# Patient Record
Sex: FEMALE | Race: OTHER | HISPANIC OR LATINO | URBAN - METROPOLITAN AREA
[De-identification: names, ages, dates, MRNs, and addresses within clinical notes are randomized per-mention and may not be internally consistent; named-entity substitution may affect disease eponyms.]

---

## 2017-01-01 ENCOUNTER — OUTPATIENT (OUTPATIENT)
Dept: OUTPATIENT SERVICES | Facility: HOSPITAL | Age: 57
LOS: 1 days | End: 2017-01-01
Payer: MEDICAID

## 2017-01-01 DIAGNOSIS — Z90.49 ACQUIRED ABSENCE OF OTHER SPECIFIED PARTS OF DIGESTIVE TRACT: Chronic | ICD-10-CM

## 2017-01-01 DIAGNOSIS — Z98.89 OTHER SPECIFIED POSTPROCEDURAL STATES: Chronic | ICD-10-CM

## 2017-03-01 PROCEDURE — G9001: CPT

## 2017-03-08 DIAGNOSIS — R69 ILLNESS, UNSPECIFIED: ICD-10-CM

## 2017-04-05 ENCOUNTER — OUTPATIENT (OUTPATIENT)
Dept: OUTPATIENT SERVICES | Facility: HOSPITAL | Age: 57
LOS: 1 days | End: 2017-04-05
Payer: MEDICAID

## 2017-04-05 ENCOUNTER — APPOINTMENT (OUTPATIENT)
Dept: CARDIOLOGY | Facility: HOSPITAL | Age: 57
End: 2017-04-05

## 2017-04-05 DIAGNOSIS — Z98.89 OTHER SPECIFIED POSTPROCEDURAL STATES: Chronic | ICD-10-CM

## 2017-04-05 DIAGNOSIS — R07.89 OTHER CHEST PAIN: ICD-10-CM

## 2017-04-05 DIAGNOSIS — R07.9 CHEST PAIN, UNSPECIFIED: ICD-10-CM

## 2017-04-05 DIAGNOSIS — Z90.49 ACQUIRED ABSENCE OF OTHER SPECIFIED PARTS OF DIGESTIVE TRACT: Chronic | ICD-10-CM

## 2017-04-05 PROCEDURE — 75574 CT ANGIO HRT W/3D IMAGE: CPT | Mod: 26

## 2017-04-05 PROCEDURE — 75574 CT ANGIO HRT W/3D IMAGE: CPT

## 2017-06-01 ENCOUNTER — OUTPATIENT (OUTPATIENT)
Dept: OUTPATIENT SERVICES | Facility: HOSPITAL | Age: 57
LOS: 1 days | End: 2017-06-01
Payer: MEDICAID

## 2017-06-01 DIAGNOSIS — Z98.89 OTHER SPECIFIED POSTPROCEDURAL STATES: Chronic | ICD-10-CM

## 2017-06-01 DIAGNOSIS — Z90.49 ACQUIRED ABSENCE OF OTHER SPECIFIED PARTS OF DIGESTIVE TRACT: Chronic | ICD-10-CM

## 2017-06-09 DIAGNOSIS — R69 ILLNESS, UNSPECIFIED: ICD-10-CM

## 2017-09-01 PROCEDURE — G9001: CPT

## 2018-04-10 ENCOUNTER — OUTPATIENT (OUTPATIENT)
Dept: OUTPATIENT SERVICES | Facility: HOSPITAL | Age: 58
LOS: 1 days | End: 2018-04-10
Payer: MEDICAID

## 2018-04-10 VITALS
SYSTOLIC BLOOD PRESSURE: 108 MMHG | TEMPERATURE: 99 F | RESPIRATION RATE: 18 BRPM | HEART RATE: 85 BPM | DIASTOLIC BLOOD PRESSURE: 63 MMHG | WEIGHT: 210.1 LBS | HEIGHT: 64 IN | OXYGEN SATURATION: 98 %

## 2018-04-10 DIAGNOSIS — Z98.89 OTHER SPECIFIED POSTPROCEDURAL STATES: Chronic | ICD-10-CM

## 2018-04-10 DIAGNOSIS — R06.02 SHORTNESS OF BREATH: ICD-10-CM

## 2018-04-10 DIAGNOSIS — R07.89 OTHER CHEST PAIN: ICD-10-CM

## 2018-04-10 DIAGNOSIS — Z90.49 ACQUIRED ABSENCE OF OTHER SPECIFIED PARTS OF DIGESTIVE TRACT: Chronic | ICD-10-CM

## 2018-04-10 LAB
ALBUMIN SERPL ELPH-MCNC: 4.1 G/DL — SIGNIFICANT CHANGE UP (ref 3.3–5)
ALP SERPL-CCNC: 110 U/L — SIGNIFICANT CHANGE UP (ref 40–120)
ALT FLD-CCNC: 15 U/L RC — SIGNIFICANT CHANGE UP (ref 10–45)
ANION GAP SERPL CALC-SCNC: 14 MMOL/L — SIGNIFICANT CHANGE UP (ref 5–17)
AST SERPL-CCNC: 13 U/L — SIGNIFICANT CHANGE UP (ref 10–40)
BILIRUB SERPL-MCNC: 0.4 MG/DL — SIGNIFICANT CHANGE UP (ref 0.2–1.2)
BUN SERPL-MCNC: 11 MG/DL — SIGNIFICANT CHANGE UP (ref 7–23)
CALCIUM SERPL-MCNC: 9.5 MG/DL — SIGNIFICANT CHANGE UP (ref 8.4–10.5)
CHLORIDE SERPL-SCNC: 103 MMOL/L — SIGNIFICANT CHANGE UP (ref 96–108)
CO2 SERPL-SCNC: 22 MMOL/L — SIGNIFICANT CHANGE UP (ref 22–31)
CREAT SERPL-MCNC: 0.52 MG/DL — SIGNIFICANT CHANGE UP (ref 0.5–1.3)
GLUCOSE BLDC GLUCOMTR-MCNC: 105 MG/DL — HIGH (ref 70–99)
GLUCOSE SERPL-MCNC: 117 MG/DL — HIGH (ref 70–99)
HCT VFR BLD CALC: 34.2 % — LOW (ref 34.5–45)
HGB BLD-MCNC: 11.4 G/DL — LOW (ref 11.5–15.5)
MCHC RBC-ENTMCNC: 27.6 PG — SIGNIFICANT CHANGE UP (ref 27–34)
MCHC RBC-ENTMCNC: 33.2 GM/DL — SIGNIFICANT CHANGE UP (ref 32–36)
MCV RBC AUTO: 83 FL — SIGNIFICANT CHANGE UP (ref 80–100)
PLATELET # BLD AUTO: 314 K/UL — SIGNIFICANT CHANGE UP (ref 150–400)
POTASSIUM SERPL-MCNC: 3.8 MMOL/L — SIGNIFICANT CHANGE UP (ref 3.5–5.3)
POTASSIUM SERPL-SCNC: 3.8 MMOL/L — SIGNIFICANT CHANGE UP (ref 3.5–5.3)
PROT SERPL-MCNC: 7.6 G/DL — SIGNIFICANT CHANGE UP (ref 6–8.3)
RBC # BLD: 4.12 M/UL — SIGNIFICANT CHANGE UP (ref 3.8–5.2)
RBC # FLD: 13.1 % — SIGNIFICANT CHANGE UP (ref 10.3–14.5)
SODIUM SERPL-SCNC: 139 MMOL/L — SIGNIFICANT CHANGE UP (ref 135–145)
WBC # BLD: 10.1 K/UL — SIGNIFICANT CHANGE UP (ref 3.8–10.5)
WBC # FLD AUTO: 10.1 K/UL — SIGNIFICANT CHANGE UP (ref 3.8–10.5)

## 2018-04-10 PROCEDURE — 99152 MOD SED SAME PHYS/QHP 5/>YRS: CPT

## 2018-04-10 PROCEDURE — 93458 L HRT ARTERY/VENTRICLE ANGIO: CPT | Mod: 26

## 2018-04-10 PROCEDURE — 82962 GLUCOSE BLOOD TEST: CPT

## 2018-04-10 PROCEDURE — 85027 COMPLETE CBC AUTOMATED: CPT

## 2018-04-10 PROCEDURE — C1887: CPT

## 2018-04-10 PROCEDURE — C1894: CPT

## 2018-04-10 PROCEDURE — C1769: CPT

## 2018-04-10 PROCEDURE — 93010 ELECTROCARDIOGRAM REPORT: CPT

## 2018-04-10 PROCEDURE — 93005 ELECTROCARDIOGRAM TRACING: CPT

## 2018-04-10 PROCEDURE — 93458 L HRT ARTERY/VENTRICLE ANGIO: CPT

## 2018-04-10 PROCEDURE — 80053 COMPREHEN METABOLIC PANEL: CPT

## 2018-04-10 RX ORDER — AZELASTINE HCL 0.05 %
1 DROPS OPHTHALMIC (EYE)
Qty: 0 | Refills: 0 | COMMUNITY

## 2018-04-10 RX ORDER — DULOXETINE HYDROCHLORIDE 30 MG/1
1 CAPSULE, DELAYED RELEASE ORAL
Qty: 0 | Refills: 0 | COMMUNITY

## 2018-04-10 RX ORDER — TIOTROPIUM BROMIDE 18 UG/1
1 CAPSULE ORAL; RESPIRATORY (INHALATION)
Qty: 0 | Refills: 0 | COMMUNITY

## 2018-04-10 RX ORDER — NITROGLYCERIN 6.5 MG
1 CAPSULE, EXTENDED RELEASE ORAL
Qty: 0 | Refills: 0 | COMMUNITY

## 2018-04-10 RX ORDER — ALBUTEROL 90 UG/1
2 AEROSOL, METERED ORAL
Qty: 0 | Refills: 0 | COMMUNITY

## 2018-04-10 RX ORDER — MONTELUKAST 4 MG/1
1 TABLET, CHEWABLE ORAL
Qty: 0 | Refills: 0 | COMMUNITY

## 2018-04-10 RX ORDER — FERROUS SULFATE 325(65) MG
1 TABLET ORAL
Qty: 0 | Refills: 0 | COMMUNITY

## 2018-04-10 RX ORDER — TIZANIDINE 4 MG/1
2 TABLET ORAL
Qty: 0 | Refills: 0 | COMMUNITY

## 2018-04-10 RX ORDER — COLCHICINE 0.6 MG
1 TABLET ORAL
Qty: 0 | Refills: 0 | COMMUNITY

## 2018-04-10 RX ORDER — BUDESONIDE AND FORMOTEROL FUMARATE DIHYDRATE 160; 4.5 UG/1; UG/1
2 AEROSOL RESPIRATORY (INHALATION)
Qty: 0 | Refills: 0 | COMMUNITY

## 2018-04-10 NOTE — H&P CARDIOLOGY - FAMILY HISTORY
Father  Still living? Unknown  Family history of heart disease, Age at diagnosis: 81-90     Mother  Still living? Yes, Estimated age: Age Unknown  Family history of hypertension, Age at diagnosis: 51-60     Sibling  Still living? Yes, Estimated age: 51-60  Family history of heart disease, Age at diagnosis: 41-50  Family history of diabetes mellitus type II, Age at diagnosis: 41-50

## 2018-04-10 NOTE — H&P CARDIOLOGY - PSH
H/O knee surgery  bilateral patella realignment  S/P bilateral breast reduction  age 22  S/P cholecystectomy  1993  S/P colon resection  surgery March 2013 with temporary colostomy til 9/13

## 2018-04-10 NOTE — H&P CARDIOLOGY - PMH
Asthma    Coronary artery disease    Diabetes mellitus type 2 in obese    Diverticular disease    Fibromyalgia    Hyperlipidemia    Hypertension    Obstructive sleep apnea of adult    Peripheral neuropathy

## 2018-04-10 NOTE — H&P CARDIOLOGY - HISTORY OF PRESENT ILLNESS
57 yr old with PMHx of DM2(Hgba1c-unknown,managed by PMD), HTN, CAD (mild to small vessel disease), Cardiac Catheterization in 2014 documenting mild CAD and occasional ventricular ectopy. Pt reports her palpitations have significantly increased despite maximum medical therapy. Holter monitor showed frequent Ventricular ectopy with pt having over 3000 PVC's over a 24 hour period on Metoprolol 100 mg BID. Pt also experiences chest pain. Pt was referred for Cardiac Cath by Dr Mccloud. 57 yr old with PMHx of DM2(Hgba1c-unknown,managed by PMD), HTN, CAD (mild to small vessel disease), MYLES-uses CPAP, Cardiac Catheterization in 2014 documenting mild CAD and occasional ventricular ectopy. Pt reports her palpitations have significantly increased despite maximum medical therapy. Holter monitor showed frequent Ventricular ectopy with pt having over 3000 PVC's over a 24 hour period on Metoprolol 100 mg BID. Pt also experiences chest pain. Pt was referred for Cardiac Cath by Dr Mccloud. 57 yr old with PMHx of DM2(Rjmn2m-1.7,managed by Endo @ [facility]), HTN, CAD (mild to small vessel disease), MYLES-uses CPAP, Cardiac Catheterization in 2014 documenting mild CAD and occasional ventricular ectopy. Pt reports her palpitations have significantly increased despite maximum medical therapy. Holter monitor showed frequent Ventricular ectopy with pt having over 3000 PVC's over a 24 hour period on Metoprolol 100 mg BID. Pt also experiences chest pain. Pt was referred for Cardiac Cath by Dr Mccloud.

## 2018-04-24 ENCOUNTER — TRANSCRIPTION ENCOUNTER (OUTPATIENT)
Age: 58
End: 2018-04-24

## 2018-04-27 ENCOUNTER — APPOINTMENT (OUTPATIENT)
Dept: ELECTROPHYSIOLOGY | Facility: CLINIC | Age: 58
End: 2018-04-27
Payer: MEDICAID

## 2018-04-27 ENCOUNTER — NON-APPOINTMENT (OUTPATIENT)
Age: 58
End: 2018-04-27

## 2018-04-27 VITALS
HEIGHT: 64 IN | BODY MASS INDEX: 35.85 KG/M2 | HEART RATE: 87 BPM | DIASTOLIC BLOOD PRESSURE: 76 MMHG | SYSTOLIC BLOOD PRESSURE: 111 MMHG | WEIGHT: 210 LBS | OXYGEN SATURATION: 98 %

## 2018-04-27 DIAGNOSIS — R26.89 OTHER ABNORMALITIES OF GAIT AND MOBILITY: ICD-10-CM

## 2018-04-27 DIAGNOSIS — I49.9 CARDIAC ARRHYTHMIA, UNSPECIFIED: ICD-10-CM

## 2018-04-27 DIAGNOSIS — Z87.19 PERSONAL HISTORY OF OTHER DISEASES OF THE DIGESTIVE SYSTEM: ICD-10-CM

## 2018-04-27 DIAGNOSIS — Z86.19 PERSONAL HISTORY OF OTHER INFECTIOUS AND PARASITIC DISEASES: ICD-10-CM

## 2018-04-27 DIAGNOSIS — D16.00 BENIGN NEOPLASM OF SCAPULA AND LONG BONES OF UNSPECIFIED UPPER LIMB: ICD-10-CM

## 2018-04-27 DIAGNOSIS — Z98.890 OTHER SPECIFIED POSTPROCEDURAL STATES: ICD-10-CM

## 2018-04-27 PROCEDURE — 99205 OFFICE O/P NEW HI 60 MIN: CPT

## 2018-04-27 PROCEDURE — 93000 ELECTROCARDIOGRAM COMPLETE: CPT

## 2018-04-27 RX ORDER — POLYVINYL ALCOHOL 14 MG/ML
1.4 SOLUTION/ DROPS OPHTHALMIC
Refills: 0 | Status: ACTIVE | COMMUNITY

## 2018-11-05 NOTE — H&P CARDIOLOGY - BSA (M2)
Render Post-Care Instructions In Note?: no Medical Necessity Information: It is in your best interest to select a reason for this procedure from the list below. All of these items fulfill various CMS LCD requirements except the new and changing color options. Include Z78.9 (Other Specified Conditions Influencing Health Status) As An Associated Diagnosis?: Yes Post-Care Instructions: I reviewed with the patient in detail post-care instructions. Patient is to wear sunprotection, and avoid picking at any of the treated lesions. Pt may apply Vaseline to crusted or scabbing areas. Pared With?: 15 blade Number Of Freeze-Thaw Cycles: 2 freeze-thaw cycles Detail Level: Detailed Consent: The patient's consent was obtained including but not limited to risks of crusting, scabbing, blistering, scarring, darker or lighter pigmentary change, recurrence, incomplete removal and infection. Medical Necessity Clause: This procedure was medically necessary because the lesions that were treated were: painful, pruritic and spreading 1.99

## 2019-01-31 ENCOUNTER — APPOINTMENT (OUTPATIENT)
Dept: ELECTROPHYSIOLOGY | Facility: CLINIC | Age: 59
End: 2019-01-31
Payer: MEDICAID

## 2019-01-31 ENCOUNTER — NON-APPOINTMENT (OUTPATIENT)
Age: 59
End: 2019-01-31

## 2019-01-31 VITALS
BODY MASS INDEX: 33.63 KG/M2 | WEIGHT: 197 LBS | OXYGEN SATURATION: 98 % | SYSTOLIC BLOOD PRESSURE: 129 MMHG | HEIGHT: 64 IN | HEART RATE: 83 BPM | DIASTOLIC BLOOD PRESSURE: 74 MMHG

## 2019-01-31 DIAGNOSIS — I49.3 VENTRICULAR PREMATURE DEPOLARIZATION: ICD-10-CM

## 2019-01-31 PROCEDURE — 93000 ELECTROCARDIOGRAM COMPLETE: CPT

## 2019-01-31 PROCEDURE — 99215 OFFICE O/P EST HI 40 MIN: CPT

## 2019-01-31 RX ORDER — IBUPROFEN 600 MG/1
600 TABLET, FILM COATED ORAL
Refills: 0 | Status: DISCONTINUED | COMMUNITY
End: 2019-01-31

## 2019-01-31 RX ORDER — MIRTAZAPINE 15 MG/1
15 TABLET, FILM COATED ORAL
Refills: 0 | Status: DISCONTINUED | COMMUNITY
End: 2019-01-31

## 2019-01-31 RX ORDER — CHLORHEXIDINE GLUCONATE 4 %
325 (65 FE) LIQUID (ML) TOPICAL
Refills: 0 | Status: DISCONTINUED | COMMUNITY
End: 2019-01-31

## 2019-01-31 RX ORDER — EVOLOCUMAB 140 MG/ML
140 INJECTION, SOLUTION SUBCUTANEOUS
Refills: 0 | Status: DISCONTINUED | COMMUNITY
End: 2019-01-31

## 2019-01-31 RX ORDER — DICLOFENAC SODIUM 10 MG/G
1 GEL TOPICAL
Refills: 0 | Status: DISCONTINUED | COMMUNITY
End: 2019-01-31

## 2019-01-31 RX ORDER — CELECOXIB 200 MG/1
200 CAPSULE ORAL
Refills: 0 | Status: DISCONTINUED | COMMUNITY
End: 2019-01-31

## 2019-01-31 RX ORDER — AZELASTINE HYDROCHLORIDE 0.5 MG/ML
0.05 SOLUTION/ DROPS OPHTHALMIC
Refills: 0 | Status: DISCONTINUED | COMMUNITY
End: 2019-01-31

## 2019-01-31 RX ORDER — ATORVASTATIN CALCIUM 40 MG/1
40 TABLET, FILM COATED ORAL
Refills: 0 | Status: DISCONTINUED | COMMUNITY
End: 2019-01-31

## 2019-01-31 RX ORDER — CHOLESTYRAMINE 4 G/9G
4 POWDER, FOR SUSPENSION ORAL
Refills: 0 | Status: DISCONTINUED | COMMUNITY
End: 2019-01-31

## 2019-01-31 RX ORDER — TIZANIDINE 4 MG/1
4 TABLET ORAL
Refills: 0 | Status: DISCONTINUED | COMMUNITY
End: 2019-01-31

## 2019-02-01 PROBLEM — I49.3 PVC (PREMATURE VENTRICULAR CONTRACTION): Status: ACTIVE | Noted: 2019-02-01

## 2019-02-01 NOTE — PHYSICAL EXAM
[General Appearance - Well Developed] : well developed [Normal Appearance] : normal appearance [Well Groomed] : well groomed [General Appearance - Well Nourished] : well nourished [No Deformities] : no deformities [General Appearance - In No Acute Distress] : no acute distress [Normal Conjunctiva] : the conjunctiva exhibited no abnormalities [Eyelids - No Xanthelasma] : the eyelids demonstrated no xanthelasmas [Normal Oral Mucosa] : normal oral mucosa [No Oral Pallor] : no oral pallor [No Oral Cyanosis] : no oral cyanosis [Normal Jugular Venous A Waves Present] : normal jugular venous A waves present [Normal Jugular Venous V Waves Present] : normal jugular venous V waves present [No Jugular Venous Crow A Waves] : no jugular venous crow A waves [Respiration, Rhythm And Depth] : normal respiratory rhythm and effort [Exaggerated Use Of Accessory Muscles For Inspiration] : no accessory muscle use [Auscultation Breath Sounds / Voice Sounds] : lungs were clear to auscultation bilaterally [Heart Rate And Rhythm] : heart rate and rhythm were normal [Heart Sounds] : normal S1 and S2 [Murmurs] : no murmurs present [Abdomen Soft] : soft [Abdomen Tenderness] : non-tender [Abdomen Mass (___ Cm)] : no abdominal mass palpated [Abnormal Walk] : normal gait [Gait - Sufficient For Exercise Testing] : the gait was sufficient for exercise testing [Nail Clubbing] : no clubbing of the fingernails [Cyanosis, Localized] : no localized cyanosis [Petechial Hemorrhages (___cm)] : no petechial hemorrhages [Skin Color & Pigmentation] : normal skin color and pigmentation [] : no rash [No Venous Stasis] : no venous stasis [Skin Lesions] : no skin lesions [No Skin Ulcers] : no skin ulcer [No Xanthoma] : no  xanthoma was observed [Oriented To Time, Place, And Person] : oriented to person, place, and time [Affect] : the affect was normal [Mood] : the mood was normal [No Anxiety] : not feeling anxious

## 2019-02-01 NOTE — HISTORY OF PRESENT ILLNESS
[FreeTextEntry1] : Palpitations/fluttering worse when laying down. If it occurs while standing she may get lightheaded or dizzy.  No syncope or collapse.  May also get chest pain.  She has tried metoprolol with mild relief.  For progressive symptoms she presents today to revisit the idea of ablation. She exercises/swims a few times a week, but has not been consistent.  She does get symptoms with exercise.

## 2019-02-01 NOTE — DISCUSSION/SUMMARY
[FreeTextEntry1] : Progressive symptoms on beta blocker therapy.  We discussed options for therapy.  As noted at her prior visit with Dr. Grayson, she was reminded success for ablation would be dependent on her ability to demonstrate PVCs in the lab.  All of her questions were answered and an attempt at ablation will be arranged.

## 2019-02-25 ENCOUNTER — TRANSCRIPTION ENCOUNTER (OUTPATIENT)
Age: 59
End: 2019-02-25

## 2019-02-25 ENCOUNTER — INPATIENT (INPATIENT)
Facility: HOSPITAL | Age: 59
LOS: 0 days | Discharge: HOME | DRG: 247 | End: 2019-02-26
Attending: INTERNAL MEDICINE | Admitting: INTERNAL MEDICINE
Payer: MEDICAID

## 2019-02-25 VITALS
OXYGEN SATURATION: 98 % | TEMPERATURE: 99 F | HEART RATE: 70 BPM | DIASTOLIC BLOOD PRESSURE: 76 MMHG | RESPIRATION RATE: 18 BRPM | HEIGHT: 65 IN | WEIGHT: 186.95 LBS | SYSTOLIC BLOOD PRESSURE: 143 MMHG

## 2019-02-25 DIAGNOSIS — Z90.49 ACQUIRED ABSENCE OF OTHER SPECIFIED PARTS OF DIGESTIVE TRACT: Chronic | ICD-10-CM

## 2019-02-25 DIAGNOSIS — Z98.89 OTHER SPECIFIED POSTPROCEDURAL STATES: Chronic | ICD-10-CM

## 2019-02-25 DIAGNOSIS — I20.0 UNSTABLE ANGINA: ICD-10-CM

## 2019-02-25 DIAGNOSIS — R06.89 OTHER ABNORMALITIES OF BREATHING: ICD-10-CM

## 2019-02-25 LAB
GLUCOSE BLDC GLUCOMTR-MCNC: 113 MG/DL — HIGH (ref 70–99)
GLUCOSE BLDC GLUCOMTR-MCNC: 127 MG/DL — HIGH (ref 70–99)

## 2019-02-25 PROCEDURE — 93010 ELECTROCARDIOGRAM REPORT: CPT

## 2019-02-25 PROCEDURE — 93010 ELECTROCARDIOGRAM REPORT: CPT | Mod: 77

## 2019-02-25 PROCEDURE — 99152 MOD SED SAME PHYS/QHP 5/>YRS: CPT | Mod: GC

## 2019-02-25 PROCEDURE — 92928 PRQ TCAT PLMT NTRAC ST 1 LES: CPT | Mod: LD,GC

## 2019-02-25 PROCEDURE — 93454 CORONARY ARTERY ANGIO S&I: CPT | Mod: 26,59,GC

## 2019-02-25 RX ORDER — DEXTROSE 50 % IN WATER 50 %
25 SYRINGE (ML) INTRAVENOUS ONCE
Qty: 0 | Refills: 0 | Status: DISCONTINUED | OUTPATIENT
Start: 2019-02-25 | End: 2019-02-26

## 2019-02-25 RX ORDER — CETIRIZINE HYDROCHLORIDE 10 MG/1
1 TABLET ORAL
Qty: 0 | Refills: 0 | COMMUNITY

## 2019-02-25 RX ORDER — INSULIN GLARGINE 100 [IU]/ML
18 INJECTION, SOLUTION SUBCUTANEOUS AT BEDTIME
Qty: 0 | Refills: 0 | Status: DISCONTINUED | OUTPATIENT
Start: 2019-02-25 | End: 2019-02-26

## 2019-02-25 RX ORDER — OMEGA-3 ACID ETHYL ESTERS 1 G
1 CAPSULE ORAL
Qty: 0 | Refills: 0 | COMMUNITY

## 2019-02-25 RX ORDER — METOPROLOL TARTRATE 50 MG
1 TABLET ORAL
Qty: 0 | Refills: 0 | COMMUNITY

## 2019-02-25 RX ORDER — DICLOFENAC SODIUM 30 MG/G
0 GEL TOPICAL
Qty: 0 | Refills: 0 | COMMUNITY

## 2019-02-25 RX ORDER — METOPROLOL TARTRATE 50 MG
25 TABLET ORAL
Qty: 0 | Refills: 0 | Status: DISCONTINUED | OUTPATIENT
Start: 2019-02-25 | End: 2019-02-26

## 2019-02-25 RX ORDER — SITAGLIPTIN 50 MG/1
1 TABLET, FILM COATED ORAL
Qty: 0 | Refills: 0 | COMMUNITY

## 2019-02-25 RX ORDER — INSULIN LISPRO 100/ML
VIAL (ML) SUBCUTANEOUS AT BEDTIME
Qty: 0 | Refills: 0 | Status: DISCONTINUED | OUTPATIENT
Start: 2019-02-25 | End: 2019-02-26

## 2019-02-25 RX ORDER — OMEPRAZOLE 10 MG/1
1 CAPSULE, DELAYED RELEASE ORAL
Qty: 0 | Refills: 0 | COMMUNITY

## 2019-02-25 RX ORDER — ASPIRIN/CALCIUM CARB/MAGNESIUM 324 MG
81 TABLET ORAL DAILY
Qty: 0 | Refills: 0 | Status: DISCONTINUED | OUTPATIENT
Start: 2019-02-25 | End: 2019-02-26

## 2019-02-25 RX ORDER — INSULIN LISPRO 100/ML
VIAL (ML) SUBCUTANEOUS
Qty: 0 | Refills: 0 | Status: DISCONTINUED | OUTPATIENT
Start: 2019-02-25 | End: 2019-02-26

## 2019-02-25 RX ORDER — FAMOTIDINE 10 MG/ML
20 INJECTION INTRAVENOUS DAILY
Qty: 0 | Refills: 0 | Status: DISCONTINUED | OUTPATIENT
Start: 2019-02-25 | End: 2019-02-26

## 2019-02-25 RX ORDER — ZOLPIDEM TARTRATE 10 MG/1
5 TABLET ORAL AT BEDTIME
Qty: 0 | Refills: 0 | Status: DISCONTINUED | OUTPATIENT
Start: 2019-02-25 | End: 2019-02-26

## 2019-02-25 RX ORDER — MIRTAZAPINE 45 MG/1
1 TABLET, ORALLY DISINTEGRATING ORAL
Qty: 0 | Refills: 0 | COMMUNITY

## 2019-02-25 RX ORDER — VALSARTAN 80 MG/1
1 TABLET ORAL
Qty: 0 | Refills: 0 | COMMUNITY

## 2019-02-25 RX ORDER — DEXTROSE 50 % IN WATER 50 %
15 SYRINGE (ML) INTRAVENOUS ONCE
Qty: 0 | Refills: 0 | Status: DISCONTINUED | OUTPATIENT
Start: 2019-02-25 | End: 2019-02-26

## 2019-02-25 RX ORDER — MAGNESIUM OXIDE 400 MG ORAL TABLET 241.3 MG
1 TABLET ORAL
Qty: 0 | Refills: 0 | COMMUNITY

## 2019-02-25 RX ORDER — MONTELUKAST 4 MG/1
1 TABLET, CHEWABLE ORAL
Qty: 0 | Refills: 0 | COMMUNITY

## 2019-02-25 RX ORDER — DEXTROSE 50 % IN WATER 50 %
12.5 SYRINGE (ML) INTRAVENOUS ONCE
Qty: 0 | Refills: 0 | Status: DISCONTINUED | OUTPATIENT
Start: 2019-02-25 | End: 2019-02-26

## 2019-02-25 RX ORDER — ATORVASTATIN CALCIUM 80 MG/1
80 TABLET, FILM COATED ORAL AT BEDTIME
Qty: 0 | Refills: 0 | Status: DISCONTINUED | OUTPATIENT
Start: 2019-02-25 | End: 2019-02-25

## 2019-02-25 RX ORDER — TRAZODONE HCL 50 MG
50 TABLET ORAL AT BEDTIME
Qty: 0 | Refills: 0 | Status: DISCONTINUED | OUTPATIENT
Start: 2019-02-25 | End: 2019-02-26

## 2019-02-25 RX ORDER — SODIUM CHLORIDE 9 MG/ML
1000 INJECTION, SOLUTION INTRAVENOUS
Qty: 0 | Refills: 0 | Status: DISCONTINUED | OUTPATIENT
Start: 2019-02-25 | End: 2019-02-26

## 2019-02-25 RX ORDER — ALLOPURINOL 300 MG
100 TABLET ORAL
Qty: 0 | Refills: 0 | Status: DISCONTINUED | OUTPATIENT
Start: 2019-02-25 | End: 2019-02-26

## 2019-02-25 RX ORDER — CLOPIDOGREL BISULFATE 75 MG/1
75 TABLET, FILM COATED ORAL DAILY
Qty: 0 | Refills: 0 | Status: DISCONTINUED | OUTPATIENT
Start: 2019-02-25 | End: 2019-02-26

## 2019-02-25 RX ORDER — GLUCAGON INJECTION, SOLUTION 0.5 MG/.1ML
1 INJECTION, SOLUTION SUBCUTANEOUS ONCE
Qty: 0 | Refills: 0 | Status: DISCONTINUED | OUTPATIENT
Start: 2019-02-25 | End: 2019-02-26

## 2019-02-25 RX ORDER — FAMOTIDINE 10 MG/ML
1 INJECTION INTRAVENOUS
Qty: 0 | Refills: 0 | COMMUNITY

## 2019-02-25 RX ORDER — INSULIN GLARGINE 100 [IU]/ML
16 INJECTION, SOLUTION SUBCUTANEOUS
Qty: 0 | Refills: 0 | COMMUNITY

## 2019-02-25 RX ADMIN — INSULIN GLARGINE 18 UNIT(S): 100 INJECTION, SOLUTION SUBCUTANEOUS at 22:19

## 2019-02-25 RX ADMIN — Medication 25 MILLIGRAM(S): at 18:17

## 2019-02-25 RX ADMIN — Medication 100 MILLIGRAM(S): at 18:17

## 2019-02-25 NOTE — PROVIDER CONTACT NOTE (OTHER) - ACTION/TREATMENT ORDERED:
pt not on isolation precautions, if pt presents with loose stool/diarrhea, notify Infection Prevention immediately.

## 2019-02-25 NOTE — CHART NOTE - NSCHARTNOTEFT_GEN_A_CORE
Patient underwent a PCI procedure and is being admitted as they are at increased risk for major adverse cardiac and vascular events if discharged due to the following high risk characteristics:      Pre- Procedural Clinical Criteria  Unstable angina     Admit- patient underwent a PCI procedure and is being admitted due to high risk characteristicts and is considered to be at an increased risk of major adverse cardiovascular events if discharged at this time   -prox LAD lesion

## 2019-02-25 NOTE — CHART NOTE - NSCHARTNOTEFT_GEN_A_CORE
Patient is on Repatha injectible at home for hyperlipidemia, Lipitor 80 mg PO d/c'd in [place].                  Romeo Spencer, [facility]    [phone]

## 2019-02-25 NOTE — PROGRESS NOTE ADULT - SUBJECTIVE AND OBJECTIVE BOX
1- Hour Post Removal of Right Radial Band Assessment of Access Site    Vital signs are stable, neuro-vascular status of the upper extremities is intact, stable and there is no evidence of hematoma on the right upper extremity.     Complications: NONE     Comments:

## 2019-02-25 NOTE — H&P CARDIOLOGY - HISTORY OF PRESENT ILLNESS
58 yr old with PMHx of DM2,  HTN, CAD (mild to small vessel disease), HLD, MYLES-uses CPAP, presents to [facility] on 2/23/19 with complaints of chest pain. Pt reports chest pain for one day and pain in the back and radiated to the chest and down the left arm, associated with SOB.   Cardiac Cath-  Pt was admitted on telemetry for ACS work up. Trop x 3 WNL.   Lipase on admission was 345, normalized to 295 next day, and then 152 two days later. GI Consulted and CT Abdomen done showed small bowel containing left lower quadrant abdominal hernia without evidence of   Pt endorsed diarrhea on admission which resolved the next day. Cdiff was positive, however pt always has positive result. Pt spoke to Dr Gregg (her GI doctor) and was recommended for no treatment as Cdiff will always be positive. Currently pt does not have any symptoms of diarrhea, fever, chills, abdominal pain. Pt is follow up with GI speacialist at [facility] for possible fecal transplant. 58 yr old with PMHx of DM2,  HTN, CAD (mild to small vessel disease), HLD, MYLES-uses CPAP, presents to [facility] on 2/23/19 with complaints of chest pain. Pt reports chest pain for one day and pain in the back and radiated to the chest and down the left arm, associated with SOB.   Cardiac Cath-4/2018 40% LAD.   Pt was admitted on telemetry for ACS work up. Trop x 3 WNL.     Lipase on admission was 345, normalized to 295 next day, and then 152 two days later. GI Consulted and CT Abdomen done showed small bowel containing left lower quadrant abdominal hernia without evidence of bowel obstruction. Fat containing supraumbilical and left periumbilical hernias noted.     Pt endorsed diarrhea on admission which resolved the next day. Cdiff was positive, however pt always has positive result. Pt spoke to Dr Gregg (her GI doctor) and was recommended for no treatment as Cdiff will always be positive. Currently pt does not have any symptoms of diarrhea, fever, chills, abdominal pain. Pt is follow up with GI speacialist at [facility] for possible fecal transplant. Pt was in isolation room in [facility].     Echo 2/24/19 showed LVEF 60-65%, No regional wall motion abnormalities, Trace Mitral valve regurgitation, mildly thickened mitral valve    Pt was transferred to [facility] for Cardiac Cath. 58 yr old with PMHx of DM2,  HTN, CAD (mild to small vessel disease), HLD, MYLES-does not use CPAP, presents to [facility] on 2/23/19 with complaints of chest pain. Pt reports chest pain for one day and pain in the back and radiated to the chest and down the left arm, associated with SOB.   Cardiac Cath-4/2018 40% LAD.   Pt was admitted on telemetry for ACS work up. Trop x 3 WNL.     Lipase on admission was 345, normalized to 295 next day, and then 152 two days later. GI Consulted and CT Abdomen done showed small bowel containing left lower quadrant abdominal hernia without evidence of bowel obstruction. Fat containing supraumbilical and left periumbilical hernias noted.     Pt endorsed diarrhea on admission which resolved the next day. Cdiff was positive, however pt always has positive result. Pt spoke to Dr Gregg (her GI doctor) and was recommended for no treatment as Cdiff will always be positive. Currently pt does not have any symptoms of diarrhea, fever, chills, abdominal pain. Pt is follow up with GI speacialist at [facility] for possible fecal transplant. Pt was in isolation room in [facility].     Echo 2/24/19 showed LVEF 60-65%, No regional wall motion abnormalities, Trace Mitral valve regurgitation, mildly thickened mitral valve    Pt was transferred to [facility] for Cardiac Cath. 58 yr old with PMHx of DM2 (Dr Griffith-Dawson, controlled),  HTN, CAD (mild to small vessel disease), HLD, MYLES-does not use CPAP, presents to [facility] on 2/23/19 with complaints of chest pain. Pt reports chest pain for one day and pain in the back and radiated to the chest and down the left arm, associated with SOB.   Cardiac Cath-4/2018 40% LAD.   Pt was admitted on telemetry for ACS work up. Trop x 3 WNL.     Lipase on admission was 345, normalized to 295 next day, and then 152 two days later. GI Consulted and CT Abdomen done showed small bowel containing left lower quadrant abdominal hernia without evidence of bowel obstruction. Fat containing supraumbilical and left periumbilical hernias noted.     Pt endorsed diarrhea on admission which resolved the next day. Cdiff was positive, however pt always has positive result. Pt spoke to Dr Gregg (her GI doctor) and was recommended for no treatment as Cdiff will always be positive. Currently pt does not have any symptoms of diarrhea, fever, chills, abdominal pain. Pt is follow up with GI speacialist at [facility] for possible fecal transplant. Pt was in isolation room in [facility].     Echo 2/24/19 showed LVEF 60-65%, No regional wall motion abnormalities, Trace Mitral valve regurgitation, mildly thickened mitral valve    Pt was transferred to [facility] for Cardiac Cath. 58 yr old with PMHx of DM2 (Dr Griffith-Dawson, controlled, [id number]-3.7 2/21/19),  HTN, CAD (mild to small vessel disease), HLD, MYLES-does not use CPAP, presents to [facility] on 2/23/19 with complaints of chest pain. Pt reports chest pain for one day and pain in the back and radiated to the chest and down the left arm, associated with SOB.   Cardiac Cath-4/2018 40% LAD.   Pt was admitted on telemetry for ACS work up. Trop x 3 WNL.     Lipase on admission was 345, normalized to 295 next day, and then 152 two days later. GI Consulted and CT Abdomen done showed small bowel containing left lower quadrant abdominal hernia without evidence of bowel obstruction. Fat containing supraumbilical and left periumbilical hernias noted.     Pt endorsed diarrhea on admission which resolved the next day. Cdiff was positive, however pt always has positive result. Pt spoke to Dr Gregg (her GI doctor) and was recommended for no treatment as Cdiff will always be positive. Currently pt does not have any symptoms of diarrhea, fever, chills, abdominal pain. Pt is follow up with GI speacialist at [facility] for possible fecal transplant. Pt was in isolation room in [facility].     Echo 2/24/19 showed LVEF 60-65%, No regional wall motion abnormalities, Trace Mitral valve regurgitation, mildly thickened mitral valve    Pt was transferred to [facility] for Cardiac Cath.

## 2019-02-25 NOTE — PROVIDER CONTACT NOTE (OTHER) - REASON
pt is transferred from [facility], positive for c-diff, but doesn't need to be on isolation as per Infection Prevention

## 2019-02-25 NOTE — PROVIDER CONTACT NOTE (OTHER) - SITUATION
Pt is positive for c-diff, according to the transfer documentation from [facility], pt always has a positive c-diff result regardless of her not having any symptoms.

## 2019-02-25 NOTE — PROVIDER CONTACT NOTE (OTHER) - RECOMMENDATIONS
Spoke to Nisreen from Infection Prevention and Nisreen suggested that the is probably colonized and does not need to be on isolation precautions.

## 2019-02-25 NOTE — PATIENT PROFILE ADULT - NSPROMUTANXFEARFT_GEN_A_NUR
Pt's wife calling for UA results from 3-16-17.    Was done to see if abx had cleared the UTI.    Pt denies having any UTI s/s.    Please advise, thanks.   none

## 2019-02-26 VITALS
SYSTOLIC BLOOD PRESSURE: 161 MMHG | OXYGEN SATURATION: 99 % | TEMPERATURE: 98 F | RESPIRATION RATE: 17 BRPM | DIASTOLIC BLOOD PRESSURE: 89 MMHG | HEART RATE: 70 BPM

## 2019-02-26 DIAGNOSIS — E11.69 TYPE 2 DIABETES MELLITUS WITH OTHER SPECIFIED COMPLICATION: ICD-10-CM

## 2019-02-26 DIAGNOSIS — I10 ESSENTIAL (PRIMARY) HYPERTENSION: ICD-10-CM

## 2019-02-26 DIAGNOSIS — I25.118 ATHEROSCLEROTIC HEART DISEASE OF NATIVE CORONARY ARTERY WITH OTHER FORMS OF ANGINA PECTORIS: ICD-10-CM

## 2019-02-26 DIAGNOSIS — E78.5 HYPERLIPIDEMIA, UNSPECIFIED: ICD-10-CM

## 2019-02-26 LAB
ANION GAP SERPL CALC-SCNC: 15 MMOL/L — SIGNIFICANT CHANGE UP (ref 5–17)
BASOPHILS # BLD AUTO: 0.1 K/UL — SIGNIFICANT CHANGE UP (ref 0–0.2)
BASOPHILS NFR BLD AUTO: 0.7 % — SIGNIFICANT CHANGE UP (ref 0–2)
BUN SERPL-MCNC: 13 MG/DL — SIGNIFICANT CHANGE UP (ref 7–23)
CALCIUM SERPL-MCNC: 9.8 MG/DL — SIGNIFICANT CHANGE UP (ref 8.4–10.5)
CHLORIDE SERPL-SCNC: 99 MMOL/L — SIGNIFICANT CHANGE UP (ref 96–108)
CO2 SERPL-SCNC: 23 MMOL/L — SIGNIFICANT CHANGE UP (ref 22–31)
CREAT SERPL-MCNC: 0.56 MG/DL — SIGNIFICANT CHANGE UP (ref 0.5–1.3)
EOSINOPHIL # BLD AUTO: 0.2 K/UL — SIGNIFICANT CHANGE UP (ref 0–0.5)
EOSINOPHIL NFR BLD AUTO: 2.7 % — SIGNIFICANT CHANGE UP (ref 0–6)
GLUCOSE BLDC GLUCOMTR-MCNC: 138 MG/DL — HIGH (ref 70–99)
GLUCOSE SERPL-MCNC: 154 MG/DL — HIGH (ref 70–99)
HBA1C BLD-MCNC: 6.8 % — HIGH (ref 4–5.6)
HCT VFR BLD CALC: 34.2 % — LOW (ref 34.5–45)
HGB BLD-MCNC: 11.9 G/DL — SIGNIFICANT CHANGE UP (ref 11.5–15.5)
LYMPHOCYTES # BLD AUTO: 3.2 K/UL — SIGNIFICANT CHANGE UP (ref 1–3.3)
LYMPHOCYTES # BLD AUTO: 35.9 % — SIGNIFICANT CHANGE UP (ref 13–44)
MCHC RBC-ENTMCNC: 28.2 PG — SIGNIFICANT CHANGE UP (ref 27–34)
MCHC RBC-ENTMCNC: 35 GM/DL — SIGNIFICANT CHANGE UP (ref 32–36)
MCV RBC AUTO: 80.5 FL — SIGNIFICANT CHANGE UP (ref 80–100)
MONOCYTES # BLD AUTO: 0.8 K/UL — SIGNIFICANT CHANGE UP (ref 0–0.9)
MONOCYTES NFR BLD AUTO: 9.4 % — SIGNIFICANT CHANGE UP (ref 2–14)
NEUTROPHILS # BLD AUTO: 4.6 K/UL — SIGNIFICANT CHANGE UP (ref 1.8–7.4)
NEUTROPHILS NFR BLD AUTO: 51.3 % — SIGNIFICANT CHANGE UP (ref 43–77)
PLATELET # BLD AUTO: 267 K/UL — SIGNIFICANT CHANGE UP (ref 150–400)
POTASSIUM SERPL-MCNC: 3.9 MMOL/L — SIGNIFICANT CHANGE UP (ref 3.5–5.3)
POTASSIUM SERPL-SCNC: 3.9 MMOL/L — SIGNIFICANT CHANGE UP (ref 3.5–5.3)
RBC # BLD: 4.24 M/UL — SIGNIFICANT CHANGE UP (ref 3.8–5.2)
RBC # FLD: 13.6 % — SIGNIFICANT CHANGE UP (ref 10.3–14.5)
SODIUM SERPL-SCNC: 137 MMOL/L — SIGNIFICANT CHANGE UP (ref 135–145)
WBC # BLD: 9 K/UL — SIGNIFICANT CHANGE UP (ref 3.8–10.5)
WBC # FLD AUTO: 9 K/UL — SIGNIFICANT CHANGE UP (ref 3.8–10.5)

## 2019-02-26 PROCEDURE — 99231 SBSQ HOSP IP/OBS SF/LOW 25: CPT

## 2019-02-26 RX ORDER — CLOPIDOGREL BISULFATE 75 MG/1
1 TABLET, FILM COATED ORAL
Qty: 90 | Refills: 3
Start: 2019-02-26 | End: 2020-02-20

## 2019-02-26 RX ORDER — CLOPIDOGREL BISULFATE 75 MG/1
1 TABLET, FILM COATED ORAL
Qty: 0 | Refills: 0 | COMMUNITY

## 2019-02-26 RX ORDER — ROSUVASTATIN CALCIUM 5 MG/1
1 TABLET ORAL
Qty: 0 | Refills: 0 | COMMUNITY

## 2019-02-26 RX ORDER — HEPARIN SODIUM 5000 [USP'U]/ML
0 INJECTION INTRAVENOUS; SUBCUTANEOUS
Qty: 0 | Refills: 0 | COMMUNITY

## 2019-02-26 RX ORDER — INSULIN GLARGINE 100 [IU]/ML
18 INJECTION, SOLUTION SUBCUTANEOUS
Qty: 0 | Refills: 0 | COMMUNITY

## 2019-02-26 RX ORDER — METFORMIN HYDROCHLORIDE 850 MG/1
1 TABLET ORAL
Qty: 0 | Refills: 0 | COMMUNITY

## 2019-02-26 RX ADMIN — Medication 100 MILLIGRAM(S): at 05:43

## 2019-02-26 RX ADMIN — Medication 81 MILLIGRAM(S): at 05:43

## 2019-02-26 RX ADMIN — Medication 25 MILLIGRAM(S): at 05:43

## 2019-02-26 RX ADMIN — CLOPIDOGREL BISULFATE 75 MILLIGRAM(S): 75 TABLET, FILM COATED ORAL at 05:43

## 2019-02-26 NOTE — DISCHARGE NOTE ADULT - PLAN OF CARE
Patient remains chest pain free and understands post cath discharge instructions. Do not stop your aspirin or Plavix unless instructed to do so by your cardiologist, they help keep your stented arteries open.   No heavy lifting or pushing/pulling with procedure arm for 2 weeks. No driving for 2 days. You may shower 24 hours following the procedure but avoid baths/swimming for 1 week. Check your wrist site for bleeding and/or swelling daily following procedure and call your doctor immediately if it occurs or if you experience increased pain at the site. Follow up with your cardiologist in 1-2 weeks. You may call [facility] if you have any questions/concerns regarding your procedure [phone]. Your blood pressure will be controlled. Continue with your blood pressure medications; eat a heart healthy diet with low salt diet; exercise regularly (consult with your physician or cardiologist first); maintain a heart healthy weight; if you smoke - quit (A resource to help you stop smoking is the [facility] for Tobacco Control – phone number [phone].); include healthy ways to manage stress. Continue to follow with your primary care physician or cardiologist. Your LDL cholesterol will be less than 70mg/dL Continue with your cholesterol medications. Eat a heart healthy diet that is low in saturated fats and salt, and includes whole grains, fruits, vegetables and lean protein; exercise regularly (consult with your physician or cardiologist first); maintain a heart healthy weight; if you smoke - quit (A resource to help you stop smoking is the [facility] for Tobacco Control – phone number [phone].). Continue to follow with your primary physician or cardiologist. Your hemoglobin A1C will be between 7-8 Your Hemoglobin A1c level is   Continue to follow with your primary care MD or your endocrinologist.  Follow a heart healthy diabetic diet. If you check your fingerstick glucose at home, call your MD if it is greater than 250mg/dL on 2 occasions or less than 100mg/dL on 2 occasions. Know signs of low blood sugar, such as: dizziness, shakiness, sweating, confusion, hunger, nervousness-drink 4 ounces apple juice if occurs and call your doctor. Know early signs of high blood sugar, such as: frequent urination, increased thirst, blurry vision, fatigue, headache - call your doctor if this occurs. Follow with other practitioners to care for your diabetes, such as ophthalmologist and podiatrist.

## 2019-02-26 NOTE — DISCHARGE NOTE ADULT - CARE PLAN
Principal Discharge DX:	Coronary artery disease of native artery of native heart with stable angina pectoris  Secondary Diagnosis:	Hypertension, unspecified type  Secondary Diagnosis:	Hyperlipidemia, unspecified hyperlipidemia type  Secondary Diagnosis:	Diabetes mellitus type 2 in obese Principal Discharge DX:	Coronary artery disease of native artery of native heart with stable angina pectoris  Goal:	Patient remains chest pain free and understands post cath discharge instructions.  Assessment and plan of treatment:	Do not stop your aspirin or Plavix unless instructed to do so by your cardiologist, they help keep your stented arteries open.   No heavy lifting or pushing/pulling with procedure arm for 2 weeks. No driving for 2 days. You may shower 24 hours following the procedure but avoid baths/swimming for 1 week. Check your wrist site for bleeding and/or swelling daily following procedure and call your doctor immediately if it occurs or if you experience increased pain at the site. Follow up with your cardiologist in 1-2 weeks. You may call [facility] if you have any questions/concerns regarding your procedure [phone].  Secondary Diagnosis:	Hypertension, unspecified type  Goal:	Your blood pressure will be controlled.  Assessment and plan of treatment:	Continue with your blood pressure medications; eat a heart healthy diet with low salt diet; exercise regularly (consult with your physician or cardiologist first); maintain a heart healthy weight; if you smoke - quit (A resource to help you stop smoking is the [facility] Tobacco [facility] – phone number [phone].); include healthy ways to manage stress. Continue to follow with your primary care physician or cardiologist.  Secondary Diagnosis:	Hyperlipidemia, unspecified hyperlipidemia type  Goal:	Your LDL cholesterol will be less than 70mg/dL  Assessment and plan of treatment:	Continue with your cholesterol medications. Eat a heart healthy diet that is low in saturated fats and salt, and includes whole grains, fruits, vegetables and lean protein; exercise regularly (consult with your physician or cardiologist first); maintain a heart healthy weight; if you smoke - quit (A resource to help you stop smoking is the [facility] for Tobacco Control – phone number [phone].). Continue to follow with your primary physician or cardiologist.  Secondary Diagnosis:	Diabetes mellitus type 2 in obese  Goal:	Your hemoglobin A1C will be between 7-8  Assessment and plan of treatment:	Your Hemoglobin A1c level is   Continue to follow with your primary care MD or your endocrinologist.  Follow a heart healthy diabetic diet. If you check your fingerstick glucose at home, call your MD if it is greater than 250mg/dL on 2 occasions or less than 100mg/dL on 2 occasions. Know signs of low blood sugar, such as: dizziness, shakiness, sweating, confusion, hunger, nervousness-drink 4 ounces apple juice if occurs and call your doctor. Know early signs of high blood sugar, such as: frequent urination, increased thirst, blurry vision, fatigue, headache - call your doctor if this occurs. Follow with other practitioners to care for your diabetes, such as ophthalmologist and podiatrist.

## 2019-02-26 NOTE — DISCHARGE NOTE ADULT - PATIENT PORTAL LINK FT
You can access the [facility] Patient Portal, offered by [facility], by registering with the following website: [email or web address]

## 2019-02-26 NOTE — DISCHARGE NOTE ADULT - MEDICATION SUMMARY - MEDICATIONS TO TAKE
I will START or STAY ON the medications listed below when I get home from the hospital:    aspirin 81 mg oral delayed release tablet  -- 1 tab(s) by mouth once a day  home/hospital  -- Indication: For Helping keep stented arteries open    Diovan 40 mg oral tablet  -- 1 tab(s) by mouth once a day  home  -- Indication: For Hypertension    traZODone 50 mg oral tablet  -- 1 tab(s) by mouth once a day (at bedtime)  home /hospital  -- Indication: For Depression    metFORMIN 1000 mg oral tablet  -- 1 tab(s) by mouth 2 times a day. DO NOT TAKE on 2/26 or 2/27, restart on 2/28.  -- Indication: For Diabetes type 2    Basaglar KwikPen 100 units/mL subcutaneous solution  -- 18  subcutaneous once a day (at bedtime)    home  -- Indication: For Diabetes type 2     Januvia 100 mg oral tablet  -- 1 tab(s) by mouth once a day  home  -- Indication: For Diabetes type 2    allopurinol 100 mg oral tablet  -- 1 tab(s) by mouth 2 times a day  home/hospital  -- Indication: For gout    colchicine 0.6 mg oral tablet  -- 1 tab(s) by mouth once a day, As Needed  -- Indication: For gout    ezetimibe 10 mg oral tablet  -- 1 tab(s) by mouth once a day  home/hospital  -- Indication: For Hyperlipidemia    evolocumab 140 mg/mL subcutaneous solution  -- subcutaneous every 2 weeks  home  -- Indication: For Hyperlipidemia    Plavix 75 mg oral tablet  -- 1 tab(s) by mouth once a day  hospital  -- Indication: For Helping keep stented arteries open    hydrOXYzine hydrochloride 10 mg oral tablet  -- 2 tab(s) by mouth 4 times a day, As Needed  home  -- Indication: For anxiety    Ambien 5 mg oral tablet  -- 1 tab(s) by mouth once a day (at bedtime)  home/hospital  -- Indication: For insomnia    metoprolol tartrate 25 mg oral tablet  -- 1 tab(s) by mouth 2 times a day  home/hospital  -- Indication: For Hypertension    albuterol 2.5 mg/3 mL (0.083%) inhalation solution  -- 3 milliliter(s) inhaled every 6 hours, As Needed  home/hospital  -- Indication: For CoPD    budesonide-formoterol 160 mcg-4.5 mcg/inh inhalation aerosol  -- 2 puff(s) inhaled 2 times a day, As Needed  -- Indication: For CoPD    dicyclomine 10 mg oral capsule  -- 1 cap(s) by mouth 3 times a day  home/hospital  -- Indication: For bowel    Pepcid 20 mg oral tablet  -- 1 tab(s) by mouth once a day  home/hospital  -- Indication: For reflux I will START or STAY ON the medications listed below when I get home from the hospital:    aspirin 81 mg oral delayed release tablet  -- 1 tab(s) by mouth once a day  home/hospital  -- Indication: For Helping keep stented arteries open    Diovan 40 mg oral tablet  -- 1 tab(s) by mouth once a day  home  -- Indication: For Hypertension    traZODone 50 mg oral tablet  -- 1 tab(s) by mouth once a day (at bedtime)  home /hospital  -- Indication: For Depression    metFORMIN 1000 mg oral tablet  -- 1 tab(s) by mouth 2 times a day. DO NOT TAKE on 2/26 or 2/27, restart on 2/28.  -- Indication: For Diabetes type 2    Basaglar KwikPen 100 units/mL subcutaneous solution  -- 18  subcutaneous once a day (at bedtime)    home  -- Indication: For Diabetes type 2     Januvia 100 mg oral tablet  -- 1 tab(s) by mouth once a day  home  -- Indication: For Diabetes type 2    allopurinol 100 mg oral tablet  -- 1 tab(s) by mouth 2 times a day  home/hospital  -- Indication: For gout    colchicine 0.6 mg oral tablet  -- 1 tab(s) by mouth once a day, As Needed  -- Indication: For gout    ezetimibe 10 mg oral tablet  -- 1 tab(s) by mouth once a day  home/hospital  -- Indication: For Hyperlipidemia    evolocumab 140 mg/mL subcutaneous solution  -- subcutaneous every 2 weeks  home  -- Indication: For Hyperlipidemia    Plavix 75 mg oral tablet  -- 1 tab(s) by mouth once a day  [facility] MDD:1  -- Indication: For Stent     Ambien 5 mg oral tablet  -- 1 tab(s) by mouth once a day (at bedtime)  home/hospital  -- Indication: For insomnia    hydrOXYzine hydrochloride 10 mg oral tablet  -- 2 tab(s) by mouth 4 times a day, As Needed  home  -- Indication: For anxiety    metoprolol tartrate 25 mg oral tablet  -- 1 tab(s) by mouth 2 times a day  home/hospital  -- Indication: For Hypertension    albuterol 2.5 mg/3 mL (0.083%) inhalation solution  -- 3 milliliter(s) inhaled every 6 hours, As Needed  home/hospital  -- Indication: For CoPD    budesonide-formoterol 160 mcg-4.5 mcg/inh inhalation aerosol  -- 2 puff(s) inhaled 2 times a day, As Needed  -- Indication: For CoPD    dicyclomine 10 mg oral capsule  -- 1 cap(s) by mouth 3 times a day  home/hospital  -- Indication: For bowel    Pepcid 20 mg oral tablet  -- 1 tab(s) by mouth once a day  home/hospital  -- Indication: For reflux

## 2019-02-26 NOTE — PROGRESS NOTE ADULT - ATTENDING COMMENTS
Romeo Spencer, [facility]    [phone]
Romeo Spencer, [facility]    [phone]

## 2019-02-26 NOTE — DISCHARGE NOTE ADULT - HOSPITAL COURSE
HPI:  58 yr old with PMHx of DM2 (Dr Griffith-Endo, controlled, [id number]-5.7 2/21/19),  HTN, CAD (mild to small vessel disease), HLD, MYLES-does not use CPAP, presents to [facility] on 2/23/19 with complaints of chest pain. Pt reports chest pain for one day and pain in the back and radiated to the chest and down the left arm, associated with SOB.   Cardiac Cath-4/2018 40% LAD.   Pt was admitted on telemetry for ACS work up. Trop x 3 WNL.     Lipase on admission was 345, normalized to 295 next day, and then 152 two days later. GI Consulted and CT Abdomen done showed small bowel containing left lower quadrant abdominal hernia without evidence of bowel obstruction. Fat containing supraumbilical and left periumbilical hernias noted.     Pt endorsed diarrhea on admission which resolved the next day. Cdiff was positive, however pt always has positive result. Pt spoke to Dr Gregg (her GI doctor) and was recommended for no treatment as Cdiff will always be positive. Currently pt does not have any symptoms of diarrhea, fever, chills, abdominal pain. Pt is follow up with GI speacialist at [facility] for possible fecal transplant. Pt was in isolation room in [facility].     Echo 2/24/19 showed LVEF 60-65%, No regional wall motion abnormalities, Trace Mitral valve regurgitation, mildly thickened mitral valve    Pt was transferred to [facility] for Cardiac Cath. (25 Feb 2019 15:02)    2/25 cardiac cath with 2 stents to the prox LAD. Right radial cath without swelling, bleeding. 58 yr old with PMHx of DM2 (Dr Griffith-Dawson, controlled, Uzqu9o-0.7 2/21/19),  HTN, CAD (mild to small vessel disease), HLD, MYLES-does not use CPAP, presents to [facility] on 2/23/19 with complaints of chest pain. Pt reports chest pain for one day and pain in the back and radiated to the chest and down the left arm, associated with SOB.   Cardiac Cath-4/2018 40% LAD.   Pt was admitted on telemetry for ACS work up. Trop x 3 WNL.     Lipase on admission was 345, normalized to 295 next day, and then 152 two days later. GI Consulted and CT Abdomen done showed small bowel containing left lower quadrant abdominal hernia without evidence of bowel obstruction. Fat containing supraumbilical and left periumbilical hernias noted.     Pt endorsed diarrhea on admission which resolved the next day. Cdiff was positive, however pt always has positive result. Pt spoke to Dr Gregg (her GI doctor) and was recommended for no treatment as Cdiff will always be positive. Currently pt does not have any symptoms of diarrhea, fever, chills, abdominal pain. Pt is follow up with GI speacialist at [facility] for possible fecal transplant. Pt was in isolation room in [facility].     Echo 2/24/19 showed LVEF 60-65%, No regional wall motion abnormalities, Trace Mitral valve regurgitation, mildly thickened mitral valve    Pt was transferred to [facility] for Cardiac Cath. (25 Feb 2019 15:02)    2/25 cardiac cath with 2 stents to the prox LAD. Right radial cath without swelling, bleeding.

## 2019-02-26 NOTE — DISCHARGE NOTE ADULT - MEDICATION SUMMARY - MEDICATIONS TO STOP TAKING
I will STOP taking the medications listed below when I get home from the hospital:    heparin 1 units/mL injectable solution (obsolete)  -- injectable every 8 hours  hospital    insulin lispro 100 units/mL injectable solution (obsolete)  -- [facility] I will STOP taking the medications listed below when I get home from the hospital:    heparin 1 units/mL injectable solution (obsolete)  -- injectable every 8 hours  hospital    Lantus 100 units/mL subcutaneous solution  -- 18 unit(s) subcutaneous once a day (at bedtime)  [facility]    insulin lispro 100 units/mL injectable solution (obsolete)  -- [facility]    Crestor 20 mg oral tablet  -- 1 tab(s) by mouth once a day (at bedtime)  [facility]

## 2019-02-26 NOTE — DISCHARGE NOTE ADULT - SECONDARY DIAGNOSIS.
Hypertension, unspecified type Hyperlipidemia, unspecified hyperlipidemia type Diabetes mellitus type 2 in obese

## 2019-02-26 NOTE — PROGRESS NOTE ADULT - ASSESSMENT
HPI:  58 yr old with PMHx of DM2 (Dr Griffith-Endo, controlled, Fqiy5o-5.7 2/21/19),  HTN, CAD (mild to small vessel disease), HLD, MYLES-does not use CPAP, presents to [facility] on 2/23/19 with complaints of chest pain. Pt reports chest pain for one day and pain in the back and radiated to the chest and down the left arm, associated with SOB.   Cardiac Cath-4/2018 40% LAD.   Pt was admitted on telemetry for ACS work up. Trop x 3 WNL.     Lipase on admission was 345, normalized to 295 next day, and then 152 two days later. GI Consulted and CT Abdomen done showed small bowel containing left lower quadrant abdominal hernia without evidence of bowel obstruction. Fat containing supraumbilical and left periumbilical hernias noted.     Pt endorsed diarrhea on admission which resolved the next day. Cdiff was positive, however pt always has positive result. Pt spoke to Dr Gregg (her GI doctor) and was recommended for no treatment as Cdiff will always be positive. Currently pt does not have any symptoms of diarrhea, fever, chills, abdominal pain. Pt is follow up with GI speacialist at [facility] for possible fecal transplant. Pt was in isolation room in [facility].     Echo 2/24/19 showed LVEF 60-65%, No regional wall motion abnormalities, Trace Mitral valve regurgitation, mildly thickened mitral valve    Pt was transferred to [facility] for Cardiac Cath. (25 Feb 2019 15:02)

## 2019-02-26 NOTE — PROGRESS NOTE ADULT - SUBJECTIVE AND OBJECTIVE BOX
Patient is a 58y old  Female who presents with a chief complaint of chest pain        Allergies    Cipro (Diarrhea; Nausea; Vomiting)  codeine (Diarrhea; Nausea; Vomiting)  morphine (Diarrhea; Nausea; Vomiting)    Intolerances        Medications:  allopurinol 100 milliGRAM(s) Oral two times a day  aspirin enteric coated 81 milliGRAM(s) Oral daily  clopidogrel Tablet 75 milliGRAM(s) Oral daily  dextrose 40% Gel 15 Gram(s) Oral once PRN  dextrose 5%. 1000 milliLiter(s) IV Continuous <Continuous>  dextrose 50% Injectable 12.5 Gram(s) IV Push once  dextrose 50% Injectable 25 Gram(s) IV Push once  dextrose 50% Injectable 25 Gram(s) IV Push once  dicyclomine 10 milliGRAM(s) Oral three times a day before meals  famotidine    Tablet 20 milliGRAM(s) Oral daily  glucagon  Injectable 1 milliGRAM(s) IntraMuscular once PRN  insulin glargine Injectable (LANTUS) 18 Unit(s) SubCutaneous at bedtime  insulin lispro (HumaLOG) corrective regimen sliding scale   SubCutaneous three times a day before meals  insulin lispro (HumaLOG) corrective regimen sliding scale   SubCutaneous at bedtime  metoprolol tartrate 25 milliGRAM(s) Oral two times a day  traZODone 50 milliGRAM(s) Oral at bedtime  zolpidem 5 milliGRAM(s) Oral at bedtime PRN      Vitals:  T(C): 36.8 (19 @ 20:15), Max: 37.3 (19 @ 15:02)  HR: 67 (19 @ 22:15) (67 - 80)  BP: 151/70 (19 @ 22:15) (143/76 - 181/93)  BP(mean): 98 (19 @ 15:02) (98 - 98)  RR: 18 (19 @ 22:15) (17 - 18)  SpO2: 98% (19 @ 22:15) (97% - 99%)  Wt(kg): --  Daily Height in cm: 165.1 (2019 15:16)    Daily Weight in k.8 (2019 15:02)  I&O's Summary    2019 07:01  -  2019 01:20  --------------------------------------------------------  IN: 180 mL / OUT: 0 mL / NET: 180 mL          Physical Exam:  Appearance: Normal  Eyes: PERRL, EOMI  HENT: Normal oral muscosa, NC/AT  Cardiovascular: S1S2, RRR, No M/R/G, no JVD, No Lower extremity edema  Procedural Access Site: No hematoma, Non-tender to palpation, 2+ pulse, No bruit, No Ecchymosis  Respiratory: Clear to auscultation bilaterally  Gastrointestinal: Soft, Non tender, Normal Bowel Sounds  Musculoskeletal: No clubbing, No joint deformity   Neurologic: Non-focal  Lymphatic: No lymphadenopathy  Psychiatry: AAOx3, Mood & affect appropriate  Skin: No rashes, No ecchymoses, No cyanosis        137  |  99  |  13  ----------------------------<  154<H>  3.9   |  23  |  0.56    Ca    9.8      2019 00:04              Lipid panel   Hgb A1c                         11.9   9.0   )-----------( 267      ( 2019 00:04 )             34.2         ECG:    Cath: prox LAD x2 stents    Imaging:    Interpretation of Telemetry:

## 2019-02-26 NOTE — DISCHARGE NOTE ADULT - CARE PROVIDER_API CALL
Renetta Saldana)  Cardiology  [address], [address]  [place] [address]  Phone: [phone]  Fax: [phone]  Follow Up Time:

## 2019-02-26 NOTE — DISCHARGE NOTE ADULT - VISION (WITH CORRECTIVE LENSES IF THE PATIENT USUALLY WEARS THEM):
Partially impaired: cannot see medication labels or newsprint, but can see obstacles in path, and the surrounding layout; can count fingers at arm's length
Home

## 2019-04-16 ENCOUNTER — APPOINTMENT (OUTPATIENT)
Dept: ELECTROPHYSIOLOGY | Facility: HOSPITAL | Age: 59
End: 2019-04-16

## 2019-05-23 ENCOUNTER — APPOINTMENT (OUTPATIENT)
Dept: PULMONOLOGY | Facility: CLINIC | Age: 59
End: 2019-05-23
Payer: MEDICAID

## 2019-05-23 VITALS
OXYGEN SATURATION: 98 % | DIASTOLIC BLOOD PRESSURE: 76 MMHG | RESPIRATION RATE: 15 BRPM | WEIGHT: 202 LBS | SYSTOLIC BLOOD PRESSURE: 126 MMHG | HEIGHT: 64 IN | HEART RATE: 89 BPM | TEMPERATURE: 97 F | BODY MASS INDEX: 34.49 KG/M2

## 2019-05-23 DIAGNOSIS — R06.09 OTHER FORMS OF DYSPNEA: ICD-10-CM

## 2019-05-23 PROCEDURE — 99204 OFFICE O/P NEW MOD 45 MIN: CPT | Mod: 25

## 2019-05-23 PROCEDURE — 94729 DIFFUSING CAPACITY: CPT

## 2019-05-23 PROCEDURE — ZZZZZ: CPT

## 2019-05-23 PROCEDURE — 94060 EVALUATION OF WHEEZING: CPT

## 2019-05-23 PROCEDURE — 94726 PLETHYSMOGRAPHY LUNG VOLUMES: CPT

## 2019-05-23 NOTE — END OF VISIT
[] : Fellow [FreeTextEntry3] : CT personally reviewed by me and fellow and reveals faint ggo which are consistent with either atelectasis or mosaic attenuation.  will add inhaled corticosteroids for optimal management of asthma.   [Time Spent: ___ minutes] : I have spent [unfilled] minutes of face to face time with the patient

## 2019-05-23 NOTE — HISTORY OF PRESENT ILLNESS
[FreeTextEntry1] : 58F hx DM2, HTN, CAD, MYLES, c. diff, asthma, PVC, fibromyalgia, who comes for initial evaluation. Recently admitted to [facility] for anginal pain and subsequently transferred to [facility] on 2/25/2019 for cardiac cath - 2 stents to prox LAD.  She then developed some SOB and went to [facility] [place] to be worked up. While there, she had a CT Chest which showed "minimal groundglass infiltrate at the lung bases which may be due to active disease" but no evidence of nodule or alveolar infiltrate. Since discharge she has been participating in cardiac rehab. Pt comes here for evaluation of abnormal CT Chest and history of asthma. \par \maria teresa Has had asthma since she was a child. Initially very severe, became a little better during puberty but then worsened again when she was pregnant at 25. Overall the past 5-10 years pt states that her dyspnea has been relatively well controlled though recently notes that she has difficult carrying on long conversations because she develops SOB. Only has albuterol at home - prescribed budesonide bid but not taking it. Rarely uses albuterol. SOB worsens with strong smells, exertion, smoke. Participates in swimming, cardiac rehab without significant SOB. Has not been hospitalized for asthma in the past. No steroids either. Never smoker. \maria teresa \maria teresa Has a diagnosis of sleep apnea but was not able to tolerate the machine. Significant other has told her that she snores but unsure if she has any apneic episodes. Feels fatigued during the day and would take naps if able to. \par \par TTE 2/24/19: EF 60-65%, No regional wall motion abnormalities, Trace Mitral valve regurgitation, mildly thickened mitral valve \par CT Chest 3/4/2019: patchy GG bases vs. ?mosaic attenuation

## 2019-05-23 NOTE — REASON FOR VISIT
[Initial Evaluation] : an initial evaluation [Abnormal CT Scan] : abnormal CT Scan [Asthma] : asthma

## 2019-05-23 NOTE — REVIEW OF SYSTEMS
[Cough] : cough [Dyspnea] : dyspnea [Hypertension] : ~T hypertension [Chest Discomfort] : chest discomfort [Snoring] : snoring [Awakes With Headache] : awakes with a headache [Awakes With Dry Mouth] : awakes with dry mouth [Negative] : Endocrine [Wheezing] : no wheezing

## 2019-05-23 NOTE — PHYSICAL EXAM
[General Appearance - Well Developed] : well developed [Normal Appearance] : normal appearance [General Appearance - Well Nourished] : well nourished [Normal Conjunctiva] : the conjunctiva exhibited no abnormalities [Neck Appearance] : the appearance of the neck was normal [Jugular Venous Distention Increased] : there was no jugular-venous distention [Heart Rate And Rhythm] : heart rate and rhythm were normal [Heart Sounds] : normal S1 and S2 [Murmurs] : no murmurs present [] : no respiratory distress [Auscultation Breath Sounds / Voice Sounds] : lungs were clear to auscultation bilaterally [Bowel Sounds] : normal bowel sounds [Abdomen Soft] : soft [Abdomen Tenderness] : non-tender [Abnormal Walk] : normal gait [Nail Clubbing] : no clubbing of the fingernails [Cyanosis, Localized] : no localized cyanosis [Skin Color & Pigmentation] : normal skin color and pigmentation [Skin Turgor] : normal skin turgor [Motor Exam] : the motor exam was normal [No Focal Deficits] : no focal deficits [Oriented To Time, Place, And Person] : oriented to person, place, and time [Mood] : the mood was normal

## 2019-05-23 NOTE — ASSESSMENT
[FreeTextEntry1] : 58F hx DM2, HTN, CAD, MYLES, c. diff, asthma, PVC, fibromyalgia, who comes for initial evaluation. Recently admitted for anginal episode, transferred to [facility], had cardiac cath requiring 2 stent to prox LAD. Had subsequent SOB and CT Chest which shows GG infiltrate vs. mosaic pattern - may be atelectasis vs. small airways disease. Pt does not appear to be infected, floridly fluid overloaded or in respiratory distress. Has a hx of asthma since childhood, no recent exacerbations, though reports that in the last year she has been having increasing dyspnea - has difficulty carrying on long conversations without becoming SOB. PFTs today show normal FEV1/FVC though has a partial bronchodilatory response. Also has sleep apnea - actively snoring at night, wakes up with headache and dry mouth, feels tired throughout the day. Given cardiac history, she may benefit from treatment of underlying MYLES. \par - trial of pulmicort\par - cont albuterol\par - diagnostic PSG\par - CPET to w/u dyspnea if not improved with addition of inhaled steroid\par \par d/w Dr. Valdovinos

## 2019-06-26 ENCOUNTER — FORM ENCOUNTER (OUTPATIENT)
Age: 59
End: 2019-06-26

## 2019-07-07 PROBLEM — Z86.19 HISTORY OF CLOSTRIDIUM DIFFICILE COLITIS: Status: RESOLVED | Noted: 2018-04-27 | Resolved: 2019-07-07

## 2019-07-10 PROCEDURE — 83036 HEMOGLOBIN GLYCOSYLATED A1C: CPT

## 2019-07-10 PROCEDURE — C1887: CPT

## 2019-07-10 PROCEDURE — C1874: CPT

## 2019-07-10 PROCEDURE — 99152 MOD SED SAME PHYS/QHP 5/>YRS: CPT

## 2019-07-10 PROCEDURE — 82962 GLUCOSE BLOOD TEST: CPT

## 2019-07-10 PROCEDURE — 93454 CORONARY ARTERY ANGIO S&I: CPT | Mod: 59

## 2019-07-10 PROCEDURE — C1894: CPT

## 2019-07-10 PROCEDURE — 85027 COMPLETE CBC AUTOMATED: CPT

## 2019-07-10 PROCEDURE — 80048 BASIC METABOLIC PNL TOTAL CA: CPT

## 2019-07-10 PROCEDURE — C1769: CPT

## 2019-07-10 PROCEDURE — 93005 ELECTROCARDIOGRAM TRACING: CPT

## 2019-07-10 PROCEDURE — C1725: CPT

## 2019-07-10 PROCEDURE — C9600: CPT | Mod: LD

## 2019-08-01 ENCOUNTER — APPOINTMENT (OUTPATIENT)
Dept: PULMONOLOGY | Facility: CLINIC | Age: 59
End: 2019-08-01
Payer: MEDICAID

## 2019-08-01 VITALS
SYSTOLIC BLOOD PRESSURE: 137 MMHG | WEIGHT: 205 LBS | RESPIRATION RATE: 16 BRPM | DIASTOLIC BLOOD PRESSURE: 82 MMHG | TEMPERATURE: 97.3 F | BODY MASS INDEX: 35 KG/M2 | HEART RATE: 88 BPM | HEIGHT: 64 IN

## 2019-08-01 DIAGNOSIS — J45.909 UNSPECIFIED ASTHMA, UNCOMPLICATED: ICD-10-CM

## 2019-08-01 DIAGNOSIS — R93.89 ABNORMAL FINDINGS ON DIAGNOSTIC IMAGING OF OTHER SPECIFIED BODY STRUCTURES: ICD-10-CM

## 2019-08-01 DIAGNOSIS — R06.83 SNORING: ICD-10-CM

## 2019-08-01 PROCEDURE — 99214 OFFICE O/P EST MOD 30 MIN: CPT

## 2019-08-01 NOTE — HISTORY OF PRESENT ILLNESS
[FreeTextEntry1] : 58F hx DM2, HTN, CAD, MYLES, c. diff, asthma, PVC, fibromyalgia, seen initially on 5/23/19 for evaluation of abnormal Chest CT.    \par \maria teresa On 2/25/2019 had cardiac cath - 2 stents to prox LAD.  She then developed some SOB and went to [place] to be worked up. While there, she had a CT Chest which showed "minimal groundglass infiltrate at the lung bases which may be due to active disease" but no evidence of nodule or alveolar infiltrate. \par \par Reports history asthma since she was a child. Initially very severe, became a little better during puberty but then worsened again when she was pregnant at 25. Overall the past 5-10 years pt states that her dyspnea has been relatively well controlled though recently notes that she has difficult carrying on long conversations because she develops SOB. Only has albuterol at home - prescribed budesonide bid but not taking it. Rarely uses albuterol. SOB worsens with strong smells, exertion, smoke. Participates in swimming, cardiac rehab without significant SOB. Has not been hospitalized for asthma in the past. No steroids either. Never smoker. \par \maria teresa Has a diagnosis of sleep apnea but was not able to tolerate the machine in the past.  She had a PSG on 6/27/19 in our lab and it did not show sleep apnea.  AHI was 0.5/\par PFTs in 5/2019 showed restrictive physilogy secondary to obesity.  DLCO was WNL.\par \par TTE 2/24/19: EF 60-65%, No regional wall motion abnormalities, Trace Mitral valve regurgitation, mildly thickened mitral valve \par \par Returns for follow up today:\par \maria teresa Used PUlmicort for one month then stopped.  She is feeling well and has not had to use a rescue inhaler at all.  She is having some issues with her heart.  Reports an atypical left chest discomfort that is being worked up by her cardiologist.  Also recently wore a holter monitor.  Will be seeing her doctor soon.  Remains active, swimming each day, experiencing joint pains.  \par \par

## 2019-08-01 NOTE — ASSESSMENT
[FreeTextEntry1] : 58F hx DM2, HTN, CAD, MYLES, c. diff, asthma, PVC, fibromyalgia,evaluated initially after a hospitalization for chest discomfort.  She had two stents placed at that time and CT Chest  showed minimal GG infiltrate vs. mosaic pattern which was thought to be atelectasis vs. small airways disease.  PFTs at that time showed normal FEV1/FVC with a partial bronchodilatory response. She reported history of sleep apnea but subsequent PSG in our lab did not show evidence of MYLES.  She had lost significant weight since her earlier diagnosis.  She was given a trial of PUlmicort which she used for a month and then stopped.  She has not had any difficulty since stopping and has not reauired albuterol at all.  \par \par She is clinically stable from the pulmonary standpoint at this time.  \par \par Plan:\par - Pro air as needed\par - Advised that if she is needing to use PRoAir more than 2x per week she should resume PUlmicort.  IT was renewed for her today.\par - She does not require any further CT follow up.  \par F/U in 6 months.

## 2019-08-01 NOTE — REVIEW OF SYSTEMS
[Hypertension] : ~T hypertension [Chest Discomfort] : chest discomfort [Snoring] : snoring [Awakes With Dry Mouth] : awakes with dry mouth [Awakes With Headache] : awakes with a headache [As Noted in HPI] : as noted in HPI [Arthralgias] : arthralgias [Negative] : Respiratory [Cough] : no cough [Dyspnea] : no dyspnea [Wheezing] : no wheezing [Myalgias] : no myalgias

## 2019-08-01 NOTE — CONSULT LETTER
[Dear  ___] : Dear  [unfilled], [Consult Letter:] : I had the pleasure of evaluating your patient, [unfilled]. [Please see my note below.] : Please see my note below. [Sincerely,] : Sincerely, [Consult Closing:] : Thank you very much for allowing me to participate in the care of this patient.  If you have any questions, please do not hesitate to contact me. [FreeTextEntry2] : Shelia Tirado MD [DrEber  ___] : Dr. CONTRERAS

## 2019-08-01 NOTE — PHYSICAL EXAM
[General Appearance - Well Developed] : well developed [Normal Appearance] : normal appearance [General Appearance - Well Nourished] : well nourished [Normal Conjunctiva] : the conjunctiva exhibited no abnormalities [Neck Appearance] : the appearance of the neck was normal [Jugular Venous Distention Increased] : there was no jugular-venous distention [Heart Rate And Rhythm] : heart rate and rhythm were normal [Heart Sounds] : normal S1 and S2 [Murmurs] : no murmurs present [Auscultation Breath Sounds / Voice Sounds] : lungs were clear to auscultation bilaterally [Abnormal Walk] : normal gait [Nail Clubbing] : no clubbing of the fingernails [Motor Exam] : the motor exam was normal [Cyanosis, Localized] : no localized cyanosis [No Focal Deficits] : no focal deficits [Oriented To Time, Place, And Person] : oriented to person, place, and time [Mood] : the mood was normal [Skin Turgor] : normal skin turgor [II] : II [] : no rash [Skin Color & Pigmentation] : normal skin color and pigmentation [Skin Lesions] : no skin lesions [No Venous Stasis] : no venous stasis [No Xanthoma] : no  xanthoma was observed [No Skin Ulcers] : no skin ulcer

## 2019-08-06 ENCOUNTER — APPOINTMENT (OUTPATIENT)
Dept: CV DIAGNOSTICS | Facility: HOSPITAL | Age: 59
End: 2019-08-06

## 2019-08-06 ENCOUNTER — OUTPATIENT (OUTPATIENT)
Dept: OUTPATIENT SERVICES | Facility: HOSPITAL | Age: 59
LOS: 1 days | End: 2019-08-06
Payer: MEDICAID

## 2019-08-06 DIAGNOSIS — Z98.89 OTHER SPECIFIED POSTPROCEDURAL STATES: Chronic | ICD-10-CM

## 2019-08-06 DIAGNOSIS — Z90.49 ACQUIRED ABSENCE OF OTHER SPECIFIED PARTS OF DIGESTIVE TRACT: Chronic | ICD-10-CM

## 2019-08-06 DIAGNOSIS — I25.10 ATHEROSCLEROTIC HEART DISEASE OF NATIVE CORONARY ARTERY WITHOUT ANGINA PECTORIS: ICD-10-CM

## 2019-08-06 PROCEDURE — A9500: CPT

## 2019-08-06 PROCEDURE — 78452 HT MUSCLE IMAGE SPECT MULT: CPT

## 2019-08-06 PROCEDURE — 93017 CV STRESS TEST TRACING ONLY: CPT

## 2019-08-06 PROCEDURE — 78452 HT MUSCLE IMAGE SPECT MULT: CPT | Mod: 26

## 2019-08-06 PROCEDURE — 93016 CV STRESS TEST SUPVJ ONLY: CPT

## 2019-08-06 PROCEDURE — 93018 CV STRESS TEST I&R ONLY: CPT

## 2019-08-13 RX ORDER — BUDESONIDE 90 UG/1
90 AEROSOL, POWDER RESPIRATORY (INHALATION)
Qty: 1 | Refills: 0 | Status: DISCONTINUED | COMMUNITY
Start: 2019-05-23 | End: 2019-08-13

## 2019-08-13 RX ORDER — BUDESONIDE 90 UG/1
90 AEROSOL, POWDER RESPIRATORY (INHALATION)
Qty: 1 | Refills: 5 | Status: DISCONTINUED | COMMUNITY
Start: 2019-08-01 | End: 2019-08-13

## 2019-08-22 ENCOUNTER — OUTPATIENT (OUTPATIENT)
Dept: OUTPATIENT SERVICES | Facility: HOSPITAL | Age: 59
LOS: 1 days | End: 2019-08-22
Payer: MEDICAID

## 2019-08-22 VITALS
HEART RATE: 77 BPM | RESPIRATION RATE: 16 BRPM | HEIGHT: 64 IN | WEIGHT: 201.06 LBS | TEMPERATURE: 99 F | DIASTOLIC BLOOD PRESSURE: 76 MMHG | OXYGEN SATURATION: 99 % | SYSTOLIC BLOOD PRESSURE: 126 MMHG

## 2019-08-22 DIAGNOSIS — R07.89 OTHER CHEST PAIN: ICD-10-CM

## 2019-08-22 DIAGNOSIS — Z90.49 ACQUIRED ABSENCE OF OTHER SPECIFIED PARTS OF DIGESTIVE TRACT: Chronic | ICD-10-CM

## 2019-08-22 DIAGNOSIS — Z98.89 OTHER SPECIFIED POSTPROCEDURAL STATES: Chronic | ICD-10-CM

## 2019-08-22 LAB
ALBUMIN SERPL ELPH-MCNC: 4.6 G/DL — SIGNIFICANT CHANGE UP (ref 3.3–5)
ALP SERPL-CCNC: 91 U/L — SIGNIFICANT CHANGE UP (ref 40–120)
ALT FLD-CCNC: 22 U/L — SIGNIFICANT CHANGE UP (ref 10–45)
ANION GAP SERPL CALC-SCNC: 13 MMOL/L — SIGNIFICANT CHANGE UP (ref 5–17)
AST SERPL-CCNC: 16 U/L — SIGNIFICANT CHANGE UP (ref 10–40)
BILIRUB SERPL-MCNC: 0.3 MG/DL — SIGNIFICANT CHANGE UP (ref 0.2–1.2)
BUN SERPL-MCNC: 12 MG/DL — SIGNIFICANT CHANGE UP (ref 7–23)
CALCIUM SERPL-MCNC: 10 MG/DL — SIGNIFICANT CHANGE UP (ref 8.4–10.5)
CHLORIDE SERPL-SCNC: 103 MMOL/L — SIGNIFICANT CHANGE UP (ref 96–108)
CO2 SERPL-SCNC: 22 MMOL/L — SIGNIFICANT CHANGE UP (ref 22–31)
CREAT SERPL-MCNC: 0.51 MG/DL — SIGNIFICANT CHANGE UP (ref 0.5–1.3)
GLUCOSE BLDC GLUCOMTR-MCNC: 109 MG/DL — HIGH (ref 70–99)
GLUCOSE SERPL-MCNC: 124 MG/DL — HIGH (ref 70–99)
HCT VFR BLD CALC: 37.8 % — SIGNIFICANT CHANGE UP (ref 34.5–45)
HGB BLD-MCNC: 12.5 G/DL — SIGNIFICANT CHANGE UP (ref 11.5–15.5)
MCHC RBC-ENTMCNC: 27.6 PG — SIGNIFICANT CHANGE UP (ref 27–34)
MCHC RBC-ENTMCNC: 33.1 GM/DL — SIGNIFICANT CHANGE UP (ref 32–36)
MCV RBC AUTO: 83.5 FL — SIGNIFICANT CHANGE UP (ref 80–100)
PLATELET # BLD AUTO: 269 K/UL — SIGNIFICANT CHANGE UP (ref 150–400)
POTASSIUM SERPL-MCNC: 4.1 MMOL/L — SIGNIFICANT CHANGE UP (ref 3.5–5.3)
POTASSIUM SERPL-SCNC: 4.1 MMOL/L — SIGNIFICANT CHANGE UP (ref 3.5–5.3)
PROT SERPL-MCNC: 7.3 G/DL — SIGNIFICANT CHANGE UP (ref 6–8.3)
RBC # BLD: 4.53 M/UL — SIGNIFICANT CHANGE UP (ref 3.8–5.2)
RBC # FLD: 13.4 % — SIGNIFICANT CHANGE UP (ref 10.3–14.5)
SODIUM SERPL-SCNC: 138 MMOL/L — SIGNIFICANT CHANGE UP (ref 135–145)
WBC # BLD: 7.4 K/UL — SIGNIFICANT CHANGE UP (ref 3.8–10.5)
WBC # FLD AUTO: 7.4 K/UL — SIGNIFICANT CHANGE UP (ref 3.8–10.5)

## 2019-08-22 PROCEDURE — 99152 MOD SED SAME PHYS/QHP 5/>YRS: CPT | Mod: GC

## 2019-08-22 PROCEDURE — 93458 L HRT ARTERY/VENTRICLE ANGIO: CPT | Mod: 26,GC

## 2019-08-22 PROCEDURE — 80053 COMPREHEN METABOLIC PANEL: CPT

## 2019-08-22 PROCEDURE — C1769: CPT

## 2019-08-22 PROCEDURE — 85027 COMPLETE CBC AUTOMATED: CPT

## 2019-08-22 PROCEDURE — C1894: CPT

## 2019-08-22 PROCEDURE — 93458 L HRT ARTERY/VENTRICLE ANGIO: CPT

## 2019-08-22 PROCEDURE — 93005 ELECTROCARDIOGRAM TRACING: CPT

## 2019-08-22 PROCEDURE — 93010 ELECTROCARDIOGRAM REPORT: CPT | Mod: 59

## 2019-08-22 PROCEDURE — 82962 GLUCOSE BLOOD TEST: CPT

## 2019-08-22 PROCEDURE — 99152 MOD SED SAME PHYS/QHP 5/>YRS: CPT

## 2019-08-22 PROCEDURE — C1887: CPT

## 2019-08-22 RX ORDER — VALSARTAN 80 MG/1
1 TABLET ORAL
Qty: 0 | Refills: 0 | DISCHARGE

## 2019-08-22 RX ORDER — TRAZODONE HCL 50 MG
1 TABLET ORAL
Qty: 0 | Refills: 0 | DISCHARGE

## 2019-08-22 RX ORDER — SODIUM CHLORIDE 9 MG/ML
3 INJECTION INTRAMUSCULAR; INTRAVENOUS; SUBCUTANEOUS EVERY 8 HOURS
Refills: 0 | Status: DISCONTINUED | OUTPATIENT
Start: 2019-08-22 | End: 2019-09-11

## 2019-08-22 RX ORDER — INSULIN GLARGINE 100 [IU]/ML
18 INJECTION, SOLUTION SUBCUTANEOUS
Qty: 0 | Refills: 0 | DISCHARGE

## 2019-08-22 RX ORDER — FAMOTIDINE 10 MG/ML
1 INJECTION INTRAVENOUS
Qty: 0 | Refills: 0 | DISCHARGE

## 2019-08-22 NOTE — H&P CARDIOLOGY - PMH
Asthma    CAD (coronary artery disease)    Coronary artery disease    Diabetes mellitus type 2 in obese    Diverticular disease    Fibromyalgia    Hyperlipidemia    Hypertension    Obstructive sleep apnea of adult    Peripheral neuropathy    Stented coronary artery
amio load.  on full dose lovenox.  follow up cardio re: possible long term AC
per GI/medicine

## 2019-08-22 NOTE — H&P CARDIOLOGY - HISTORY OF PRESENT ILLNESS
58 yr old no implanted device with PMHx of DM2 (Dr Griffith-Endo, controlled, Hgba1c- 6.9 on 8/15/19), HTN, CAD (2 stents in LAD 2/25/2019), HLD, MYLES-does not use CPAP presents for cardiac cath. pt reports she is having chest discomfort worse on exertion and sleeping sometimes for past 3 weeks, evaluated by Dr. BOSTON Mccloud and had abnormal stress test.     < from: Nuclear Stress Test-Pharmacologic (08.06.19 @ 11:04) >  * Baseline ECG: Nonspecific ST-T wave abnormality.  * ECG Changes: No significant ischemic ST segment changes beyond baseline abnormalities.  * Arrhythmia: None.  * Review of raw data shows: Breast attenuation artifact, diaphragmatic artifact. * The left ventricle was normal in size. There are medium-sized, mild defects in the anteroseptal wall that are mostly fixed suggestive of infarct with mild kushal-infarct ischemia.  * There are small, mild to moderate defects in the basal inferior and basal inferoseptal walls that are mostly fixed suggestive of infarct with mild kushal-infarct ischemia.  * Post-stress gated wall motion analysis was performed (LVEF = 59 %;LVEDV = 71 ml.) revealing hypokinesis of the basal inferior and basal inferoseptal walls and reduced systolic thickening of the anteroseptal wall. *** No previous Nuclear/Stress exam.    < from: Cardiac Cath Lab - Adult (02.25.19 @ 16:17) >  LM:   --  LM: Normal.  LAD:   --  Proximal LAD: There was a 70 % stenosis.  CX:   --  Circumflex: Normal.  RCA:   --  RCA: Normal. < end of copied text >

## 2019-08-28 ENCOUNTER — TRANSCRIPTION ENCOUNTER (OUTPATIENT)
Age: 59
End: 2019-08-28

## 2019-11-18 ENCOUNTER — INPATIENT (INPATIENT)
Facility: HOSPITAL | Age: 59
LOS: 4 days | Discharge: HOME | End: 2019-11-23
Attending: INTERNAL MEDICINE | Admitting: INTERNAL MEDICINE
Payer: MEDICAID

## 2019-11-18 VITALS
TEMPERATURE: 99 F | SYSTOLIC BLOOD PRESSURE: 131 MMHG | DIASTOLIC BLOOD PRESSURE: 74 MMHG | OXYGEN SATURATION: 95 % | RESPIRATION RATE: 18 BRPM | HEART RATE: 92 BPM

## 2019-11-18 DIAGNOSIS — Z90.49 ACQUIRED ABSENCE OF OTHER SPECIFIED PARTS OF DIGESTIVE TRACT: Chronic | ICD-10-CM

## 2019-11-18 DIAGNOSIS — K63.1 PERFORATION OF INTESTINE (NONTRAUMATIC): ICD-10-CM

## 2019-11-18 DIAGNOSIS — Z98.89 OTHER SPECIFIED POSTPROCEDURAL STATES: Chronic | ICD-10-CM

## 2019-11-18 LAB
ALBUMIN SERPL ELPH-MCNC: 4.2 G/DL — SIGNIFICANT CHANGE UP (ref 3.3–5)
ALP SERPL-CCNC: 107 U/L — SIGNIFICANT CHANGE UP (ref 40–120)
ALT FLD-CCNC: 13 U/L — SIGNIFICANT CHANGE UP (ref 4–33)
ANION GAP SERPL CALC-SCNC: 15 MMO/L — HIGH (ref 7–14)
AST SERPL-CCNC: 23 U/L — SIGNIFICANT CHANGE UP (ref 4–32)
BILIRUB SERPL-MCNC: 0.7 MG/DL — SIGNIFICANT CHANGE UP (ref 0.2–1.2)
BUN SERPL-MCNC: 7 MG/DL — SIGNIFICANT CHANGE UP (ref 7–23)
CALCIUM SERPL-MCNC: 9.6 MG/DL — SIGNIFICANT CHANGE UP (ref 8.4–10.5)
CHLORIDE SERPL-SCNC: 101 MMOL/L — SIGNIFICANT CHANGE UP (ref 98–107)
CO2 SERPL-SCNC: 23 MMOL/L — SIGNIFICANT CHANGE UP (ref 22–31)
CREAT SERPL-MCNC: 0.57 MG/DL — SIGNIFICANT CHANGE UP (ref 0.5–1.3)
GLUCOSE BLDC GLUCOMTR-MCNC: 111 MG/DL — HIGH (ref 70–99)
GLUCOSE BLDC GLUCOMTR-MCNC: 123 MG/DL — HIGH (ref 70–99)
GLUCOSE SERPL-MCNC: 121 MG/DL — HIGH (ref 70–99)
HCT VFR BLD CALC: 41.2 % — SIGNIFICANT CHANGE UP (ref 34.5–45)
HGB BLD-MCNC: 12.7 G/DL — SIGNIFICANT CHANGE UP (ref 11.5–15.5)
LACTATE SERPL-SCNC: 1.3 MMOL/L — SIGNIFICANT CHANGE UP (ref 0.5–2)
MAGNESIUM SERPL-MCNC: 1.8 MG/DL — SIGNIFICANT CHANGE UP (ref 1.6–2.6)
MCHC RBC-ENTMCNC: 26.5 PG — LOW (ref 27–34)
MCHC RBC-ENTMCNC: 30.8 % — LOW (ref 32–36)
MCV RBC AUTO: 85.8 FL — SIGNIFICANT CHANGE UP (ref 80–100)
NRBC # FLD: 0 K/UL — SIGNIFICANT CHANGE UP (ref 0–0)
PHOSPHATE SERPL-MCNC: 3.8 MG/DL — SIGNIFICANT CHANGE UP (ref 2.5–4.5)
PLATELET # BLD AUTO: 268 K/UL — SIGNIFICANT CHANGE UP (ref 150–400)
PMV BLD: 10.8 FL — SIGNIFICANT CHANGE UP (ref 7–13)
POTASSIUM SERPL-MCNC: 4.2 MMOL/L — SIGNIFICANT CHANGE UP (ref 3.5–5.3)
POTASSIUM SERPL-SCNC: 4.2 MMOL/L — SIGNIFICANT CHANGE UP (ref 3.5–5.3)
PROT SERPL-MCNC: 8 G/DL — SIGNIFICANT CHANGE UP (ref 6–8.3)
RBC # BLD: 4.8 M/UL — SIGNIFICANT CHANGE UP (ref 3.8–5.2)
RBC # FLD: 14.5 % — SIGNIFICANT CHANGE UP (ref 10.3–14.5)
SODIUM SERPL-SCNC: 139 MMOL/L — SIGNIFICANT CHANGE UP (ref 135–145)
WBC # BLD: 11.79 K/UL — HIGH (ref 3.8–10.5)
WBC # FLD AUTO: 11.79 K/UL — HIGH (ref 3.8–10.5)

## 2019-11-18 PROCEDURE — 99222 1ST HOSP IP/OBS MODERATE 55: CPT | Mod: GC

## 2019-11-18 RX ORDER — PIPERACILLIN AND TAZOBACTAM 4; .5 G/20ML; G/20ML
3.38 INJECTION, POWDER, LYOPHILIZED, FOR SOLUTION INTRAVENOUS ONCE
Refills: 0 | Status: COMPLETED | OUTPATIENT
Start: 2019-11-18 | End: 2019-11-18

## 2019-11-18 RX ORDER — DEXTROSE 50 % IN WATER 50 %
25 SYRINGE (ML) INTRAVENOUS ONCE
Refills: 0 | Status: DISCONTINUED | OUTPATIENT
Start: 2019-11-18 | End: 2019-11-23

## 2019-11-18 RX ORDER — INFLUENZA VIRUS VACCINE 15; 15; 15; 15 UG/.5ML; UG/.5ML; UG/.5ML; UG/.5ML
0.5 SUSPENSION INTRAMUSCULAR ONCE
Refills: 0 | Status: DISCONTINUED | OUTPATIENT
Start: 2019-11-18 | End: 2019-11-23

## 2019-11-18 RX ORDER — PIPERACILLIN AND TAZOBACTAM 4; .5 G/20ML; G/20ML
3.38 INJECTION, POWDER, LYOPHILIZED, FOR SOLUTION INTRAVENOUS EVERY 8 HOURS
Refills: 0 | Status: DISCONTINUED | OUTPATIENT
Start: 2019-11-18 | End: 2019-11-22

## 2019-11-18 RX ORDER — INSULIN LISPRO 100/ML
VIAL (ML) SUBCUTANEOUS EVERY 6 HOURS
Refills: 0 | Status: DISCONTINUED | OUTPATIENT
Start: 2019-11-18 | End: 2019-11-20

## 2019-11-18 RX ORDER — COLCHICINE 0.6 MG
1 TABLET ORAL
Qty: 0 | Refills: 0 | DISCHARGE

## 2019-11-18 RX ORDER — DEXTROSE 50 % IN WATER 50 %
15 SYRINGE (ML) INTRAVENOUS ONCE
Refills: 0 | Status: DISCONTINUED | OUTPATIENT
Start: 2019-11-18 | End: 2019-11-23

## 2019-11-18 RX ORDER — ASPIRIN/CALCIUM CARB/MAGNESIUM 324 MG
81 TABLET ORAL DAILY
Refills: 0 | Status: DISCONTINUED | OUTPATIENT
Start: 2019-11-18 | End: 2019-11-18

## 2019-11-18 RX ORDER — HYDROXYZINE HCL 10 MG
2 TABLET ORAL
Qty: 0 | Refills: 0 | DISCHARGE

## 2019-11-18 RX ORDER — METOPROLOL TARTRATE 50 MG
5 TABLET ORAL EVERY 6 HOURS
Refills: 0 | Status: DISCONTINUED | OUTPATIENT
Start: 2019-11-18 | End: 2019-11-20

## 2019-11-18 RX ORDER — EZETIMIBE 10 MG/1
1 TABLET ORAL
Qty: 0 | Refills: 0 | DISCHARGE

## 2019-11-18 RX ORDER — INSULIN LISPRO 100/ML
VIAL (ML) SUBCUTANEOUS
Refills: 0 | Status: DISCONTINUED | OUTPATIENT
Start: 2019-11-18 | End: 2019-11-18

## 2019-11-18 RX ORDER — METOPROLOL TARTRATE 50 MG
1 TABLET ORAL
Qty: 0 | Refills: 0 | DISCHARGE

## 2019-11-18 RX ORDER — METOPROLOL TARTRATE 50 MG
5 TABLET ORAL EVERY 6 HOURS
Refills: 0 | Status: DISCONTINUED | OUTPATIENT
Start: 2019-11-18 | End: 2019-11-18

## 2019-11-18 RX ORDER — GLUCAGON INJECTION, SOLUTION 0.5 MG/.1ML
1 INJECTION, SOLUTION SUBCUTANEOUS ONCE
Refills: 0 | Status: DISCONTINUED | OUTPATIENT
Start: 2019-11-18 | End: 2019-11-23

## 2019-11-18 RX ORDER — SODIUM CHLORIDE 9 MG/ML
1000 INJECTION, SOLUTION INTRAVENOUS
Refills: 0 | Status: DISCONTINUED | OUTPATIENT
Start: 2019-11-18 | End: 2019-11-20

## 2019-11-18 RX ORDER — SODIUM CHLORIDE 9 MG/ML
1000 INJECTION INTRAMUSCULAR; INTRAVENOUS; SUBCUTANEOUS
Refills: 0 | Status: DISCONTINUED | OUTPATIENT
Start: 2019-11-18 | End: 2019-11-19

## 2019-11-18 RX ORDER — DEXTROSE 50 % IN WATER 50 %
12.5 SYRINGE (ML) INTRAVENOUS ONCE
Refills: 0 | Status: DISCONTINUED | OUTPATIENT
Start: 2019-11-18 | End: 2019-11-23

## 2019-11-18 RX ORDER — ENOXAPARIN SODIUM 100 MG/ML
40 INJECTION SUBCUTANEOUS DAILY
Refills: 0 | Status: DISCONTINUED | OUTPATIENT
Start: 2019-11-18 | End: 2019-11-23

## 2019-11-18 RX ORDER — ASPIRIN/CALCIUM CARB/MAGNESIUM 324 MG
81 TABLET ORAL DAILY
Refills: 0 | Status: DISCONTINUED | OUTPATIENT
Start: 2019-11-18 | End: 2019-11-20

## 2019-11-18 RX ORDER — CLOPIDOGREL BISULFATE 75 MG/1
75 TABLET, FILM COATED ORAL DAILY
Refills: 0 | Status: DISCONTINUED | OUTPATIENT
Start: 2019-11-18 | End: 2019-11-20

## 2019-11-18 RX ADMIN — PIPERACILLIN AND TAZOBACTAM 3.38 GRAM(S): 4; .5 INJECTION, POWDER, LYOPHILIZED, FOR SOLUTION INTRAVENOUS at 21:15

## 2019-11-18 RX ADMIN — SODIUM CHLORIDE 100 MILLILITER(S): 9 INJECTION INTRAMUSCULAR; INTRAVENOUS; SUBCUTANEOUS at 21:36

## 2019-11-18 RX ADMIN — PIPERACILLIN AND TAZOBACTAM 200 GRAM(S): 4; .5 INJECTION, POWDER, LYOPHILIZED, FOR SOLUTION INTRAVENOUS at 20:13

## 2019-11-18 NOTE — H&P ADULT - NSHPPHYSICALEXAM_GEN_ALL_CORE
General: WN/WD NAD  Neurology: A&Ox3, nonfocal, SWIFT x 4  Head:  Normocephalic, atraumatic  ENT:  Mucosa moist, no ulcerations  Neck:  Supple, no sinuses or palpable masses  Lymphatic:  No palpable cervical, supraclavicular, axillary or inguinal adenopathy  Respiratory: CTA B/L  CV: RRR, S1S2, no murmur  Abdominal: Soft, tenderness in bilateral lower quadrants, left worse than right, no rebound or guarding, well healed midline laparotomy and ostomy scars   MSK: No edema, + peripheral pulses, FROM all 4 extremity

## 2019-11-18 NOTE — ED PROVIDER NOTE - OBJECTIVE STATEMENT
59F transferred from [facility] for perforated diverticulitis to see Dr Arriola of surgery. She is 59 yr old female with h/o HTN DM CAD 2 stents (2/25/19 [facility]), h/o C diff infections, h/o prior perforated diverticulitis requiring surgery including colostomy 2013 with subsequent reversal, c/o lower abdo pain since yesterday morning assoc with some loss of appetite mild nausea, had normal BM today. Seen at [facility] and had CT showing: " moderate free air in abdomen and pelvis, concerning for bowel perforation. Colonic diverticulosis with sigmoid inflammation, suggesting acute perforated diverticulitis" Patient had received Zosyn at [facility]. Arrives with stable VS c/o mild lower abdo pains.

## 2019-11-18 NOTE — H&P ADULT - ASSESSMENT
59F with diverticulitis s/p Hartmans and reversal in 2013 at [facility], also with CAD s/p two stents in February presents with diverticulitis    admit to B team surgery, Dr. Arriola  -will obtain labs  -upload imaging  -IV antibiotics  -NPO/IVF  -strict intake and output  seen and examined with Dr. Arriola  page [phone] with surgical questions    Alison Lincoln, PGY-4

## 2019-11-18 NOTE — ED PROVIDER NOTE - CLINICAL SUMMARY MEDICAL DECISION MAKING FREE TEXT BOX
59 yr old female with h/o HTN DM CAD 2 stents (2/25/19 [facility]), h/o C diff infections, h/o prior perforated diverticulitis requiring surgery including colostomy 2013 with subsequent reversal, now with abdo pains since yesterday and perforated sigmoid diverticulitis on CT scan. Received Zosyn, NPO, pre-op labs IVF EKG, surgery notified

## 2019-11-18 NOTE — ED PROVIDER NOTE - PMH
Asthma    CAD (coronary artery disease)    Coronary artery disease    Diabetes mellitus type 2 in obese    Diverticular disease    Fibromyalgia    Hyperlipidemia    Hypertension    Obstructive sleep apnea of adult    Peripheral neuropathy    Stented coronary artery

## 2019-11-18 NOTE — H&P ADULT - NSICDXPASTMEDICALHX_GEN_ALL_CORE_FT
PAST MEDICAL HISTORY:  Asthma     CAD (coronary artery disease)     Coronary artery disease     Diabetes mellitus type 2 in obese     Diverticular disease     Fibromyalgia     Hyperlipidemia     Hypertension     Obstructive sleep apnea of adult     Peripheral neuropathy     Stented coronary artery

## 2019-11-18 NOTE — H&P ADULT - NSICDXFAMILYHX_GEN_ALL_CORE_FT
FAMILY HISTORY:  Father  Still living? Unknown  Family history of heart disease, Age at diagnosis: 81-90    Mother  Still living? Yes, Estimated age: Age Unknown  Family history of hypertension, Age at diagnosis: 51-60    Sibling  Still living? Yes, Estimated age: 51-60  Family history of diabetes mellitus type II, Age at diagnosis: 41-50  Family history of heart disease, Age at diagnosis: 41-50

## 2019-11-18 NOTE — H&P ADULT - ATTENDING COMMENTS
I saw and examined the patient. I was physically present for the key portions of the evaluation and management (E/M) service provided.  I agree with the above history, physical, and plan which I have reviewed and edited where appropriate.    After speaking with our Radiologists (spoke w/ Dr. Waldrop) we feel that this is likely free air secondary to a colonic perforation from acute diverticulitis. On exam, Mrs. Samaniego is soft and moderately tender in the lower quadrants but not peritoneal. She is normothermic and in NSR. He WBCs are elevated as expected.   Given Mrs. Samaniego's complicated past surgical history and multiple ventral hernias as well as on DAPT for her stents as well as the likely Hinchey 2 diverticulitis, we will defer operating on her at this time. She warrants close observation with serial abdominal exams and follow up labs. We will have to revisit the need for DAPT i/s/o more than 6 months out from drug eluting stents.     I spent 65min reviewing history, data, images and in discussion/coordination of care. Greater than 50% of my time was spent in face-to-face discussion with the patient regarding diagnosis, preoperative concerns and antibiotic therapy.    Cortez Arriola MD  Acute and Critical Care Surgery    The Acute Care Surgery (B Team) Attending Group Practice:  Dr. Harriett Grayson, Dr. Marlo Cazares, Dr. Cortez Arriola, and Dr. Jeremiah Rosales    Urgent issues - spectra [phone] or [phone]  Nonurgent issues - [phone]  Patient appointments or after hours - [phone] I saw and examined the patient. I was physically present for the key portions of the evaluation and management (E/M) service provided.  I agree with the above history, physical, and plan which I have reviewed and edited where appropriate.    After speaking with our Radiologists (spoke w/ Dr. Waldrop) we feel that this is likely free air secondary to a colonic perforation from acute diverticulitis. On exam, Mrs. Samaniego is soft and moderately tender in the lower quadrants but not peritoneal. She is normothermic and in NSR. He WBCs are elevated as expected.   Given Mrs. Samaniego's complicated past surgical history and multiple ventral hernias as well as on DAPT for her stents as well as the likely Hinchey 2 diverticulitis, we will defer operating on her at this time. She warrants close observation with serial abdominal exams and follow up labs. We will have to revisit the need for DAPT i/s/o more than 6 months out from drug eluting stents.      K57.20 Diverticulitis of large intestine with perforation without bleeding   E11.59 Type 2 diabetes mellitus with other circulatory complication, without long-term current use of insulin   -May need basal/bolus regiment while NPO  -FS q6  I10 Essential hypertension   -IV meds ordered, maintain SBP<180  E66.01 Morbid obesity      I spent 65min reviewing history, data, images and in discussion/coordination of care. Greater than 50% of my time was spent in face-to-face discussion with the patient regarding diagnosis, preoperative concerns and antibiotic therapy.    Cortez Arriola MD  Acute and Critical Care Surgery    The Acute Care Surgery (B Team) Attending Group Practice:  Dr. Harriett Grayson, Dr. Marlo Cazares, Dr. Cortez Arriola, and Dr. Jeremiah Rosales    Urgent issues - spectra [phone] or [phone]  Nonurgent issues - [phone]  Patient appointments or after hours - [phone]

## 2019-11-18 NOTE — ED ADULT NURSE NOTE - OBJECTIVE STATEMENT
60 yo female, ambulatory, transferred from [facility] ED today for rule out perforation to colon and to have surgery performed at [facility]. pt reports pain to bilateral lower pelvic region that began yesterday morning. pt reports nausea, denies emesis. pt reports subjective fever/chills at home. pt denies headache, chest pain, sob, dysuria. pt arrives w 20g iv insert to right hand, flushing well. abx hung, pt to go straight to floor.

## 2019-11-18 NOTE — ED ADULT NURSE NOTE - CHIEF COMPLAINT QUOTE
pt transffered fro [facility] for r/o diverticulum.,  pt has confirmed pneumoperituneum.  pt c/o abd pain.  pt received tylenol 650mg prior to arrival.

## 2019-11-18 NOTE — ED ADULT NURSE NOTE - ED STAT RN HANDOFF DETAILS
Patient is A&Ox4, aware of plan of care, and has room available.  Report given to nurse on floor via phone.  Patient awaiting transportation.  Will continue to monitor patient closely. UBALDO Kennedy R.N.

## 2019-11-18 NOTE — ED ADULT NURSE NOTE - NSIMPLEMENTINTERV_GEN_ALL_ED
Implemented All Fall with Harm Risk Interventions:  [place] to call system. Call bell, personal items and telephone within reach. Instruct patient to call for assistance. Room bathroom lighting operational. Non-slip footwear when patient is off stretcher. Physically safe environment: no spills, clutter or unnecessary equipment. Stretcher in lowest position, wheels locked, appropriate side rails in place. Provide visual cue, wrist band, yellow gown, etc. Monitor gait and stability. Monitor for mental status changes and reorient to person, place, and time. Review medications for side effects contributing to fall risk. Reinforce activity limits and safety measures with patient and family. Provide visual clues: red socks.

## 2019-11-18 NOTE — H&P ADULT - HISTORY OF PRESENT ILLNESS
59F with diverticulitis s/p Hartmans and reversal in 2013 at [facility], also with CAD s/p two stents in February presents with abdominal pain for two days. As per patient, pain started yesterday am in the left lower quadrant and then worsened. Pain identical to the type she had last time she presented with diverticulitis. Pain associated with mild nausea and decreased appetite. Patient denies vomiting. She does endorse a couple of episodes of diarrhea yesterday, improved today.  In the ED, patient with normal vital signs.... labs/ CT.

## 2019-11-18 NOTE — ED ADULT TRIAGE NOTE - CHIEF COMPLAINT QUOTE
pt transffered fro [facility] for r/o diverticulum.,  pt has confirmed pneumoperituneum.  pt c/o abd pain.  pt received tylenol 650mg prior to arrival.

## 2019-11-18 NOTE — CONSULT NOTE ADULT - ASSESSMENT
59 year female with h/o DM HTN hyperlipidemia,  diverticular disease, prior colon resection, prior hartmans, h/o CDIF  now with acute abdomen, perforation, abdominal pain, distention, nausea, diarrhea, in need of surgical intervention. Patient is cardiac optimized to proceed should she require surgery as she has a recent cardiac catheterization  demonstrating that her stent is patent and no other vessels diseased, and her most recent echo is normal . She has no chest pain or shortness of breath. She is on DAPT , and risk of bleeding is increased.     advise    1. continue IV fluids, maintaining K and mag within therapeutic limits  2. continue metoprolol 5 IV q 6 as tolerated  3. stop ASA and plavix for possible OR

## 2019-11-19 LAB
ANION GAP SERPL CALC-SCNC: 14 MMO/L — SIGNIFICANT CHANGE UP (ref 7–14)
BUN SERPL-MCNC: 7 MG/DL — SIGNIFICANT CHANGE UP (ref 7–23)
CALCIUM SERPL-MCNC: 9.2 MG/DL — SIGNIFICANT CHANGE UP (ref 8.4–10.5)
CHLORIDE SERPL-SCNC: 102 MMOL/L — SIGNIFICANT CHANGE UP (ref 98–107)
CO2 SERPL-SCNC: 23 MMOL/L — SIGNIFICANT CHANGE UP (ref 22–31)
CREAT SERPL-MCNC: 0.54 MG/DL — SIGNIFICANT CHANGE UP (ref 0.5–1.3)
GLUCOSE BLDC GLUCOMTR-MCNC: 109 MG/DL — HIGH (ref 70–99)
GLUCOSE BLDC GLUCOMTR-MCNC: 112 MG/DL — HIGH (ref 70–99)
GLUCOSE BLDC GLUCOMTR-MCNC: 138 MG/DL — HIGH (ref 70–99)
GLUCOSE BLDC GLUCOMTR-MCNC: 143 MG/DL — HIGH (ref 70–99)
GLUCOSE SERPL-MCNC: 149 MG/DL — HIGH (ref 70–99)
HBA1C BLD-MCNC: 6.8 % — HIGH (ref 4–5.6)
HCT VFR BLD CALC: 35.8 % — SIGNIFICANT CHANGE UP (ref 34.5–45)
HGB BLD-MCNC: 11.2 G/DL — LOW (ref 11.5–15.5)
MAGNESIUM SERPL-MCNC: 1.8 MG/DL — SIGNIFICANT CHANGE UP (ref 1.6–2.6)
MCHC RBC-ENTMCNC: 26.2 PG — LOW (ref 27–34)
MCHC RBC-ENTMCNC: 31.3 % — LOW (ref 32–36)
MCV RBC AUTO: 83.6 FL — SIGNIFICANT CHANGE UP (ref 80–100)
NRBC # FLD: 0 K/UL — SIGNIFICANT CHANGE UP (ref 0–0)
PHOSPHATE SERPL-MCNC: 2.9 MG/DL — SIGNIFICANT CHANGE UP (ref 2.5–4.5)
PLATELET # BLD AUTO: 243 K/UL — SIGNIFICANT CHANGE UP (ref 150–400)
PMV BLD: 10.7 FL — SIGNIFICANT CHANGE UP (ref 7–13)
POTASSIUM SERPL-MCNC: 3.7 MMOL/L — SIGNIFICANT CHANGE UP (ref 3.5–5.3)
POTASSIUM SERPL-SCNC: 3.7 MMOL/L — SIGNIFICANT CHANGE UP (ref 3.5–5.3)
RBC # BLD: 4.28 M/UL — SIGNIFICANT CHANGE UP (ref 3.8–5.2)
RBC # FLD: 14.5 % — SIGNIFICANT CHANGE UP (ref 10.3–14.5)
SODIUM SERPL-SCNC: 139 MMOL/L — SIGNIFICANT CHANGE UP (ref 135–145)
WBC # BLD: 9.9 K/UL — SIGNIFICANT CHANGE UP (ref 3.8–10.5)
WBC # FLD AUTO: 9.9 K/UL — SIGNIFICANT CHANGE UP (ref 3.8–10.5)

## 2019-11-19 PROCEDURE — 99232 SBSQ HOSP IP/OBS MODERATE 35: CPT

## 2019-11-19 RX ORDER — POTASSIUM CHLORIDE 20 MEQ
10 PACKET (EA) ORAL ONCE
Refills: 0 | Status: COMPLETED | OUTPATIENT
Start: 2019-11-19 | End: 2019-11-19

## 2019-11-19 RX ORDER — POTASSIUM PHOSPHATE, MONOBASIC POTASSIUM PHOSPHATE, DIBASIC 236; 224 MG/ML; MG/ML
15 INJECTION, SOLUTION INTRAVENOUS ONCE
Refills: 0 | Status: COMPLETED | OUTPATIENT
Start: 2019-11-19 | End: 2019-11-19

## 2019-11-19 RX ORDER — DEXTROSE MONOHYDRATE, SODIUM CHLORIDE, AND POTASSIUM CHLORIDE 50; .745; 4.5 G/1000ML; G/1000ML; G/1000ML
1000 INJECTION, SOLUTION INTRAVENOUS
Refills: 0 | Status: DISCONTINUED | OUTPATIENT
Start: 2019-11-19 | End: 2019-11-21

## 2019-11-19 RX ORDER — MAGNESIUM SULFATE 500 MG/ML
2 VIAL (ML) INJECTION ONCE
Refills: 0 | Status: COMPLETED | OUTPATIENT
Start: 2019-11-19 | End: 2019-11-19

## 2019-11-19 RX ADMIN — CLOPIDOGREL BISULFATE 75 MILLIGRAM(S): 75 TABLET, FILM COATED ORAL at 11:34

## 2019-11-19 RX ADMIN — Medication 5 MILLIGRAM(S): at 00:03

## 2019-11-19 RX ADMIN — SODIUM CHLORIDE 100 MILLILITER(S): 9 INJECTION INTRAMUSCULAR; INTRAVENOUS; SUBCUTANEOUS at 11:34

## 2019-11-19 RX ADMIN — PIPERACILLIN AND TAZOBACTAM 25 GRAM(S): 4; .5 INJECTION, POWDER, LYOPHILIZED, FOR SOLUTION INTRAVENOUS at 18:52

## 2019-11-19 RX ADMIN — PIPERACILLIN AND TAZOBACTAM 25 GRAM(S): 4; .5 INJECTION, POWDER, LYOPHILIZED, FOR SOLUTION INTRAVENOUS at 11:30

## 2019-11-19 RX ADMIN — Medication 100 MILLIEQUIVALENT(S): at 12:23

## 2019-11-19 RX ADMIN — Medication 81 MILLIGRAM(S): at 11:34

## 2019-11-19 RX ADMIN — PIPERACILLIN AND TAZOBACTAM 25 GRAM(S): 4; .5 INJECTION, POWDER, LYOPHILIZED, FOR SOLUTION INTRAVENOUS at 05:10

## 2019-11-19 RX ADMIN — ENOXAPARIN SODIUM 40 MILLIGRAM(S): 100 INJECTION SUBCUTANEOUS at 11:30

## 2019-11-19 RX ADMIN — Medication 5 MILLIGRAM(S): at 17:28

## 2019-11-19 RX ADMIN — POTASSIUM PHOSPHATE, MONOBASIC POTASSIUM PHOSPHATE, DIBASIC 62.5 MILLIMOLE(S): 236; 224 INJECTION, SOLUTION INTRAVENOUS at 17:20

## 2019-11-19 RX ADMIN — Medication 5 MILLIGRAM(S): at 05:10

## 2019-11-19 RX ADMIN — Medication 50 GRAM(S): at 15:55

## 2019-11-19 NOTE — PROGRESS NOTE ADULT - ATTENDING COMMENTS
Chart and note reviewed, case discussed with B team    Pneumoperitoneum  a.  No obvious source on non-contrast CT  b.  Mildly tender, Afebrile, VSS  c.  Keep NPO  d.  Continue IV zosyn  e.  Repeat CT with contrast for non-resolution  f.  Possible operative intervention for non-improvment

## 2019-11-19 NOTE — PROGRESS NOTE ADULT - ASSESSMENT
59F with diverticulitis s/p Hartmans and reversal in 2013 at [facility], also with CAD s/p two stents in February presents with diverticulitis    Plan:  - NPO, IVF  - Continue abx  - strict intake and output    Surgery B team  [id number]

## 2019-11-19 NOTE — PROGRESS NOTE ADULT - SUBJECTIVE AND OBJECTIVE BOX
SUBJECTIVE: Pt seen and examined at bedside. No acute events overnight. Pain well controlled. Denies any pain this morning. Denies N/V/F/C    MEDICATIONS  (STANDING):  aspirin enteric coated 81 milliGRAM(s) Oral daily  clopidogrel Tablet 75 milliGRAM(s) Oral daily  dextrose 5%. 1000 milliLiter(s) (50 mL/Hr) IV Continuous <Continuous>  dextrose 50% Injectable 12.5 Gram(s) IV Push once  dextrose 50% Injectable 25 Gram(s) IV Push once  dextrose 50% Injectable 25 Gram(s) IV Push once  enoxaparin Injectable 40 milliGRAM(s) SubCutaneous daily  influenza   Vaccine 0.5 milliLiter(s) IntraMuscular once  insulin lispro (HumaLOG) corrective regimen sliding scale   SubCutaneous every 6 hours  magnesium sulfate  IVPB 2 Gram(s) IV Intermittent once  metoprolol tartrate Injectable 5 milliGRAM(s) IV Push every 6 hours  piperacillin/tazobactam IVPB.. 3.375 Gram(s) IV Intermittent every 8 hours  potassium chloride  10 mEq/100 mL IVPB 10 milliEquivalent(s) IV Intermittent once  potassium phosphate IVPB 15 milliMole(s) IV Intermittent once  sodium chloride 0.9%. 1000 milliLiter(s) (100 mL/Hr) IV Continuous <Continuous>    MEDICATIONS  (PRN):  dextrose 40% Gel 15 Gram(s) Oral once PRN Blood Glucose LESS THAN 70 milliGRAM(s)/deciliter  glucagon  Injectable 1 milliGRAM(s) IntraMuscular once PRN Glucose LESS THAN 70 milligrams/deciliter      OBJECTIVE:    Vital Signs Last 24 Hrs  T(C): 37.4 (19 Nov 2019 10:02), Max: 37.6 (19 Nov 2019 05:09)  T(F): 99.3 (19 Nov 2019 10:02), Max: 99.6 (19 Nov 2019 05:09)  HR: 93 (19 Nov 2019 10:02) (85 - 93)  BP: 110/65 (19 Nov 2019 10:02) (110/65 - 142/76)  BP(mean): --  RR: 16 (19 Nov 2019 10:02) (16 - 19)  SpO2: 98% (19 Nov 2019 10:02) (94% - 98%)    General Appearance: NAD  Neck: Supple  Chest: non-labored breathing, no respiratory distress  Abdomen: Soft, non-tender, non-distended  Extremities: warm and well perfused    I&O's Summary    18 Nov 2019 07:01 - 19 Nov 2019 07:00  --------------------------------------------------------  IN: 1200 mL / OUT: 850 mL / NET: 350 mL    19 Nov 2019 07:01  -  19 Nov 2019 10:24  --------------------------------------------------------  IN: 0 mL / OUT: 0 mL / NET: 0 mL      I&O's Detail    18 Nov 2019 07:01 - 19 Nov 2019 07:00  --------------------------------------------------------  IN:    sodium chloride 0.9%.: 1200 mL  Total IN: 1200 mL    OUT:    Voided: 850 mL  Total OUT: 850 mL    Total NET: 350 mL      19 Nov 2019 07:01  -  19 Nov 2019 10:24  --------------------------------------------------------  IN:  Total IN: 0 mL    OUT:  Total OUT: 0 mL    Total NET: 0 mL            LABS:                        11.2   9.90  )-----------( 243      ( 19 Nov 2019 06:35 )             35.8     11-19    139  |  102  |  7   ----------------------------<  149<H>  3.7   |  23  |  0.54    Ca    9.2      19 Nov 2019 06:35  Phos  2.9     11-19  Mg     1.8     11-19    TPro  8.0  /  Alb  4.2  /  TBili  0.7  /  DBili  x   /  AST  23  /  ALT  13  /  AlkPhos  107  11-18

## 2019-11-20 LAB
ANION GAP SERPL CALC-SCNC: 15 MMO/L — HIGH (ref 7–14)
BUN SERPL-MCNC: 4 MG/DL — LOW (ref 7–23)
CALCIUM SERPL-MCNC: 9.2 MG/DL — SIGNIFICANT CHANGE UP (ref 8.4–10.5)
CHLORIDE SERPL-SCNC: 103 MMOL/L — SIGNIFICANT CHANGE UP (ref 98–107)
CO2 SERPL-SCNC: 22 MMOL/L — SIGNIFICANT CHANGE UP (ref 22–31)
CREAT SERPL-MCNC: 0.53 MG/DL — SIGNIFICANT CHANGE UP (ref 0.5–1.3)
GLUCOSE BLDC GLUCOMTR-MCNC: 120 MG/DL — HIGH (ref 70–99)
GLUCOSE BLDC GLUCOMTR-MCNC: 135 MG/DL — HIGH (ref 70–99)
GLUCOSE BLDC GLUCOMTR-MCNC: 152 MG/DL — HIGH (ref 70–99)
GLUCOSE BLDC GLUCOMTR-MCNC: 171 MG/DL — HIGH (ref 70–99)
GLUCOSE SERPL-MCNC: 172 MG/DL — HIGH (ref 70–99)
HCT VFR BLD CALC: 36.2 % — SIGNIFICANT CHANGE UP (ref 34.5–45)
HGB BLD-MCNC: 11.3 G/DL — LOW (ref 11.5–15.5)
MAGNESIUM SERPL-MCNC: 2 MG/DL — SIGNIFICANT CHANGE UP (ref 1.6–2.6)
MCHC RBC-ENTMCNC: 26.5 PG — LOW (ref 27–34)
MCHC RBC-ENTMCNC: 31.2 % — LOW (ref 32–36)
MCV RBC AUTO: 84.8 FL — SIGNIFICANT CHANGE UP (ref 80–100)
NRBC # FLD: 0 K/UL — SIGNIFICANT CHANGE UP (ref 0–0)
PHOSPHATE SERPL-MCNC: 3 MG/DL — SIGNIFICANT CHANGE UP (ref 2.5–4.5)
PLATELET # BLD AUTO: 255 K/UL — SIGNIFICANT CHANGE UP (ref 150–400)
PMV BLD: 10.9 FL — SIGNIFICANT CHANGE UP (ref 7–13)
POTASSIUM SERPL-MCNC: 4 MMOL/L — SIGNIFICANT CHANGE UP (ref 3.5–5.3)
POTASSIUM SERPL-SCNC: 4 MMOL/L — SIGNIFICANT CHANGE UP (ref 3.5–5.3)
RBC # BLD: 4.27 M/UL — SIGNIFICANT CHANGE UP (ref 3.8–5.2)
RBC # FLD: 13.9 % — SIGNIFICANT CHANGE UP (ref 10.3–14.5)
SODIUM SERPL-SCNC: 140 MMOL/L — SIGNIFICANT CHANGE UP (ref 135–145)
WBC # BLD: 9.85 K/UL — SIGNIFICANT CHANGE UP (ref 3.8–10.5)
WBC # FLD AUTO: 9.85 K/UL — SIGNIFICANT CHANGE UP (ref 3.8–10.5)

## 2019-11-20 RX ORDER — METOPROLOL TARTRATE 50 MG
50 TABLET ORAL
Refills: 0 | Status: DISCONTINUED | OUTPATIENT
Start: 2019-11-20 | End: 2019-11-23

## 2019-11-20 RX ORDER — ACETAMINOPHEN 500 MG
1000 TABLET ORAL ONCE
Refills: 0 | Status: COMPLETED | OUTPATIENT
Start: 2019-11-20 | End: 2019-11-20

## 2019-11-20 RX ORDER — ALBUTEROL 90 UG/1
2 AEROSOL, METERED ORAL EVERY 6 HOURS
Refills: 0 | Status: DISCONTINUED | OUTPATIENT
Start: 2019-11-20 | End: 2019-11-23

## 2019-11-20 RX ORDER — INSULIN LISPRO 100/ML
VIAL (ML) SUBCUTANEOUS
Refills: 0 | Status: DISCONTINUED | OUTPATIENT
Start: 2019-11-20 | End: 2019-11-23

## 2019-11-20 RX ADMIN — Medication 1000 MILLIGRAM(S): at 03:04

## 2019-11-20 RX ADMIN — Medication 1: at 22:43

## 2019-11-20 RX ADMIN — Medication 50 MILLIGRAM(S): at 18:35

## 2019-11-20 RX ADMIN — Medication 81 MILLIGRAM(S): at 12:07

## 2019-11-20 RX ADMIN — Medication 5 MILLIGRAM(S): at 01:50

## 2019-11-20 RX ADMIN — PIPERACILLIN AND TAZOBACTAM 25 GRAM(S): 4; .5 INJECTION, POWDER, LYOPHILIZED, FOR SOLUTION INTRAVENOUS at 01:51

## 2019-11-20 RX ADMIN — Medication 1000 MILLIGRAM(S): at 17:11

## 2019-11-20 RX ADMIN — Medication 1: at 06:30

## 2019-11-20 RX ADMIN — Medication 400 MILLIGRAM(S): at 02:34

## 2019-11-20 RX ADMIN — CLOPIDOGREL BISULFATE 75 MILLIGRAM(S): 75 TABLET, FILM COATED ORAL at 12:07

## 2019-11-20 RX ADMIN — PIPERACILLIN AND TAZOBACTAM 25 GRAM(S): 4; .5 INJECTION, POWDER, LYOPHILIZED, FOR SOLUTION INTRAVENOUS at 18:35

## 2019-11-20 RX ADMIN — Medication 400 MILLIGRAM(S): at 16:41

## 2019-11-20 RX ADMIN — PIPERACILLIN AND TAZOBACTAM 25 GRAM(S): 4; .5 INJECTION, POWDER, LYOPHILIZED, FOR SOLUTION INTRAVENOUS at 09:47

## 2019-11-20 RX ADMIN — ENOXAPARIN SODIUM 40 MILLIGRAM(S): 100 INJECTION SUBCUTANEOUS at 12:07

## 2019-11-20 NOTE — PROGRESS NOTE ADULT - SUBJECTIVE AND OBJECTIVE BOX
B Team Surgery Progress Note    SUBJECTIVE: Pt seen and examined at bedside. No acute events overnight. Pain well controlled. Tolerated sips, pain improving, having flatus    OBJECTIVE:    General Appearance: NAD  Chest: non-labored breathing, no respiratory distress  Abdomen: Soft, minimally tender in LLQ, non-distended  Extremities: warm and well perfused    Vital Signs Last 24 Hrs  T(C): 36.7 (20 Nov 2019 05:49), Max: 37.8 (19 Nov 2019 21:26)  T(F): 98 (20 Nov 2019 05:49), Max: 100 (19 Nov 2019 21:26)  HR: 67 (20 Nov 2019 05:49) (67 - 97)  BP: 117/56 (20 Nov 2019 05:49) (110/65 - 139/63)  BP(mean): --  RR: 16 (20 Nov 2019 05:49) (14 - 16)  SpO2: 98% (20 Nov 2019 05:49) (96% - 98%)    I&O's Detail    19 Nov 2019 07:01  -  20 Nov 2019 07:00  --------------------------------------------------------  IN:    dextrose 5% + sodium chloride 0.45% with potassium chloride 20 mEq/L: 1000 mL    IV PiggyBack: 700 mL    sodium chloride 0.9%: 1200 mL  Total IN: 2900 mL    OUT:    Voided: 1950 mL  Total OUT: 1950 mL    Total NET: 950 mL      MEDICATIONS  (STANDING):  aspirin enteric coated 81 milliGRAM(s) Oral daily  clopidogrel Tablet 75 milliGRAM(s) Oral daily  dextrose 5% + sodium chloride 0.45% with potassium chloride 20 mEq/L 1000 milliLiter(s) (50 mL/Hr) IV Continuous <Continuous>  dextrose 50% Injectable 12.5 Gram(s) IV Push once  dextrose 50% Injectable 25 Gram(s) IV Push once  dextrose 50% Injectable 25 Gram(s) IV Push once  enoxaparin Injectable 40 milliGRAM(s) SubCutaneous daily  influenza   Vaccine 0.5 milliLiter(s) IntraMuscular once  insulin lispro (HumaLOG) corrective regimen sliding scale   SubCutaneous every 6 hours  metoprolol tartrate Injectable 5 milliGRAM(s) IV Push every 6 hours  piperacillin/tazobactam IVPB.. 3.375 Gram(s) IV Intermittent every 8 hours    MEDICATIONS  (PRN):  dextrose 40% Gel 15 Gram(s) Oral once PRN Blood Glucose LESS THAN 70 milliGRAM(s)/deciliter  glucagon  Injectable 1 milliGRAM(s) IntraMuscular once PRN Glucose LESS THAN 70 milligrams/deciliter      LABS:                        11.3   9.85  )-----------( 255      ( 20 Nov 2019 06:20 )             36.2     11-20    140  |  103  |  4<L>  ----------------------------<  172<H>  4.0   |  22  |  0.53    Ca    9.2      20 Nov 2019 06:20  Phos  3.0     11-20  Mg     2.0     11-20    TPro  8.0  /  Alb  4.2  /  TBili  0.7  /  DBili  x   /  AST  23  /  ALT  13  /  AlkPhos  107  11-18      LIVER FUNCTIONS - ( 18 Nov 2019 21:00 )  Alb: 4.2 g/dL / Pro: 8.0 g/dL / ALK PHOS: 107 u/L / ALT: 13 u/L / AST: 23 u/L / GGT: x

## 2019-11-20 NOTE — PROGRESS NOTE ADULT - ASSESSMENT
59F with diverticulitis s/p Robert's and reversal in 2013 at [facility], also with CAD s/p two stents in February, presents with diverticulitis    Plan:  - transitioning to clears, monitor for nausea or change in exam  - continue abx  - ASA/Plavix for stents; DVT ppx  - strict intake and output    Surgery B team  [id number]

## 2019-11-20 NOTE — PROGRESS NOTE ADULT - SUBJECTIVE AND OBJECTIVE BOX
INTERVAL HPI/OVERNIGHT EVENTS: Patient noted with improvement in abdominal pain.  No chest pain, no shortness of breath    MEDICATIONS  (STANDING):  dextrose 5% + sodium chloride 0.45% with potassium chloride 20 mEq/L 1000 milliLiter(s) (50 mL/Hr) IV Continuous <Continuous>  dextrose 50% Injectable 12.5 Gram(s) IV Push once  dextrose 50% Injectable 25 Gram(s) IV Push once  dextrose 50% Injectable 25 Gram(s) IV Push once  enoxaparin Injectable 40 milliGRAM(s) SubCutaneous daily  influenza   Vaccine 0.5 milliLiter(s) IntraMuscular once  insulin lispro (HumaLOG) corrective regimen sliding scale   SubCutaneous Before meals and at bedtime  metoprolol tartrate 50 milliGRAM(s) Oral two times a day  piperacillin/tazobactam IVPB.. 3.375 Gram(s) IV Intermittent every 8 hours    MEDICATIONS  (PRN):  ALBUTerol    90 MICROgram(s) HFA Inhaler 2 Puff(s) Inhalation every 6 hours PRN Wheezing  dextrose 40% Gel 15 Gram(s) Oral once PRN Blood Glucose LESS THAN 70 milliGRAM(s)/deciliter  glucagon  Injectable 1 milliGRAM(s) IntraMuscular once PRN Glucose LESS THAN 70 milligrams/deciliter      Allergies    Cipro (Diarrhea; Nausea; Vomiting)  codeine (Diarrhea; Nausea; Vomiting)  morphine (Diarrhea; Nausea; Vomiting)    Intolerances      ROS:  General: Pt denies recent weight loss/fever/chills    Neurological: denies numbness or  sensation loss    Cardiovascular: denies chest pain/palpitations/leg edema    Respiratory and Thorax: denies SOB/cough/wheezing    Gastrointestinal: denies abdominal pain/diarrhea/constipation/bloody stool    Genitourinary: denies urinary frequency/urgency/ dysuria    Musculoskeletal: denies joint pain or swelling, denies restricted motion    Hematologic: denies abnormal bleeding  	    	  	    		        	    	            Vital Signs Last 24 Hrs  T(C): 36.9 (20 Nov 2019 18:06), Max: 37.8 (19 Nov 2019 21:26)  T(F): 98.4 (20 Nov 2019 18:06), Max: 100 (19 Nov 2019 21:26)  HR: 89 (20 Nov 2019 18:06) (67 - 96)  BP: 132/77 (20 Nov 2019 18:06) (115/70 - 139/63)  BP(mean): --  RR: 17 (20 Nov 2019 18:06) (15 - 18)  SpO2: 99% (20 Nov 2019 18:06) (96% - 99%)  Daily     Daily     11-19 @ 07:01  -  11-20 @ 07:00  --------------------------------------------------------  IN: 2900 mL / OUT: 1950 mL / NET: 950 mL    11-20 @ 07:01  -  11-20 @ 20:31  --------------------------------------------------------  IN: 400 mL / OUT: 0 mL / NET: 400 mL      Physical Exam:    wdwn female  no JVD  cor RRR  lung clear  abd soft  ext no edema      LABS:                        11.3   9.85  )-----------( 255      ( 20 Nov 2019 06:20 )             36.2     11-20    140  |  103  |  4<L>  ----------------------------<  172<H>  4.0   |  22  |  0.53    Ca    9.2      20 Nov 2019 06:20  Phos  3.0     11-20  Mg     2.0     11-20    TPro  8.0  /  Alb  4.2  /  TBili  0.7  /  DBili  x   /  AST  23  /  ALT  13  /  AlkPhos  107  11-18          RADIOLOGY & ADDITIONAL TESTS:    TELE:    EKG:

## 2019-11-20 NOTE — PROGRESS NOTE ADULT - ASSESSMENT
59 year female with h/o DM HTN hyperlipidemia,  diverticular disease, prior colon resection, prior hartmans, h/o CDIF  now with acute abdomen, perforation, abdominal pain, distention, nausea, diarrhea felt to be resolving spontaneously, being observed by surgical. She has no chest pain or shortness of breath. She was  on DAPT ,      This patient had a LAD stent placed in 2/2019 and subsequently had repeat cath 8/2019 with patent stent, no other significant disease. As it is now greater than 6 months since stent, low risk of reocclusion.  The risk of  GI bleeding  or repeat perforation is increased.     advise    1. continue IV fluids, maintaining K and mag within therapeutic limits  2. continue metoprolol 5 IV q 6 as tolerated  3.  hold ASA and plavix x 2 -3 days until  acute abdomen is resolved, then restart ASA only

## 2019-11-21 ENCOUNTER — TRANSCRIPTION ENCOUNTER (OUTPATIENT)
Age: 59
End: 2019-11-21

## 2019-11-21 LAB
ANION GAP SERPL CALC-SCNC: 11 MMO/L — SIGNIFICANT CHANGE UP (ref 7–14)
BUN SERPL-MCNC: 4 MG/DL — LOW (ref 7–23)
CALCIUM SERPL-MCNC: 9.8 MG/DL — SIGNIFICANT CHANGE UP (ref 8.4–10.5)
CHLORIDE SERPL-SCNC: 104 MMOL/L — SIGNIFICANT CHANGE UP (ref 98–107)
CO2 SERPL-SCNC: 23 MMOL/L — SIGNIFICANT CHANGE UP (ref 22–31)
CREAT SERPL-MCNC: 0.54 MG/DL — SIGNIFICANT CHANGE UP (ref 0.5–1.3)
GLUCOSE BLDC GLUCOMTR-MCNC: 127 MG/DL — HIGH (ref 70–99)
GLUCOSE BLDC GLUCOMTR-MCNC: 146 MG/DL — HIGH (ref 70–99)
GLUCOSE BLDC GLUCOMTR-MCNC: 147 MG/DL — HIGH (ref 70–99)
GLUCOSE BLDC GLUCOMTR-MCNC: 158 MG/DL — HIGH (ref 70–99)
GLUCOSE SERPL-MCNC: 154 MG/DL — HIGH (ref 70–99)
HCT VFR BLD CALC: 39.6 % — SIGNIFICANT CHANGE UP (ref 34.5–45)
HGB BLD-MCNC: 12.3 G/DL — SIGNIFICANT CHANGE UP (ref 11.5–15.5)
MAGNESIUM SERPL-MCNC: 1.9 MG/DL — SIGNIFICANT CHANGE UP (ref 1.6–2.6)
MCHC RBC-ENTMCNC: 25.9 PG — LOW (ref 27–34)
MCHC RBC-ENTMCNC: 31.1 % — LOW (ref 32–36)
MCV RBC AUTO: 83.4 FL — SIGNIFICANT CHANGE UP (ref 80–100)
NRBC # FLD: 0 K/UL — SIGNIFICANT CHANGE UP (ref 0–0)
PHOSPHATE SERPL-MCNC: 3.4 MG/DL — SIGNIFICANT CHANGE UP (ref 2.5–4.5)
PLATELET # BLD AUTO: 297 K/UL — SIGNIFICANT CHANGE UP (ref 150–400)
PMV BLD: 10.7 FL — SIGNIFICANT CHANGE UP (ref 7–13)
POTASSIUM SERPL-MCNC: 3.9 MMOL/L — SIGNIFICANT CHANGE UP (ref 3.5–5.3)
POTASSIUM SERPL-SCNC: 3.9 MMOL/L — SIGNIFICANT CHANGE UP (ref 3.5–5.3)
RBC # BLD: 4.75 M/UL — SIGNIFICANT CHANGE UP (ref 3.8–5.2)
RBC # FLD: 14 % — SIGNIFICANT CHANGE UP (ref 10.3–14.5)
SODIUM SERPL-SCNC: 138 MMOL/L — SIGNIFICANT CHANGE UP (ref 135–145)
WBC # BLD: 7.52 K/UL — SIGNIFICANT CHANGE UP (ref 3.8–10.5)
WBC # FLD AUTO: 7.52 K/UL — SIGNIFICANT CHANGE UP (ref 3.8–10.5)

## 2019-11-21 PROCEDURE — 99231 SBSQ HOSP IP/OBS SF/LOW 25: CPT

## 2019-11-21 PROCEDURE — 99232 SBSQ HOSP IP/OBS MODERATE 35: CPT

## 2019-11-21 RX ORDER — CLOPIDOGREL BISULFATE 75 MG/1
75 TABLET, FILM COATED ORAL DAILY
Refills: 0 | Status: DISCONTINUED | OUTPATIENT
Start: 2019-11-21 | End: 2019-11-21

## 2019-11-21 RX ORDER — ACETAMINOPHEN 500 MG
650 TABLET ORAL EVERY 6 HOURS
Refills: 0 | Status: DISCONTINUED | OUTPATIENT
Start: 2019-11-21 | End: 2019-11-23

## 2019-11-21 RX ORDER — ASPIRIN/CALCIUM CARB/MAGNESIUM 324 MG
81 TABLET ORAL DAILY
Refills: 0 | Status: DISCONTINUED | OUTPATIENT
Start: 2019-11-21 | End: 2019-11-23

## 2019-11-21 RX ADMIN — ENOXAPARIN SODIUM 40 MILLIGRAM(S): 100 INJECTION SUBCUTANEOUS at 12:01

## 2019-11-21 RX ADMIN — PIPERACILLIN AND TAZOBACTAM 25 GRAM(S): 4; .5 INJECTION, POWDER, LYOPHILIZED, FOR SOLUTION INTRAVENOUS at 09:27

## 2019-11-21 RX ADMIN — PIPERACILLIN AND TAZOBACTAM 25 GRAM(S): 4; .5 INJECTION, POWDER, LYOPHILIZED, FOR SOLUTION INTRAVENOUS at 18:12

## 2019-11-21 RX ADMIN — Medication 50 MILLIGRAM(S): at 18:13

## 2019-11-21 RX ADMIN — Medication 650 MILLIGRAM(S): at 12:02

## 2019-11-21 RX ADMIN — Medication 1: at 18:13

## 2019-11-21 RX ADMIN — PIPERACILLIN AND TAZOBACTAM 25 GRAM(S): 4; .5 INJECTION, POWDER, LYOPHILIZED, FOR SOLUTION INTRAVENOUS at 01:21

## 2019-11-21 RX ADMIN — Medication 650 MILLIGRAM(S): at 12:32

## 2019-11-21 RX ADMIN — Medication 50 MILLIGRAM(S): at 05:48

## 2019-11-21 NOTE — DISCHARGE NOTE PROVIDER - CARE PROVIDERS DIRECT ADDRESSES
[email or web address]

## 2019-11-21 NOTE — DISCHARGE NOTE PROVIDER - HOSPITAL COURSE
59F with diverticulitis s/p Hartmans and reversal in 2013 at [facility], also with CAD s/p two stents in February presents with abdominal pain for two days to [facility] on 11/18. As per patient, pain started 1 day prior in the left lower quadrant and then worsened. Pain identical to the type she had last time she presented with diverticulitis. Pain associated with mild nausea and decreased appetite. Patient denies vomiting. She does endorse a couple of episodes of diarrhea. Patient diagnosed with acute diverticulitis. She was admitted to Dr Arriola. She was made NPO and started on zosyn. Patient pain slowly resolved and her diet was slowly advanced as tolerated.  At this time, pt is tolerating a regular diet, ambulating and voiding.  Pt has been deemed stable for discharge at this time. 59F with diverticulitis s/p Hartmans and reversal in 2013 at [facility], also with CAD s/p two stents in February presents with abdominal pain for two days to [facility] on 11/18. As per patient, pain started 1 day prior in the left lower quadrant and then worsened. Pain identical to the type she had last time she presented with diverticulitis. Pain associated with mild nausea and decreased appetite. Patient denies vomiting. She does endorse a couple of episodes of diarrhea. Patient diagnosed with acute diverticulitis. She was admitted to Dr Arriola. She was made NPO and started on zosyn. Patient pain slowly resolved and her diet was slowly advanced as tolerated.  At this time, pt is tolerating a regular diet, ambulating and voiding.  Pt has been deemed stable for discharge at this time with augmentin to f/u with Dr Roberto 59F with diverticulitis s/p Hartmans and reversal in 2013 at [facility], also with CAD s/p two stents in February presents with abdominal pain for two days to [facility] on 11/18. As per patient, pain started 1 day prior in the left lower quadrant and then worsened. Pain identical to the type she had last time she presented with diverticulitis. Pain associated with mild nausea and decreased appetite. Patient denies vomiting. She does endorse a couple of episodes of diarrhea. Patient diagnosed with acute diverticulitis. She was admitted to Dr Arriola. She was made NPO and started on zosyn. Patient pain slowly resolved and her diet was slowly advanced as tolerated.  At this time, pt is tolerating a regular diet, ambulating and voiding.  Pt has been deemed stable for discharge at this time with augmentin to f/u with Dr Roberto.

## 2019-11-21 NOTE — PROGRESS NOTE ADULT - ASSESSMENT
59F with diverticulitis s/p Robert's and reversal in 2013 at [facility], also with CAD s/p two stents in February, presents with diverticulitis with pain improved today    Plan:  - transitioning to regular diet  - continue abx  - ASA for stents, as per EP holding plavix  - strict intake and output    Surgery B team  [id number]

## 2019-11-21 NOTE — PROGRESS NOTE ADULT - SUBJECTIVE AND OBJECTIVE BOX
Morning Surgical Progress Note  Patient is a 59y old  Female who presents with a chief complaint of diverticulitis (20 Nov 2019 20:31)      SUBJECTIVE: Patient seen and examined at bedside with surgical team, patient without complaints.     PAST MEDICAL & SURGICAL HISTORY:  Stented coronary artery  CAD (coronary artery disease)  Fibromyalgia  Asthma  Coronary artery disease  Diverticular disease  Obstructive sleep apnea of adult  Peripheral neuropathy  Hyperlipidemia  Hypertension  Diabetes mellitus type 2 in obese  S/P bilateral breast reduction: age 22  H/O knee surgery: bilateral patella realignment  S/P cholecystectomy: 1993  S/P colon resection: surgery March 2013 with temporary colostomy til 9/13    FAMILY HISTORY:  Family history of diabetes mellitus type II (Sibling)  Family history of hypertension  Family history of heart disease (Sibling)    REVIEW OF SYSTEMS:    CONSTITUTIONAL: No weakness, fevers or chills  EYES/ENT: No visual changes;  No vertigo or throat pain   NECK: No pain or stiffness  RESPIRATORY: No cough, wheezing, hemoptysis; No shortness of breath  CARDIOVASCULAR: No chest pain or palpitations  GASTROINTESTINAL: No abdominal or epigastric pain. No nausea, vomiting, or hematemesis; No diarrhea or constipation. No melena or hematochezia.  GENITOURINARY: No dysuria, frequency or hematuria  NEUROLOGICAL: No numbness or weakness  SKIN: No itching, rashes    Vital Signs Last 24 Hrs  T(C): 36.9 (21 Nov 2019 05:47), Max: 37.1 (21 Nov 2019 02:16)  T(F): 98.4 (21 Nov 2019 05:47), Max: 98.7 (21 Nov 2019 02:16)  HR: 68 (21 Nov 2019 05:47) (68 - 96)  BP: 119/64 (21 Nov 2019 05:47) (114/68 - 135/80)  BP(mean): --  RR: 17 (21 Nov 2019 05:47) (16 - 18)  SpO2: 96% (21 Nov 2019 05:47) (96% - 100%)I&O's Detail    20 Nov 2019 07:01  -  21 Nov 2019 07:00  --------------------------------------------------------  IN:    dextrose 5% + sodium chloride 0.45% with potassium chloride 20 mEq/L: 1000 mL    IV PiggyBack: 100 mL    Oral Fluid: 150 mL  Total IN: 1250 mL    OUT:  Total OUT: 0 mL    Total NET: 1250 mL        Medications  MEDICATIONS  (STANDING):  dextrose 50% Injectable 12.5 Gram(s) IV Push once  dextrose 50% Injectable 25 Gram(s) IV Push once  dextrose 50% Injectable 25 Gram(s) IV Push once  enoxaparin Injectable 40 milliGRAM(s) SubCutaneous daily  influenza   Vaccine 0.5 milliLiter(s) IntraMuscular once  insulin lispro (HumaLOG) corrective regimen sliding scale   SubCutaneous Before meals and at bedtime  metoprolol tartrate 50 milliGRAM(s) Oral two times a day  piperacillin/tazobactam IVPB.. 3.375 Gram(s) IV Intermittent every 8 hours    MEDICATIONS  (PRN):  ALBUTerol    90 MICROgram(s) HFA Inhaler 2 Puff(s) Inhalation every 6 hours PRN Wheezing  dextrose 40% Gel 15 Gram(s) Oral once PRN Blood Glucose LESS THAN 70 milliGRAM(s)/deciliter  glucagon  Injectable 1 milliGRAM(s) IntraMuscular once PRN Glucose LESS THAN 70 milligrams/deciliter      Physical Exam  Constitutional: A&Ox3, NAD  Eyes: Scleras clear, PERRLA/ EOMI, Gross vision intact  Gastrointestinal: Soft nontender, nondistended  Skin: No Rashes, Hematoma, Ecchymosis    LABS:                        12.3   7.52  )-----------( 297      ( 21 Nov 2019 06:45 )             39.6     11-20    140  |  103  |  4<L>  ----------------------------<  172<H>  4.0   |  22  |  0.53    Ca    9.2      20 Nov 2019 06:20  Phos  3.0     11-20  Mg     2.0     11-20

## 2019-11-21 NOTE — DISCHARGE NOTE PROVIDER - CARE PROVIDER_API CALL
Cortez Arriola)  Surgery; Surgical Critical Care  [address], [address]  [place] [address]  Phone: [phone]  Fax: [phone]  Follow Up Time: 1 week Derek Roberto)  ColonRectal Surgery; Surgery  [facility], [address] [place] [address]  Phone: [phone]  Fax: [phone]  Follow Up Time: 1 month

## 2019-11-21 NOTE — DISCHARGE NOTE PROVIDER - NSDCMRMEDTOKEN_GEN_ALL_CORE_FT
albuterol 90 mcg/inh inhalation aerosol: 2 puff(s) inhaled 4 times a day, As Needed  Ambien 5 mg oral tablet: 1 tab(s) orally once a day (at bedtime), As Needed  aspirin 81 mg oral delayed release tablet: 1 tab(s) orally once a day  home/hospital  Basaglar KwikPen 100 units/mL subcutaneous solution: 24  subcutaneous once a day (at bedtime)  colchicine 0.6 mg oral tablet: 1 tab(s) orally once a day, As Needed  dicyclomine 10 mg oral capsule: 1 cap(s) orally 3 times a day  evolocumab 140 mg/mL subcutaneous solution: subcutaneous every 2 weeks  home  Januvia 100 mg oral tablet: 1 tab(s) orally once a day  home  metFORMIN 1000 mg oral tablet: 1 tab(s) orally 2 times a day. DO NOT TAKE on 2/26 or 2/27, restart on 2/28.  metoprolol tartrate 100 mg oral tablet: 1 tab(s) orally once a day (at bedtime)  metoprolol tartrate 75 mg oral tablet: 1 tab(s) orally once a day  Plavix 75 mg oral tablet: 1 tab(s) orally once a day  hospital MDD:1 acetaminophen 325 mg oral tablet: 2 tab(s) orally every 6 hours, As needed, Mild Pain (1 - 3)  albuterol 90 mcg/inh inhalation aerosol: 2 puff(s) inhaled 4 times a day, As Needed  Ambien 5 mg oral tablet: 1 tab(s) orally once a day (at bedtime), As Needed  amoxicillin-clavulanate 875 mg-125 mg oral tablet: 1 tab(s) orally 2 times a day MDD:2  aspirin 81 mg oral delayed release tablet: 1 tab(s) orally once a day  home/hospital  Basaglar KwikPen 100 units/mL subcutaneous solution: 24  subcutaneous once a day (at bedtime)  colchicine 0.6 mg oral tablet: 1 tab(s) orally once a day, As Needed  dicyclomine 10 mg oral capsule: 1 cap(s) orally 3 times a day  evolocumab 140 mg/mL subcutaneous solution: subcutaneous every 2 weeks  home  Januvia 100 mg oral tablet: 1 tab(s) orally once a day  home  metFORMIN 1000 mg oral tablet: 1 tab(s) orally 2 times a day. DO NOT TAKE on 2/26 or 2/27, restart on 2/28.  metoprolol tartrate 100 mg oral tablet: 1 tab(s) orally once a day (at bedtime)  metoprolol tartrate 75 mg oral tablet: 1 tab(s) orally once a day

## 2019-11-21 NOTE — DISCHARGE NOTE PROVIDER - NSDCCPCAREPLAN_GEN_ALL_CORE_FT
PRINCIPAL DISCHARGE DIAGNOSIS  Diagnosis: Perforated bowel  Assessment and Plan of Treatment: s/p Zosyn (antibiotics) and bowel rest  You will need close follow up with Dr Cortez Arriola  Please follow up with your cardiologist for your cardiac stent      SECONDARY DISCHARGE DIAGNOSES  Diagnosis: Diverticulitis large intestine  Assessment and Plan of Treatment: PRINCIPAL DISCHARGE DIAGNOSIS  Diagnosis: Perforated bowel  Assessment and Plan of Treatment: s/p Zosyn (antibiotics) and bowel rest  You will need close follow up with Dr Derek Roberto, please see him in 4-6 weeks for colonscopy  Please follow up with your cardiologist for your cardiac stent, you will continue aspirin and stop plavix      SECONDARY DISCHARGE DIAGNOSES  Diagnosis: Diverticulitis large intestine  Assessment and Plan of Treatment:

## 2019-11-21 NOTE — PROGRESS NOTE ADULT - ATTENDING COMMENTS
Perforated diverticulitis  a.  Tolerating diet  b.  Denies pain, with LLQ tenderness  c.  Transition to po abx  d.  Plan of care discussed with pt, follow up with colorectal surgeon for colonoscopy and further intervention  e.  Discharge planning

## 2019-11-21 NOTE — DISCHARGE NOTE PROVIDER - PROVIDER TOKENS
PROVIDER:[TOKEN:[[id number]],FOLLOWUP:[1 week]] PROVIDER:[TOKEN:[[id number]],FOLLOWUP:[1 month]]

## 2019-11-22 LAB
GLUCOSE BLDC GLUCOMTR-MCNC: 139 MG/DL — HIGH (ref 70–99)
GLUCOSE BLDC GLUCOMTR-MCNC: 147 MG/DL — HIGH (ref 70–99)
GLUCOSE BLDC GLUCOMTR-MCNC: 150 MG/DL — HIGH (ref 70–99)
GLUCOSE BLDC GLUCOMTR-MCNC: 203 MG/DL — HIGH (ref 70–99)

## 2019-11-22 PROCEDURE — 99232 SBSQ HOSP IP/OBS MODERATE 35: CPT

## 2019-11-22 PROCEDURE — 99231 SBSQ HOSP IP/OBS SF/LOW 25: CPT

## 2019-11-22 RX ORDER — ACETAMINOPHEN 500 MG
2 TABLET ORAL
Qty: 0 | Refills: 0 | DISCHARGE
Start: 2019-11-22

## 2019-11-22 RX ADMIN — Medication 650 MILLIGRAM(S): at 11:47

## 2019-11-22 RX ADMIN — Medication 650 MILLIGRAM(S): at 21:07

## 2019-11-22 RX ADMIN — Medication 2: at 21:57

## 2019-11-22 RX ADMIN — Medication 81 MILLIGRAM(S): at 10:02

## 2019-11-22 RX ADMIN — PIPERACILLIN AND TAZOBACTAM 25 GRAM(S): 4; .5 INJECTION, POWDER, LYOPHILIZED, FOR SOLUTION INTRAVENOUS at 01:50

## 2019-11-22 RX ADMIN — Medication 650 MILLIGRAM(S): at 21:37

## 2019-11-22 RX ADMIN — Medication 650 MILLIGRAM(S): at 12:40

## 2019-11-22 RX ADMIN — Medication 1 TABLET(S): at 11:47

## 2019-11-22 RX ADMIN — Medication 50 MILLIGRAM(S): at 10:02

## 2019-11-22 RX ADMIN — Medication 1 TABLET(S): at 18:05

## 2019-11-22 RX ADMIN — Medication 50 MILLIGRAM(S): at 18:05

## 2019-11-22 NOTE — PROGRESS NOTE ADULT - SUBJECTIVE AND OBJECTIVE BOX
Morning Surgical Progress Note  Patient is a 59y old  Female who presents with a chief complaint of diverticulitis (21 Nov 2019 14:10)      SUBJECTIVE: Patient seen and examined at bedside with surgical team, patient without complaints this am, she is tolerating a regular diet     PAST MEDICAL & SURGICAL HISTORY:  Stented coronary artery  CAD (coronary artery disease)  Fibromyalgia  Asthma  Coronary artery disease  Diverticular disease  Obstructive sleep apnea of adult  Peripheral neuropathy  Hyperlipidemia  Hypertension  Diabetes mellitus type 2 in obese  S/P bilateral breast reduction: age 22  H/O knee surgery: bilateral patella realignment  S/P cholecystectomy: 1993  S/P colon resection: surgery March 2013 with temporary colostomy til 9/13    FAMILY HISTORY:  Family history of diabetes mellitus type II (Sibling)  Family history of hypertension  Family history of heart disease (Sibling)    REVIEW OF SYSTEMS:    CONSTITUTIONAL: No weakness, fevers or chills  EYES/ENT: No visual changes;  No vertigo or throat pain   NECK: No pain or stiffness  RESPIRATORY: No cough, wheezing, hemoptysis; No shortness of breath  CARDIOVASCULAR: No chest pain or palpitations  GASTROINTESTINAL: No abdominal or epigastric pain. No nausea, vomiting, or hematemesis; No diarrhea or constipation. No melena or hematochezia.  GENITOURINARY: No dysuria, frequency or hematuria  NEUROLOGICAL: No numbness or weakness  SKIN: No itching, rashes    Vital Signs Last 24 Hrs  T(C): 37 (22 Nov 2019 05:22), Max: 37.1 (21 Nov 2019 14:00)  T(F): 98.6 (22 Nov 2019 05:22), Max: 98.8 (21 Nov 2019 14:00)  HR: 70 (22 Nov 2019 05:22) (68 - 81)  BP: 99/53 (22 Nov 2019 05:22) (99/53 - 133/85)  BP(mean): --  RR: 18 (22 Nov 2019 05:22) (17 - 18)  SpO2: 97% (22 Nov 2019 05:22) (97% - 100%)I&O's Detail    21 Nov 2019 07:01  -  22 Nov 2019 07:00  --------------------------------------------------------  IN:    IV PiggyBack: 300 mL    Oral Fluid: 790 mL  Total IN: 1090 mL    OUT:  Total OUT: 0 mL    Total NET: 1090 mL        Medications  MEDICATIONS  (STANDING):  amoxicillin  875 milliGRAM(s)/clavulanate 1 Tablet(s) Oral two times a day  aspirin enteric coated 81 milliGRAM(s) Oral daily  dextrose 50% Injectable 12.5 Gram(s) IV Push once  dextrose 50% Injectable 25 Gram(s) IV Push once  dextrose 50% Injectable 25 Gram(s) IV Push once  enoxaparin Injectable 40 milliGRAM(s) SubCutaneous daily  influenza   Vaccine 0.5 milliLiter(s) IntraMuscular once  insulin lispro (HumaLOG) corrective regimen sliding scale   SubCutaneous Before meals and at bedtime  metoprolol tartrate 50 milliGRAM(s) Oral two times a day    MEDICATIONS  (PRN):  acetaminophen   Tablet .. 650 milliGRAM(s) Oral every 6 hours PRN Mild Pain (1 - 3)  ALBUTerol    90 MICROgram(s) HFA Inhaler 2 Puff(s) Inhalation every 6 hours PRN Wheezing  dextrose 40% Gel 15 Gram(s) Oral once PRN Blood Glucose LESS THAN 70 milliGRAM(s)/deciliter  glucagon  Injectable 1 milliGRAM(s) IntraMuscular once PRN Glucose LESS THAN 70 milligrams/deciliter      Physical Exam  Constitutional: A&Ox3, NAD  Gastrointestinal: Soft minimally tender LLQ no rebound or guarding  Extremities: Moving all extremities, no edema  Skin: No Rashes, Hematoma, Ecchymosis    LABS:                        12.3   7.52  )-----------( 297      ( 21 Nov 2019 06:45 )             39.6     11-21    138  |  104  |  4<L>  ----------------------------<  154<H>  3.9   |  23  |  0.54    Ca    9.8      21 Nov 2019 06:45  Phos  3.4     11-21  Mg     1.9     11-21

## 2019-11-22 NOTE — PROGRESS NOTE ADULT - ASSESSMENT
59F with diverticulitis s/p Robert's and reversal in 2013 at [facility], also with CAD s/p two stents in February, presents with diverticulitis with pain improved today    Plan:  - transitioning to regular diet  - continue abx, changing to augmentin for a total of 10 more additional days (4 days of zosyn already given)  - ASA for stents, no need for plavix as per EP  - DC later today, f/u with Dr Roberto  - Seen and discussed with B team    Surgery B team  [id number]

## 2019-11-23 ENCOUNTER — TRANSCRIPTION ENCOUNTER (OUTPATIENT)
Age: 59
End: 2019-11-23

## 2019-11-23 VITALS
DIASTOLIC BLOOD PRESSURE: 57 MMHG | SYSTOLIC BLOOD PRESSURE: 125 MMHG | RESPIRATION RATE: 16 BRPM | OXYGEN SATURATION: 97 % | TEMPERATURE: 98 F | HEART RATE: 79 BPM

## 2019-11-23 LAB — GLUCOSE BLDC GLUCOMTR-MCNC: 162 MG/DL — HIGH (ref 70–99)

## 2019-11-23 PROCEDURE — 99238 HOSP IP/OBS DSCHRG MGMT 30/<: CPT

## 2019-11-23 RX ADMIN — Medication 1 TABLET(S): at 05:43

## 2019-11-23 NOTE — PROGRESS NOTE ADULT - SUBJECTIVE AND OBJECTIVE BOX
Surgery Progress Note  Patient is a 59y old  Female who presents with a chief complaint of diverticulitis (22 Nov 2019 08:10)      SUBJECTIVE: Patient seen and examined at bedside with surgical team, patient without complaints.   Patient tolerating regular diet. Abdominal pain is improving and not worsened with meal.    Abdominal pain increases with BMs. BMs have become more formed and less watery.   Patient is voiding, having BMs, and passing flatus.    Vital Signs Last 24 Hrs  T(C): 36.5 (23 Nov 2019 05:40), Max: 37.3 (22 Nov 2019 10:10)  T(F): 97.7 (23 Nov 2019 05:40), Max: 99.1 (22 Nov 2019 10:10)  HR: 70 (23 Nov 2019 05:40) (68 - 97)  BP: 104/60 (23 Nov 2019 05:40) (98/59 - 137/94)  BP(mean): --  RR: 16 (23 Nov 2019 05:40) (16 - 18)  SpO2: 97% (23 Nov 2019 05:40) (97% - 100%)    Physical Exam  Constitutional: NAD  Respiratory: breathing comfortably on RA  Abd: soft, mild LLQ tenderness, non-distended, no guarding   Ext: moving all 4 ext spontaneously     I&O's Detail    21 Nov 2019 07:01  -  22 Nov 2019 07:00  --------------------------------------------------------  IN:    IV PiggyBack: 300 mL    Oral Fluid: 790 mL  Total IN: 1090 mL    OUT:  Total OUT: 0 mL    Total NET: 1090 mL      22 Nov 2019 07:01  -  23 Nov 2019 05:58  --------------------------------------------------------  IN:    Oral Fluid: 1120 mL  Total IN: 1120 mL    OUT:    Voided: 300 mL  Total OUT: 300 mL    Total NET: 820 mL      MEDICATIONS  (STANDING):  amoxicillin  875 milliGRAM(s)/clavulanate 1 Tablet(s) Oral two times a day  aspirin enteric coated 81 milliGRAM(s) Oral daily  dextrose 50% Injectable 12.5 Gram(s) IV Push once  dextrose 50% Injectable 25 Gram(s) IV Push once  dextrose 50% Injectable 25 Gram(s) IV Push once  enoxaparin Injectable 40 milliGRAM(s) SubCutaneous daily  influenza   Vaccine 0.5 milliLiter(s) IntraMuscular once  insulin lispro (HumaLOG) corrective regimen sliding scale   SubCutaneous Before meals and at bedtime  metoprolol tartrate 50 milliGRAM(s) Oral two times a day    MEDICATIONS  (PRN):  acetaminophen   Tablet .. 650 milliGRAM(s) Oral every 6 hours PRN Mild Pain (1 - 3)  ALBUTerol    90 MICROgram(s) HFA Inhaler 2 Puff(s) Inhalation every 6 hours PRN Wheezing  dextrose 40% Gel 15 Gram(s) Oral once PRN Blood Glucose LESS THAN 70 milliGRAM(s)/deciliter  glucagon  Injectable 1 milliGRAM(s) IntraMuscular once PRN Glucose LESS THAN 70 milligrams/deciliter      LABS:                        12.3   7.52  )-----------( 297      ( 21 Nov 2019 06:45 )             39.6     11-21    138  |  104  |  4<L>  ----------------------------<  154<H>  3.9   |  23  |  0.54    Ca    9.8      21 Nov 2019 06:45  Phos  3.4     11-21  Mg     1.9     11-21

## 2019-11-23 NOTE — DISCHARGE NOTE NURSING/CASE MANAGEMENT/SOCIAL WORK - PATIENT PORTAL LINK FT
You can access the FollowMyHealth Patient Portal offered by [facility] by registering at the following website: [email or web address]. By joining [employer]’s FollowMyHealth portal, you will also be able to view your health information using other applications (apps) compatible with our system.

## 2019-11-23 NOTE — PROGRESS NOTE ADULT - ASSESSMENT
Assessment and Plan:     Assessment	  59F with diverticulitis s/p Robert's and reversal in 2013 at [facility], also with CAD s/p two stents in February, presents with diverticulitis with pain improved today    Plan:  - c/w regular diet   - continue abx, changing to augmentin for a total of 10 more additional days (4 days of zosyn already given)  - ASA for stents, no need for plavix as per EP  - DC today f/u with Dr Roberto    Surgery B team  [id number]

## 2019-11-23 NOTE — PROGRESS NOTE ADULT - ATTENDING COMMENTS
Seen and examined, chart and note reviewed, case discussed with B team    Diverticulitis  a.  Feel better today  b.  Tolerating diet  c.  Soft nontender, nondistended  d.  AVSS  e.  Continue augmentin x 10 days  f.  Follow with [facility] for colonoscopy

## 2019-12-03 ENCOUNTER — APPOINTMENT (OUTPATIENT)
Dept: COLORECTAL SURGERY | Facility: CLINIC | Age: 59
End: 2019-12-03
Payer: MEDICAID

## 2019-12-03 VITALS
HEART RATE: 85 BPM | HEIGHT: 64 IN | DIASTOLIC BLOOD PRESSURE: 80 MMHG | WEIGHT: 202 LBS | SYSTOLIC BLOOD PRESSURE: 122 MMHG | RESPIRATION RATE: 16 BRPM | BODY MASS INDEX: 34.49 KG/M2 | OXYGEN SATURATION: 98 %

## 2019-12-03 DIAGNOSIS — K52.9 NONINFECTIVE GASTROENTERITIS AND COLITIS, UNSPECIFIED: ICD-10-CM

## 2019-12-03 DIAGNOSIS — K43.2 INCISIONAL HERNIA W/OUT OBSTRUCTION OR GANGRENE: ICD-10-CM

## 2019-12-03 PROCEDURE — 99204 OFFICE O/P NEW MOD 45 MIN: CPT

## 2019-12-03 RX ORDER — POLYETHYLENE GLYCOL 3350 AND ELECTROLYTES WITH LEMON FLAVOR 236; 22.74; 6.74; 5.86; 2.97 G/4L; G/4L; G/4L; G/4L; G/4L
236 POWDER, FOR SOLUTION ORAL
Refills: 0 | Status: DISCONTINUED | COMMUNITY
End: 2019-12-03

## 2019-12-03 RX ORDER — OMEPRAZOLE 20 MG/1
20 CAPSULE, DELAYED RELEASE ORAL
Refills: 0 | Status: DISCONTINUED | COMMUNITY
End: 2019-12-03

## 2019-12-03 RX ORDER — FLUTICASONE FUROATE 100 UG/1
100 POWDER RESPIRATORY (INHALATION) DAILY
Qty: 3 | Refills: 0 | Status: DISCONTINUED | COMMUNITY
Start: 2019-08-13 | End: 2019-12-03

## 2019-12-03 RX ORDER — PSYLLIUM HUSK 0.4 G
CAPSULE ORAL
Refills: 0 | Status: DISCONTINUED | COMMUNITY
End: 2019-12-03

## 2019-12-03 RX ORDER — SITAGLIPTIN 100 MG/1
100 TABLET, FILM COATED ORAL
Refills: 0 | Status: DISCONTINUED | COMMUNITY
End: 2019-12-03

## 2019-12-03 RX ORDER — LACTOBACIL 2/BIFIDO 1/S.THERMO 900B CELL
PACKET (EA) ORAL
Refills: 0 | Status: DISCONTINUED | COMMUNITY
End: 2019-12-03

## 2019-12-03 RX ORDER — VALSARTAN 40 MG/1
40 TABLET ORAL DAILY
Refills: 0 | Status: DISCONTINUED | COMMUNITY
End: 2019-12-03

## 2019-12-03 RX ORDER — CETIRIZINE HCL 10 MG
10 TABLET ORAL
Refills: 0 | Status: DISCONTINUED | COMMUNITY
End: 2019-12-03

## 2019-12-03 NOTE — CONSULT LETTER
[Dear  ___] : Dear  [unfilled], [Consult Letter:] : I had the pleasure of evaluating your patient, [unfilled]. [Please see my note below.] : Please see my note below. [Sincerely,] : Sincerely, [FreeTextEntry2] : Marlo Ashley [FreeTextEntry3] : Derek Roberto MD FACS\par Chief Colon and Rectal Surgery\par [facility]

## 2019-12-03 NOTE — REVIEW OF SYSTEMS
[Heartburn] : heartburn [Abdominal Pain] : abdominal pain [Joint Pain] : joint pain [Anxiety] : anxiety [Negative] : Integumentary [FreeTextEntry3] : wears glasses [FreeTextEntry5] : 2 stents [FreeTextEntry6] : Asthma [FreeTextEntry7] : Diverticulitis [FreeTextEntry9] : Back pain [de-identified] : Diabetes

## 2019-12-03 NOTE — HISTORY OF PRESENT ILLNESS
[FreeTextEntry1] : Patient is a 58 yo female here to discuss her recent hospitalization.  Patient With history of perforated diverticulitis with subsequent Robert's procedure and reversal. This was in 2013. Patient was recently admitted to outside hospital in which it appeared perforated diverticulitis was once again present. Given patient's multiple comorbidities, she was transferred to [facility] for further evaluation. There she was treated with antibiotics with significant resolution of symptoms. Currently she reports active bowel with occasional cramping. No fevers or chills. Normal bowel movements tolerating diet. Denies pain.  Patient is still on antibiotics Augmentin. She is scheduled for colonoscopy on December 19. She has not had additional endoscopy since 2015.

## 2019-12-03 NOTE — PHYSICAL EXAM
[Normal Rate and Rhythm] : normal rate and rhythm [Normal Heart Sounds] : normal heart sounds [Normal Breath Sounds] : Normal breath sounds [No Rash or Lesion] : No rash or lesion [Alert] : alert [Oriented to Person] : oriented to person [Oriented to Place] : oriented to place [Oriented to Time] : oriented to time [Calm] : calm [de-identified] : obese abdomen, +BS [de-identified] : normal female [de-identified] : NC/AT [de-identified] : SANCHEZ/+ROM [de-identified] : Intact

## 2019-12-03 NOTE — ASSESSMENT
[FreeTextEntry1] : Recurrent diverticulitis with large incisional hernias\par -I recommended patient continue antibiotics at this time\par -Will repeat imaging to evaluate for active inflammation\par -We'll determine duration of antibiotics after imaging\par -We'll discuss case with gastroenterology\par -Any surgical intervention on this patient is not without significant risk given multiple comorbidities and large incisional hernias\par -We'll discuss further after imaging

## 2019-12-10 ENCOUNTER — APPOINTMENT (OUTPATIENT)
Dept: CT IMAGING | Facility: IMAGING CENTER | Age: 59
End: 2019-12-10
Payer: MEDICAID

## 2019-12-10 ENCOUNTER — OUTPATIENT (OUTPATIENT)
Dept: OUTPATIENT SERVICES | Facility: HOSPITAL | Age: 59
LOS: 1 days | End: 2019-12-10
Payer: MEDICAID

## 2019-12-10 DIAGNOSIS — Z98.89 OTHER SPECIFIED POSTPROCEDURAL STATES: Chronic | ICD-10-CM

## 2019-12-10 DIAGNOSIS — Z90.49 ACQUIRED ABSENCE OF OTHER SPECIFIED PARTS OF DIGESTIVE TRACT: Chronic | ICD-10-CM

## 2019-12-10 DIAGNOSIS — Z00.8 ENCOUNTER FOR OTHER GENERAL EXAMINATION: ICD-10-CM

## 2019-12-10 PROCEDURE — 82565 ASSAY OF CREATININE: CPT

## 2019-12-10 PROCEDURE — 74177 CT ABD & PELVIS W/CONTRAST: CPT

## 2019-12-10 PROCEDURE — 74177 CT ABD & PELVIS W/CONTRAST: CPT | Mod: 26

## 2019-12-12 ENCOUNTER — RESULT CHARGE (OUTPATIENT)
Age: 59
End: 2019-12-12

## 2019-12-13 ENCOUNTER — APPOINTMENT (OUTPATIENT)
Dept: ULTRASOUND IMAGING | Facility: CLINIC | Age: 59
End: 2019-12-13

## 2020-01-01 NOTE — H&P CARDIOLOGY - FAMILY HISTORY
infant/actual Father  Still living? Unknown  Family history of heart disease, Age at diagnosis: 81-90     Mother  Still living? Yes, Estimated age: Age Unknown  Family history of hypertension, Age at diagnosis: 51-60     Sibling  Still living? Yes, Estimated age: 51-60  Family history of heart disease, Age at diagnosis: 41-50  Family history of diabetes mellitus type II, Age at diagnosis: 41-50

## 2020-02-13 ENCOUNTER — APPOINTMENT (OUTPATIENT)
Dept: COLORECTAL SURGERY | Facility: CLINIC | Age: 60
End: 2020-02-13

## 2020-08-05 ENCOUNTER — OUTPATIENT (OUTPATIENT)
Dept: OUTPATIENT SERVICES | Facility: HOSPITAL | Age: 60
LOS: 1 days | End: 2020-08-05
Payer: MEDICAID

## 2020-08-05 ENCOUNTER — APPOINTMENT (OUTPATIENT)
Dept: CV DIAGNOSTICS | Facility: HOSPITAL | Age: 60
End: 2020-08-05

## 2020-08-05 DIAGNOSIS — Z90.49 ACQUIRED ABSENCE OF OTHER SPECIFIED PARTS OF DIGESTIVE TRACT: Chronic | ICD-10-CM

## 2020-08-05 DIAGNOSIS — Z98.89 OTHER SPECIFIED POSTPROCEDURAL STATES: Chronic | ICD-10-CM

## 2020-08-05 DIAGNOSIS — I25.10 ATHEROSCLEROTIC HEART DISEASE OF NATIVE CORONARY ARTERY WITHOUT ANGINA PECTORIS: ICD-10-CM

## 2020-08-05 PROCEDURE — A9500: CPT

## 2020-08-05 PROCEDURE — 78452 HT MUSCLE IMAGE SPECT MULT: CPT | Mod: 26

## 2020-08-05 PROCEDURE — 78452 HT MUSCLE IMAGE SPECT MULT: CPT

## 2020-08-05 PROCEDURE — 93017 CV STRESS TEST TRACING ONLY: CPT

## 2020-08-05 PROCEDURE — 93016 CV STRESS TEST SUPVJ ONLY: CPT

## 2020-08-05 PROCEDURE — 93018 CV STRESS TEST I&R ONLY: CPT

## 2020-08-31 ENCOUNTER — APPOINTMENT (OUTPATIENT)
Dept: ENDOCRINOLOGY | Facility: CLINIC | Age: 60
End: 2020-08-31
Payer: MEDICAID

## 2020-08-31 VITALS
WEIGHT: 204 LBS | HEIGHT: 62.6 IN | DIASTOLIC BLOOD PRESSURE: 81 MMHG | BODY MASS INDEX: 36.6 KG/M2 | SYSTOLIC BLOOD PRESSURE: 139 MMHG | HEART RATE: 69 BPM | OXYGEN SATURATION: 96 %

## 2020-08-31 DIAGNOSIS — Z82.49 FAMILY HISTORY OF ISCHEMIC HEART DISEASE AND OTHER DISEASES OF THE CIRCULATORY SYSTEM: ICD-10-CM

## 2020-08-31 DIAGNOSIS — Z78.9 OTHER SPECIFIED HEALTH STATUS: ICD-10-CM

## 2020-08-31 LAB — GLUCOSE BLDC GLUCOMTR-MCNC: 126

## 2020-08-31 PROCEDURE — 99214 OFFICE O/P EST MOD 30 MIN: CPT | Mod: 25

## 2020-08-31 PROCEDURE — 36415 COLL VENOUS BLD VENIPUNCTURE: CPT

## 2020-08-31 PROCEDURE — G0447 BEHAVIOR COUNSEL OBESITY 15M: CPT

## 2020-08-31 PROCEDURE — 82962 GLUCOSE BLOOD TEST: CPT

## 2020-08-31 PROCEDURE — 99204 OFFICE O/P NEW MOD 45 MIN: CPT | Mod: 25

## 2020-08-31 RX ORDER — FLUTICASONE PROPIONATE 50 UG/1
50 SPRAY, METERED NASAL
Refills: 0 | Status: DISCONTINUED | COMMUNITY
End: 2020-08-31

## 2020-08-31 RX ORDER — METFORMIN HYDROCHLORIDE 1000 MG/1
1000 TABLET, COATED ORAL
Qty: 60 | Refills: 5 | Status: DISCONTINUED | COMMUNITY
End: 2020-08-31

## 2020-08-31 RX ORDER — NITROGLYCERIN 20 MG/1
0.1 PATCH TRANSDERMAL
Refills: 0 | Status: ACTIVE | COMMUNITY

## 2020-08-31 RX ORDER — FLUTICASONE PROPIONATE 0.5 MG/G
0.05 CREAM TOPICAL
Refills: 0 | Status: DISCONTINUED | COMMUNITY
End: 2020-08-31

## 2020-08-31 RX ORDER — SALINE NASAL SPRAY 1.5 OZ
0.65 SOLUTION NASAL
Refills: 0 | Status: DISCONTINUED | COMMUNITY
End: 2020-08-31

## 2020-08-31 RX ORDER — DULOXETINE HYDROCHLORIDE 20 MG/1
20 CAPSULE, DELAYED RELEASE PELLETS ORAL
Refills: 0 | Status: DISCONTINUED | COMMUNITY
End: 2020-08-31

## 2020-08-31 RX ORDER — AMOXICILLIN AND CLAVULANATE POTASSIUM 875; 125 MG/1; MG/1
875-125 TABLET, COATED ORAL
Qty: 20 | Refills: 0 | Status: DISCONTINUED | COMMUNITY
Start: 2019-12-03 | End: 2020-08-31

## 2020-08-31 RX ORDER — EPINEPHRINE 0.3 MG/.3ML
0.3 INJECTION INTRAMUSCULAR
Refills: 0 | Status: DISCONTINUED | COMMUNITY
End: 2020-08-31

## 2020-08-31 NOTE — PAST MEDICAL HISTORY
[Surgical Menopause] : The patient is in surgical menopause [Menarche Age ____] : age at menarche was [unfilled] [Menopause Age____] : age at menopause was [unfilled] [Irregular Cycle Intervals] : are  irregular [Total Preg ___] : G[unfilled] [Live Births ___] : P[unfilled]  [Abortions ___] : Abortions:[unfilled] [AB Spont ___] : miscarriages: [unfilled]

## 2020-08-31 NOTE — ASSESSMENT
[Diabetes Foot Care] : diabetes foot care [Long Term Vascular Complications] : long term vascular complications of diabetes [Carbohydrate Consistent Diet] : carbohydrate consistent diet [Importance of Diet and Exercise] : importance of diet and exercise to improve glycemic control, achieve weight loss and improve cardiovascular health [Exercise/Effect on Glucose] : exercise/effect on glucose [Hypoglycemia Management] : hypoglycemia management [Action and use of Insulin] : action and use of short and long-acting insulin [Insulin Self-Administration] : insulin self-administration [Self Monitoring of Blood Glucose] : self monitoring of blood glucose [Injection Technique, Storage, Sharps Disposal] : injection technique, storage, and sharps disposal [Weight Loss] : weight loss [Retinopathy Screening] : Patient was referred to ophthalmology for retinopathy screening [Diabetic Medications] : Risks and benefits of diabetic medications were discussed [FreeTextEntry1] : 1. Type 2 daibetes/obesity - Labs were don’t to check insulin production. Will consider adjusting medications for patient. Did discuss that Metformin can cause GI side effects, will switch to Metformin ER. Also discussed Acarbose, can cause bloating and gi side effects. Will make adjustments once labs are reviewed. We discussed the importance of weight loss in the management of her comorbidities. We discussed the risks of continued excess weight on long term health. \par 2. Thyroid enlarged - She states her daughters both have hypothyroidism and Hashimoto's. Recent TFTs were normal. Will check labs for TFTs and Thyroid Ab. Will go for thyroid sono.

## 2020-08-31 NOTE — CONSULT LETTER
[Dear  ___] : Dear  [unfilled], [Consult Letter:] : I had the pleasure of evaluating your patient, [unfilled]. [Please see my note below.] : Please see my note below. [Consult Closing:] : Thank you very much for allowing me to participate in the care of this patient.  If you have any questions, please do not hesitate to contact me. [Sincerely,] : Sincerely, [FreeTextEntry3] : Keesha NAIRC

## 2020-08-31 NOTE — REVIEW OF SYSTEMS
[Fatigue] : fatigue [Recent Weight Loss (___ Lbs)] : recent weight loss: [unfilled] lbs [Blurred Vision] : blurred vision [Palpitations] : palpitations [Shortness Of Breath] : shortness of breath [Dysphagia] : dysphagia [Diarrhea] : diarrhea [Joint Stiffness] : joint stiffness [Joint Pain] : joint pain [Headaches] : headaches [Tremors] : tremors [Pain/Numbness of Digits] : pain/numbness of digits [Depression] : depression [Anxiety] : anxiety [All other systems negative] : All other systems negative [Negative] : Heme/Lymph [FreeTextEntry7] : Diverticulitis [FreeTextEntry5] : PVCs [FreeTextEntry9] : gout, [de-identified] : possible mini stroke

## 2020-08-31 NOTE — HISTORY OF PRESENT ILLNESS
[FreeTextEntry1] : 60 year old female presents for evaluation of diabetes. She has been living with diabetes for over 5 years. She was started on insulin 2 years ago. Presently she is on Metformin 1000 mg BID, Acarbose 25 mg TIDAC and Basaglar 35 u qhs. Her recent A1C is 7.3. She reports that she tries her best to follow a carb balanced diet. She does struggle as she has IBS and Gout and is limited in her food choices. She does not drink any juice or soda. She walks daily. She enjoys swimming and goes as often as possible to the beach. She denies any diabetic neuropathy, retinopathy, or nephropathy.\par Last Optho was over 1 year ago. \par She does have IBS, diverticulitis and h/o colon resection with colectomy which was reversed.

## 2020-08-31 NOTE — PHYSICAL EXAM
[Alert] : alert [Well Nourished] : well nourished [Well Developed] : well developed [No Acute Distress] : no acute distress [EOMI] : extra ocular movement intact [Normal Sclera/Conjunctiva] : normal sclera/conjunctiva [Normal Oropharynx] : the oropharynx was normal [No Proptosis] : no proptosis [No Thyroid Nodules] : no palpable thyroid nodules [No Respiratory Distress] : no respiratory distress [No Accessory Muscle Use] : no accessory muscle use [Clear to Auscultation] : lungs were clear to auscultation bilaterally [Normal S1, S2] : normal S1 and S2 [Normal Rate] : heart rate was normal [Regular Rhythm] : with a regular rhythm [No Edema] : no peripheral edema [Pedal Pulses Normal] : the pedal pulses are present [Normal Bowel Sounds] : normal bowel sounds [Not Distended] : not distended [Soft] : abdomen soft [Normal Anterior Cervical Nodes] : no anterior cervical lymphadenopathy [Normal Posterior Cervical Nodes] : no posterior cervical lymphadenopathy [No Spinal Tenderness] : no spinal tenderness [Spine Straight] : spine straight [Normal Gait] : normal gait [Normal Strength/Tone] : muscle strength and tone were normal [No Rash] : no rash [Normal] : normal [Full ROM] : with full range of motion [Normal Reflexes] : deep tendon reflexes were 2+ and symmetric [Oriented x3] : oriented to person, place, and time [No Tremors] : no tremors [Acanthosis Nigricans] : no acanthosis nigricans [Diminished Throughout Both Feet] : normal tactile sensation with monofilament testing throughout both feet [de-identified] : thyroid enlarged approx 60 g; irregular texture [de-identified] : mildly tender LLQ; large well healed scar

## 2020-09-10 LAB
ALBUMIN SERPL ELPH-MCNC: 4.7 G/DL
ALP BLD-CCNC: 114 U/L
ALT SERPL-CCNC: 19 U/L
ANION GAP SERPL CALC-SCNC: 16 MMOL/L
APPEARANCE: CLEAR
AST SERPL-CCNC: 14 U/L
BASOPHILS # BLD AUTO: 0.04 K/UL
BASOPHILS NFR BLD AUTO: 0.4 %
BILIRUB SERPL-MCNC: 0.2 MG/DL
BILIRUBIN URINE: NEGATIVE
BLOOD URINE: NEGATIVE
BUN SERPL-MCNC: 13 MG/DL
CALCIUM SERPL-MCNC: 9.9 MG/DL
CHLORIDE SERPL-SCNC: 103 MMOL/L
CO2 SERPL-SCNC: 21 MMOL/L
COLOR: COLORLESS
CREAT SERPL-MCNC: 0.5 MG/DL
CREAT SPEC-SCNC: 37 MG/DL
EOSINOPHIL # BLD AUTO: 0.28 K/UL
EOSINOPHIL NFR BLD AUTO: 2.9 %
FOLATE SERPL-MCNC: 10.7 NG/ML
GLUCOSE QUALITATIVE U: NEGATIVE
GLUCOSE SERPL-MCNC: 117 MG/DL
HCT VFR BLD CALC: 39.2 %
HGB BLD-MCNC: 12.4 G/DL
IMM GRANULOCYTES NFR BLD AUTO: 0.3 %
KETONES URINE: NEGATIVE
LEUKOCYTE ESTERASE URINE: NEGATIVE
LYMPHOCYTES # BLD AUTO: 2.53 K/UL
LYMPHOCYTES NFR BLD AUTO: 26.3 %
MAN DIFF?: NORMAL
MCHC RBC-ENTMCNC: 26.8 PG
MCHC RBC-ENTMCNC: 31.6 GM/DL
MCV RBC AUTO: 84.7 FL
MICROALBUMIN 24H UR DL<=1MG/L-MCNC: <1.2 MG/DL
MICROALBUMIN/CREAT 24H UR-RTO: NORMAL MG/G
MONOCYTES # BLD AUTO: 0.67 K/UL
MONOCYTES NFR BLD AUTO: 7 %
NEUTROPHILS # BLD AUTO: 6.08 K/UL
NEUTROPHILS NFR BLD AUTO: 63.1 %
NITRITE URINE: NEGATIVE
PH URINE: 6
PLATELET # BLD AUTO: 298 K/UL
POTASSIUM SERPL-SCNC: 4.1 MMOL/L
PROT SERPL-MCNC: 6.9 G/DL
PROTEIN URINE: NEGATIVE
RBC # BLD: 4.63 M/UL
RBC # FLD: 14.3 %
SAVE SPECIMEN: NORMAL
SODIUM SERPL-SCNC: 140 MMOL/L
SPECIFIC GRAVITY URINE: 1.01
T4 FREE SERPL-MCNC: 1.1 NG/DL
T4 SERPL-MCNC: 7.9 UG/DL
THYROGLOB AB SERPL-ACNC: <20 IU/ML
THYROPEROXIDASE AB SERPL IA-ACNC: <10 IU/ML
TSH SERPL-ACNC: 1.29 UIU/ML
UROBILINOGEN URINE: NORMAL
VIT B12 SERPL-MCNC: 798 PG/ML
WBC # FLD AUTO: 9.63 K/UL

## 2020-09-16 NOTE — H&P CARDIOLOGY - PULMONARY EMBOLUS
----- Message from Inga Ridley sent at 9/16/2020 12:52 PM CDT -----  Regarding: cancel appt  Contact: pt [phone]  Pt is calling to cancel her appt today due to transportation issues. If any questions please contact pt      
no

## 2020-10-29 ENCOUNTER — APPOINTMENT (OUTPATIENT)
Dept: ENDOCRINOLOGY | Facility: CLINIC | Age: 60
End: 2020-10-29
Payer: MEDICAID

## 2020-10-29 VITALS
SYSTOLIC BLOOD PRESSURE: 133 MMHG | WEIGHT: 200.38 LBS | DIASTOLIC BLOOD PRESSURE: 80 MMHG | OXYGEN SATURATION: 95 % | BODY MASS INDEX: 35.95 KG/M2 | HEIGHT: 62.6 IN | TEMPERATURE: 94.4 F | HEART RATE: 79 BPM

## 2020-10-29 LAB
GLUCOSE BLDC GLUCOMTR-MCNC: 183
HBA1C MFR BLD HPLC: 7.2

## 2020-10-29 PROCEDURE — 99072 ADDL SUPL MATRL&STAF TM PHE: CPT

## 2020-10-29 PROCEDURE — 36415 COLL VENOUS BLD VENIPUNCTURE: CPT

## 2020-10-29 PROCEDURE — 83036 HEMOGLOBIN GLYCOSYLATED A1C: CPT | Mod: QW

## 2020-10-29 PROCEDURE — 82962 GLUCOSE BLOOD TEST: CPT

## 2020-10-29 PROCEDURE — 99214 OFFICE O/P EST MOD 30 MIN: CPT | Mod: 25

## 2020-10-29 RX ORDER — ACARBOSE 25 MG/1
25 TABLET ORAL 3 TIMES DAILY
Refills: 3 | Status: DISCONTINUED | COMMUNITY
End: 2020-10-29

## 2020-10-29 RX ORDER — METFORMIN ER 500 MG 500 MG/1
500 TABLET ORAL
Qty: 360 | Refills: 1 | Status: DISCONTINUED | COMMUNITY
Start: 2020-08-31 | End: 2020-10-29

## 2020-10-29 NOTE — HISTORY OF PRESENT ILLNESS
[FreeTextEntry1] : 60 year old female presents for follow up of diabetes. She has been living with diabetes for over 5 years. She was started on insulin 2 years ago. Presently she is on Metformin 1000 mg BID, Acarbose 25 mg TIDAC and Basaglar 35 u qhs. She reports that she has seen Metformin in her feces and the "capsule" is full of the medication. Her recent A1C is 7.2. She reports that she tries her best to follow a carb balanced diet. She does struggle as she has IBS and Gout and is limited in her food choices. She does not drink any juice or soda. She walks daily. She enjoys swimming but has not been able to go due to the weather. She denies any diabetic neuropathy, retinopathy, or nephropathy.\par Last Optho was over 1 year ago. \par She does have IBS, diverticulitis and h/o colon resection with colectomy which was reversed. \par Recently hospitalized for hernia blockage. Also has aneurysm in the brain and is having workup for that.

## 2020-10-29 NOTE — REASON FOR VISIT
[DM Type 2] : DM Type 2 [Weight Management/Obesity] : weight management/obesity [Follow - Up] : a follow-up visit

## 2020-10-29 NOTE — ASSESSMENT
[FreeTextEntry1] : 1. Type 2 daibetes/obesity - Due to her multiple GI issues, I am recommending she stop the Acarbose and Metformin. She will continue Basaglar 35 u qhs. Will start prandial insulin. Sliding scale was given to the patient. We discussed how to use the sliding scale. Will adjust at next appt. \par She was also given a sample Mega sensor. She was trained on using the Mega. Script sent to pharmacy. Pt is willing to pay out of pocket for it.  We discussed at length the importance of following a carbohydrate balanced diet and the importance of incorporating protein in meals. We also discussed appropriate alternative food choices. I have recommended she f/u with the RD for additional education. \par 2. Thyroid enlarged - Recent TFTs were normal. Thyroid sono showed b/l subcm nodules. f/u sono in March 2021 \par Fasting labs done today\par Will f/u in 2 weeks [Diabetes Foot Care] : diabetes foot care [Long Term Vascular Complications] : long term vascular complications of diabetes [Carbohydrate Consistent Diet] : carbohydrate consistent diet [Importance of Diet and Exercise] : importance of diet and exercise to improve glycemic control, achieve weight loss and improve cardiovascular health [Exercise/Effect on Glucose] : exercise/effect on glucose [Hypoglycemia Management] : hypoglycemia management [Action and use of Insulin] : action and use of short and long-acting insulin [Self Monitoring of Blood Glucose] : self monitoring of blood glucose [Insulin Self-Administration] : insulin self-administration [Injection Technique, Storage, Sharps Disposal] : injection technique, storage, and sharps disposal [Retinopathy Screening] : Patient was referred to ophthalmology for retinopathy screening [Weight Loss] : weight loss [Diabetic Medications] : Risks and benefits of diabetic medications were discussed

## 2020-10-29 NOTE — PHYSICAL EXAM
[Alert] : alert [Well Nourished] : well nourished [No Acute Distress] : no acute distress [Well Developed] : well developed [Normal Sclera/Conjunctiva] : normal sclera/conjunctiva [EOMI] : extra ocular movement intact [No Proptosis] : no proptosis [Normal Oropharynx] : the oropharynx was normal [No Thyroid Nodules] : no palpable thyroid nodules [No Respiratory Distress] : no respiratory distress [No Accessory Muscle Use] : no accessory muscle use [Clear to Auscultation] : lungs were clear to auscultation bilaterally [Normal S1, S2] : normal S1 and S2 [Normal Rate] : heart rate was normal [Regular Rhythm] : with a regular rhythm [No Edema] : no peripheral edema [Pedal Pulses Normal] : the pedal pulses are present [Normal Bowel Sounds] : normal bowel sounds [Not Distended] : not distended [Soft] : abdomen soft [Normal Anterior Cervical Nodes] : no anterior cervical lymphadenopathy [No Spinal Tenderness] : no spinal tenderness [Spine Straight] : spine straight [Normal Gait] : normal gait [Normal Strength/Tone] : muscle strength and tone were normal [No Rash] : no rash [Normal] : normal [Full ROM] : with full range of motion [Normal Reflexes] : deep tendon reflexes were 2+ and symmetric [No Tremors] : no tremors [Oriented x3] : oriented to person, place, and time [Acanthosis Nigricans] : no acanthosis nigricans [Diminished Throughout Both Feet] : normal tactile sensation with monofilament testing throughout both feet [de-identified] : thyroid enlarged approx 60 g; irregular texture [de-identified] : mildly tender LLQ; large well healed scar

## 2020-10-30 ENCOUNTER — APPOINTMENT (OUTPATIENT)
Dept: NEUROSURGERY | Facility: CLINIC | Age: 60
End: 2020-10-30
Payer: MEDICAID

## 2020-10-30 VITALS
HEIGHT: 62.6 IN | SYSTOLIC BLOOD PRESSURE: 136 MMHG | BODY MASS INDEX: 35.88 KG/M2 | WEIGHT: 200 LBS | TEMPERATURE: 98.3 F | HEART RATE: 73 BPM | RESPIRATION RATE: 17 BRPM | DIASTOLIC BLOOD PRESSURE: 80 MMHG | OXYGEN SATURATION: 94 %

## 2020-10-30 DIAGNOSIS — I67.1 CEREBRAL ANEURYSM, NONRUPTURED: ICD-10-CM

## 2020-10-30 PROCEDURE — 99205 OFFICE O/P NEW HI 60 MIN: CPT

## 2020-10-30 PROCEDURE — 99072 ADDL SUPL MATRL&STAF TM PHE: CPT

## 2020-10-30 RX ORDER — MAGNESIUM OXIDE 241.3 MG/1000MG
400 TABLET ORAL
Qty: 30 | Refills: 0 | Status: ACTIVE | COMMUNITY
Start: 2020-05-19

## 2020-10-30 RX ORDER — ACETAMINOPHEN EXTRA STRENGTH 500 MG/1
500 TABLET ORAL
Qty: 90 | Refills: 0 | Status: ACTIVE | COMMUNITY
Start: 2020-07-17

## 2020-10-30 RX ORDER — GABAPENTIN 300 MG/1
300 CAPSULE ORAL
Qty: 90 | Refills: 0 | Status: DISCONTINUED | COMMUNITY
Start: 2020-03-25 | End: 2020-10-30

## 2020-10-30 RX ORDER — BLOOD SUGAR DIAGNOSTIC
STRIP MISCELLANEOUS
Qty: 100 | Refills: 0 | Status: ACTIVE | COMMUNITY
Start: 2020-09-08

## 2020-10-30 RX ORDER — POLYVINYL ALCOHOL, POVIDONE 14; 6 MG/ML; MG/ML
1.4-0.6 SOLUTION/ DROPS OPHTHALMIC
Qty: 15 | Refills: 0 | Status: ACTIVE | COMMUNITY
Start: 2020-06-11

## 2020-10-30 RX ORDER — CYANOCOBALAMIN 1000 UG/ML
1000 INJECTION INTRAMUSCULAR; SUBCUTANEOUS
Qty: 1 | Refills: 0 | Status: ACTIVE | COMMUNITY
Start: 2020-09-02

## 2020-11-02 PROBLEM — I67.1 CEREBRAL ANEURYSM: Status: ACTIVE | Noted: 2020-11-02

## 2020-11-02 NOTE — PHYSICAL EXAM
[General Appearance - Alert] : alert [General Appearance - In No Acute Distress] : in no acute distress [General Appearance - Well Nourished] : well nourished [General Appearance - Well Developed] : well developed [General Appearance - Well-Appearing] : healthy appearing [Oriented To Time, Place, And Person] : oriented to person, place, and time [Impaired Insight] : insight and judgment were intact [Affect] : the affect was normal [Memory Recent] : recent memory was not impaired [Person] : oriented to person [Place] : oriented to place [Time] : oriented to time [Cranial Nerves Optic (II)] : visual acuity intact bilaterally,  pupils equal round and reactive to light [Cranial Nerves Oculomotor (III)] : extraocular motion intact [Cranial Nerves Trigeminal (V)] : facial sensation intact symmetrically [Cranial Nerves Facial (VII)] : face symmetrical [Cranial Nerves Vestibulocochlear (VIII)] : hearing was intact bilaterally [Cranial Nerves Glossopharyngeal (IX)] : tongue and palate midline [Cranial Nerves Accessory (XI - Cranial And Spinal)] : head turning and shoulder shrug symmetric [Cranial Nerves Hypoglossal (XII)] : there was no tongue deviation with protrusion [Motor Strength] : muscle strength was normal in all four extremities [Sclera] : the sclera and conjunctiva were normal [PERRL With Normal Accommodation] : pupils were equal in size, round, reactive to light, with normal accommodation [Extraocular Movements] : extraocular movements were intact [Outer Ear] : the ears and nose were normal in appearance [Hearing Threshold Finger Rub Not [place]] : hearing was normal [Oropharynx] : the oropharynx was normal [Neck Appearance] : the appearance of the neck was normal [Respiration, Rhythm And Depth] : normal respiratory rhythm and effort [Exaggerated Use Of Accessory Muscles For Inspiration] : no accessory muscle use [Heart Rate And Rhythm] : heart rate was normal and rhythm regular [Abnormal Walk] : normal gait [Involuntary Movements] : no involuntary movements were seen [Motor Tone] : muscle strength and tone were normal [Skin Color & Pigmentation] : normal skin color and pigmentation [] : no rash

## 2020-11-06 NOTE — ASSESSMENT
[FreeTextEntry1] : IMPRESSION:\par 1. Left facial pain.\par 2. 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 Brain MRI w/wo - [facility] and navigation protocol.\par 2. F/U after imaging.\par \par \par

## 2020-11-06 NOTE — HISTORY OF PRESENT ILLNESS
[> 3 months] : more  than 3 months [FreeTextEntry1] : Left facial pain and aneurysm [de-identified] : Kinjal Samaniego is a pleasant 60 year old lady who presents for consultation regarding left facial pain.  Pain is 10/10 with increased stress.  She describes left temporal pain and left cheek pain.  Denies pain in left jaw, and is not worsened with cold, eating and is not described as a stabbing pain.\par \par She describes left facial pain as achy, burning and does not occur on a daily basis.  \par \par her Neurologist is Dr. Leroy Mota.  She takes Gabapentin for neuropathic pain.

## 2020-11-06 NOTE — REVIEW OF SYSTEMS
[Arm Weakness] : arm weakness [Hand Weakness] :  hand weakness [Leg Weakness] : leg weakness [Tension Headache] : tension-type headaches [Difficulty Walking] : difficulty walking [Anxiety] : anxiety [Eye Pain] : eye pain [Eyesight Problems] : eyesight problems [Abdominal Pain] : abdominal pain [Negative] : Heme/Lymph [FreeTextEntry7] : diverticulitis, colitis, hernia [FreeTextEntry9] : joint stiffness, arthritis

## 2020-11-06 NOTE — DATA REVIEWED
[de-identified] : BRAIN DONE ON 9/22/20 AT  [de-identified] : MRI BRAIN W/WO DONE ON 8/25/20 AT

## 2020-11-13 ENCOUNTER — RESULT REVIEW (OUTPATIENT)
Age: 60
End: 2020-11-13

## 2020-11-13 ENCOUNTER — OUTPATIENT (OUTPATIENT)
Dept: OUTPATIENT SERVICES | Facility: HOSPITAL | Age: 60
LOS: 1 days | End: 2020-11-13
Payer: MEDICAID

## 2020-11-13 ENCOUNTER — APPOINTMENT (OUTPATIENT)
Dept: ENDOCRINOLOGY | Facility: CLINIC | Age: 60
End: 2020-11-13
Payer: MEDICAID

## 2020-11-13 ENCOUNTER — APPOINTMENT (OUTPATIENT)
Dept: RHEUMATOLOGY | Facility: HOSPITAL | Age: 60
End: 2020-11-13
Payer: MEDICAID

## 2020-11-13 VITALS
SYSTOLIC BLOOD PRESSURE: 145 MMHG | HEIGHT: 63 IN | DIASTOLIC BLOOD PRESSURE: 83 MMHG | TEMPERATURE: 96.7 F | WEIGHT: 197 LBS | BODY MASS INDEX: 34.91 KG/M2 | HEART RATE: 77 BPM

## 2020-11-13 VITALS
BODY MASS INDEX: 35.08 KG/M2 | HEART RATE: 75 BPM | WEIGHT: 198 LBS | DIASTOLIC BLOOD PRESSURE: 84 MMHG | SYSTOLIC BLOOD PRESSURE: 138 MMHG | TEMPERATURE: 93.7 F | HEIGHT: 63 IN | OXYGEN SATURATION: 97 %

## 2020-11-13 DIAGNOSIS — Z98.89 OTHER SPECIFIED POSTPROCEDURAL STATES: Chronic | ICD-10-CM

## 2020-11-13 DIAGNOSIS — Z90.49 ACQUIRED ABSENCE OF OTHER SPECIFIED PARTS OF DIGESTIVE TRACT: Chronic | ICD-10-CM

## 2020-11-13 DIAGNOSIS — M06.9 RHEUMATOID ARTHRITIS, UNSPECIFIED: ICD-10-CM

## 2020-11-13 LAB
C3 SERPL-MCNC: 178 MG/DL — HIGH (ref 81–157)
C4 SERPL-MCNC: 38 MG/DL — SIGNIFICANT CHANGE UP (ref 13–39)
CK SERPL-CCNC: 116 U/L — SIGNIFICANT CHANGE UP (ref 25–170)
CRP SERPL-MCNC: 1.17 MG/DL — HIGH (ref 0–0.4)
ERYTHROCYTE [SEDIMENTATION RATE] IN BLOOD: 47 MM/HR — HIGH (ref 0–20)
GLUCOSE BLDC GLUCOMTR-MCNC: 252
RHEUMATOID FACT SERPL-ACNC: <10 IU/ML — SIGNIFICANT CHANGE UP (ref 0–13)

## 2020-11-13 PROCEDURE — 99214 OFFICE O/P EST MOD 30 MIN: CPT | Mod: 25

## 2020-11-13 PROCEDURE — 85652 RBC SED RATE AUTOMATED: CPT

## 2020-11-13 PROCEDURE — 86200 CCP ANTIBODY: CPT

## 2020-11-13 PROCEDURE — 86140 C-REACTIVE PROTEIN: CPT

## 2020-11-13 PROCEDURE — 86431 RHEUMATOID FACTOR QUANT: CPT

## 2020-11-13 PROCEDURE — 99205 OFFICE O/P NEW HI 60 MIN: CPT | Mod: GC

## 2020-11-13 PROCEDURE — 73660 X-RAY EXAM OF TOE(S): CPT

## 2020-11-13 PROCEDURE — 73130 X-RAY EXAM OF HAND: CPT

## 2020-11-13 PROCEDURE — 95249 CONT GLUC MNTR PT PROV EQP: CPT

## 2020-11-13 PROCEDURE — 95251 CONT GLUC MNTR ANALYSIS I&R: CPT

## 2020-11-13 PROCEDURE — 82962 GLUCOSE BLOOD TEST: CPT

## 2020-11-13 PROCEDURE — 73660 X-RAY EXAM OF TOE(S): CPT | Mod: 26,LT,RT

## 2020-11-13 PROCEDURE — 73130 X-RAY EXAM OF HAND: CPT | Mod: 26,LT,RT

## 2020-11-13 PROCEDURE — 73564 X-RAY EXAM KNEE 4 OR MORE: CPT

## 2020-11-13 PROCEDURE — 86225 DNA ANTIBODY NATIVE: CPT

## 2020-11-13 PROCEDURE — 82550 ASSAY OF CK (CPK): CPT

## 2020-11-13 PROCEDURE — 86160 COMPLEMENT ANTIGEN: CPT

## 2020-11-13 PROCEDURE — 73564 X-RAY EXAM KNEE 4 OR MORE: CPT | Mod: 26,LT,RT

## 2020-11-13 PROCEDURE — G0463: CPT

## 2020-11-13 PROCEDURE — 99072 ADDL SUPL MATRL&STAF TM PHE: CPT

## 2020-11-13 PROCEDURE — 86038 ANTINUCLEAR ANTIBODIES: CPT

## 2020-11-13 RX ORDER — ASPIRIN 81 MG/1
81 TABLET, COATED ORAL
Qty: 30 | Refills: 0 | Status: DISCONTINUED | COMMUNITY
Start: 2020-03-25 | End: 2020-11-13

## 2020-11-13 RX ORDER — METOPROLOL SUCCINATE 50 MG/1
50 TABLET, EXTENDED RELEASE ORAL
Qty: 60 | Refills: 0 | Status: DISCONTINUED | COMMUNITY
Start: 2020-07-09 | End: 2020-11-13

## 2020-11-13 RX ORDER — INSULIN ASPART INJECTION 100 [IU]/ML
100 INJECTION, SOLUTION SUBCUTANEOUS
Qty: 1 | Refills: 2 | Status: DISCONTINUED | COMMUNITY
Start: 2020-11-13 | End: 2020-11-13

## 2020-11-13 RX ORDER — PREDNISONE 20 MG/1
20 TABLET ORAL
Qty: 10 | Refills: 0 | Status: DISCONTINUED | COMMUNITY
Start: 2020-04-28 | End: 2020-11-13

## 2020-11-13 RX ORDER — ALLOPURINOL 300 MG/1
300 TABLET ORAL
Qty: 30 | Refills: 0 | Status: DISCONTINUED | COMMUNITY
Start: 2020-08-13 | End: 2020-11-13

## 2020-11-13 RX ORDER — ACETAMINOPHEN 650 MG/1
650 TABLET, FILM COATED, EXTENDED RELEASE ORAL
Qty: 90 | Refills: 0 | Status: DISCONTINUED | COMMUNITY
Start: 2020-03-25 | End: 2020-11-13

## 2020-11-13 RX ORDER — ACARBOSE 50 MG/1
50 TABLET ORAL
Qty: 90 | Refills: 0 | Status: DISCONTINUED | COMMUNITY
Start: 2020-04-28 | End: 2020-11-13

## 2020-11-13 RX ORDER — INSULIN ASPART INJECTION 100 [IU]/ML
100 INJECTION, SOLUTION SUBCUTANEOUS
Refills: 0 | Status: DISCONTINUED | COMMUNITY
End: 2020-11-13

## 2020-11-13 RX ORDER — ASPIRIN 81 MG
81 TABLET, DELAYED RELEASE (ENTERIC COATED) ORAL
Refills: 0 | Status: DISCONTINUED | COMMUNITY
End: 2020-11-13

## 2020-11-13 RX ORDER — ISOSORBIDE MONONITRATE 30 MG/1
30 TABLET, EXTENDED RELEASE ORAL
Qty: 30 | Refills: 0 | Status: DISCONTINUED | COMMUNITY
Start: 2020-03-25 | End: 2020-11-13

## 2020-11-13 NOTE — PHYSICAL EXAM
[Alert] : alert [Well Nourished] : well nourished [No Acute Distress] : no acute distress [Well Developed] : well developed [Normal Sclera/Conjunctiva] : normal sclera/conjunctiva [EOMI] : extra ocular movement intact [No Proptosis] : no proptosis [Normal Oropharynx] : the oropharynx was normal [No Thyroid Nodules] : no palpable thyroid nodules [No Respiratory Distress] : no respiratory distress [No Accessory Muscle Use] : no accessory muscle use [Clear to Auscultation] : lungs were clear to auscultation bilaterally [Normal S1, S2] : normal S1 and S2 [Normal Rate] : heart rate was normal [Regular Rhythm] : with a regular rhythm [No Edema] : no peripheral edema [Pedal Pulses Normal] : the pedal pulses are present [Normal Bowel Sounds] : normal bowel sounds [Not Distended] : not distended [Soft] : abdomen soft [Normal Anterior Cervical Nodes] : no anterior cervical lymphadenopathy [No Spinal Tenderness] : no spinal tenderness [Spine Straight] : spine straight [Normal Gait] : normal gait [Normal Strength/Tone] : muscle strength and tone were normal [No Rash] : no rash [Normal] : normal [Full ROM] : with full range of motion [Normal Reflexes] : deep tendon reflexes were 2+ and symmetric [No Tremors] : no tremors [Oriented x3] : oriented to person, place, and time [Acanthosis Nigricans] : no acanthosis nigricans [Diminished Throughout Both Feet] : normal tactile sensation with monofilament testing throughout both feet [de-identified] : thyroid enlarged approx 60 g; irregular texture [de-identified] : mildly tender LLQ; large well healed scar

## 2020-11-13 NOTE — ASSESSMENT
[Diabetes Foot Care] : diabetes foot care [Long Term Vascular Complications] : long term vascular complications of diabetes [Carbohydrate Consistent Diet] : carbohydrate consistent diet [Importance of Diet and Exercise] : importance of diet and exercise to improve glycemic control, achieve weight loss and improve cardiovascular health [Exercise/Effect on Glucose] : exercise/effect on glucose [Hypoglycemia Management] : hypoglycemia management [Action and use of Insulin] : action and use of short and long-acting insulin [Self Monitoring of Blood Glucose] : self monitoring of blood glucose [Insulin Self-Administration] : insulin self-administration [Injection Technique, Storage, Sharps Disposal] : injection technique, storage, and sharps disposal [Retinopathy Screening] : Patient was referred to ophthalmology for retinopathy screening [Weight Loss] : weight loss [Diabetic Medications] : Risks and benefits of diabetic medications were discussed [FreeTextEntry1] : 1. Type 2 diabetes/obesity - TIR 81% >180, 18% > 250, 1%. CV is 18.3% Her BS are stable. She does have PP spikes. We discussed diet. We also discussed looking back at those meals and reviewing insulin use. Looking at trend arrows was also discussed. \par Will continue the same medications. Has f/u appt with RD next week. \par Switch to Admelog from [facility] due to coverage\par 2. Thyroid enlarged - Recent TFTs were normal. Thyroid sono showed b/l subcm nodules. f/u sono in March 2021 \par \par Will f/u in 1 months

## 2020-11-13 NOTE — HISTORY OF PRESENT ILLNESS
[FreeTextEntry1] : 60 year old female presents for follow up of diabetes. She has been living with diabetes for over 5 years. She was started on insulin 2 years ago. Presently she is on Basaglar 35 u qhs AND Fiasp SS TIDAC started at her last visit. She is off of Metformin and Acarbose. She feels her GI sx have improved. She is now using the Mega to monitor her BS. Her recent A1C is 7.2. She reports that she tries her best to follow a carb balanced diet. She does struggle as she has IBS and Gout and is limited in her food choices. She does not drink any juice or soda. She has made changes since she started using the Mega to her diet. She walks daily. She enjoys swimming but has not been able to go due to the weather. She denies any diabetic neuropathy, retinopathy, or nephropathy.\par Last Optho was over 1 year ago. \par She does have IBS, diverticulitis and h/o colon resection with colectomy which was reversed. \par Recently hospitalized for hernia blockage. Also has aneurysm in the brain and is having workup for that.

## 2020-11-14 LAB — ANA TITR SER: NEGATIVE — SIGNIFICANT CHANGE UP

## 2020-11-16 DIAGNOSIS — M79.7 FIBROMYALGIA: ICD-10-CM

## 2020-11-16 DIAGNOSIS — R76.8 OTHER SPECIFIED ABNORMAL IMMUNOLOGICAL FINDINGS IN SERUM: ICD-10-CM

## 2020-11-16 DIAGNOSIS — M25.50 PAIN IN UNSPECIFIED JOINT: ICD-10-CM

## 2020-11-16 DIAGNOSIS — M79.10 MYALGIA, UNSPECIFIED SITE: ICD-10-CM

## 2020-11-16 LAB
CCP IGG SERPL-ACNC: <8 UNITS — SIGNIFICANT CHANGE UP
DSDNA AB SER-ACNC: <12 IU/ML — SIGNIFICANT CHANGE UP
RF+CCP IGG SER-IMP: NEGATIVE — SIGNIFICANT CHANGE UP

## 2020-11-16 NOTE — ASSESSMENT
[FreeTextEntry1] : 60 Y F with a PMHx of DM, HTN, Asthma, CAD, diverticulitis and self reported Hx of Gout presents for evaluation for elevated Complement, dsDNA levels, body and  joint pains. \par \par Problem List: \par # Concern for SLE shown by Cardiologist due to elevated ds DNA.\par # Episodic/flares Oligoarticular pains in large joints; self reported Hx of Gout\par # Persistent daily symmetrical small joint pain of hands\par # Body wide pain with tender spots. \par \par Impression:\par \par # Concern for SLE shown by Cardiologist due to elevated ds DNA.\par On Hx and exam, there are no concerns for SLE.\par dsDNA was slightly elevated, DEMETRICE was negative. 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 \par Definitive dx needs aspiration of joint during flare, she should get this procedure when/if she flare again. \par \par # Persistent daily symmetrical small joint pain of hands.\par Hx equivocal for inflammatory origin of these pains, will need further evaluation. \par Will get Xrays of hands and feet, ESR, CRP, RF, CCP. \par \par # Body wide pain with tender spots. \par Hx and exam suggestive of FM.\par Sleep hygiene, PT, weight loss advised. \par \par RTC in 3 months. \par \par DW Dr. Mercado

## 2020-11-16 NOTE — HISTORY OF PRESENT ILLNESS
[FreeTextEntry1] : 60 Y F with a PMHx of DM, HTN, Asthma, CAD, diverticulitis and self reported Hx of Gout presents for evaluation for elevated Complement, dsDNA levels, body and  joint pains. \par The patient has had three distinctive MSK symptoms since a few years.\par 1. Diffuse body pains, in arms, legs, shoulders, fatigue, lack of adequate sleep; symptoms improve with exercise.\par 2. Acute episodes of pain, swelling, limitation or ROM in knee and ankle joints; these have been attributed to Gout episodes although she has never had an arthrocentesis procedure done, but has been told she has had high UA levels; when these episodes come she takes Colchicine with resolution. \par 3. She has had pains in b/l PIP, MCP and wrist joints with no morning stiffness; could not specify if these pains are worse during the day or the night. \par Besides these symptoms she denies alopecia, oral ulcers, malar rash, Raynaud's , Sicca symptoms. \par Otherwise feels well, denies, SOB, n/v/d, abdominal pain, fever or chills.

## 2020-11-16 NOTE — PHYSICAL EXAM
[General Appearance - Alert] : alert [General Appearance - In No Acute Distress] : in no acute distress [Sclera] : the sclera and conjunctiva were normal [PERRL With Normal Accommodation] : pupils were equal in size, round, and reactive to light [Extraocular Movements] : extraocular movements were intact [Outer Ear] : the ears and nose were normal in appearance [Oropharynx] : the oropharynx was normal [Auscultation Breath Sounds / Voice Sounds] : lungs were clear to auscultation bilaterally [Heart Rate And Rhythm] : heart rate was normal and rhythm regular [Heart Sounds] : normal S1 and S2 [Heart Sounds Gallop] : no gallops [Murmurs] : no murmurs [Heart Sounds Pericardial Friction Rub] : no pericardial rub [Full Pulse] : the pedal pulses are present [Edema] : there was no peripheral edema [Bowel Sounds] : normal bowel sounds [Abdomen Soft] : soft [Abdomen Tenderness] : non-tender [Abdomen Mass (___ Cm)] : no abdominal mass palpated [Skin Color & Pigmentation] : normal skin color and pigmentation [Skin Turgor] : normal skin turgor [] : no rash [FreeTextEntry1] : Tender spots appreciated on arms, shoulders, back and lateral hips. No signs of synovitis, erythema, limitation of ROM, deformity noted in any joint but she has tenderness in b/l PIP, MCP, wrist, elbow, knee and akle joints.

## 2020-11-17 ENCOUNTER — APPOINTMENT (OUTPATIENT)
Dept: ENDOCRINOLOGY | Facility: CLINIC | Age: 60
End: 2020-11-17
Payer: MEDICAID

## 2020-11-17 PROCEDURE — G0108 DIAB MANAGE TRN  PER INDIV: CPT | Mod: 95

## 2020-12-05 ENCOUNTER — NON-APPOINTMENT (OUTPATIENT)
Age: 60
End: 2020-12-05

## 2021-01-08 ENCOUNTER — APPOINTMENT (OUTPATIENT)
Dept: RHEUMATOLOGY | Facility: HOSPITAL | Age: 61
End: 2021-01-08

## 2021-01-20 ENCOUNTER — LABORATORY RESULT (OUTPATIENT)
Age: 61
End: 2021-01-20

## 2021-01-20 ENCOUNTER — APPOINTMENT (OUTPATIENT)
Dept: ENDOCRINOLOGY | Facility: CLINIC | Age: 61
End: 2021-01-20
Payer: MEDICAID

## 2021-01-20 PROCEDURE — 99213 OFFICE O/P EST LOW 20 MIN: CPT | Mod: 95

## 2021-01-20 NOTE — ASSESSMENT
[Diabetes Foot Care] : diabetes foot care [Long Term Vascular Complications] : long term vascular complications of diabetes [Carbohydrate Consistent Diet] : carbohydrate consistent diet [Importance of Diet and Exercise] : importance of diet and exercise to improve glycemic control, achieve weight loss and improve cardiovascular health [Exercise/Effect on Glucose] : exercise/effect on glucose [Hypoglycemia Management] : hypoglycemia management [Action and use of Insulin] : action and use of short and long-acting insulin [Self Monitoring of Blood Glucose] : self monitoring of blood glucose [Insulin Self-Administration] : insulin self-administration [Injection Technique, Storage, Sharps Disposal] : injection technique, storage, and sharps disposal [Retinopathy Screening] : Patient was referred to ophthalmology for retinopathy screening [Weight Loss] : weight loss [Diabetic Medications] : Risks and benefits of diabetic medications were discussed [FreeTextEntry1] : 1. Type 2 diabetes/obesity - TIR 83% >180, 16% > 250, 1%. CV is 25.1% Her BS are stable. She does have some PP spikes but they are improving. Discussed using trend arrows to help guide insulin bolus dose. Will continue the same medications. Will continue to f/u with the RD. \par 2. Thyroid enlarged - Recent TFTs were normal. Thyroid sono showed b/l subcm nodules. f/u sono in March 2021 \par \par Will f/u in 3 months\par Will complete fasting labs and I will call withr jonathan.

## 2021-01-20 NOTE — HISTORY OF PRESENT ILLNESS
[FreeTextEntry1] : 60 year old female presents for follow up of diabetes. She has been living with diabetes for over 5 years. She was started on insulin 2 years ago. Presently she is on Basaglar 35 u qhs AND Admelog SS TIDAC. She is off of Metformin and Acarbose. She feels her GI sx have improved. She is now using the Mega to monitor her BS. Her recent A1C is 7.2. She reports that she tries her best to follow a carb balanced diet. She does struggle as she has IBS and Gout and is limited in her food choices. She has been very diligent about her diet. She does not drink any juice or soda. She has made changes since she started using the Mega to her diet. She walks daily. She enjoys swimming but has not been able to go due to the weather. She denies any diabetic neuropathy, retinopathy, or nephropathy.\par Last Optho was over 1 year ago. \par She does have IBS, diverticulitis and h/o colon resection with colectomy which was reversed. \par Recently hospitalized for hernia blockage. Also has aneurysm in the brain and is having workup for that. \par Spouse recently tested positive for COVID, she has had multiple tests and has been negative.

## 2021-01-20 NOTE — REASON FOR VISIT
[Follow - Up] : a follow-up visit [DM Type 2] : DM Type 2 [Home] : at home, [unfilled] , at the time of the visit. [Medical Office: ([facility] [place]___] : at the medical office located in  [Verbal consent obtained from patient] : the patient, [unfilled]

## 2021-02-05 ENCOUNTER — APPOINTMENT (OUTPATIENT)
Dept: ENDOCRINOLOGY | Facility: CLINIC | Age: 61
End: 2021-02-05
Payer: MEDICAID

## 2021-02-05 PROCEDURE — G0108 DIAB MANAGE TRN  PER INDIV: CPT

## 2021-02-05 PROCEDURE — 99072 ADDL SUPL MATRL&STAF TM PHE: CPT

## 2021-02-19 ENCOUNTER — APPOINTMENT (OUTPATIENT)
Dept: RHEUMATOLOGY | Facility: HOSPITAL | Age: 61
End: 2021-02-19
Payer: MEDICAID

## 2021-02-19 ENCOUNTER — OUTPATIENT (OUTPATIENT)
Dept: OUTPATIENT SERVICES | Facility: HOSPITAL | Age: 61
LOS: 1 days | End: 2021-02-19
Payer: MEDICAID

## 2021-02-19 DIAGNOSIS — Z98.89 OTHER SPECIFIED POSTPROCEDURAL STATES: Chronic | ICD-10-CM

## 2021-02-19 DIAGNOSIS — M06.9 RHEUMATOID ARTHRITIS, UNSPECIFIED: ICD-10-CM

## 2021-02-19 DIAGNOSIS — Z90.49 ACQUIRED ABSENCE OF OTHER SPECIFIED PARTS OF DIGESTIVE TRACT: Chronic | ICD-10-CM

## 2021-02-19 DIAGNOSIS — M79.7 FIBROMYALGIA: ICD-10-CM

## 2021-02-19 PROCEDURE — ZZZZZ: CPT

## 2021-02-19 PROCEDURE — G0463: CPT

## 2021-02-19 NOTE — REASON FOR VISIT
[Home] : at home, [unfilled] , at the time of the visit. [Medical Office: ([facility])___] : at the medical office located in  [Verbal consent obtained from patient] : the patient, [unfilled] [Follow-Up: _____] : a [unfilled] follow-up visit

## 2021-02-22 DIAGNOSIS — Z01.818 ENCOUNTER FOR OTHER PREPROCEDURAL EXAMINATION: ICD-10-CM

## 2021-02-23 ENCOUNTER — APPOINTMENT (OUTPATIENT)
Dept: DISASTER EMERGENCY | Facility: CLINIC | Age: 61
End: 2021-02-23

## 2021-02-23 LAB — SARS-COV-2 N GENE NPH QL NAA+PROBE: NOT DETECTED

## 2021-02-26 ENCOUNTER — APPOINTMENT (OUTPATIENT)
Dept: CV DIAGNOSTICS | Facility: HOSPITAL | Age: 61
End: 2021-02-26

## 2021-02-26 ENCOUNTER — OUTPATIENT (OUTPATIENT)
Dept: OUTPATIENT SERVICES | Facility: HOSPITAL | Age: 61
LOS: 1 days | End: 2021-02-26
Payer: MEDICAID

## 2021-02-26 DIAGNOSIS — Z98.89 OTHER SPECIFIED POSTPROCEDURAL STATES: Chronic | ICD-10-CM

## 2021-02-26 DIAGNOSIS — Z90.49 ACQUIRED ABSENCE OF OTHER SPECIFIED PARTS OF DIGESTIVE TRACT: Chronic | ICD-10-CM

## 2021-02-26 DIAGNOSIS — I25.10 ATHEROSCLEROTIC HEART DISEASE OF NATIVE CORONARY ARTERY WITHOUT ANGINA PECTORIS: ICD-10-CM

## 2021-02-26 PROCEDURE — A9500: CPT

## 2021-02-26 PROCEDURE — 78452 HT MUSCLE IMAGE SPECT MULT: CPT

## 2021-02-26 PROCEDURE — 78452 HT MUSCLE IMAGE SPECT MULT: CPT | Mod: 26

## 2021-02-26 PROCEDURE — 93016 CV STRESS TEST SUPVJ ONLY: CPT

## 2021-02-26 PROCEDURE — 93017 CV STRESS TEST TRACING ONLY: CPT

## 2021-02-26 PROCEDURE — 93018 CV STRESS TEST I&R ONLY: CPT

## 2021-02-26 NOTE — HISTORY OF PRESENT ILLNESS
[FreeTextEntry1] : 2/19/2020:\par Today we did a Telephonic visit. \par The patient says she feels alright.\par Joint pain her hands is about the same, worsens with activity. \par Shoulder and hip aching pains, muscle and body wide pains continue. \par No episodes of swollen/red/hot knee or ankle joints since our last visit. \par No fever, chills, n/v/d, abdominal pain, CP, cough. \par Has had headaches since 1.5 months; left sided, temple and face, aching, no scalp tenderness or jaw claudication, does have left eye blurry vision but no visual filed losses. \par

## 2021-02-26 NOTE — ASSESSMENT
[FreeTextEntry1] : 60 Y F with a PMHx of DM, HTN, Asthma, CAD, diverticulitis and self reported Hx of Gout was initially evaluated for elevated Complement, dsDNA levels, body and  joint pains in 11/2020.\par \par Impression/Plan: \par \par # Concern for GCA: \par Per the pt she had left sided headache in 7/2020 initially. \par Left sided, temple and face, aching, no scalp tenderness or jaw claudication, did have left eye blurry vision but no visual filed losses.\par Saw Dr. Leroy Mota (Neurology); had CT head (shoed possible internal carotid artery aneurysms) and then MRI head that did not show any abnormality.\par Headaches spontaneously resolved; she did not have any headache in 11/2020 when she saw us in clinic. \par In 12/2020 headaches returned, saw neurologist again, was given a tentative dx of GCA and started on prednisone 60 mg daily (tapered rapidly to 10 mg daily within a 1-2 weeks). Currently she is on 10 mg prednisone daily; steroids were given by neurology. \par No temporal artery bx was done.   \par At this time the dx of GCA is not clear, we requested her to come for an in person visit as soon as possible to evaluate for GCA. \par For now she will continue 10 mg daily as per her Neurologist.\par We called Dr. Mota twice, could not get in touch, left a message and phone number for call back. \par \par # Concern for SLE shown by Cardiologist due to elevated ds DNA.\par On Hx and exam, there are no concerns for SLE.\par DEMETRICE, dsDNA, complements checked, were normal. \par SLE not likely, no further w/u. 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 \par Knee xray did not show chondrocalcinosis. \par Definitive dx needs aspiration of joint during flare, she should get this procedure when/if she flare again. \par Has not had a flare since last visit. \par Plan is to get Uric acid level checked on next visit. \par Pt on Allopurinol since before, should continue. \par \par # Hand and knee pains: \par Hand and knee xrays indicate OA etiology of pains. \par PT, topical voltaren gel, weight loss. \par \par # Body wide pain with tender spots. \par Hx and exam suggestive of FM.\par Sleep hygiene, PT, weight loss advised. \par \par RTC ASAP\par \par DW Dr. [place].

## 2021-03-12 ENCOUNTER — APPOINTMENT (OUTPATIENT)
Dept: RHEUMATOLOGY | Facility: HOSPITAL | Age: 61
End: 2021-03-12
Payer: MEDICAID

## 2021-03-12 ENCOUNTER — LABORATORY RESULT (OUTPATIENT)
Age: 61
End: 2021-03-12

## 2021-03-12 ENCOUNTER — OUTPATIENT (OUTPATIENT)
Dept: OUTPATIENT SERVICES | Facility: HOSPITAL | Age: 61
LOS: 1 days | End: 2021-03-12
Payer: MEDICAID

## 2021-03-12 VITALS
TEMPERATURE: 97.1 F | RESPIRATION RATE: 16 BRPM | HEIGHT: 63 IN | SYSTOLIC BLOOD PRESSURE: 114 MMHG | HEART RATE: 87 BPM | BODY MASS INDEX: 36.5 KG/M2 | DIASTOLIC BLOOD PRESSURE: 79 MMHG | WEIGHT: 206 LBS

## 2021-03-12 DIAGNOSIS — M06.9 RHEUMATOID ARTHRITIS, UNSPECIFIED: ICD-10-CM

## 2021-03-12 DIAGNOSIS — Z98.89 OTHER SPECIFIED POSTPROCEDURAL STATES: Chronic | ICD-10-CM

## 2021-03-12 DIAGNOSIS — Z90.49 ACQUIRED ABSENCE OF OTHER SPECIFIED PARTS OF DIGESTIVE TRACT: Chronic | ICD-10-CM

## 2021-03-12 PROCEDURE — 85025 COMPLETE CBC W/AUTO DIFF WBC: CPT

## 2021-03-12 PROCEDURE — 80053 COMPREHEN METABOLIC PANEL: CPT

## 2021-03-12 PROCEDURE — 83529 ASAY OF INTERLEUKIN-6 (IL-6): CPT

## 2021-03-12 PROCEDURE — 99214 OFFICE O/P EST MOD 30 MIN: CPT | Mod: GC

## 2021-03-12 PROCEDURE — G0463: CPT

## 2021-03-12 PROCEDURE — 86140 C-REACTIVE PROTEIN: CPT

## 2021-03-12 PROCEDURE — 85652 RBC SED RATE AUTOMATED: CPT

## 2021-03-12 PROCEDURE — 84550 ASSAY OF BLOOD/URIC ACID: CPT

## 2021-03-12 RX ORDER — ZOLPIDEM TARTRATE 5 MG/1
5 TABLET ORAL
Qty: 30 | Refills: 0 | Status: DISCONTINUED | COMMUNITY
Start: 2020-09-28 | End: 2021-03-12

## 2021-03-15 ENCOUNTER — RX RENEWAL (OUTPATIENT)
Age: 61
End: 2021-03-15

## 2021-03-15 DIAGNOSIS — M25.50 PAIN IN UNSPECIFIED JOINT: ICD-10-CM

## 2021-03-15 DIAGNOSIS — R51.9 HEADACHE, UNSPECIFIED: ICD-10-CM

## 2021-03-15 LAB
25(OH)D3 SERPL-MCNC: 33.8 NG/ML
ALBUMIN SERPL ELPH-MCNC: 4.3 G/DL
ALP BLD-CCNC: 100 U/L
ALT SERPL-CCNC: 13 U/L
ANION GAP SERPL CALC-SCNC: 13 MMOL/L
AST SERPL-CCNC: 12 U/L
BASOPHILS # BLD AUTO: 0.07 K/UL
BASOPHILS NFR BLD AUTO: 1 %
BILIRUB SERPL-MCNC: 0.2 MG/DL
BUN SERPL-MCNC: 18 MG/DL
CALCIUM SERPL-MCNC: 10.1 MG/DL
CHLORIDE SERPL-SCNC: 106 MMOL/L
CHOLEST SERPL-MCNC: 139 MG/DL
CO2 SERPL-SCNC: 23 MMOL/L
CREAT SERPL-MCNC: 0.6 MG/DL
CREAT SPEC-SCNC: 112 MG/DL
EOSINOPHIL # BLD AUTO: 0.21 K/UL
EOSINOPHIL NFR BLD AUTO: 2.9 %
ESTIMATED AVERAGE GLUCOSE: 180 MG/DL
FOLATE SERPL-MCNC: 11.2 NG/ML
GLUCOSE SERPL-MCNC: 174 MG/DL
HBA1C MFR BLD HPLC: 7.9 %
HCT VFR BLD CALC: 41.7 %
HDLC SERPL-MCNC: 56 MG/DL
HGB BLD-MCNC: 13 G/DL
IMM GRANULOCYTES NFR BLD AUTO: 0.7 %
LDLC SERPL CALC-MCNC: 32 MG/DL
LYMPHOCYTES # BLD AUTO: 2.34 K/UL
LYMPHOCYTES NFR BLD AUTO: 31.9 %
MAN DIFF?: NORMAL
MCHC RBC-ENTMCNC: 26.2 PG
MCHC RBC-ENTMCNC: 31.2 GM/DL
MCV RBC AUTO: 84.1 FL
MICROALBUMIN 24H UR DL<=1MG/L-MCNC: 1.7 MG/DL
MICROALBUMIN/CREAT 24H UR-RTO: 16 MG/G
MONOCYTES # BLD AUTO: 0.59 K/UL
MONOCYTES NFR BLD AUTO: 8 %
NEUTROPHILS # BLD AUTO: 4.07 K/UL
NEUTROPHILS NFR BLD AUTO: 55.5 %
NONHDLC SERPL-MCNC: 83 MG/DL
PLATELET # BLD AUTO: 281 K/UL
POTASSIUM SERPL-SCNC: 4.3 MMOL/L
PROT SERPL-MCNC: 6.8 G/DL
RBC # BLD: 4.96 M/UL
RBC # FLD: 15 %
SAVE SPECIMEN: NORMAL
SODIUM SERPL-SCNC: 141 MMOL/L
T4 FREE SERPL-MCNC: 1 NG/DL
T4 SERPL-MCNC: 7.4 UG/DL
TRIGL SERPL-MCNC: 254 MG/DL
TSH SERPL-ACNC: 1.85 UIU/ML
VIT B12 SERPL-MCNC: 846 PG/ML
WBC # FLD AUTO: 7.33 K/UL

## 2021-03-15 NOTE — HISTORY OF PRESENT ILLNESS
[FreeTextEntry1] : 3/12/2021:\par The patient presents today for a regular office visit.  \par The patient says she feels alright.\par Joint pain her hands is about the same, worsens with activity. \par Shoulder and hip aching pains, muscle and body wide pains continue. \par No episodes of swollen/red/hot knee or ankle joints since our last visit. \par No fever, chills, n/v/d, abdominal pain, CP, cough. \par Her left sided headache that she told us about on our last telephonic visit is present but better. \par Left eye vision blurriness also present. \par No jaw claudication or no scalp tenderness. \par \par

## 2021-03-15 NOTE — ASSESSMENT
[FreeTextEntry1] : 60 Y F with a PMHx of DM, HTN, Asthma, CAD, diverticulitis and self reported Hx of Gout was initially evaluated for elevated Complement, dsDNA levels, body and  joint pains in 11/2020.\par \par Impression/Plan: \par \par # Concern for GCA: \par Per the pt she had left sided headache in 7/2020 initially. \par Left sided, temple and face, aching, no scalp tenderness or jaw claudication, did have left eye blurry vision but no visual filed losses.\par Saw Dr. Leroy Mota (Neurology); had CT head (shoed possible internal carotid artery aneurysms) and then MRI head that did not show any abnormality.\par Headaches spontaneously resolved; she did not have any headache in 11/2020 when she saw us in clinic. \par In 12/2020 headaches returned, saw neurologist again, was given a tentative dx of GCA and started on prednisone 60 mg daily (tapered rapidly to 10 mg daily within a 1-2 weeks). \par Currently she is on 5 mg prednisone daily; steroids were given by neurology. \par No temporal artery bx was done.   \par At this time the dx of GCA is not clear.\par Her headache pattern is atypical, vision symptoms are not classic of GCA, no jaw claudication, no scalp tenderness; physical exam unremarkable for GCA related pathology as well.\par At this time we have low suspicion for GCA related headaches; headaches might be related to possibly Trigeminal neuralgia as pt says the headache involves the left side of her face. \par She is supposed to see Opthalmology soon, the plan is to have her back to see us after Opthalmology evaluation; if there is concern by Opthalmology then we will reconsider our dx of GCA. \par Will get inflammatory labs and IL 6 level today. \par \par # Concern for SLE shown by Cardiologist due to elevated ds DNA.\par On Hx and exam, there are no concerns for SLE.\par DEMETRICE, dsDNA, complements checked, were normal. \par SLE not likely, no further w/u. 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 \par Knee xray did not show chondrocalcinosis. \par Definitive dx needs aspiration of joint during flare, she should get this procedure when/if she flare again. \par Has not had a flare since last visit. \par Plan is to get Uric acid level today. \par Pt on Allopurinol since before, should continue. \par \par # Hand and knee pains: \par Hand and knee xrays indicate OA etiology of pains. \par PT, topical voltaren gel, weight loss. \par \par # Body wide pain with tender spots. \par Hx and exam suggestive of FM but PMR is also being considered. \par Sleep hygiene, PT, weight loss advised. \par For possible PMR we will start Prednisone 10 mg daily and see response on next visit. \par \par RTC in 4 weeks after Opthalmology appointment. \par \par DW Dr. Mercado. \par \par Jazmine Moon MD\par Rheumatology Fellow

## 2021-03-16 LAB
APPEARANCE: ABNORMAL
BILIRUBIN URINE: NEGATIVE
BLOOD URINE: NEGATIVE
COLOR: YELLOW
GLUCOSE QUALITATIVE U: NEGATIVE
KETONES URINE: NEGATIVE
LEUKOCYTE ESTERASE URINE: NEGATIVE
NITRITE URINE: POSITIVE
PH URINE: 5.5
PROTEIN URINE: NEGATIVE
SPECIFIC GRAVITY URINE: 1.02
UROBILINOGEN URINE: NORMAL

## 2021-03-17 ENCOUNTER — NON-APPOINTMENT (OUTPATIENT)
Age: 61
End: 2021-03-17

## 2021-03-24 DIAGNOSIS — N39.0 URINARY TRACT INFECTION, SITE NOT SPECIFIED: ICD-10-CM

## 2021-03-26 ENCOUNTER — APPOINTMENT (OUTPATIENT)
Dept: NEUROSURGERY | Facility: CLINIC | Age: 61
End: 2021-03-26
Payer: MEDICAID

## 2021-03-26 VITALS
DIASTOLIC BLOOD PRESSURE: 80 MMHG | TEMPERATURE: 97.3 F | RESPIRATION RATE: 16 BRPM | BODY MASS INDEX: 36.5 KG/M2 | OXYGEN SATURATION: 96 % | WEIGHT: 206 LBS | HEIGHT: 63 IN | SYSTOLIC BLOOD PRESSURE: 146 MMHG | HEART RATE: 70 BPM

## 2021-03-26 PROCEDURE — 99213 OFFICE O/P EST LOW 20 MIN: CPT

## 2021-03-26 PROCEDURE — 99072 ADDL SUPL MATRL&STAF TM PHE: CPT

## 2021-04-02 ENCOUNTER — APPOINTMENT (OUTPATIENT)
Dept: ENDOCRINOLOGY | Facility: CLINIC | Age: 61
End: 2021-04-02
Payer: MEDICAID

## 2021-04-02 PROCEDURE — G0108 DIAB MANAGE TRN  PER INDIV: CPT

## 2021-04-02 PROCEDURE — 99072 ADDL SUPL MATRL&STAF TM PHE: CPT

## 2021-04-05 NOTE — PHYSICAL EXAM
[General Appearance - Alert] : alert [General Appearance - In No Acute Distress] : in no acute distress [General Appearance - Well Nourished] : well nourished [General Appearance - Well Developed] : well developed [General Appearance - Well-Appearing] : healthy appearing [Oriented To Time, Place, And Person] : oriented to person, place, and time [Impaired Insight] : insight and judgment were intact [Affect] : the affect was normal [Memory Recent] : recent memory was not impaired [Person] : oriented to person [Place] : oriented to place [Time] : oriented to time [Cranial Nerves Optic (II)] : visual acuity intact bilaterally,  pupils equal round and reactive to light [Cranial Nerves Oculomotor (III)] : extraocular motion intact [Cranial Nerves Trigeminal (V)] : facial sensation intact symmetrically [Cranial Nerves Facial (VII)] : face symmetrical [Cranial Nerves Vestibulocochlear (VIII)] : hearing was intact bilaterally [Cranial Nerves Glossopharyngeal (IX)] : tongue and palate midline [Cranial Nerves Accessory (XI - Cranial And Spinal)] : head turning and shoulder shrug symmetric [Cranial Nerves Hypoglossal (XII)] : there was no tongue deviation with protrusion [Motor Strength] : muscle strength was normal in all four extremities [Balance] : balance was intact [Sclera] : the sclera and conjunctiva were normal [PERRL With Normal Accommodation] : pupils were equal in size, round, reactive to light, with normal accommodation [Extraocular Movements] : extraocular movements were intact [Outer Ear] : the ears and nose were normal in appearance [Hearing Threshold Finger Rub Not [place]] : hearing was normal [Oropharynx] : the oropharynx was normal [Neck Appearance] : the appearance of the neck was normal [Respiration, Rhythm And Depth] : normal respiratory rhythm and effort [Exaggerated Use Of Accessory Muscles For Inspiration] : no accessory muscle use [Heart Rate And Rhythm] : heart rate was normal and rhythm regular [Abnormal Walk] : normal gait [Involuntary Movements] : no involuntary movements were seen [Motor Tone] : muscle strength and tone were normal [Skin Color & Pigmentation] : normal skin color and pigmentation [] : no rash [Limited Balance] : balance was intact

## 2021-04-05 NOTE — REASON FOR VISIT
[Follow-Up: _____] : a [unfilled] follow-up visit [Other: _____] : [unfilled] [FreeTextEntry1] : Kinjal Samaniego is a pleasant 60 year old lady who presented for consultation regarding left facial pain 10/30/21. Today she presents for f/u with similar pain in left temporal pain and left cheek. States that she follows up with her Neurologist and Rheumatologist.  States that she was prescribed Tegretol however she has not taken any as prescribed.  She is currently taking Prednisone 10 mg daily.  Denies pain in left jaw, and is not worsened with cold, eating and is not described as a stabbing pain.\par \par She describes left facial pain as achy, burning and does not occur on a daily basis. States that some days she wakes up with left eye swollen with bloody patches present.\par \par her Neurologist is Dr. Leroy Mota. She reported that she never took Gabapentin for neuropathic pain as reported at last visit. \par

## 2021-04-09 ENCOUNTER — APPOINTMENT (OUTPATIENT)
Dept: RHEUMATOLOGY | Facility: HOSPITAL | Age: 61
End: 2021-04-09
Payer: MEDICAID

## 2021-04-09 ENCOUNTER — OUTPATIENT (OUTPATIENT)
Dept: OUTPATIENT SERVICES | Facility: HOSPITAL | Age: 61
LOS: 1 days | End: 2021-04-09
Payer: MEDICAID

## 2021-04-09 VITALS
HEART RATE: 80 BPM | SYSTOLIC BLOOD PRESSURE: 128 MMHG | DIASTOLIC BLOOD PRESSURE: 81 MMHG | BODY MASS INDEX: 35.79 KG/M2 | HEIGHT: 63 IN | WEIGHT: 202 LBS | TEMPERATURE: 96 F

## 2021-04-09 DIAGNOSIS — M06.9 RHEUMATOID ARTHRITIS, UNSPECIFIED: ICD-10-CM

## 2021-04-09 DIAGNOSIS — Z90.49 ACQUIRED ABSENCE OF OTHER SPECIFIED PARTS OF DIGESTIVE TRACT: Chronic | ICD-10-CM

## 2021-04-09 DIAGNOSIS — Z98.89 OTHER SPECIFIED POSTPROCEDURAL STATES: Chronic | ICD-10-CM

## 2021-04-09 LAB
CREAT ?TM UR-MCNC: 85 MG/DL — SIGNIFICANT CHANGE UP
PROT ?TM UR-MCNC: 8 MG/DL — SIGNIFICANT CHANGE UP (ref 0–12)
PROT/CREAT UR-RTO: 0.1 RATIO — SIGNIFICANT CHANGE UP (ref 0–0.2)
VIT D25+D1,25 OH+D1,25 PNL SERPL-MCNC: 36.8 PG/ML — SIGNIFICANT CHANGE UP (ref 19.9–79.3)

## 2021-04-09 PROCEDURE — G0463: CPT

## 2021-04-09 PROCEDURE — 82570 ASSAY OF URINE CREATININE: CPT

## 2021-04-09 PROCEDURE — 81001 URINALYSIS AUTO W/SCOPE: CPT

## 2021-04-09 PROCEDURE — 82306 VITAMIN D 25 HYDROXY: CPT

## 2021-04-09 PROCEDURE — 99215 OFFICE O/P EST HI 40 MIN: CPT

## 2021-04-09 PROCEDURE — 82652 VIT D 1 25-DIHYDROXY: CPT

## 2021-04-09 PROCEDURE — 84156 ASSAY OF PROTEIN URINE: CPT

## 2021-04-09 PROCEDURE — 82164 ANGIOTENSIN I ENZYME TEST: CPT

## 2021-04-09 PROCEDURE — 86235 NUCLEAR ANTIGEN ANTIBODY: CPT

## 2021-04-09 RX ORDER — INSULIN LISPRO 100 U/ML
100 INJECTION, SOLUTION SUBCUTANEOUS
Qty: 1 | Refills: 2 | Status: DISCONTINUED | COMMUNITY
Start: 2020-11-13 | End: 2021-04-09

## 2021-04-09 RX ORDER — COLD-HOT PACK
EACH MISCELLANEOUS DAILY
Refills: 0 | Status: ACTIVE | COMMUNITY
Start: 2021-04-09

## 2021-04-09 RX ORDER — PAROXETINE HYDROCHLORIDE 10 MG/1
10 TABLET, FILM COATED ORAL
Qty: 30 | Refills: 0 | Status: DISCONTINUED | COMMUNITY
Start: 2020-08-03 | End: 2021-04-09

## 2021-04-09 RX ORDER — TRAZODONE HYDROCHLORIDE 50 MG/1
50 TABLET ORAL
Qty: 30 | Refills: 0 | Status: DISCONTINUED | COMMUNITY
Start: 2020-09-28 | End: 2021-04-09

## 2021-04-09 RX ORDER — KETOROLAC TROMETHAMINE 10 MG/1
10 TABLET, FILM COATED ORAL
Qty: 20 | Refills: 0 | Status: DISCONTINUED | COMMUNITY
Start: 2020-07-17 | End: 2021-04-09

## 2021-04-09 RX ORDER — SENNOSIDES 8.6 MG TABLETS 8.6 MG/1
8.6 TABLET ORAL DAILY
Refills: 0 | Status: ACTIVE | COMMUNITY
Start: 2021-04-09

## 2021-04-09 RX ORDER — FLUCONAZOLE 150 MG/1
150 TABLET ORAL
Qty: 1 | Refills: 0 | Status: DISCONTINUED | COMMUNITY
Start: 2020-05-21 | End: 2021-04-09

## 2021-04-09 RX ORDER — TERCONAZOLE 8 MG/G
0.8 CREAM VAGINAL
Qty: 20 | Refills: 0 | Status: DISCONTINUED | COMMUNITY
Start: 2020-05-21 | End: 2021-04-09

## 2021-04-09 NOTE — PHYSICAL EXAM
[General Appearance - Alert] : alert [General Appearance - In No Acute Distress] : in no acute distress [Sclera] : the sclera and conjunctiva were normal [PERRL With Normal Accommodation] : pupils were equal in size, round, and reactive to light [Extraocular Movements] : extraocular movements were intact [Outer Ear] : the ears and nose were normal in appearance [Oropharynx] : the oropharynx was normal [Auscultation Breath Sounds / Voice Sounds] : lungs were clear to auscultation bilaterally [Heart Rate And Rhythm] : heart rate was normal and rhythm regular [Heart Sounds] : normal S1 and S2 [Heart Sounds Gallop] : no gallops [Murmurs] : no murmurs [Heart Sounds Pericardial Friction Rub] : no pericardial rub [Full Pulse] : the pedal pulses are present [Edema] : there was no peripheral edema [Bowel Sounds] : normal bowel sounds [Abdomen Soft] : soft [Abdomen Tenderness] : non-tender [Abdomen Mass (___ Cm)] : no abdominal mass palpated [Skin Color & Pigmentation] : normal skin color and pigmentation [Skin Turgor] : normal skin turgor [] : no rash [FreeTextEntry1] : Tender spots appreciated on arms, shoulders, back and lateral hips. No signs of synovitis, erythema, limitation of ROM, deformity noted in any joint but she has tenderness in b/l PIP, MCP, wrist, elbow, knee and ankle joints.

## 2021-04-09 NOTE — ASSESSMENT
[FreeTextEntry1] : 60 Y F with a PMHx of DM, HTN, Asthma, CAD, diverticulitis and self reported Hx of Gout was initially evaluated for elevated Complement, dsDNA levels, body and  joint pains in 11/2020.\par \par Impression/Plan: \par \par # Concern for GCA: \par Per the pt she had left sided headache in 7/2020 initially. \par Left sided, temple and face, aching, no scalp tenderness or jaw claudication, did have left eye blurry vision but no visual filed losses.\par Saw Dr. Leroy Mota (Neurology); had CT head (shoed possible internal carotid artery aneurysms) and then MRI head that did not show any abnormality.\par Headaches spontaneously resolved; she did not have any headache in 11/2020 when she saw us in clinic. \par In 12/2020 headaches returned, saw neurologist again, was given a tentative dx of GCA and started on prednisone 60 mg daily (tapered rapidly to 10 mg daily within a 1-2 weeks). \par Currently she is on 5 mg prednisone daily; steroids were given by neurology. \par No temporal artery bx was done.   \par At this time the dx of GCA is not clear.\par Her headache pattern is atypical, vision symptoms are not classic of GCA, no jaw claudication, no scalp tenderness; physical exam unremarkable for GCA related pathology as well.\par At this time we have low suspicion for GCA related headaches; headaches might be related to possibly Trigeminal neuralgia as pt says the headache involves the left side of her face. \par She says she saw an Ophthalmologist at [facility] (Phone# [phone]) who told her that there was very low suspicion for GCA based on the eye exam; we don’t have records, we tried calling the number but could not get in touch with Opthalmologist; we asked our clinic staff to obtain records.   \par Per her Opthalmologist her main issue is dry eyes. \par Her headache could be from trigeminal neuralgia, we will start Lyrica. \par \par # Concern for SLE shown by Cardiologist due to elevated ds DNA.\par On Hx and exam, there are no concerns for SLE.\par DEMETRICE, dsDNA, complements checked, were normal. \par SLE not likely. \par Will send w/u for Sjogren in view of dry eyes hx. 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 \par Knee xray did not show chondrocalcinosis. \par Definitive dx needs aspiration of joint during flare, she should get this procedure when/if she flare again. \par Has not had a flare since last visit. \par Uric acid level 7.2. \par Continue Allopurinol 100 mg daily. \par \par # Hand and knee pains: \par Hand and knee xrays indicate OA etiology of pains and possibly related to FMS.\par Will start Lyrica. \par Pt has PT scheduled as well as an upcoming sleep study. \par PT, topical voltaren gel, weight loss counseling. \par \par # Body wide pain with tender spots. \par Hx and exam suggestive of FMS. \par Will start Lyrica. \par Pt has PT scheduled as well as an upcoming sleep study. \par We have low suspicion for PMR at this time, will start tapering steroids, currently on 10 mg daily prednisone, asked pt to take 5 mg starting today for 2 weeks and then stop.  \par \par RTC in 4 weeks, hopefully by then we would have Opthalmology records and pt might have had time to get PT and adequate Lyrica to treat trigeminal neuralgia symptoms and body pain from FMS.\par \par DW Dr. Meehan\par \par Jazmine Moon MD\par Rheumatology Fellow

## 2021-04-09 NOTE — HISTORY OF PRESENT ILLNESS
[FreeTextEntry1] : 4/9/2021:\par The patient presents today for a regular office visit.  \par The patient says she has pain all over her body. \par In the hands, shoulders, thighs, back, knees, shins, ankles and feet. \par No episodes of swollen/red/hot knee or ankle joints since our last visit. \par No fever, chills, n/v/d, abdominal pain, CP, cough. \par Her left sided headache is present and comes on intermittently. \par No jaw claudication, scalp tenderness. \par \par

## 2021-04-10 LAB
24R-OH-CALCIDIOL SERPL-MCNC: 28 NG/ML — LOW (ref 30–80)
APPEARANCE UR: CLEAR — SIGNIFICANT CHANGE UP
BACTERIA # UR AUTO: NEGATIVE — SIGNIFICANT CHANGE UP
BILIRUB UR-MCNC: NEGATIVE — SIGNIFICANT CHANGE UP
COLOR SPEC: SIGNIFICANT CHANGE UP
DIFF PNL FLD: NEGATIVE — SIGNIFICANT CHANGE UP
DSDNA AB FLD-ACNC: <0.2 AI — SIGNIFICANT CHANGE UP
ENA SS-A AB FLD IA-ACNC: <0.2 AI — SIGNIFICANT CHANGE UP
EPI CELLS # UR: 2 /HPF — SIGNIFICANT CHANGE UP (ref 0–5)
GLUCOSE UR QL: NEGATIVE — SIGNIFICANT CHANGE UP
HYALINE CASTS # UR AUTO: 1 /LPF — SIGNIFICANT CHANGE UP (ref 0–7)
KETONES UR-MCNC: NEGATIVE — SIGNIFICANT CHANGE UP
LEUKOCYTE ESTERASE UR-ACNC: NEGATIVE — SIGNIFICANT CHANGE UP
NITRITE UR-MCNC: NEGATIVE — SIGNIFICANT CHANGE UP
PH UR: 5.5 — SIGNIFICANT CHANGE UP (ref 5–8)
PROT UR-MCNC: NEGATIVE — SIGNIFICANT CHANGE UP
RBC CASTS # UR COMP ASSIST: 1 /HPF — SIGNIFICANT CHANGE UP (ref 0–4)
SP GR SPEC: 1.02 — SIGNIFICANT CHANGE UP (ref 1.01–1.02)
UROBILINOGEN FLD QL: SIGNIFICANT CHANGE UP
WBC UR QL: 1 /HPF — SIGNIFICANT CHANGE UP (ref 0–5)

## 2021-04-12 DIAGNOSIS — M79.7 FIBROMYALGIA: ICD-10-CM

## 2021-04-12 LAB — ACE SERPL-CCNC: 26 U/L — SIGNIFICANT CHANGE UP (ref 14–82)

## 2021-04-20 ENCOUNTER — APPOINTMENT (OUTPATIENT)
Dept: MRI IMAGING | Facility: CLINIC | Age: 61
End: 2021-04-20
Payer: MEDICAID

## 2021-04-20 ENCOUNTER — OUTPATIENT (OUTPATIENT)
Dept: OUTPATIENT SERVICES | Facility: HOSPITAL | Age: 61
LOS: 1 days | End: 2021-04-20
Payer: MEDICAID

## 2021-04-20 DIAGNOSIS — R51.9 HEADACHE, UNSPECIFIED: ICD-10-CM

## 2021-04-20 DIAGNOSIS — Z90.49 ACQUIRED ABSENCE OF OTHER SPECIFIED PARTS OF DIGESTIVE TRACT: Chronic | ICD-10-CM

## 2021-04-20 DIAGNOSIS — Z00.8 ENCOUNTER FOR OTHER GENERAL EXAMINATION: ICD-10-CM

## 2021-04-20 DIAGNOSIS — Z98.89 OTHER SPECIFIED POSTPROCEDURAL STATES: Chronic | ICD-10-CM

## 2021-04-20 PROCEDURE — 70553 MRI BRAIN STEM W/O & W/DYE: CPT

## 2021-04-20 PROCEDURE — 70553 MRI BRAIN STEM W/O & W/DYE: CPT | Mod: 26

## 2021-04-20 PROCEDURE — A9585: CPT

## 2021-04-21 ENCOUNTER — APPOINTMENT (OUTPATIENT)
Dept: ENDOCRINOLOGY | Facility: CLINIC | Age: 61
End: 2021-04-21
Payer: MEDICAID

## 2021-04-21 VITALS
BODY MASS INDEX: 37.39 KG/M2 | WEIGHT: 211 LBS | HEIGHT: 63 IN | HEART RATE: 71 BPM | SYSTOLIC BLOOD PRESSURE: 124 MMHG | DIASTOLIC BLOOD PRESSURE: 74 MMHG | TEMPERATURE: 95.3 F | OXYGEN SATURATION: 95 %

## 2021-04-21 LAB — GLUCOSE BLDC GLUCOMTR-MCNC: 176

## 2021-04-21 PROCEDURE — 82962 GLUCOSE BLOOD TEST: CPT

## 2021-04-21 PROCEDURE — G0447 BEHAVIOR COUNSEL OBESITY 15M: CPT

## 2021-04-21 PROCEDURE — 99072 ADDL SUPL MATRL&STAF TM PHE: CPT

## 2021-04-21 PROCEDURE — 95251 CONT GLUC MNTR ANALYSIS I&R: CPT

## 2021-04-21 PROCEDURE — 99214 OFFICE O/P EST MOD 30 MIN: CPT | Mod: 25

## 2021-04-22 NOTE — PHYSICAL EXAM
[Alert] : alert [Well Nourished] : well nourished [No Acute Distress] : no acute distress [Well Developed] : well developed [Normal Sclera/Conjunctiva] : normal sclera/conjunctiva [EOMI] : extra ocular movement intact [No Proptosis] : no proptosis [No Thyroid Nodules] : no palpable thyroid nodules [No Respiratory Distress] : no respiratory distress [No Accessory Muscle Use] : no accessory muscle use [Clear to Auscultation] : lungs were clear to auscultation bilaterally [Normal S1, S2] : normal S1 and S2 [Normal Rate] : heart rate was normal [Regular Rhythm] : with a regular rhythm [No Edema] : no peripheral edema [Pedal Pulses Normal] : the pedal pulses are present [Normal Bowel Sounds] : normal bowel sounds [Not Distended] : not distended [Soft] : abdomen soft [Normal Anterior Cervical Nodes] : no anterior cervical lymphadenopathy [No Spinal Tenderness] : no spinal tenderness [Spine Straight] : spine straight [Normal Gait] : normal gait [Normal Strength/Tone] : muscle strength and tone were normal [No Rash] : no rash [Acanthosis Nigricans] : no acanthosis nigricans [Normal] : normal [Full ROM] : with full range of motion [Diminished Throughout Both Feet] : normal tactile sensation with monofilament testing throughout both feet [Normal Reflexes] : deep tendon reflexes were 2+ and symmetric [No Tremors] : no tremors [Oriented x3] : oriented to person, place, and time [de-identified] : thyroid enlarged approx 60 g; irregular texture [de-identified] : mildly tender LLQ; large well healed scar

## 2021-04-22 NOTE — HISTORY OF PRESENT ILLNESS
[FreeTextEntry1] : 60 year old female presents for follow up of diabetes. She has been living with diabetes for over 5 years. She was started on insulin 2 years ago. Presently she is on Basaglar 35 u qhs AND Admelog SS TIDAC. She is off of Metformin and Acarbose. She feels her GI sx have improved. She is now using the Mega to monitor her BS. Her recent A1C is 7.9, up from 7.2. She reports that she tries her best to follow a carb balanced diet. She does struggle as she has IBS and Gout and is limited in her food choices. She has been very diligent about her diet. She does not drink any juice or soda. She has made changes since she started using the Mega to her diet. She walks daily. She denies any diabetic neuropathy, retinopathy, or nephropathy.  Last Optho was over 1 year ago.  She does have IBS, diverticulitis and h/o colon resection with colectomy which was reversed.  Recently hospitalized for hernia blockage. Also has aneurysm in the brain and is having workup for that.  She continues to take prednisone that was prescribed to her for possible GCA by neurology, has tapered down to 5 mg daily.

## 2021-04-22 NOTE — ASSESSMENT
[Diabetes Foot Care] : diabetes foot care [Long Term Vascular Complications] : long term vascular complications of diabetes [Carbohydrate Consistent Diet] : carbohydrate consistent diet [Importance of Diet and Exercise] : importance of diet and exercise to improve glycemic control, achieve weight loss and improve cardiovascular health [Exercise/Effect on Glucose] : exercise/effect on glucose [Hypoglycemia Management] : hypoglycemia management [Action and use of Insulin] : action and use of short and long-acting insulin [Self Monitoring of Blood Glucose] : self monitoring of blood glucose [Insulin Self-Administration] : insulin self-administration [Injection Technique, Storage, Sharps Disposal] : injection technique, storage, and sharps disposal [Retinopathy Screening] : Patient was referred to ophthalmology for retinopathy screening [Weight Loss] : weight loss [Diabetic Medications] : Risks and benefits of diabetic medications were discussed [FreeTextEntry1] : 1. Type 2 diabetes/obesity - TIR % >180, % > 250, %. CV is % Her BS are stable. She does have some mild PP spikes but they are improving. Discussed using trend arrows to help guide insulin bolus dose. Will continue the same medications. COntinue to focus on lifestyle modifications. Will consider adding oral meds at f/u visit to help decrease insulin use.\par 2. Thyroid enlarged - Recent TFTs were normal. Thyroid sono showed b/l subcm nodules. She did not complete thyroid sono. Will complete now. \par \par Will discuss case with Dr. Adkins, and patient may need ACTH stim test once off of steroids. Can continue to taper slowly.\par \par Will f/u in 3 months\par

## 2021-04-29 ENCOUNTER — OUTPATIENT (OUTPATIENT)
Dept: OUTPATIENT SERVICES | Facility: HOSPITAL | Age: 61
LOS: 1 days | End: 2021-04-29
Payer: MEDICAID

## 2021-04-29 ENCOUNTER — APPOINTMENT (OUTPATIENT)
Dept: ULTRASOUND IMAGING | Facility: CLINIC | Age: 61
End: 2021-04-29
Payer: MEDICAID

## 2021-04-29 DIAGNOSIS — Z98.89 OTHER SPECIFIED POSTPROCEDURAL STATES: Chronic | ICD-10-CM

## 2021-04-29 DIAGNOSIS — E04.1 NONTOXIC SINGLE THYROID NODULE: ICD-10-CM

## 2021-04-29 DIAGNOSIS — Z90.49 ACQUIRED ABSENCE OF OTHER SPECIFIED PARTS OF DIGESTIVE TRACT: Chronic | ICD-10-CM

## 2021-04-29 PROCEDURE — 76536 US EXAM OF HEAD AND NECK: CPT | Mod: 26

## 2021-04-29 PROCEDURE — 76536 US EXAM OF HEAD AND NECK: CPT

## 2021-04-30 ENCOUNTER — NON-APPOINTMENT (OUTPATIENT)
Age: 61
End: 2021-04-30

## 2021-04-30 ENCOUNTER — APPOINTMENT (OUTPATIENT)
Dept: NEUROSURGERY | Facility: CLINIC | Age: 61
End: 2021-04-30
Payer: MEDICAID

## 2021-04-30 VITALS
HEART RATE: 80 BPM | TEMPERATURE: 97.5 F | DIASTOLIC BLOOD PRESSURE: 74 MMHG | HEIGHT: 63 IN | OXYGEN SATURATION: 98 % | WEIGHT: 211 LBS | RESPIRATION RATE: 17 BRPM | SYSTOLIC BLOOD PRESSURE: 135 MMHG | BODY MASS INDEX: 37.39 KG/M2

## 2021-04-30 DIAGNOSIS — R51.9 HEADACHE, UNSPECIFIED: ICD-10-CM

## 2021-04-30 LAB — ERYTHROCYTE [SEDIMENTATION RATE] IN BLOOD BY WESTERGREN METHOD: 21 MM/HR

## 2021-04-30 PROCEDURE — 99215 OFFICE O/P EST HI 40 MIN: CPT

## 2021-04-30 PROCEDURE — 99072 ADDL SUPL MATRL&STAF TM PHE: CPT

## 2021-05-03 NOTE — HISTORY OF PRESENT ILLNESS
[FreeTextEntry1] : Kinjal Samaniego is a pleasant 60 year old lady who presented for consultation regarding left facial pain 10/30/21. Today she presents for f/u with new imaging.  She has similar pain in left temporal pain and left cheek and left occipital area pain.  She also reports left facial numbness. States that she follows up with her Neurologist and Rheumatologist. States that she was prescribed Tegretol however she has not taken any as prescribed. She is currently taking Prednisone 10 mg daily. Denies pain in left jaw, and is not worsened with cold, eating and is not described as a stabbing pain.\par \par She describes left facial pain as achy, burning and does not occur on a daily basis. States that some days she wakes up with left eye swollen with bloody patches present.  Revealed that she was told that she may have temporal arteritis.\par \par Pain presentation is V1, V2 and V3 which is more compatible with Type 2 TN.  \par \par Her Neurologist is Dr. Leroy Mota. She reported that she never took Gabapentin for neuropathic pain.\par

## 2021-05-03 NOTE — PHYSICAL EXAM
[General Appearance - Alert] : alert [General Appearance - In No Acute Distress] : in no acute distress [General Appearance - Well Nourished] : well nourished [General Appearance - Well Developed] : well developed [General Appearance - Well-Appearing] : healthy appearing [Oriented To Time, Place, And Person] : oriented to person, place, and time [Impaired Insight] : insight and judgment were intact [Affect] : the affect was normal [Memory Recent] : recent memory was not impaired [Person] : oriented to person [Place] : oriented to place [Time] : oriented to time [Cranial Nerves Optic (II)] : visual acuity intact bilaterally,  pupils equal round and reactive to light [Cranial Nerves Oculomotor (III)] : extraocular motion intact [Cranial Nerves Facial (VII)] : face symmetrical [Cranial Nerves Vestibulocochlear (VIII)] : hearing was intact bilaterally [Cranial Nerves Glossopharyngeal (IX)] : tongue and palate midline [Cranial Nerves Accessory (XI - Cranial And Spinal)] : head turning and shoulder shrug symmetric [Motor Strength] : muscle strength was normal in all four extremities [Cranial Nerves Hypoglossal (XII)] : there was no tongue deviation with protrusion [Sclera] : the sclera and conjunctiva were normal [PERRL With Normal Accommodation] : pupils were equal in size, round, reactive to light, with normal accommodation [Extraocular Movements] : extraocular movements were intact [Outer Ear] : the ears and nose were normal in appearance [Hearing Threshold Finger Rub Not [place]] : hearing was normal [Oropharynx] : the oropharynx was normal [Neck Appearance] : the appearance of the neck was normal [Respiration, Rhythm And Depth] : normal respiratory rhythm and effort [Exaggerated Use Of Accessory Muscles For Inspiration] : no accessory muscle use [Abnormal Walk] : normal gait [Involuntary Movements] : no involuntary movements were seen [Motor Tone] : muscle strength and tone were normal [Skin Color & Pigmentation] : normal skin color and pigmentation [] : no rash [FreeTextEntry5] : left facial numbness

## 2021-05-03 NOTE — ASSESSMENT
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 No vascular compression of the trigeminal nerves identified.\par \par \par PLAN:\par 1. ESR r/o temporal arteritis.\par 2. There is no surgical indication present as pt states that her left facial pain is tolerable.\par 3. The unusual characteristics of pain presentation whereby pain goes to the occipital area and facial numbness are not compatible with TN.\par 4. F/U in 6 months for clinical evaluation.  No imaging needed.\par 5. ESR resulted 21.

## 2021-05-03 NOTE — DATA REVIEWED
[de-identified] : EXAM: MR BRAIN WAW IC\par \par \par PROCEDURE DATE: 04/20/2021\par \par \par \par INTERPRETATION: CLINICAL INDICATION: Rule out trigeminal neuralgia. Left facial pain. Follow-up.\par \par TECHNIQUE: Multi-planar multi-sequential MR imaging of the brain was performed before and after the intravenous administration of contrast. 9.5 ml of Gadavist IV contrast was administered.\par \par COMPARISON: Outside [facility] brain 8/25/2020.\par \par FINDINGS:\par Limited evaluation due to motion artifact on postcontrast images. Within these limits:\par \par No acute infarction, intracranial hemorrhage or mass lesion. No abnormal intracranial enhancement is identified.\par \par No hydrocephalus. No extra-axial fluid collections. The skull base flow voids are present.\par \par High-resolution T2 sequence demonstrates no mass within the basal cisterns. There is no evidence for vascular compression of the bilateral cisternal trigeminal nerves. However, there is calcification arising from the anterior tentorium (9-39) (101-57) appearing to compress the left trigeminal nerve just as it enters left Meckel's cave. This calcification is also noted on outside CT from 9/22/2020.\par \par The visualized intraorbital contents are normal. The imaged portions of the paranasal sinuses are clear. Trace left mastoid effusion. The visualized osseous structures, soft tissues and partially visualized parotid glands appear normal.\par \par IMPRESSION:\par Within the limits of study described above:\par \par -No acute infarct, hemorrhage, or mass lesion.\par \par -Compression of the left trigeminal nerve just as it enters left Meckel's cave by likely dystrophic calcification arising from the left anterior tentorial leaflet. No vascular compression of the trigeminal nerves identified.\par \par \par \par \par \par \par ISABEL HORTON M.D., ATTENDING RADIOLOGIST\par This document has been electronically signed. Apr 21 2021 3:05PM\par

## 2021-05-05 ENCOUNTER — RX RENEWAL (OUTPATIENT)
Age: 61
End: 2021-05-05

## 2021-05-07 ENCOUNTER — OUTPATIENT (OUTPATIENT)
Dept: OUTPATIENT SERVICES | Facility: HOSPITAL | Age: 61
LOS: 1 days | End: 2021-05-07
Payer: MEDICAID

## 2021-05-07 ENCOUNTER — APPOINTMENT (OUTPATIENT)
Dept: RHEUMATOLOGY | Facility: HOSPITAL | Age: 61
End: 2021-05-07
Payer: MEDICAID

## 2021-05-07 VITALS
HEIGHT: 63 IN | DIASTOLIC BLOOD PRESSURE: 86 MMHG | TEMPERATURE: 96 F | WEIGHT: 210 LBS | SYSTOLIC BLOOD PRESSURE: 122 MMHG | HEART RATE: 80 BPM | BODY MASS INDEX: 37.21 KG/M2

## 2021-05-07 DIAGNOSIS — Z98.89 OTHER SPECIFIED POSTPROCEDURAL STATES: Chronic | ICD-10-CM

## 2021-05-07 DIAGNOSIS — M06.9 RHEUMATOID ARTHRITIS, UNSPECIFIED: ICD-10-CM

## 2021-05-07 DIAGNOSIS — Z90.49 ACQUIRED ABSENCE OF OTHER SPECIFIED PARTS OF DIGESTIVE TRACT: Chronic | ICD-10-CM

## 2021-05-07 PROCEDURE — G0463: CPT

## 2021-05-07 PROCEDURE — 99215 OFFICE O/P EST HI 40 MIN: CPT | Mod: GC

## 2021-05-11 ENCOUNTER — NON-APPOINTMENT (OUTPATIENT)
Age: 61
End: 2021-05-11

## 2021-05-12 ENCOUNTER — APPOINTMENT (OUTPATIENT)
Dept: ENDOCRINOLOGY | Facility: CLINIC | Age: 61
End: 2021-05-12

## 2021-05-12 DIAGNOSIS — M25.50 PAIN IN UNSPECIFIED JOINT: ICD-10-CM

## 2021-05-12 NOTE — PHYSICAL EXAM
[General Appearance - Alert] : alert [Sclera] : the sclera and conjunctiva were normal [General Appearance - In No Acute Distress] : in no acute distress [PERRL With Normal Accommodation] : pupils were equal in size, round, and reactive to light [Extraocular Movements] : extraocular movements were intact [Outer Ear] : the ears and nose were normal in appearance [Oropharynx] : the oropharynx was normal [Auscultation Breath Sounds / Voice Sounds] : lungs were clear to auscultation bilaterally [Heart Rate And Rhythm] : heart rate was normal and rhythm regular [Heart Sounds] : normal S1 and S2 [Heart Sounds Gallop] : no gallops [Murmurs] : no murmurs [Heart Sounds Pericardial Friction Rub] : no pericardial rub [Full Pulse] : the pedal pulses are present [Edema] : there was no peripheral edema [Abdomen Soft] : soft [Bowel Sounds] : normal bowel sounds [Abdomen Tenderness] : non-tender [Abdomen Mass (___ Cm)] : no abdominal mass palpated [Skin Color & Pigmentation] : normal skin color and pigmentation [Skin Turgor] : normal skin turgor [] : no rash [FreeTextEntry1] : Tender spots appreciated on arms, shoulders, back and lateral hips. Swelling, tenderness and warmth in lrft knee, left ankle appreciated. No signs of synovitis, erythema, limitation of ROM, deformity noted in any other joint but she has tenderness in b/l PIP, MCP, wrist, elbow joints.

## 2021-05-12 NOTE — ASSESSMENT
[FreeTextEntry1] : 60 Y F with a PMHx of DM, HTN, Asthma, CAD, diverticulitis and self reported Hx of Gout was initially evaluated for elevated Complement, dsDNA levels, body and  joint pains in 11/2020.\par \par Impression/Plan: \par \par # Concern for GCA: \par Had intermittent left sided headaches involving the face on the left side.\par There was concern shown by her neurologist for GCA before, never had temporal artery bx, was put on emperic steroids.\par MRI showed compression of left sided trigeminal nerve, this is consistent with Trigeminal neuralgia, on lyrica, NS service say no need for surgery at this time as headaches have resolved. \par No further concern or w/u for GCA. \par \par # Concern for SLE shown by Cardiologist due to elevated ds DNA.\par On Hx and exam, there are no concerns for SLE.\par DEMETRICE, dsDNA, complements checked, were normal. \par SLE not likely. \par No further w/u. 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 \par Uric acid level 7.2. \par Recently her Allopurinol was increased to 200 daily from 100.\par Now the patient likely as a gout flare as she has joint pain and swelling in left knee and left ankle as well as right 1st toe. \par Will start steroid taper for gout attack, colchicine will not be effective as the symptoms have been ongoing for the last 2 weeks.\par Continue Allopurinol.\par \par # Hand and knee pains: \par Hand and knee xrays indicate OA etiology of pains and possibly related to FMS.\par Continue Lyrica. \par Pt has PT scheduled as well as an upcoming sleep study. \par PT, topical voltaren gel, weight loss counseling. \par \par # Body wide pain with tender spots. \par Hx and exam suggestive of FMS. \par Continue Lyrica. \par Pt has PT scheduled as well as an upcoming sleep study. \par \par DW Dr. Meehan\par \par Jazmine Moon MD\par Rheumatology Fellow

## 2021-05-12 NOTE — HISTORY OF PRESENT ILLNESS
[FreeTextEntry1] : 5/7/2021:\par The patient presents today for a regular office visit.  \par Says her headaches are now resolved.\par But she complains of left knee, b/l ankle and right toe pain and swelling since 2 weeks. \par Also complains of body wide pain.\par No fever, chills, CP, SOB, n/v/d or abdominal.

## 2021-05-26 ENCOUNTER — OUTPATIENT (OUTPATIENT)
Dept: OUTPATIENT SERVICES | Facility: HOSPITAL | Age: 61
LOS: 1 days | End: 2021-05-26
Payer: MEDICAID

## 2021-05-26 ENCOUNTER — APPOINTMENT (OUTPATIENT)
Dept: SLEEP CENTER | Facility: CLINIC | Age: 61
End: 2021-05-26
Payer: MEDICAID

## 2021-05-26 DIAGNOSIS — Z98.89 OTHER SPECIFIED POSTPROCEDURAL STATES: Chronic | ICD-10-CM

## 2021-05-26 DIAGNOSIS — Z90.49 ACQUIRED ABSENCE OF OTHER SPECIFIED PARTS OF DIGESTIVE TRACT: Chronic | ICD-10-CM

## 2021-05-26 PROCEDURE — 95810 POLYSOM 6/> YRS 4/> PARAM: CPT

## 2021-05-26 PROCEDURE — 95810 POLYSOM 6/> YRS 4/> PARAM: CPT | Mod: 26

## 2021-05-27 DIAGNOSIS — G47.33 OBSTRUCTIVE SLEEP APNEA (ADULT) (PEDIATRIC): ICD-10-CM

## 2021-07-16 ENCOUNTER — APPOINTMENT (OUTPATIENT)
Dept: RHEUMATOLOGY | Facility: HOSPITAL | Age: 61
End: 2021-07-16
Payer: MEDICAID

## 2021-07-16 ENCOUNTER — OUTPATIENT (OUTPATIENT)
Dept: OUTPATIENT SERVICES | Facility: HOSPITAL | Age: 61
LOS: 1 days | End: 2021-07-16
Payer: MEDICAID

## 2021-07-16 VITALS
HEIGHT: 63 IN | DIASTOLIC BLOOD PRESSURE: 79 MMHG | SYSTOLIC BLOOD PRESSURE: 119 MMHG | BODY MASS INDEX: 36.68 KG/M2 | WEIGHT: 207 LBS | HEART RATE: 82 BPM | RESPIRATION RATE: 14 BRPM | TEMPERATURE: 97.5 F

## 2021-07-16 DIAGNOSIS — Z98.89 OTHER SPECIFIED POSTPROCEDURAL STATES: Chronic | ICD-10-CM

## 2021-07-16 DIAGNOSIS — M06.9 RHEUMATOID ARTHRITIS, UNSPECIFIED: ICD-10-CM

## 2021-07-16 DIAGNOSIS — Z90.49 ACQUIRED ABSENCE OF OTHER SPECIFIED PARTS OF DIGESTIVE TRACT: Chronic | ICD-10-CM

## 2021-07-16 LAB — T4 FREE+ TSH PNL SERPL: 2.02 UIU/ML — SIGNIFICANT CHANGE UP (ref 0.27–4.2)

## 2021-07-16 PROCEDURE — G0463: CPT

## 2021-07-16 PROCEDURE — 84443 ASSAY THYROID STIM HORMONE: CPT

## 2021-07-16 PROCEDURE — 99214 OFFICE O/P EST MOD 30 MIN: CPT | Mod: GC

## 2021-07-16 RX ORDER — PREGABALIN 75 MG/1
75 CAPSULE ORAL
Qty: 60 | Refills: 0 | Status: DISCONTINUED | COMMUNITY
Start: 2021-04-09 | End: 2021-07-16

## 2021-07-16 RX ORDER — PREDNISONE 10 MG/1
10 TABLET ORAL
Qty: 30 | Refills: 0 | Status: DISCONTINUED | COMMUNITY
Start: 2021-03-12 | End: 2021-07-16

## 2021-07-16 RX ORDER — EZETIMIBE 10 MG/1
10 TABLET ORAL
Refills: 0 | Status: DISCONTINUED | COMMUNITY
End: 2021-07-16

## 2021-07-18 NOTE — PHYSICAL EXAM
[General Appearance - Alert] : alert [General Appearance - In No Acute Distress] : in no acute distress [General Appearance - Well Nourished] : well nourished [General Appearance - Well Developed] : well developed [Examination Of The Oral Cavity] : the lips and gums were normal [Oropharynx] : the oropharynx was normal [Respiration, Rhythm And Depth] : normal respiratory rhythm and effort [Exaggerated Use Of Accessory Muscles For Inspiration] : no accessory muscle use [Auscultation Breath Sounds / Voice Sounds] : lungs were clear to auscultation bilaterally [Heart Rate And Rhythm] : heart rate was normal and rhythm regular [Heart Sounds] : normal S1 and S2 [Edema] : there was no peripheral edema [Bowel Sounds] : normal bowel sounds [Abdomen Soft] : soft [Abdomen Tenderness] : non-tender [No Spinal Tenderness] : no spinal tenderness [Abnormal Walk] : normal gait [Nail Clubbing] : no clubbing  or cyanosis of the fingernails [Musculoskeletal - Swelling] : no joint swelling seen [Motor Tone] : muscle strength and tone were normal [Skin Turgor] : normal skin turgor [] : no rash [Skin Lesions] : no skin lesions [FreeTextEntry1] : muscle strength 5/5. Reduced sensation in b/l hands but symmetric

## 2021-07-18 NOTE — ASSESSMENT
[FreeTextEntry1] : 60 Y F with a PMHx of DM, HTN, Asthma, CAD, diverticulitis and self reported Hx of Gout was initially evaluated for elevated Complement, dsDNA levels, body and joint pains in 11/2020 with w/u negative for autoimmune inflammatory arthritis, presenting for f/u\par \par #Carpal Tunnel Syndrome: Patient reporting R>L b/l 1st and 2nd digit pain, numbness worsening since last visit, worst at night, radiation to b/l forearms, positive Tinel's Sign. Most consistent with CTS. Diabetic neuropathy is also a possible contributing factor. At this time, we doubt RA as etiology given negative w/u in the past and no erosive changes on recent XR\par -Night splints wordered\par -Mobic 7.5mg 1-2x daily for 4 weeks prescribed\par -f/u TSH\par -if patient does not improve with NSAIDs, consider Gabapentin at next visit\par \par #Symmetric Joint Tenderness: Located worst in right 2nd MCP and right 1st MTP but present over b/l 1st and 2nd MCPs and MTPs without signs of effusion. No joint swelling. It is difficult to explain this tenderness to fibromyalgia alone. Possible 2/2 OA vs. less likely gout. \par -US of hands and wrists ordered to further identify/evaluate for signs of effusions, synovitis\par -Mobic prescription as above\par \par #Fibromyalgia: Patient with multiple areas of point tenderness in the muscle bodies on the upper + lower back, b/l arms, forearms, LE. Unable to tolerate Lyrica in the past. Reports good exercise regimen swimming 4x per week. \par -c/w exercise regimen\par -PT ordered, has not yet seen them\par -consider gabapentin at next visit\par \par #Gout: Self-reported, no hx of crystal diagnosis in the past. S/p tx with steroid taper in March 2021 and May 2021. \par -f/u uric acid level. Reports no red meat, cold cuts, alcohol, high fructose containing beverages or foods\par -c/w allopurinol 200mg/day for now. Consider increasing if still not at goal\par \par RTC in 1 month\par Case discussed with Dr. Vogel

## 2021-07-18 NOTE — HISTORY OF PRESENT ILLNESS
[FreeTextEntry1] : 60 Y F with a PMHx of DM, HTN, Asthma, CAD, diverticulitis and self reported Hx of Gout was initially evaluated for elevated Complement, dsDNA levels, body and joint pains in 11/2020.\par The patient had three distinctive MSK symptoms since a few years.\par \par 1. Diffuse body pains, in arms, legs, shoulders, fatigue, lack of adequate sleep; symptoms improve with exercise.\par 2. Acute episodes of pain, swelling, limitation or ROM in knee and ankle joints; these have been attributed to Gout episodes although she has never had an arthrocentesis procedure done, but has been told she has had high UA levels; when these episodes come she takes Colchicine with resolution. \par 3. Pains in b/l PIP, MCP and wrist joints with no morning stiffness; could not specify if these pains are worse during the day or the night. \par \par Based on the symptoms above, we did w/u for inflammatory arthritis, was negative.\par We did xrays, showed OA, no changes to suggest inflammatory arthritis. \par We also checked DEMETRICE, dsDNA and complements, were normal, hence there was no concern for SLE. \par ------------------------------------------------\par 7/16/21:\par Patient reports stopping Lyrica months ago 2/2 the medication making her feel unwell, but could not express exactly the issue. Was feeling weakness, palpitations that she attributed the medication to. Was on Lyrica for <1 month, was not helping her. Has not started PT yet. Fibromyalgia pain is predominately in the chest, back, b/l shoulder, hands, occasional LE. Swims 4x/week for 40mins. No weight lifting\par \par For gout, patient is on allopurinol 200mg daily. Was on steroid taper in May for gout flare in the right hand and wrist. Uric acid checked by cardiologist on 6/28/21 was 7.4. A1c was 7.6, otherwise WBC, BMP, lipids unremarkable. Does not eat red meat, no alcohol, less carbs and sweets, no sugary drinks/soda/juice, cold cuts. \par \par -Complaining of right index, thumb, wrist pain and numbness for past 2 weeks with radiation in the right forearm worst at night, wakes up from sleep. Also has symptoms no left hand and forearm in the same distribution but not as intense. \par -Admits to weakness in b/l hands, arms, UE. Hand weakness a bit worse. \par -Also reports right low back pain, aching for the past 2-3 months, worst with activity, notices it most with swimming. Reports w/u by outpatient PCP, which was reportedly normal\par -Admits to left knee and b/l ankle swelling with stiffness that improves with rest.\par -Denies fevers, chills, night sweats, chest pain, SOB, n/v/d/c,cough, rhinorrhea, sore thoat, abdominal pain, alopecia, ulcers, weight loss, changes in urinary freq, dysuria, hematuria, Raynaud's.

## 2021-07-19 DIAGNOSIS — M79.7 FIBROMYALGIA: ICD-10-CM

## 2021-07-19 DIAGNOSIS — M19.90 UNSPECIFIED OSTEOARTHRITIS, UNSPECIFIED SITE: ICD-10-CM

## 2021-07-19 DIAGNOSIS — G56.03 CARPAL TUNNEL SYNDROME, BILATERAL UPPER LIMBS: ICD-10-CM

## 2021-07-21 ENCOUNTER — APPOINTMENT (OUTPATIENT)
Dept: ENDOCRINOLOGY | Facility: CLINIC | Age: 61
End: 2021-07-21
Payer: MEDICAID

## 2021-07-21 VITALS
SYSTOLIC BLOOD PRESSURE: 129 MMHG | HEART RATE: 78 BPM | HEIGHT: 63 IN | DIASTOLIC BLOOD PRESSURE: 81 MMHG | BODY MASS INDEX: 36.32 KG/M2 | OXYGEN SATURATION: 96 % | WEIGHT: 205 LBS

## 2021-07-21 LAB
GLUCOSE BLDC GLUCOMTR-MCNC: 142
HBA1C MFR BLD HPLC: 7.3

## 2021-07-21 PROCEDURE — 83036 HEMOGLOBIN GLYCOSYLATED A1C: CPT | Mod: QW

## 2021-07-21 PROCEDURE — 99214 OFFICE O/P EST MOD 30 MIN: CPT | Mod: 25

## 2021-07-21 PROCEDURE — 82962 GLUCOSE BLOOD TEST: CPT

## 2021-07-21 RX ORDER — VALSARTAN 40 MG/1
40 TABLET ORAL
Refills: 0 | Status: ACTIVE | COMMUNITY
Start: 2021-07-16

## 2021-07-21 NOTE — ASSESSMENT
[Diabetes Foot Care] : diabetes foot care [Long Term Vascular Complications] : long term vascular complications of diabetes [Carbohydrate Consistent Diet] : carbohydrate consistent diet [Importance of Diet and Exercise] : importance of diet and exercise to improve glycemic control, achieve weight loss and improve cardiovascular health [Exercise/Effect on Glucose] : exercise/effect on glucose [Hypoglycemia Management] : hypoglycemia management [Action and use of Insulin] : action and use of short and long-acting insulin [Self Monitoring of Blood Glucose] : self monitoring of blood glucose [Insulin Self-Administration] : insulin self-administration [Injection Technique, Storage, Sharps Disposal] : injection technique, storage, and sharps disposal [Retinopathy Screening] : Patient was referred to ophthalmology for retinopathy screening [Weight Loss] : weight loss [Diabetic Medications] : Risks and benefits of diabetic medications were discussed [FreeTextEntry1] : 1. Type 2 diabetes/obesity - BS appear to be stable on meter. Will continue the same medications. Will do a trial of Jardiance 10 mg daily to help with BS and weight loss. Risks and benefits of therapy with Jardiance were discussed. The proper way to take the medication, as well as, special considerations while on it were also discussed. Will lower Basaglar to 30 u qhs and can cut SS down as well. \par 2. Thyroid enlarged - Appears euthyroid. Check TFTs today. Thyroid sono in 4/2021 did no show any thyroid nodules. Repeat in 1 year. \par \par Fasting labs done today in the office, will call with results. \par \par Will f/u in 6 weeks\par

## 2021-07-21 NOTE — HISTORY OF PRESENT ILLNESS
[FreeTextEntry1] : 60 year old female presents for follow up of diabetes. She has been living with diabetes for over 5 years. She was started on insulin 2 years ago. Presently she is on Basaglar 35 u qhs AND Admelog SS TIDAC. She is off of Metformin and Acarbose. She feels her GI sx have improved. She is now using the Mega to monitor her BS, but has not worn it recently due to skin irritation. Her recent A1C is 7.3, down from 7.9. She reports that she tries her best to follow a carb balanced diet. She does struggle as she has IBS and Gout and is limited in her food choices. She has been very diligent about her diet. She does not drink any juice or soda. She has made changes since she started using the Mega to her diet. She walks daily. She recently took a DM course through [facility]. She denies any diabetic neuropathy, retinopathy, or nephropathy.  Last Optho was over 1 year ago.  She does have IBS, diverticulitis and h/o colon resection with colectomy which was reversed.  Recently hospitalized for hernia blockage. Also has aneurysm in the brain and is having workup for that.  She stopped prednisone but looks like she will be restarting it.

## 2021-07-21 NOTE — PHYSICAL EXAM
[Alert] : alert [Well Nourished] : well nourished [No Acute Distress] : no acute distress [Well Developed] : well developed [Normal Sclera/Conjunctiva] : normal sclera/conjunctiva [EOMI] : extra ocular movement intact [No Proptosis] : no proptosis [No Thyroid Nodules] : no palpable thyroid nodules [No Respiratory Distress] : no respiratory distress [No Accessory Muscle Use] : no accessory muscle use [Clear to Auscultation] : lungs were clear to auscultation bilaterally [Normal S1, S2] : normal S1 and S2 [Normal Rate] : heart rate was normal [Regular Rhythm] : with a regular rhythm [No Edema] : no peripheral edema [Pedal Pulses Normal] : the pedal pulses are present [Normal Bowel Sounds] : normal bowel sounds [Not Distended] : not distended [Soft] : abdomen soft [Normal Anterior Cervical Nodes] : no anterior cervical lymphadenopathy [No Spinal Tenderness] : no spinal tenderness [Spine Straight] : spine straight [Normal Gait] : normal gait [Normal Strength/Tone] : muscle strength and tone were normal [No Rash] : no rash [Normal] : normal [Full ROM] : with full range of motion [Normal Reflexes] : deep tendon reflexes were 2+ and symmetric [No Tremors] : no tremors [Oriented x3] : oriented to person, place, and time [Acanthosis Nigricans] : no acanthosis nigricans [Diminished Throughout Both Feet] : normal tactile sensation with monofilament testing throughout both feet [de-identified] : thyroid enlarged approx 60 g; irregular texture [de-identified] : mildly tender LLQ; large well healed scar

## 2021-07-26 ENCOUNTER — APPOINTMENT (OUTPATIENT)
Dept: ULTRASOUND IMAGING | Facility: CLINIC | Age: 61
End: 2021-07-26
Payer: MEDICAID

## 2021-07-26 ENCOUNTER — OUTPATIENT (OUTPATIENT)
Dept: OUTPATIENT SERVICES | Facility: HOSPITAL | Age: 61
LOS: 1 days | End: 2021-07-26
Payer: MEDICAID

## 2021-07-26 ENCOUNTER — RESULT REVIEW (OUTPATIENT)
Age: 61
End: 2021-07-26

## 2021-07-26 DIAGNOSIS — Z98.89 OTHER SPECIFIED POSTPROCEDURAL STATES: Chronic | ICD-10-CM

## 2021-07-26 DIAGNOSIS — M25.50 PAIN IN UNSPECIFIED JOINT: ICD-10-CM

## 2021-07-26 DIAGNOSIS — Z90.49 ACQUIRED ABSENCE OF OTHER SPECIFIED PARTS OF DIGESTIVE TRACT: Chronic | ICD-10-CM

## 2021-07-26 PROCEDURE — 76881 US COMPL JOINT R-T W/IMG: CPT | Mod: 26

## 2021-07-26 PROCEDURE — 76881 US COMPL JOINT R-T W/IMG: CPT

## 2021-07-30 ENCOUNTER — NON-APPOINTMENT (OUTPATIENT)
Age: 61
End: 2021-07-30

## 2021-08-13 ENCOUNTER — OUTPATIENT (OUTPATIENT)
Dept: OUTPATIENT SERVICES | Facility: HOSPITAL | Age: 61
LOS: 1 days | End: 2021-08-13
Payer: MEDICAID

## 2021-08-13 ENCOUNTER — APPOINTMENT (OUTPATIENT)
Dept: RHEUMATOLOGY | Facility: HOSPITAL | Age: 61
End: 2021-08-13
Payer: MEDICAID

## 2021-08-13 VITALS
TEMPERATURE: 96.1 F | WEIGHT: 205 LBS | HEIGHT: 63 IN | BODY MASS INDEX: 36.32 KG/M2 | SYSTOLIC BLOOD PRESSURE: 133 MMHG | HEART RATE: 76 BPM | DIASTOLIC BLOOD PRESSURE: 79 MMHG

## 2021-08-13 DIAGNOSIS — M10.9 GOUT, UNSPECIFIED: ICD-10-CM

## 2021-08-13 DIAGNOSIS — Z90.49 ACQUIRED ABSENCE OF OTHER SPECIFIED PARTS OF DIGESTIVE TRACT: Chronic | ICD-10-CM

## 2021-08-13 DIAGNOSIS — Z98.89 OTHER SPECIFIED POSTPROCEDURAL STATES: Chronic | ICD-10-CM

## 2021-08-13 DIAGNOSIS — M06.9 RHEUMATOID ARTHRITIS, UNSPECIFIED: ICD-10-CM

## 2021-08-13 DIAGNOSIS — M79.7 FIBROMYALGIA: ICD-10-CM

## 2021-08-13 LAB — URATE SERPL-MCNC: 3.9 MG/DL — SIGNIFICANT CHANGE UP (ref 2.5–7)

## 2021-08-13 PROCEDURE — G0463: CPT

## 2021-08-13 PROCEDURE — 84550 ASSAY OF BLOOD/URIC ACID: CPT

## 2021-08-13 PROCEDURE — 99214 OFFICE O/P EST MOD 30 MIN: CPT

## 2021-08-13 NOTE — PHYSICAL EXAM
[General Appearance - In No Acute Distress] : in no acute distress [General Appearance - Well Nourished] : well nourished [General Appearance - Well Developed] : well developed [Sclera] : the sclera and conjunctiva were normal [Neck Appearance] : the appearance of the neck was normal [Respiration, Rhythm And Depth] : normal respiratory rhythm and effort [Exaggerated Use Of Accessory Muscles For Inspiration] : no accessory muscle use [Auscultation Breath Sounds / Voice Sounds] : lungs were clear to auscultation bilaterally [Heart Rate And Rhythm] : heart rate was normal and rhythm regular [Heart Sounds] : normal S1 and S2 [Edema] : there was no peripheral edema [Bowel Sounds] : normal bowel sounds [Abdomen Soft] : soft [Abdomen Tenderness] : non-tender [No CVA Tenderness] : no ~M costovertebral angle tenderness [No Spinal Tenderness] : no spinal tenderness [Abnormal Walk] : normal gait [Nail Clubbing] : no clubbing  or cyanosis of the fingernails [Musculoskeletal - Swelling] : no joint swelling seen [Motor Tone] : muscle strength and tone were normal [] : no rash [Motor Exam] : the motor exam was normal [No Focal Deficits] : no focal deficits [FreeTextEntry1] : Decreased sensation right hand

## 2021-08-13 NOTE — ASSESSMENT
[FreeTextEntry1] : 60 Y F with a PMHx of DM, HTN, Asthma, CAD, diverticulitis, Gout, OA, Fibromyalgia was initially evaluated for elevated Complement, dsDNA levels, body and joint pains in 11/2020 with w/u negative for autoimmune inflammatory arthritis, presenting for f/u\par \par #Carpal Tunnel Syndrome: Patient reported R>L b/l 1st and 2nd digit pain, numbness at last visit, worst at night, radiation to b/l forearms, positive Tinel's Sign - was diagnosed with CTS, given nighttime splint and Mobic 7.5mg BID. Symptoms improved with Mobic BID but worsened with nighttime splint. Describes worsening right 2nd digit decreased sensation \par -transition Mobic 7.5mg to PRN \par -Cymbalta 20mg daily prescribed\par -if patient does not improve then will consider EMG \par \par #Symmetric Joint Tenderness: Located worst in right 2nd MCP and right 1st MTP but present over b/l 1st and 2nd MCPs and MTPs without signs of effusion. No joint swelling, US of joints from last visit did not demonstrate any synovitis but did show OA, which seems most likely etiology of her pain \par -Mobic PRN as above\par -PT\par \par #Fibromyalgia: Patient with multiple areas of point tenderness in the muscle bodies on the upper + lower back, b/l arms, forearms, LE. Unable to tolerate Lyrica in the past. Reports good exercise regimen swimming 4x per week. \par -c/w exercise regimen\par -PT ordered, has not yet seen them\par -Duloxetine 20mg at nighttime ordered\par \par #Gout: Self-reported, no hx of crystal diagnosis in the past. S/p tx with steroid taper in March 2021 and May 2021.  Reports adherence to diet\par -f/u uric acid level\par -c/w allopurinol 200mg/day for now. Consider increasing if still not at goal\par \par #Right Shoulder Pain: Worsening right shoulder pain with radiation to the right index finger. Had recent xray humerus done at outside clinic which was normal. Radiation of pain is concerning for cervical radiculopathy but negative Spurling, no cervical spine tenderness, exam is most consistent with possible R shoulder tendonitis. Drop arm tests negative.\par -Mobic PRN as above\par -PT \par \par RTC in 6 weeks\par Case discussed with Dr. Washington\par

## 2021-08-13 NOTE — END OF VISIT
[] : Fellow [FreeTextEntry3] : Patient seen with Dr Roman. Fibromyalgia, right shoulder pain. Gout\par \par Start duloxetine 20mg for fibromyalgia. Shoulder has pain on extremes of abduction. May be mild rotator cuff tendinitis. For PT. \par Carpal tunnel right hand with numbness of first digit. Consider EMG\par Encourage more weight loss. Recheck uric acid today. Dose increased to 200mg in March due to UA 7.2

## 2021-08-13 NOTE — HISTORY OF PRESENT ILLNESS
[___ Week(s) Ago] : [unfilled] week(s) ago [FreeTextEntry1] : Patient reports right shoulder, arm pain starting 2 months ago, progressively worsening, worst at night. Pain can radiate to the index finger. Patient reports feeling similar pain in [id number] in left arm and at that time was also having left sided shoulder and arm pain. Pain is worse with movement. Mobic does not help pain. Pain is described as achy pain. No neck pain, trauma. Does report right hand weakness and right index finger numbness.\par \par Compared to last visit, with Mobic, pain in the b/l hands has improved. Nighttime splint worsened right hand numbness so stopped using it, tried for about 1 week.  Patient reports not using PT yet. \par \par Patient continues her swimming, reports no shellfish, red meat, alcohol in diet. \par \par Denies fevers, chills, chest pain, SOB, n/v/d/c, changes in urinary freq, dysuria.

## 2021-08-16 ENCOUNTER — TRANSCRIPTION ENCOUNTER (OUTPATIENT)
Age: 61
End: 2021-08-16

## 2021-08-16 LAB
25(OH)D3 SERPL-MCNC: 40.4 NG/ML
ALBUMIN SERPL ELPH-MCNC: 4.7 G/DL
ALP BLD-CCNC: 127 U/L
ALT SERPL-CCNC: 17 U/L
ANION GAP SERPL CALC-SCNC: 13 MMOL/L
APPEARANCE: CLEAR
AST SERPL-CCNC: 13 U/L
BASOPHILS # BLD AUTO: 0.09 K/UL
BASOPHILS NFR BLD AUTO: 1.3 %
BILIRUB SERPL-MCNC: 0.4 MG/DL
BILIRUBIN URINE: NEGATIVE
BLOOD URINE: NEGATIVE
BUN SERPL-MCNC: 11 MG/DL
CALCIUM SERPL-MCNC: 9.9 MG/DL
CHLORIDE SERPL-SCNC: 104 MMOL/L
CHOLEST SERPL-MCNC: 179 MG/DL
CO2 SERPL-SCNC: 24 MMOL/L
COLOR: NORMAL
CREAT SERPL-MCNC: 0.61 MG/DL
CREAT SPEC-SCNC: 89 MG/DL
EOSINOPHIL # BLD AUTO: 0.27 K/UL
EOSINOPHIL NFR BLD AUTO: 4 %
FOLATE SERPL-MCNC: >20 NG/ML
GLUCOSE QUALITATIVE U: ABNORMAL
GLUCOSE SERPL-MCNC: 137 MG/DL
HCT VFR BLD CALC: 41.3 %
HDLC SERPL-MCNC: 60 MG/DL
HGB BLD-MCNC: 13.3 G/DL
IMM GRANULOCYTES NFR BLD AUTO: 0.3 %
KETONES URINE: NEGATIVE
LDLC SERPL CALC-MCNC: 74 MG/DL
LEUKOCYTE ESTERASE URINE: NEGATIVE
LYMPHOCYTES # BLD AUTO: 2.42 K/UL
LYMPHOCYTES NFR BLD AUTO: 35.6 %
MAN DIFF?: NORMAL
MCHC RBC-ENTMCNC: 26.7 PG
MCHC RBC-ENTMCNC: 32.2 GM/DL
MCV RBC AUTO: 82.9 FL
MICROALBUMIN 24H UR DL<=1MG/L-MCNC: 1.2 MG/DL
MICROALBUMIN/CREAT 24H UR-RTO: 14 MG/G
MONOCYTES # BLD AUTO: 0.61 K/UL
MONOCYTES NFR BLD AUTO: 9 %
NEUTROPHILS # BLD AUTO: 3.39 K/UL
NEUTROPHILS NFR BLD AUTO: 49.8 %
NITRITE URINE: NEGATIVE
NONHDLC SERPL-MCNC: 118 MG/DL
PH URINE: 5.5
PLATELET # BLD AUTO: 242 K/UL
POTASSIUM SERPL-SCNC: 4.4 MMOL/L
PROT SERPL-MCNC: 7.2 G/DL
PROTEIN URINE: NEGATIVE
RBC # BLD: 4.98 M/UL
RBC # FLD: 14.6 %
SODIUM SERPL-SCNC: 141 MMOL/L
SPECIFIC GRAVITY URINE: 1.02
T4 FREE SERPL-MCNC: 1.1 NG/DL
T4 SERPL-MCNC: 7.3 UG/DL
THYROGLOB AB SERPL-ACNC: <20 IU/ML
THYROPEROXIDASE AB SERPL IA-ACNC: <10 IU/ML
TRIGL SERPL-MCNC: 219 MG/DL
TSH SERPL-ACNC: 2.96 UIU/ML
UROBILINOGEN URINE: NORMAL
VIT B12 SERPL-MCNC: 780 PG/ML
WBC # FLD AUTO: 6.8 K/UL

## 2021-10-04 ENCOUNTER — APPOINTMENT (OUTPATIENT)
Dept: ENDOCRINOLOGY | Facility: CLINIC | Age: 61
End: 2021-10-04
Payer: MEDICAID

## 2021-10-04 VITALS
WEIGHT: 202 LBS | HEART RATE: 70 BPM | BODY MASS INDEX: 35.79 KG/M2 | HEIGHT: 63 IN | OXYGEN SATURATION: 96 % | DIASTOLIC BLOOD PRESSURE: 76 MMHG | SYSTOLIC BLOOD PRESSURE: 112 MMHG

## 2021-10-04 LAB
GLUCOSE BLDC GLUCOMTR-MCNC: 129
HBA1C MFR BLD HPLC: 6.9

## 2021-10-04 PROCEDURE — 95251 CONT GLUC MNTR ANALYSIS I&R: CPT

## 2021-10-04 PROCEDURE — 83036 HEMOGLOBIN GLYCOSYLATED A1C: CPT | Mod: QW

## 2021-10-04 PROCEDURE — 99214 OFFICE O/P EST MOD 30 MIN: CPT | Mod: 25

## 2021-10-04 NOTE — HISTORY OF PRESENT ILLNESS
[Continuous Glucose Monitoring] : Continuous Glucose Monitoring: Yes [Mega] : Mega [FreeTextEntry1] : 60 year old female presents for follow up of diabetes. She has been living with diabetes for over 5 years. She was started on insulin 2 years ago. Presently she is on Basaglar 30 u qhs AND Admelog SS TIDAC. She also takes Jardiance 10 mg daily and feels well on it. She is off of Metformin and Acarbose. She feels her GI sx have improved. She is now using the Mega to monitor her BS. Her recent A1C is 6.9, down from 7.3. She reports that she tries her best to follow a carb balanced diet. She does struggle as she has IBS and Gout and is limited in her food choices. She has been very diligent about her diet. She does not drink any juice or soda. She has made changes since she started using the Mega to her diet. She walks daily. She recently took a DM course [facility]. She denies any diabetic neuropathy, retinopathy, or nephropathy.  Last Optho was over 1 year ago.  She does have IBS, diverticulitis and h/o colon resection with colectomy which was reversed. Also has aneurysm in the brain and is having workup for that.  [FreeTextEntry2] : 98 [FreeTextEntry3] : 2 [FreeTextEntry4] : 0 [de-identified] : 6.4 [FreeTextEntry5] : 131 [FreeTextEntry6] : 15.1

## 2021-10-04 NOTE — PHYSICAL EXAM
[Alert] : alert [Well Nourished] : well nourished [No Acute Distress] : no acute distress [Well Developed] : well developed [Normal Sclera/Conjunctiva] : normal sclera/conjunctiva [EOMI] : extra ocular movement intact [No Proptosis] : no proptosis [No Thyroid Nodules] : no palpable thyroid nodules [No Respiratory Distress] : no respiratory distress [No Accessory Muscle Use] : no accessory muscle use [Clear to Auscultation] : lungs were clear to auscultation bilaterally [Normal S1, S2] : normal S1 and S2 [Normal Rate] : heart rate was normal [Regular Rhythm] : with a regular rhythm [No Edema] : no peripheral edema [Pedal Pulses Normal] : the pedal pulses are present [Normal Bowel Sounds] : normal bowel sounds [Not Distended] : not distended [Soft] : abdomen soft [Normal Anterior Cervical Nodes] : no anterior cervical lymphadenopathy [No Spinal Tenderness] : no spinal tenderness [Spine Straight] : spine straight [Normal Gait] : normal gait [Normal Strength/Tone] : muscle strength and tone were normal [No Rash] : no rash [Normal] : normal [Full ROM] : with full range of motion [Normal Reflexes] : deep tendon reflexes were 2+ and symmetric [No Tremors] : no tremors [Oriented x3] : oriented to person, place, and time [Acanthosis Nigricans] : no acanthosis nigricans [Diminished Throughout Both Feet] : normal tactile sensation with monofilament testing throughout both feet [de-identified] : thyroid enlarged approx 60 g; irregular texture [de-identified] : mildly tender LLQ; large well healed scar

## 2021-10-04 NOTE — ASSESSMENT
[Diabetes Foot Care] : diabetes foot care [Long Term Vascular Complications] : long term vascular complications of diabetes [Carbohydrate Consistent Diet] : carbohydrate consistent diet [Importance of Diet and Exercise] : importance of diet and exercise to improve glycemic control, achieve weight loss and improve cardiovascular health [Exercise/Effect on Glucose] : exercise/effect on glucose [Hypoglycemia Management] : hypoglycemia management [Action and use of Insulin] : action and use of short and long-acting insulin [Self Monitoring of Blood Glucose] : self monitoring of blood glucose [Insulin Self-Administration] : insulin self-administration [Injection Technique, Storage, Sharps Disposal] : injection technique, storage, and sharps disposal [Retinopathy Screening] : Patient was referred to ophthalmology for retinopathy screening [Weight Loss] : weight loss [Diabetic Medications] : Risks and benefits of diabetic medications were discussed [FreeTextEntry1] : 1. Type 2 diabetes/obesity - Sensor tracing shows excellent control. Continue the same medications. Focus on lifestyle modifications.  We discussed at length the importance of following a carbohydrate balanced diet and the importance of incorporating protein in meals. We also discussed appropriate alternative food choices. We discussed ways she can incorporate exercise into her daily routine. We discussed the importance of weight loss in the management of her comorbidities. We discussed the risks of continued excess weight on long term health. \par 2. Thyroid enlarged - Appears euthyroid. Check TFTs. Recent thyroid sono 9/2021 shows that two thyroid nodules remained the same and one is slightly bigger ~1mm. Will repeat in 1 year.\par \par Patient to complete labs, will call with the results. \par \par Will f/u in 3months\par

## 2021-10-08 ENCOUNTER — OUTPATIENT (OUTPATIENT)
Dept: OUTPATIENT SERVICES | Facility: HOSPITAL | Age: 61
LOS: 1 days | End: 2021-10-08
Payer: MEDICAID

## 2021-10-08 ENCOUNTER — RESULT REVIEW (OUTPATIENT)
Age: 61
End: 2021-10-08

## 2021-10-08 ENCOUNTER — APPOINTMENT (OUTPATIENT)
Dept: RHEUMATOLOGY | Facility: HOSPITAL | Age: 61
End: 2021-10-08
Payer: MEDICAID

## 2021-10-08 VITALS
BODY MASS INDEX: 35.79 KG/M2 | HEIGHT: 63 IN | WEIGHT: 202 LBS | DIASTOLIC BLOOD PRESSURE: 57 MMHG | TEMPERATURE: 96.1 F | SYSTOLIC BLOOD PRESSURE: 107 MMHG | HEART RATE: 88 BPM

## 2021-10-08 DIAGNOSIS — Z98.89 OTHER SPECIFIED POSTPROCEDURAL STATES: Chronic | ICD-10-CM

## 2021-10-08 DIAGNOSIS — M06.9 RHEUMATOID ARTHRITIS, UNSPECIFIED: ICD-10-CM

## 2021-10-08 DIAGNOSIS — Z90.49 ACQUIRED ABSENCE OF OTHER SPECIFIED PARTS OF DIGESTIVE TRACT: Chronic | ICD-10-CM

## 2021-10-08 PROCEDURE — 72052 X-RAY EXAM NECK SPINE 6/>VWS: CPT

## 2021-10-08 PROCEDURE — G0463: CPT

## 2021-10-08 PROCEDURE — 99213 OFFICE O/P EST LOW 20 MIN: CPT | Mod: GC

## 2021-10-08 PROCEDURE — 72052 X-RAY EXAM NECK SPINE 6/>VWS: CPT | Mod: 26

## 2021-10-08 NOTE — REVIEW OF SYSTEMS
[Arthralgias] : arthralgias [Fever] : no fever [Chills] : no chills [Eye Pain] : no eye pain [Nosebleeds] : no nosebleeds [Chest Pain] : no chest pain [Palpitations] : no palpitations [Shortness Of Breath] : no shortness of breath [Cough] : no cough [Abdominal Pain] : no abdominal pain [Vomiting] : no vomiting [Diarrhea] : no diarrhea [Dysuria] : no dysuria [Joint Swelling] : no joint swelling [Joint Stiffness] : no joint stiffness [Skin Lesions] : no skin lesions [Dizziness] : no dizziness [Anxiety] : no anxiety [Muscle Weakness] : no muscle weakness [Swollen Glands] : no swollen glands

## 2021-10-08 NOTE — HISTORY OF PRESENT ILLNESS
[___ Week(s) Ago] : [unfilled] week(s) ago [FreeTextEntry1] : Interval Hx 10-8-21:\maria teresa Presents today for a f/u visit.\par Says she feels fine but still has body pain, joint pain in right hand, some neuropathic pains in UE.\par No morning stiffness. \par No episodes of acute swollen joints.\par No fever, chills, n/v/d, abdominal pain, cough, CP or SOB.\par Right shoulder pains persist. \par

## 2021-10-08 NOTE — ASSESSMENT
[FreeTextEntry1] : 60 Y F with a PMHx of DM, HTN, Asthma, CAD, diverticulitis, Gout, OA, Fibromyalgia was initially evaluated for elevated Complement, dsDNA levels, body and joint pains in 11/2020.\par \par Impression: \par # FM\par Still uncontrolled symptoms.\par Pt needs to lose weight, start PT and start Cymbalta which we prescribed last visit. \par \par # Right shoulder pain.\par Likely from AC joint degenerate arthritis and possible rotator cuff pathology.\par We prescribed PT before, she is yet to begin. \par \par # B/ UE neuropathy symptoms\par Got EMG that showed C4, C5, C6 nerve root abnormalities in addition to median nerve, ulnar nerve abnormalities. \par \par # Gout\par No new flares, UA at goal to 3.9, continue Allopurinol 200 mg daily. \par \par Plan: \par Cervical xray as she has cervical nerve root abnormalities noted on EMG. \par Neurology referral. \par Start Gabapentin 300 mg BID.\par Encouraged to  and start Cymbalta that we prescribed last visit. \par PT for right shoulder pain and FM. \par Weight loss, sleep hygiene. \par Continue Allopurinol 200 mg daily.\par \par Return in 3 months\par \par DW with Dr. Mercado\par \par Jazmine Moon MD\par Rheumatology Fellow\par \par

## 2021-10-08 NOTE — PHYSICAL EXAM
[General Appearance - Alert] : alert [General Appearance - In No Acute Distress] : in no acute distress [Sclera] : the sclera and conjunctiva were normal [PERRL With Normal Accommodation] : pupils were equal in size, round, and reactive to light [Extraocular Movements] : extraocular movements were intact [Outer Ear] : the ears and nose were normal in appearance [Oropharynx] : the oropharynx was normal [Auscultation Breath Sounds / Voice Sounds] : lungs were clear to auscultation bilaterally [Heart Rate And Rhythm] : heart rate was normal and rhythm regular [Heart Sounds] : normal S1 and S2 [Heart Sounds Gallop] : no gallops [Murmurs] : no murmurs [Heart Sounds Pericardial Friction Rub] : no pericardial rub [Bowel Sounds] : normal bowel sounds [Abdomen Soft] : soft [Abdomen Tenderness] : non-tender [Abdomen Mass (___ Cm)] : no abdominal mass palpated [Skin Color & Pigmentation] : normal skin color and pigmentation [Skin Turgor] : normal skin turgor [] : no rash [FreeTextEntry1] : No signs of synovitis, limitation of ROM or deformity in any joint. tenderness in right 2nd MCP joint, right wrist and in multiple no articular regions of her body.

## 2021-10-10 ENCOUNTER — TRANSCRIPTION ENCOUNTER (OUTPATIENT)
Age: 61
End: 2021-10-10

## 2021-10-10 LAB
25(OH)D3 SERPL-MCNC: 31.6 NG/ML
ALBUMIN SERPL ELPH-MCNC: 4.4 G/DL
ALP BLD-CCNC: 133 U/L
ALT SERPL-CCNC: 14 U/L
ANION GAP SERPL CALC-SCNC: 14 MMOL/L
APPEARANCE: CLEAR
AST SERPL-CCNC: 13 U/L
BASOPHILS # BLD AUTO: 0.08 K/UL
BASOPHILS NFR BLD AUTO: 1.1 %
BILIRUB SERPL-MCNC: 0.3 MG/DL
BILIRUBIN URINE: NEGATIVE
BLOOD URINE: NEGATIVE
BUN SERPL-MCNC: 16 MG/DL
CALCIUM SERPL-MCNC: 9.9 MG/DL
CHLORIDE SERPL-SCNC: 103 MMOL/L
CHOLEST SERPL-MCNC: 161 MG/DL
CO2 SERPL-SCNC: 23 MMOL/L
COLOR: NORMAL
CREAT SERPL-MCNC: 0.63 MG/DL
CREAT SPEC-SCNC: 64 MG/DL
EOSINOPHIL # BLD AUTO: 0.23 K/UL
EOSINOPHIL NFR BLD AUTO: 3.1 %
ESTIMATED AVERAGE GLUCOSE: 154 MG/DL
GLUCOSE QUALITATIVE U: ABNORMAL
GLUCOSE SERPL-MCNC: 132 MG/DL
HBA1C MFR BLD HPLC: 7 %
HCT VFR BLD CALC: 40.2 %
HDLC SERPL-MCNC: 58 MG/DL
HGB BLD-MCNC: 13.1 G/DL
IMM GRANULOCYTES NFR BLD AUTO: 0.4 %
KETONES URINE: NEGATIVE
LDLC SERPL CALC-MCNC: 49 MG/DL
LEUKOCYTE ESTERASE URINE: NEGATIVE
LYMPHOCYTES # BLD AUTO: 2.28 K/UL
LYMPHOCYTES NFR BLD AUTO: 30.9 %
MAN DIFF?: NORMAL
MCHC RBC-ENTMCNC: 26.9 PG
MCHC RBC-ENTMCNC: 32.6 GM/DL
MCV RBC AUTO: 82.5 FL
MICROALBUMIN 24H UR DL<=1MG/L-MCNC: <1.2 MG/DL
MICROALBUMIN/CREAT 24H UR-RTO: NORMAL MG/G
MONOCYTES # BLD AUTO: 0.61 K/UL
MONOCYTES NFR BLD AUTO: 8.3 %
NEUTROPHILS # BLD AUTO: 4.15 K/UL
NEUTROPHILS NFR BLD AUTO: 56.2 %
NITRITE URINE: NEGATIVE
NONHDLC SERPL-MCNC: 103 MG/DL
PH URINE: 6
PLATELET # BLD AUTO: 280 K/UL
POTASSIUM SERPL-SCNC: 4.2 MMOL/L
PROT SERPL-MCNC: 7.1 G/DL
PROTEIN URINE: NEGATIVE
RBC # BLD: 4.87 M/UL
RBC # FLD: 14.8 %
SODIUM SERPL-SCNC: 140 MMOL/L
SPECIFIC GRAVITY URINE: 1.03
T4 FREE SERPL-MCNC: 1 NG/DL
T4 SERPL-MCNC: 7.5 UG/DL
TRIGL SERPL-MCNC: 271 MG/DL
TSH SERPL-ACNC: 2.03 UIU/ML
UROBILINOGEN URINE: NORMAL
WBC # FLD AUTO: 7.38 K/UL

## 2021-10-11 DIAGNOSIS — M54.10 RADICULOPATHY, SITE UNSPECIFIED: ICD-10-CM

## 2021-10-11 DIAGNOSIS — M54.12 RADICULOPATHY, CERVICAL REGION: ICD-10-CM

## 2021-12-02 NOTE — ED ADULT NURSE NOTE - NS ED NURSE DISCH DISPOSITION
Netta has left a voice message in the referrals department requesting that her medical record be faxed to her new neurologist in [place].  Dr. Xiomara Garcia, fax [phone]. Thank you.   Admitted

## 2022-01-14 ENCOUNTER — RESULT REVIEW (OUTPATIENT)
Age: 62
End: 2022-01-14

## 2022-01-14 ENCOUNTER — OUTPATIENT (OUTPATIENT)
Dept: OUTPATIENT SERVICES | Facility: HOSPITAL | Age: 62
LOS: 1 days | End: 2022-01-14
Payer: MEDICAID

## 2022-01-14 ENCOUNTER — APPOINTMENT (OUTPATIENT)
Dept: RHEUMATOLOGY | Facility: HOSPITAL | Age: 62
End: 2022-01-14

## 2022-01-14 VITALS
SYSTOLIC BLOOD PRESSURE: 145 MMHG | BODY MASS INDEX: 35.79 KG/M2 | DIASTOLIC BLOOD PRESSURE: 64 MMHG | WEIGHT: 202 LBS | TEMPERATURE: 97.8 F | RESPIRATION RATE: 14 BRPM | HEART RATE: 83 BPM | HEIGHT: 63 IN

## 2022-01-14 DIAGNOSIS — Z98.89 OTHER SPECIFIED POSTPROCEDURAL STATES: Chronic | ICD-10-CM

## 2022-01-14 DIAGNOSIS — Z90.49 ACQUIRED ABSENCE OF OTHER SPECIFIED PARTS OF DIGESTIVE TRACT: Chronic | ICD-10-CM

## 2022-01-14 DIAGNOSIS — M06.9 RHEUMATOID ARTHRITIS, UNSPECIFIED: ICD-10-CM

## 2022-01-14 LAB
BASOPHILS # BLD AUTO: 0.07 K/UL — SIGNIFICANT CHANGE UP (ref 0–0.2)
BASOPHILS NFR BLD AUTO: 0.9 % — SIGNIFICANT CHANGE UP (ref 0–2)
EOSINOPHIL # BLD AUTO: 0.31 K/UL — SIGNIFICANT CHANGE UP (ref 0–0.5)
EOSINOPHIL NFR BLD AUTO: 3.9 % — SIGNIFICANT CHANGE UP (ref 0–6)
HCT VFR BLD CALC: 44.4 % — SIGNIFICANT CHANGE UP (ref 34.5–45)
HGB BLD-MCNC: 13.9 G/DL — SIGNIFICANT CHANGE UP (ref 11.5–15.5)
IMM GRANULOCYTES NFR BLD AUTO: 0.6 % — SIGNIFICANT CHANGE UP (ref 0–1.5)
LYMPHOCYTES # BLD AUTO: 2.54 K/UL — SIGNIFICANT CHANGE UP (ref 1–3.3)
LYMPHOCYTES # BLD AUTO: 31.8 % — SIGNIFICANT CHANGE UP (ref 13–44)
MCHC RBC-ENTMCNC: 26.4 PG — LOW (ref 27–34)
MCHC RBC-ENTMCNC: 31.3 GM/DL — LOW (ref 32–36)
MCV RBC AUTO: 84.3 FL — SIGNIFICANT CHANGE UP (ref 80–100)
MONOCYTES # BLD AUTO: 0.73 K/UL — SIGNIFICANT CHANGE UP (ref 0–0.9)
MONOCYTES NFR BLD AUTO: 9.1 % — SIGNIFICANT CHANGE UP (ref 2–14)
NEUTROPHILS # BLD AUTO: 4.3 K/UL — SIGNIFICANT CHANGE UP (ref 1.8–7.4)
NEUTROPHILS NFR BLD AUTO: 53.7 % — SIGNIFICANT CHANGE UP (ref 43–77)
PLATELET # BLD AUTO: 303 K/UL — SIGNIFICANT CHANGE UP (ref 150–400)
RBC # BLD: 5.27 M/UL — HIGH (ref 3.8–5.2)
RBC # FLD: 15.4 % — HIGH (ref 10.3–14.5)
WBC # BLD: 8 K/UL — SIGNIFICANT CHANGE UP (ref 3.8–10.5)
WBC # FLD AUTO: 8 K/UL — SIGNIFICANT CHANGE UP (ref 3.8–10.5)

## 2022-01-14 PROCEDURE — 84550 ASSAY OF BLOOD/URIC ACID: CPT

## 2022-01-14 PROCEDURE — 85025 COMPLETE CBC W/AUTO DIFF WBC: CPT

## 2022-01-14 PROCEDURE — 73560 X-RAY EXAM OF KNEE 1 OR 2: CPT | Mod: 26,LT,59

## 2022-01-14 PROCEDURE — 80053 COMPREHEN METABOLIC PANEL: CPT

## 2022-01-14 PROCEDURE — 73030 X-RAY EXAM OF SHOULDER: CPT | Mod: 26,RT

## 2022-01-14 PROCEDURE — 36415 COLL VENOUS BLD VENIPUNCTURE: CPT

## 2022-01-14 PROCEDURE — G0463: CPT

## 2022-01-14 PROCEDURE — 73560 X-RAY EXAM OF KNEE 1 OR 2: CPT

## 2022-01-14 PROCEDURE — 73030 X-RAY EXAM OF SHOULDER: CPT

## 2022-01-15 LAB
25(OH)D3 SERPL-MCNC: 34.9 NG/ML
ALBUMIN SERPL ELPH-MCNC: 4.5 G/DL
ALBUMIN SERPL ELPH-MCNC: 4.8 G/DL — SIGNIFICANT CHANGE UP (ref 3.3–5)
ALP BLD-CCNC: 145 U/L
ALP SERPL-CCNC: 148 U/L — HIGH (ref 40–120)
ALT FLD-CCNC: 16 U/L — SIGNIFICANT CHANGE UP (ref 10–45)
ALT SERPL-CCNC: 14 U/L
ANION GAP SERPL CALC-SCNC: 14 MMOL/L — SIGNIFICANT CHANGE UP (ref 5–17)
ANION GAP SERPL CALC-SCNC: 17 MMOL/L
APPEARANCE: CLEAR
AST SERPL-CCNC: 14 U/L
AST SERPL-CCNC: 17 U/L — SIGNIFICANT CHANGE UP (ref 10–40)
BASOPHILS # BLD AUTO: 0.1 K/UL
BASOPHILS NFR BLD AUTO: 1.3 %
BILIRUB SERPL-MCNC: 0.2 MG/DL
BILIRUB SERPL-MCNC: 0.3 MG/DL — SIGNIFICANT CHANGE UP (ref 0.2–1.2)
BILIRUBIN URINE: NEGATIVE
BLOOD URINE: NEGATIVE
BUN SERPL-MCNC: 11 MG/DL
BUN SERPL-MCNC: 11 MG/DL — SIGNIFICANT CHANGE UP (ref 7–23)
CALCIUM SERPL-MCNC: 10 MG/DL
CALCIUM SERPL-MCNC: 10 MG/DL — SIGNIFICANT CHANGE UP (ref 8.4–10.5)
CHLORIDE SERPL-SCNC: 102 MMOL/L — SIGNIFICANT CHANGE UP (ref 96–108)
CHLORIDE SERPL-SCNC: 105 MMOL/L
CHOLEST SERPL-MCNC: 265 MG/DL
CO2 SERPL-SCNC: 20 MMOL/L
CO2 SERPL-SCNC: 24 MMOL/L — SIGNIFICANT CHANGE UP (ref 22–31)
COLOR: NORMAL
CREAT SERPL-MCNC: 0.67 MG/DL
CREAT SERPL-MCNC: 0.71 MG/DL — SIGNIFICANT CHANGE UP (ref 0.5–1.3)
CREAT SPEC-SCNC: 95 MG/DL
EOSINOPHIL # BLD AUTO: 0.32 K/UL
EOSINOPHIL NFR BLD AUTO: 4.2 %
ESTIMATED AVERAGE GLUCOSE: 163 MG/DL
FOLATE SERPL-MCNC: 15.5 NG/ML
GLUCOSE QUALITATIVE U: ABNORMAL
GLUCOSE SERPL-MCNC: 136 MG/DL
GLUCOSE SERPL-MCNC: 137 MG/DL — HIGH (ref 70–99)
HBA1C MFR BLD HPLC: 7.3 %
HCT VFR BLD CALC: 44.6 %
HDLC SERPL-MCNC: 57 MG/DL
HGB BLD-MCNC: 14.1 G/DL
IMM GRANULOCYTES NFR BLD AUTO: 0.7 %
KETONES URINE: NEGATIVE
LDLC SERPL CALC-MCNC: 162 MG/DL
LEUKOCYTE ESTERASE URINE: NEGATIVE
LYMPHOCYTES # BLD AUTO: 2.57 K/UL
LYMPHOCYTES NFR BLD AUTO: 34.1 %
MAN DIFF?: NORMAL
MCHC RBC-ENTMCNC: 25.9 PG
MCHC RBC-ENTMCNC: 31.6 GM/DL
MCV RBC AUTO: 81.8 FL
MICROALBUMIN 24H UR DL<=1MG/L-MCNC: <1.2 MG/DL
MICROALBUMIN/CREAT 24H UR-RTO: NORMAL MG/G
MONOCYTES # BLD AUTO: 0.73 K/UL
MONOCYTES NFR BLD AUTO: 9.7 %
NEUTROPHILS # BLD AUTO: 3.76 K/UL
NEUTROPHILS NFR BLD AUTO: 50 %
NITRITE URINE: NEGATIVE
NONHDLC SERPL-MCNC: 208 MG/DL
PH URINE: 5.5
PLATELET # BLD AUTO: 311 K/UL
POTASSIUM SERPL-MCNC: 4.4 MMOL/L — SIGNIFICANT CHANGE UP (ref 3.5–5.3)
POTASSIUM SERPL-SCNC: 4.4 MMOL/L — SIGNIFICANT CHANGE UP (ref 3.5–5.3)
POTASSIUM SERPL-SCNC: 4.5 MMOL/L
PROT SERPL-MCNC: 7.2 G/DL
PROT SERPL-MCNC: 7.4 G/DL — SIGNIFICANT CHANGE UP (ref 6–8.3)
PROTEIN URINE: NEGATIVE
RBC # BLD: 5.45 M/UL
RBC # FLD: 15.3 %
SODIUM SERPL-SCNC: 140 MMOL/L — SIGNIFICANT CHANGE UP (ref 135–145)
SODIUM SERPL-SCNC: 142 MMOL/L
SPECIFIC GRAVITY URINE: 1.02
T4 FREE SERPL-MCNC: 1 NG/DL
T4 SERPL-MCNC: 8.5 UG/DL
TRIGL SERPL-MCNC: 231 MG/DL
TSH SERPL-ACNC: 3.46 UIU/ML
URATE SERPL-MCNC: 5 MG/DL — SIGNIFICANT CHANGE UP (ref 2.5–7)
UROBILINOGEN URINE: NORMAL
VIT B12 SERPL-MCNC: 755 PG/ML
WBC # FLD AUTO: 7.53 K/UL

## 2022-01-20 ENCOUNTER — NON-APPOINTMENT (OUTPATIENT)
Age: 62
End: 2022-01-20

## 2022-01-21 DIAGNOSIS — M25.511 PAIN IN RIGHT SHOULDER: ICD-10-CM

## 2022-01-21 DIAGNOSIS — M20.40 OTHER HAMMER TOE(S) (ACQUIRED), UNSPECIFIED FOOT: ICD-10-CM

## 2022-01-21 NOTE — HISTORY OF PRESENT ILLNESS
[___ Week(s) Ago] : [unfilled] week(s) ago [FreeTextEntry1] : Interval Hx 1-14-22:\par Presents today for a f/u visit.\par Body pains are worse. \par Neuropathic pains noted in UE now better. \par Right shoulder pain persists.\par No acute episodes of joint swelling. \par No morning stiffness of joints. \par No episodes of acute swollen joints.\par Mild left knee pain also noted. \par No fever, chills, n/v/d, abdominal pain, cough, CP or SOB.\par

## 2022-01-21 NOTE — PHYSICAL EXAM
[General Appearance - Alert] : alert [General Appearance - In No Acute Distress] : in no acute distress [Sclera] : the sclera and conjunctiva were normal [PERRL With Normal Accommodation] : pupils were equal in size, round, and reactive to light [Extraocular Movements] : extraocular movements were intact [Outer Ear] : the ears and nose were normal in appearance [Oropharynx] : the oropharynx was normal [Auscultation Breath Sounds / Voice Sounds] : lungs were clear to auscultation bilaterally [Heart Rate And Rhythm] : heart rate was normal and rhythm regular [Heart Sounds] : normal S1 and S2 [Heart Sounds Gallop] : no gallops [Murmurs] : no murmurs [Heart Sounds Pericardial Friction Rub] : no pericardial rub [Bowel Sounds] : normal bowel sounds [Abdomen Soft] : soft [Abdomen Tenderness] : non-tender [Abdomen Mass (___ Cm)] : no abdominal mass palpated [Skin Color & Pigmentation] : normal skin color and pigmentation [Skin Turgor] : normal skin turgor [] : no rash [FreeTextEntry1] : Mild left knee effusion, no warmth, mild restriction of ROM. No signs of synovitis, limitation of ROM or deformity in any joint. Tenderness over left Bicep tendon, pain reproduced with resisted supination. Tenderness in multiple non articular regions of her body.

## 2022-01-21 NOTE — ASSESSMENT
[FreeTextEntry1] : 60 Y F with a PMHx of DM, HTN, Asthma, CAD, diverticulitis, Gout, OA, Fibromyalgia was initially evaluated for elevated Complement, dsDNA levels, body and joint pains in 11/2020.\par \par Impression: \par # FM\par Still uncontrolled symptoms.\par Not doing PT, taking Cymbalta 30 mg daily. \par \par # Right shoulder pain.\par Unclear etiology; Bicep tendinitis or rotator cuff pathology of glenohumeral joint arthritis?\par Pt did PT for a few session, she was then DC from PT, still has a lot of pain. \par \par # B/ UE neuropathy symptoms\par Got EMG that showed C4, C5, C6 nerve root abnormalities in addition to median nerve, ulnar nerve abnormalities. \par Yet to see a Neurologist, she has appointment with them in March.\par Symptoms better now without any therapy. \par \par # Gout\par No new flares, UA at goal to 3.9. \par \par # Left knee pain\par Hx and exam and Xray consistent with OA. \par \par Plan: \par Labs\par Increase Cymbalta to 60 mg nightly.\par PT for knee pain and FM\par Can increase Tylenol 650 mg daily to BID. \par Right shoulder US\par B/l knee Xrays\par Continue Allopurinol 200 mg daily. \par See neurology for abnormal EMG\par Weight loss, sleep hygiene. \par \par Return in 3 months\par \par DW with Dr. Washington\par \par Jazmine Moon MD\par Rheumatology Fellow\par \par

## 2022-01-24 ENCOUNTER — OUTPATIENT (OUTPATIENT)
Dept: OUTPATIENT SERVICES | Facility: HOSPITAL | Age: 62
LOS: 1 days | End: 2022-01-24
Payer: MEDICAID

## 2022-01-24 ENCOUNTER — APPOINTMENT (OUTPATIENT)
Dept: PODIATRY | Facility: HOSPITAL | Age: 62
End: 2022-01-24
Payer: MEDICAID

## 2022-01-24 VITALS
WEIGHT: 202 LBS | BODY MASS INDEX: 35.79 KG/M2 | HEART RATE: 80 BPM | DIASTOLIC BLOOD PRESSURE: 93 MMHG | SYSTOLIC BLOOD PRESSURE: 151 MMHG | HEIGHT: 63 IN | TEMPERATURE: 96.6 F

## 2022-01-24 DIAGNOSIS — Z98.89 OTHER SPECIFIED POSTPROCEDURAL STATES: Chronic | ICD-10-CM

## 2022-01-24 DIAGNOSIS — E66.9 OBESITY, UNSPECIFIED: ICD-10-CM

## 2022-01-24 DIAGNOSIS — M20.40 OTHER HAMMER TOE(S) (ACQUIRED), UNSPECIFIED FOOT: ICD-10-CM

## 2022-01-24 DIAGNOSIS — M79.609 PAIN IN UNSPECIFIED LIMB: ICD-10-CM

## 2022-01-24 DIAGNOSIS — Z90.49 ACQUIRED ABSENCE OF OTHER SPECIFIED PARTS OF DIGESTIVE TRACT: Chronic | ICD-10-CM

## 2022-01-24 DIAGNOSIS — E11.9 TYPE 2 DIABETES MELLITUS WITHOUT COMPLICATIONS: ICD-10-CM

## 2022-01-24 PROCEDURE — 99203 OFFICE O/P NEW LOW 30 MIN: CPT

## 2022-01-24 PROCEDURE — G0463: CPT

## 2022-01-24 RX ORDER — TIOTROPIUM BROMIDE 18 UG/1
18 CAPSULE ORAL; RESPIRATORY (INHALATION)
Qty: 30 | Refills: 0 | Status: DISCONTINUED | COMMUNITY
Start: 2020-03-25 | End: 2022-01-24

## 2022-01-24 RX ORDER — MELOXICAM 7.5 MG/1
7.5 TABLET ORAL DAILY
Qty: 56 | Refills: 0 | Status: DISCONTINUED | COMMUNITY
Start: 2021-07-16 | End: 2022-01-24

## 2022-01-24 RX ORDER — PREDNISONE 5 MG/1
5 TABLET ORAL
Qty: 20 | Refills: 0 | Status: DISCONTINUED | COMMUNITY
Start: 2021-05-07 | End: 2022-01-24

## 2022-01-24 RX ORDER — KETOTIFEN FUMARATE 0.25 MG/ML
0.03 SOLUTION/ DROPS OPHTHALMIC
Qty: 5 | Refills: 0 | Status: DISCONTINUED | COMMUNITY
Start: 2020-06-11 | End: 2022-01-24

## 2022-01-24 RX ORDER — MONTELUKAST 10 MG/1
10 TABLET, FILM COATED ORAL
Qty: 30 | Refills: 0 | Status: DISCONTINUED | COMMUNITY
Start: 2020-04-28 | End: 2022-01-24

## 2022-01-24 RX ORDER — POLYETHYLENE GLYCOL, PROPYLENE GLYCOL .4; .3 G/100ML; G/100ML
0.4-0.3 LIQUID OPHTHALMIC
Qty: 15 | Refills: 0 | Status: DISCONTINUED | COMMUNITY
Start: 2020-06-10 | End: 2022-01-24

## 2022-01-24 NOTE — HISTORY OF PRESENT ILLNESS
[Other: ___] : [unfilled] [FreeTextEntry1] : 61 F presents to clinic complaining of b/l foot pain. Pt reports pain in b/l 2nd hammertoes. Pt states that she does not want any surgery but was looking for some conservative options to experience relief. Pt denies any trauma. Pt denies N/V/F/Chills.

## 2022-01-24 NOTE — PROCEDURE
[FreeTextEntry1] : 61 F b/l 2nd painful flexible hammer toes\par pt seen and examined\par recommended pt wear supportive sneaker style shoe \par recommend pt buy Superfeet inserts to wear in shoes\par discussed surgical treatment of hammertoes w/ pt, pt not interested in surgery at this time\par x ray b/l feet ordered \par pt to follow up in 2 months\par

## 2022-01-24 NOTE — PHYSICAL EXAM
[2+] : left foot dorsalis pedis 2+ [Pes Planus] : pes planus deformity [Skin Color & Pigmentation] : normal skin color and pigmentation [Skin Turgor] : normal skin turgor [] : no rash [Skin Lesions] : no skin lesions [Ankle Swelling (On Exam)] : not present [Varicose Veins Of Lower Extremities] : not present [Delayed in the Right Toes] : capillary refills normal in right toes [Delayed in the Left Toes] : capillary refills normal in the left toes [de-identified] : POP 2nd PIPJ 2nd hammer toe b/l [Foot Ulcer] : no foot ulcer [Skin Induration] : no skin induration

## 2022-01-24 NOTE — REASON FOR VISIT
[Initial Visit] : an initial visit for [Foot Pain] : foot pain [FreeTextEntry2] : diabetoc foot examination

## 2022-01-25 ENCOUNTER — OUTPATIENT (OUTPATIENT)
Dept: OUTPATIENT SERVICES | Facility: HOSPITAL | Age: 62
LOS: 1 days | End: 2022-01-25
Payer: MEDICAID

## 2022-01-25 ENCOUNTER — APPOINTMENT (OUTPATIENT)
Dept: ULTRASOUND IMAGING | Facility: CLINIC | Age: 62
End: 2022-01-25
Payer: MEDICAID

## 2022-01-25 DIAGNOSIS — Z98.89 OTHER SPECIFIED POSTPROCEDURAL STATES: Chronic | ICD-10-CM

## 2022-01-25 DIAGNOSIS — M25.511 PAIN IN RIGHT SHOULDER: ICD-10-CM

## 2022-01-25 DIAGNOSIS — Z90.49 ACQUIRED ABSENCE OF OTHER SPECIFIED PARTS OF DIGESTIVE TRACT: Chronic | ICD-10-CM

## 2022-01-25 PROCEDURE — 76882 US LMTD JT/FCL EVL NVASC XTR: CPT

## 2022-01-25 PROCEDURE — 76881 US COMPL JOINT R-T W/IMG: CPT | Mod: 26,RT

## 2022-01-25 PROCEDURE — 76881 US COMPL JOINT R-T W/IMG: CPT

## 2022-01-28 ENCOUNTER — APPOINTMENT (OUTPATIENT)
Dept: NEUROSURGERY | Facility: CLINIC | Age: 62
End: 2022-01-28
Payer: MEDICAID

## 2022-01-28 ENCOUNTER — NON-APPOINTMENT (OUTPATIENT)
Age: 62
End: 2022-01-28

## 2022-01-28 VITALS
TEMPERATURE: 97.1 F | SYSTOLIC BLOOD PRESSURE: 116 MMHG | DIASTOLIC BLOOD PRESSURE: 75 MMHG | BODY MASS INDEX: 35.79 KG/M2 | HEIGHT: 63 IN | OXYGEN SATURATION: 95 % | WEIGHT: 202 LBS | HEART RATE: 74 BPM

## 2022-01-28 PROCEDURE — 99215 OFFICE O/P EST HI 40 MIN: CPT

## 2022-01-31 ENCOUNTER — APPOINTMENT (OUTPATIENT)
Dept: CV DIAGNOSTICS | Facility: HOSPITAL | Age: 62
End: 2022-01-31

## 2022-01-31 ENCOUNTER — OUTPATIENT (OUTPATIENT)
Dept: OUTPATIENT SERVICES | Facility: HOSPITAL | Age: 62
LOS: 1 days | End: 2022-01-31
Payer: MEDICAID

## 2022-01-31 DIAGNOSIS — Z98.89 OTHER SPECIFIED POSTPROCEDURAL STATES: Chronic | ICD-10-CM

## 2022-01-31 DIAGNOSIS — I25.10 ATHEROSCLEROTIC HEART DISEASE OF NATIVE CORONARY ARTERY WITHOUT ANGINA PECTORIS: ICD-10-CM

## 2022-01-31 DIAGNOSIS — Z90.49 ACQUIRED ABSENCE OF OTHER SPECIFIED PARTS OF DIGESTIVE TRACT: Chronic | ICD-10-CM

## 2022-01-31 PROCEDURE — A9500: CPT

## 2022-01-31 PROCEDURE — 93017 CV STRESS TEST TRACING ONLY: CPT

## 2022-01-31 PROCEDURE — 78452 HT MUSCLE IMAGE SPECT MULT: CPT | Mod: MH

## 2022-01-31 PROCEDURE — 93016 CV STRESS TEST SUPVJ ONLY: CPT

## 2022-01-31 PROCEDURE — 78452 HT MUSCLE IMAGE SPECT MULT: CPT | Mod: 26,MH

## 2022-01-31 PROCEDURE — 93018 CV STRESS TEST I&R ONLY: CPT

## 2022-02-08 ENCOUNTER — APPOINTMENT (OUTPATIENT)
Dept: ENDOCRINOLOGY | Facility: CLINIC | Age: 62
End: 2022-02-08
Payer: MEDICAID

## 2022-02-08 VITALS
OXYGEN SATURATION: 96 % | WEIGHT: 204 LBS | HEART RATE: 95 BPM | BODY MASS INDEX: 36.14 KG/M2 | SYSTOLIC BLOOD PRESSURE: 126 MMHG | HEIGHT: 63 IN | DIASTOLIC BLOOD PRESSURE: 87 MMHG

## 2022-02-08 LAB — GLUCOSE BLDC GLUCOMTR-MCNC: 116

## 2022-02-08 PROCEDURE — 82962 GLUCOSE BLOOD TEST: CPT

## 2022-02-08 PROCEDURE — 95251 CONT GLUC MNTR ANALYSIS I&R: CPT

## 2022-02-08 PROCEDURE — 99214 OFFICE O/P EST MOD 30 MIN: CPT | Mod: 25

## 2022-02-08 NOTE — PHYSICAL EXAM
[Alert] : alert [Well Nourished] : well nourished [No Acute Distress] : no acute distress [Well Developed] : well developed [Normal Sclera/Conjunctiva] : normal sclera/conjunctiva [EOMI] : extra ocular movement intact [No Proptosis] : no proptosis [No Thyroid Nodules] : no palpable thyroid nodules [No Respiratory Distress] : no respiratory distress [No Accessory Muscle Use] : no accessory muscle use [Clear to Auscultation] : lungs were clear to auscultation bilaterally [Normal S1, S2] : normal S1 and S2 [Normal Rate] : heart rate was normal [Regular Rhythm] : with a regular rhythm [No Edema] : no peripheral edema [Pedal Pulses Normal] : the pedal pulses are present [Normal Bowel Sounds] : normal bowel sounds [Not Distended] : not distended [Soft] : abdomen soft [Normal Anterior Cervical Nodes] : no anterior cervical lymphadenopathy [No Spinal Tenderness] : no spinal tenderness [Spine Straight] : spine straight [Normal Gait] : normal gait [Normal Strength/Tone] : muscle strength and tone were normal [No Rash] : no rash [Normal] : normal [Full ROM] : with full range of motion [Normal Reflexes] : deep tendon reflexes were 2+ and symmetric [No Tremors] : no tremors [Oriented x3] : oriented to person, place, and time [Right foot was examined, including] : right foot ~C was examined, including visual inspection with sensory and pulse exams [Left foot was examined, including] : left foot ~C was examined, including visual inspection with sensory and pulse exams [Acanthosis Nigricans] : no acanthosis nigricans [Diminished Throughout Both Feet] : normal tactile sensation with monofilament testing throughout both feet [de-identified] : thyroid enlarged approx 60 g; irregular texture [de-identified] :  large well healed scar

## 2022-02-08 NOTE — ASSESSMENT
[Diabetes Foot Care] : diabetes foot care [Long Term Vascular Complications] : long term vascular complications of diabetes [Carbohydrate Consistent Diet] : carbohydrate consistent diet [Importance of Diet and Exercise] : importance of diet and exercise to improve glycemic control, achieve weight loss and improve cardiovascular health [Exercise/Effect on Glucose] : exercise/effect on glucose [Hypoglycemia Management] : hypoglycemia management [Action and use of Insulin] : action and use of short and long-acting insulin [Self Monitoring of Blood Glucose] : self monitoring of blood glucose [Insulin Self-Administration] : insulin self-administration [Injection Technique, Storage, Sharps Disposal] : injection technique, storage, and sharps disposal [Retinopathy Screening] : Patient was referred to ophthalmology for retinopathy screening [Weight Loss] : weight loss [Diabetic Medications] : Risks and benefits of diabetic medications were discussed [FreeTextEntry1] : 1. Type 2 diabetes/obesity - Sensor tracing shows excellent control. Continue the same medications. Focus on lifestyle modifications.  We discussed at length the importance of following a carbohydrate balanced diet and the importance of incorporating protein in meals. We also discussed appropriate alternative food choices. We discussed ways she can incorporate exercise into her daily routine. We discussed the importance of weight loss in the management of her comorbidities. We discussed the risks of continued excess weight on long term health. Must see Optho! \par 2. Thyroid enlarged - Appears euthyroid. TFTs stable. Recent thyroid sono 9/2021 shows that two thyroid nodules remained the same and one is slightly bigger ~1mm. Will go for repeat due to new onset tenderness over thyroid.\par \par \par Will f/u in 3months\par

## 2022-02-08 NOTE — HISTORY OF PRESENT ILLNESS
[Continuous Glucose Monitoring] : Continuous Glucose Monitoring: Yes [Mega] : Mega [FreeTextEntry1] : 60 year old female presents for follow up of diabetes. She has been living with diabetes for over 5 years. She was started on insulin 2 years ago. Presently she is on Basaglar 30 u qhs AND Admelog SS TIDAC. She also takes Jardiance 10 mg daily and feels well on it. She is off of Metformin and Acarbose. She feels her GI sx have improved. She is now using the Mega to monitor her BS. Her recent A1C is 7.3, up from 6.9. She reports that she tries her best to follow a carb balanced diet. She does struggle as she has IBS and Gout and is limited in her food choices. She has been very diligent about her diet. She does not drink any juice or soda. She has made changes since she started using the Mega to her diet. She denies any diabetic neuropathy, retinopathy, or nephropathy.  Last Optho was over 1 year ago.  She does have IBS, diverticulitis and h/o colon resection with colectomy which was reversed. 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 According to the pt, Neurosx is ok with this and has f/u in 6 months. \par She also had an abnormal stress test and will be going for an angiogram. Started on Repatha recently by Cardiology. \par Also found to have a torn rotator cuff. Needs surgery.  [FreeTextEntry2] : 98 [FreeTextEntry3] : 2 [FreeTextEntry4] : 0 [de-identified] : 6.3 [FreeTextEntry5] : 123 [FreeTextEntry6] : 16.6

## 2022-02-09 ENCOUNTER — RX RENEWAL (OUTPATIENT)
Age: 62
End: 2022-02-09

## 2022-02-10 NOTE — ASSESSMENT
[FreeTextEntry1] : IMPRESSION:\par \par \par \par PLAN:\par 1. F/U in 9 months - clinical visit at which time it will be decided if imaging is needed.

## 2022-02-23 ENCOUNTER — OUTPATIENT (OUTPATIENT)
Dept: OUTPATIENT SERVICES | Facility: HOSPITAL | Age: 62
LOS: 1 days | End: 2022-02-23
Payer: MEDICAID

## 2022-02-23 VITALS
HEART RATE: 80 BPM | RESPIRATION RATE: 16 BRPM | OXYGEN SATURATION: 97 % | DIASTOLIC BLOOD PRESSURE: 65 MMHG | SYSTOLIC BLOOD PRESSURE: 130 MMHG

## 2022-02-23 VITALS
DIASTOLIC BLOOD PRESSURE: 56 MMHG | WEIGHT: 201.94 LBS | RESPIRATION RATE: 16 BRPM | SYSTOLIC BLOOD PRESSURE: 121 MMHG | OXYGEN SATURATION: 97 % | HEIGHT: 65 IN | TEMPERATURE: 98 F | HEART RATE: 83 BPM

## 2022-02-23 DIAGNOSIS — Z90.49 ACQUIRED ABSENCE OF OTHER SPECIFIED PARTS OF DIGESTIVE TRACT: Chronic | ICD-10-CM

## 2022-02-23 DIAGNOSIS — Z98.89 OTHER SPECIFIED POSTPROCEDURAL STATES: Chronic | ICD-10-CM

## 2022-02-23 DIAGNOSIS — R94.09 ABNORMAL RESULTS OF OTHER FUNCTION STUDIES OF CENTRAL NERVOUS SYSTEM: ICD-10-CM

## 2022-02-23 DIAGNOSIS — R94.01 ABNORMAL ELECTROENCEPHALOGRAM [EEG]: ICD-10-CM

## 2022-02-23 LAB
ALBUMIN SERPL ELPH-MCNC: 4.5 G/DL — SIGNIFICANT CHANGE UP (ref 3.3–5)
ALP SERPL-CCNC: 141 U/L — HIGH (ref 40–120)
ALT FLD-CCNC: 15 U/L — SIGNIFICANT CHANGE UP (ref 10–45)
ANION GAP SERPL CALC-SCNC: 15 MMOL/L — SIGNIFICANT CHANGE UP (ref 5–17)
AST SERPL-CCNC: 16 U/L — SIGNIFICANT CHANGE UP (ref 10–40)
BILIRUB SERPL-MCNC: 0.3 MG/DL — SIGNIFICANT CHANGE UP (ref 0.2–1.2)
BUN SERPL-MCNC: 14 MG/DL — SIGNIFICANT CHANGE UP (ref 7–23)
CALCIUM SERPL-MCNC: 10.3 MG/DL — SIGNIFICANT CHANGE UP (ref 8.4–10.5)
CHLORIDE SERPL-SCNC: 103 MMOL/L — SIGNIFICANT CHANGE UP (ref 96–108)
CO2 SERPL-SCNC: 21 MMOL/L — LOW (ref 22–31)
CREAT SERPL-MCNC: 0.57 MG/DL — SIGNIFICANT CHANGE UP (ref 0.5–1.3)
GLUCOSE BLDC GLUCOMTR-MCNC: 127 MG/DL — HIGH (ref 70–99)
GLUCOSE SERPL-MCNC: 139 MG/DL — HIGH (ref 70–99)
HCT VFR BLD CALC: 42.8 % — SIGNIFICANT CHANGE UP (ref 34.5–45)
HGB BLD-MCNC: 13.4 G/DL — SIGNIFICANT CHANGE UP (ref 11.5–15.5)
MCHC RBC-ENTMCNC: 25.6 PG — LOW (ref 27–34)
MCHC RBC-ENTMCNC: 31.3 GM/DL — LOW (ref 32–36)
MCV RBC AUTO: 81.8 FL — SIGNIFICANT CHANGE UP (ref 80–100)
NRBC # BLD: 0 /100 WBCS — SIGNIFICANT CHANGE UP (ref 0–0)
PLATELET # BLD AUTO: 274 K/UL — SIGNIFICANT CHANGE UP (ref 150–400)
POTASSIUM SERPL-MCNC: 3.9 MMOL/L — SIGNIFICANT CHANGE UP (ref 3.5–5.3)
POTASSIUM SERPL-SCNC: 3.9 MMOL/L — SIGNIFICANT CHANGE UP (ref 3.5–5.3)
PROT SERPL-MCNC: 7.5 G/DL — SIGNIFICANT CHANGE UP (ref 6–8.3)
RBC # BLD: 5.23 M/UL — HIGH (ref 3.8–5.2)
RBC # FLD: 15.4 % — HIGH (ref 10.3–14.5)
SODIUM SERPL-SCNC: 139 MMOL/L — SIGNIFICANT CHANGE UP (ref 135–145)
WBC # BLD: 8.17 K/UL — SIGNIFICANT CHANGE UP (ref 3.8–10.5)
WBC # FLD AUTO: 8.17 K/UL — SIGNIFICANT CHANGE UP (ref 3.8–10.5)

## 2022-02-23 PROCEDURE — 93571 IV DOP VEL&/PRESS C FLO 1ST: CPT | Mod: 26,LD

## 2022-02-23 PROCEDURE — 93571 IV DOP VEL&/PRESS C FLO 1ST: CPT | Mod: LD

## 2022-02-23 PROCEDURE — 93458 L HRT ARTERY/VENTRICLE ANGIO: CPT | Mod: 59

## 2022-02-23 PROCEDURE — 93010 ELECTROCARDIOGRAM REPORT: CPT

## 2022-02-23 PROCEDURE — 36415 COLL VENOUS BLD VENIPUNCTURE: CPT

## 2022-02-23 PROCEDURE — C1769: CPT

## 2022-02-23 PROCEDURE — 99152 MOD SED SAME PHYS/QHP 5/>YRS: CPT

## 2022-02-23 PROCEDURE — 93005 ELECTROCARDIOGRAM TRACING: CPT

## 2022-02-23 PROCEDURE — 82962 GLUCOSE BLOOD TEST: CPT

## 2022-02-23 PROCEDURE — 93458 L HRT ARTERY/VENTRICLE ANGIO: CPT | Mod: 26,59

## 2022-02-23 PROCEDURE — 99153 MOD SED SAME PHYS/QHP EA: CPT

## 2022-02-23 PROCEDURE — 80053 COMPREHEN METABOLIC PANEL: CPT

## 2022-02-23 PROCEDURE — C1894: CPT

## 2022-02-23 PROCEDURE — C1887: CPT

## 2022-02-23 PROCEDURE — 85027 COMPLETE CBC AUTOMATED: CPT

## 2022-02-23 RX ORDER — METOPROLOL TARTRATE 50 MG
1 TABLET ORAL
Qty: 0 | Refills: 0 | DISCHARGE

## 2022-02-23 RX ORDER — INSULIN GLARGINE 100 [IU]/ML
24 INJECTION, SOLUTION SUBCUTANEOUS
Qty: 0 | Refills: 0 | DISCHARGE

## 2022-02-23 RX ORDER — COLCHICINE 0.6 MG
1 TABLET ORAL
Qty: 0 | Refills: 0 | DISCHARGE

## 2022-02-23 RX ORDER — METFORMIN HYDROCHLORIDE 850 MG/1
1 TABLET ORAL
Qty: 0 | Refills: 0 | DISCHARGE

## 2022-02-23 NOTE — ASU DISCHARGE PLAN (ADULT/PEDIATRIC) - CARE PROVIDER_API CALL
Renetta Saldana  CARDIOLOGY  [address], [address]  [place] [address]  Phone: [phone]  Fax: [phone]  Established Patient  Follow Up Time: 2 weeks

## 2022-02-23 NOTE — H&P CARDIOLOGY - NSICDXPASTSURGICALHX_GEN_ALL_CORE_FT
PAST SURGICAL HISTORY:  H/O knee surgery bilateral patella realignment    S/P bilateral breast reduction age 22    S/P cholecystectomy 1993    S/P colon resection surgery March 2013 with temporary colostomy til 9/13

## 2022-02-23 NOTE — ASU PATIENT PROFILE, ADULT - BLOOD TRANSFUSION, PREVIOUS, PROFILE
Patient called stating that her blood sugars starting running high. It got up to 400s. She adjusted her pump to set the target to 100. Now her blood sugars are running from the 140s-170s. She has IBS and her stomach has been \"swelling\" and feels gassy. She ate Doritos and dip for lunch with a diet soda. She wants to know if she did the right thing by changing her target to 100 or if she needs to do something else. no

## 2022-02-23 NOTE — H&P CARDIOLOGY - HISTORY OF PRESENT ILLNESS
This is a 62 yo  female with PMH of HTN. HLD ( on Rapatha),  CAD s/p 2 LAD stents ( 02/2019), DM type 2 ( well managed as per pt, endo Dr Mendosa, last A1C unknown; DM complicated by peripheral neuropathy), diverticulitis s/p Hartmans and reversal in 2013 at [facility], MYLES not on CPAP,  Asthma ( controlled, as per pt), Cdiff, hx pf acute abd on 11/2019, fibromyalgia.  Seen and eval by Dr BOSTON Mccloud for c/c of recurrent, intermittent "chest discomfort"  and fatigue; pt states to feel "exhausted" with minimal exertion and occasional palpitations; dyspnea after ambulating 2 blocks.  Had abnormal NST ( see report below).  Holter showed PCCs.  Presents here today for [facility] for further CAD evaluation.     st< from: Nuclear Stress Test-Pharmacologic (Nuclear Stress Test-Pharmacologic .) (01.31.22 @ 11:42) >  ------------------------------------------------------------------------  GATED ANALYSIS:  Post-stress gated wall motion analysis was performed (LVEF  = 67 %;LVEDV = 58 ml.) revealing hypokinesis of the basal  inferior and basal inferoseptal walls.  ------------------------------------------------------------------------  IMPRESSIONS:Abnormal Study  * Chest Pain: No chest pain with administration of  Regadenoson.  * Symptom: Shortness of breath, nausea.  * HR Response: Appropriate.  * BP Response: Appropriate.  * Heart Rhythm: Sinus Rhythm - 83 BPM.  * Baseline ECG: Nonspecific ST-T wave abnormality.  Poor R  wave progression.  * ECG Changes: ST Depression: Less than 0.5 mm horizontal  in leads V4-V6 started at 1:00 min following regadenoson  infusion at HR of 126 bpm and persisted 4:00 min into  recovery.  These changes did not meet ischemic criteria.  * Arrhythmia: None.  * The left ventricle was normal in size.  * There are medium-sized, mild defects in the  anterolateral wall that are reversible suggestive of mild  ischemia.  * There are medium-sized, mild to moderate defects in the  basal inferior and basal inferoseptal walls that are  mostly fixed suggestive of infarct with mild kushal-infarct  ischemia.  * Post-stress gated wall motion analysis was performed  (LVEF = 67 %;LVEDV = 58 ml.) revealing hypokinesis of the  basal inferior and basal inferoseptal walls.  *** Compared with the Nuclear/Stress perfusion images of  2/26/2021, more ischemia is noted on the current exam.    < end of copied text >    c

## 2022-02-23 NOTE — ASU PATIENT PROFILE, ADULT - FALL HARM RISK - UNIVERSAL INTERVENTIONS
Bed in lowest position, wheels locked, appropriate side rails in place/Call bell, personal items and telephone in reach/Instruct patient to call for assistance before getting out of bed or chair/Non-slip footwear when patient is out of bed/[place] to call system/Physically safe environment - no spills, clutter or unnecessary equipment/Purposeful Proactive Rounding/Room/bathroom lighting operational, light cord in reach

## 2022-02-23 NOTE — ASU DISCHARGE PLAN (ADULT/PEDIATRIC) - ASU DC SPECIAL INSTRUCTIONSFT
s/p diagnostic left heart catheterization via right radial artery     Wound Care:   the day AFTER your procedure remove bandage GENTLY, and clean using  mild soap and gentle warm, water stream, pat dry. leave OPEN to air. YOU MAY SHOWER   DO NOT apply lotions, creams, ointments, powder, perfumes to your incision site  DO NOT SOAK your site for 1 week ( no baths, no pools, no tubs, etc...)  Check  your groin and /or wrist daily. A small amount of bruising, and soreness are normal    ACTIVITY: for 24 hours   - DO NOT DRIVE  - DO NOT make any important decisions or sign legal documents   - DO NOT operate heavy machineries   - you may resume sexual activity in 48 hours, unless otherwise instructed by your cardiologist     If your procedure was done through the WRIST: for the NEXT 3DAYS:  - avoid pushing, pulling, with that affected wrist   - avoid repeated movement of that hand and wrist ( eg: typing, hammering)  - DO NOT LIFT anything more than 5 lbs     If your procedure was done through the GROIN: for the NEXT 5 DAYS  - Limit climbing stairs, DO NOT soak in bathtub or pool  - no strenuous activities, pushing, pulling, straining  - Do not lift anything 10lbs or heavier     MEDICATION:   take your medications as explained ( see discharge paperwork)   If you received a STENT, you will be taking antiplatelet medications to KEEP YOUR STENT OPEN ( eg: Aspirin, Plavix, Brilinta, Effient, etc).  Take as prescribed DO NOT STOP taking them without consulting with your cardiologist first.     Follow heart healthy diet recommended by your doctor, if you smoke STOP SMOKING ( may call [phone] for center of tobacco control if you need assistance)     CALL your doctor to make appointment in 2 WEEKS     ***CALL YOUR DOCTOR***  if you experience: fever, chills, body aches, or severe pain, swelling, redness, heat or yellow discharge at incision site  If you experience bleeding or excruciating pain at the procedural site, swelling ( golf ball size) at your procedural site  If you experience CHEST PAIN  If you experience extremity numbness, tingling, temperature change ( of your procedural site)   If you are unable to reach your doctor, you may contact:   -Cardiology Office at [facility] at [phone] or   - [facility] [phone]

## 2022-03-01 ENCOUNTER — OUTPATIENT (OUTPATIENT)
Dept: OUTPATIENT SERVICES | Facility: HOSPITAL | Age: 62
LOS: 1 days | End: 2022-03-01
Payer: MEDICAID

## 2022-03-01 ENCOUNTER — APPOINTMENT (OUTPATIENT)
Dept: NEUROLOGY | Facility: HOSPITAL | Age: 62
End: 2022-03-01

## 2022-03-01 VITALS
HEIGHT: 63 IN | SYSTOLIC BLOOD PRESSURE: 107 MMHG | HEART RATE: 86 BPM | TEMPERATURE: 96.4 F | WEIGHT: 204 LBS | DIASTOLIC BLOOD PRESSURE: 72 MMHG | BODY MASS INDEX: 36.14 KG/M2

## 2022-03-01 DIAGNOSIS — R51.9 HEADACHE, UNSPECIFIED: ICD-10-CM

## 2022-03-01 DIAGNOSIS — Z98.89 OTHER SPECIFIED POSTPROCEDURAL STATES: Chronic | ICD-10-CM

## 2022-03-01 DIAGNOSIS — M54.10 RADICULOPATHY, SITE UNSPECIFIED: ICD-10-CM

## 2022-03-01 DIAGNOSIS — Z90.49 ACQUIRED ABSENCE OF OTHER SPECIFIED PARTS OF DIGESTIVE TRACT: Chronic | ICD-10-CM

## 2022-03-01 DIAGNOSIS — R56.9 UNSPECIFIED CONVULSIONS: ICD-10-CM

## 2022-03-01 PROCEDURE — G0463: CPT

## 2022-03-01 RX ORDER — MULTIVIT-MIN/IRON/FOLIC ACID/K 18-600-40
400 CAPSULE ORAL
Refills: 0 | Status: COMPLETED | COMMUNITY
End: 2022-03-01

## 2022-03-01 RX ORDER — HYDROXYZINE HYDROCHLORIDE 10 MG/1
10 TABLET ORAL
Qty: 30 | Refills: 0 | Status: DISCONTINUED | COMMUNITY
Start: 2020-09-28 | End: 2022-03-01

## 2022-03-01 RX ORDER — DULOXETINE HYDROCHLORIDE 30 MG/1
30 CAPSULE, DELAYED RELEASE PELLETS ORAL DAILY
Qty: 60 | Refills: 5 | Status: DISCONTINUED | COMMUNITY
Start: 2021-08-13 | End: 2022-03-01

## 2022-03-01 RX ORDER — GABAPENTIN 300 MG/1
300 CAPSULE ORAL
Qty: 60 | Refills: 3 | Status: DISCONTINUED | COMMUNITY
Start: 2021-10-08 | End: 2022-03-01

## 2022-03-01 NOTE — END OF VISIT
[FreeTextEntry3] : 62 y/o woman hx of trigeminal neuralgia on Trileptal and f/w Dr. Nava, right rotator cuff tear, fibromyalgia p/w neck pain, occasional face pain. She is no longer taking gabapentin, cymbalta, or meloxicam. She does not have any cervical spine imaging. Will get MR c-spine, increase carbamazepine to 400mg BID and referr for PT.

## 2022-03-01 NOTE — REVIEW OF SYSTEMS
[Recent Weight Gain (___ Lbs)] : recent [unfilled] ~Ulb weight gain [Arm Weakness] : arm weakness [Leg Weakness] : leg weakness [Numbness] : numbness [SOB on Exertion] : shortness of breath during exertion [Arthralgias] : arthralgias [Limb Pain] : limb pain [Fever] : no fever [Joint Swelling] : no joint swelling

## 2022-03-01 NOTE — HISTORY OF PRESENT ILLNESS
[FreeTextEntry1] : 60F R handed with a PMHx of R rotator cuff tear, DM, HTN, Asthma, CAD s/p stents, diverticulitis, Gout, OA, Fibromyalgia, trigeminal neuralgia, tremors presents to establish care. Pt saw seeing neurologist Dr. Leroy Mota but had insurance issues. \par \par Pt has had >20 years of neck pain, was a former seamstress. Progressive, worsening b/l hand weakness since 2018, which initially brought her to see a neurologist. She has reported C5-7 nerve impingements. Last MRI C spine done in 2020. She also reports R index finger numbness, no paresthesias. \par \par Pt diagnosed with L sided trigeminal neuralgia in 2019, MRI done. She is on Trileptal lowest dose twice a day, no significant difference because she has only had mild symptoms but has slightly decreased frequency of headaches, occasional ear and jaw pain and tearing. She saw neurosurgery Dr. Hammond for trigeminal neuralgia, who offered surgery but pt refused. \par \par Pt reports b/l hand tremors since 2019, intermittent but alternating hands, when eating and sewing, not related to her trigeminal neuralgia pain, lasting seconds at a time. No worsening with anxiety, no alcohol intake, no family hx of tremors. \par \par Pt reports diffuse joint pains due to her fibromyalgia, was on duloxetine 60mg daily but discontinued due to nausea. Now just taking tylenol PRN for it. She has not had physical therapy or occupational therapy recently. \par \par EMG 8/2021 with Dr. Leroy Mota\par 1. b/l C4-5 and C5-6 radiculopathy\par 2. b/l median motor neuropathy\par 3. b/l ulnar motor neuropathy\par 4. b/l median sensory neuropathy

## 2022-03-01 NOTE — ASSESSMENT
[FreeTextEntry1] : 60F R handed with a PMHx of R rotator cuff tear, DM, HTN, Asthma, CAD s/p stents, diverticulitis, Gout, OA, Fibromyalgia, trigeminal neuralgia, tremors presents to establish care. Neuro exam notable for hyporeflexia, diffuse weakness although motor exam limited by joint pain, LLE weakness proximal >distal, b/l finger abduction weakness. \par \par Impression: Worsening b/l UE weakness with neck pain likely 2/2 known b/l cervical radiculopathy seen on EMG/NCS. L trigeminal neuralgia. B/l alternating hand tremors likely enhanced physiologic tremor\par \par Plan:\par [] MRI C spine w/ and w/o contrast\par [] PT/OT\par [] rheumatology and nsgy follow up\par [] RTC in 3 months

## 2022-03-01 NOTE — PHYSICAL EXAM
[General Appearance - Alert] : alert [General Appearance - In No Acute Distress] : in no acute distress [Oriented To Time, Place, And Person] : oriented to person, place, and time [Impaired Insight] : insight and judgment were intact [Affect] : the affect was normal [Mood] : the mood was normal [Memory Recent] : recent memory was not impaired [Short Term Intact] : short term memory intact [Span Intact] : the attention span was normal [Fluency] : fluency intact [Comprehension] : comprehension intact [Cranial Nerves Optic (II)] : visual acuity intact bilaterally,  visual fields full to confrontation, pupils equal round and reactive to light [Cranial Nerves Oculomotor (III)] : extraocular motion intact [Cranial Nerves Trigeminal (V)] : facial sensation intact symmetrically [Cranial Nerves Facial (VII)] : face symmetrical [Cranial Nerves Vestibulocochlear (VIII)] : hearing was intact bilaterally [Cranial Nerves Accessory (XI - Cranial And Spinal)] : head turning and shoulder shrug symmetric [Cranial Nerves Hypoglossal (XII)] : there was no tongue deviation with protrusion [Motor Tone] : muscle tone was normal in all four extremities [Motor Handedness Right-Handed] : the patient is right hand dominant [Hand Weakness Right] : the hand  was weak [Hand Weakness Left] : the hand  was weak [-4] : fingers abduction -4/5 left [4] : knee flexion 4/5 [+4] : knee extension +4/5 [5] : ankle plantar flexion 5/5 [Sensation Vibration Decrease] : vibration was intact [Abnormal Walk] : normal gait [1+] : Ankle jerk left 1+ [Extraocular Movements] : extraocular movements were intact [] : no respiratory distress [Skin Lesions] : no skin lesions [Coordination - Dysmetria Impaired Finger-to-Nose Bilateral] : not present [FreeTextEntry6] : Strength exam limited by RUE rotator cuff tear and diffuse joint pain [FreeTextEntry7] : Decreased to LT on LUE 5th digit [FreeTextEntry1] : No midline spinal tenderness

## 2022-03-08 ENCOUNTER — OUTPATIENT (OUTPATIENT)
Dept: OUTPATIENT SERVICES | Facility: HOSPITAL | Age: 62
LOS: 1 days | End: 2022-03-08
Payer: MEDICAID

## 2022-03-08 ENCOUNTER — APPOINTMENT (OUTPATIENT)
Dept: GASTROENTEROLOGY | Facility: HOSPITAL | Age: 62
End: 2022-03-08
Payer: MEDICAID

## 2022-03-08 VITALS
DIASTOLIC BLOOD PRESSURE: 75 MMHG | TEMPERATURE: 96.4 F | SYSTOLIC BLOOD PRESSURE: 118 MMHG | HEART RATE: 79 BPM | BODY MASS INDEX: 36.14 KG/M2 | HEIGHT: 63 IN | WEIGHT: 204 LBS

## 2022-03-08 DIAGNOSIS — Z98.89 OTHER SPECIFIED POSTPROCEDURAL STATES: Chronic | ICD-10-CM

## 2022-03-08 DIAGNOSIS — K57.20 DIVERTICULITIS OF LARGE INTESTINE WITH PERFORATION AND ABSCESS W/OUT BLEEDING: ICD-10-CM

## 2022-03-08 DIAGNOSIS — Z90.49 ACQUIRED ABSENCE OF OTHER SPECIFIED PARTS OF DIGESTIVE TRACT: Chronic | ICD-10-CM

## 2022-03-08 DIAGNOSIS — K92.1 MELENA: ICD-10-CM

## 2022-03-08 DIAGNOSIS — R10.9 UNSPECIFIED ABDOMINAL PAIN: ICD-10-CM

## 2022-03-08 PROCEDURE — 99204 OFFICE O/P NEW MOD 45 MIN: CPT | Mod: GC

## 2022-03-08 PROCEDURE — G0463: CPT

## 2022-03-08 NOTE — PHYSICAL EXAM
[General Appearance - Alert] : alert [General Appearance - In No Acute Distress] : in no acute distress [General Appearance - Well Nourished] : well nourished [General Appearance - Well Developed] : well developed [General Appearance - Well-Appearing] : healthy appearing [Sclera] : the sclera and conjunctiva were normal [Extraocular Movements] : extraocular movements were intact [Outer Ear] : the ears and nose were normal in appearance [Hearing Threshold Finger Rub Not [place]] : hearing was normal [Neck Appearance] : the appearance of the neck was normal [Neck Cervical Mass (___cm)] : no neck mass was observed [Exaggerated Use Of Accessory Muscles For Inspiration] : no accessory muscle use [Heart Rate And Rhythm] : heart rate was normal and rhythm regular [Abdomen Soft] : soft [Abdomen Tenderness] : non-tender [Abnormal Walk] : normal gait [Nail Clubbing] : no clubbing  or cyanosis of the fingernails [Skin Color & Pigmentation] : normal skin color and pigmentation [] : no rash [Oriented To Time, Place, And Person] : oriented to person, place, and time

## 2022-03-09 DIAGNOSIS — K21.9 GASTRO-ESOPHAGEAL REFLUX DISEASE WITHOUT ESOPHAGITIS: ICD-10-CM

## 2022-03-09 DIAGNOSIS — K92.1 MELENA: ICD-10-CM

## 2022-03-09 DIAGNOSIS — K57.30 DIVERTICULOSIS OF LARGE INTESTINE WITHOUT PERFORATION OR ABSCESS WITHOUT BLEEDING: ICD-10-CM

## 2022-03-09 DIAGNOSIS — E66.9 OBESITY, UNSPECIFIED: ICD-10-CM

## 2022-03-09 NOTE — END OF VISIT
[] : Fellow [FreeTextEntry3] : As modified and discussed with patient\par MD ANKIT Aranda FACPied[facility]\par Associate Professor of Medicine\par [facility]\par

## 2022-03-09 NOTE — REASON FOR VISIT
[Initial Evaluation] : an initial evaluation [FreeTextEntry1] : screening colonoscopy, recurrent hematochezia

## 2022-03-09 NOTE — HISTORY OF PRESENT ILLNESS
[Heartburn] : denies heartburn [Nausea] : denies nausea [Vomiting] : denies vomiting [Diarrhea] : denies diarrhea [Constipation] : improvement in constipation [Yellow Skin Or Eyes (Jaundice)] : denies jaundice [Abdominal Pain] : denies abdominal pain [Abdominal Swelling] : denies abdominal swelling [Rectal Pain] : denies rectal pain [de-identified] : 61F PMH of HTN. HLD (on Rapatha), CAD s/p 2 LAD stents (02/2019), DM type 2 (well managed as per pt, endo Dr Mendosa, last A1C unknown; DM complicated by peripheral neuropathy), diverticulitis s/p Hartmans and reversal in 2013 at [facility], MYLES not on CPAP,  Asthma (controlled, as per pt), Cdiff (>10 episodes), fibromyalgia presenting to [facility] care + to obtain screening colonoscopy. \par \par Having episodes of hematochezia intermittently x 1 year (sometimes scant, sometimes more than scant amounts) -- relates this to hemorrhoids, however has not had colonoscopy since 2014. Reports she's had difficulty obtaining a colonoscopy 2/2 multiple episodes of c diff infections (reports 10+ in her lifetime). Also has heartburn/reflux 1-2x per week late at night, doesn’t take PPI (says it makes her "feel sick") \par \par Currently denies abd pain, n/v/d/f/c, melena, weight loss. Improved constipation since taking senna. Recently had a cath 2/23/22 showing CAD with patent stents. Continues on ASA/plavix. \par \par Denies family Hx of CRC, gastric, ovarian, endometrial, pancreatic, uterine malignancy.

## 2022-03-09 NOTE — ASSESSMENT
[FreeTextEntry1] : 61F PMH of HTN. HLD (on Rapatha), CAD s/p 2 LAD stents (02/2019), DM type 2 (well managed as per pt, endo Dr Mendosa, last A1C unknown; DM complicated by peripheral neuropathy), diverticulitis s/p Hartmans and reversal in 2013 at [facility], MYLES not on CPAP,  Asthma (controlled, as per pt), Cdiff (>10 episodes), fibromyalgia presenting to [facility] care + to obtain screening colonoscopy. \par \par Impression: \par #Hematochezia: x 1 year intermittent episodes of hematochezia. Hb stable in 14s. Ddx possibly hemorrhoids, diverticulosis, bleeding polyps, malignancy, angioectasias. \par #CRC screening: last done 2014\par #GERD: 1-2x weekly epigastric pain/reflux, not on PPI. Denies dysphagia, odynophagia, weight loss, no anemia on labs\par #Diverticulosis\par #Obesity BMI 36\par \par Recommendations: \par - Plan for EGD/colon in setting of GERD + hematochezia \par - Will need cardiology clearance (Dr. Blair) prior to procedure\par - If able to do so, will need to be off Plavix x 5 days prior to colonoscopy (needs screening for CRC as well)\par - Prep: Miralax + Dulcolax \par - Liquid diet day prior to procedure\par - NPO MN prior to procedure\par - Famotidine PRN for reflux symptoms \par -Weight loss discussed\par \par \par Jose Lott MD\par GI Fellow PGY5

## 2022-03-31 ENCOUNTER — RESULT REVIEW (OUTPATIENT)
Age: 62
End: 2022-03-31

## 2022-04-08 ENCOUNTER — OUTPATIENT (OUTPATIENT)
Dept: OUTPATIENT SERVICES | Facility: HOSPITAL | Age: 62
LOS: 1 days | End: 2022-04-08
Payer: MEDICAID

## 2022-04-08 ENCOUNTER — APPOINTMENT (OUTPATIENT)
Dept: RHEUMATOLOGY | Facility: HOSPITAL | Age: 62
End: 2022-04-08
Payer: MEDICAID

## 2022-04-08 VITALS
HEIGHT: 63 IN | WEIGHT: 203 LBS | BODY MASS INDEX: 35.97 KG/M2 | SYSTOLIC BLOOD PRESSURE: 122 MMHG | HEART RATE: 88 BPM | TEMPERATURE: 97.8 F | DIASTOLIC BLOOD PRESSURE: 76 MMHG | RESPIRATION RATE: 14 BRPM

## 2022-04-08 DIAGNOSIS — Z98.89 OTHER SPECIFIED POSTPROCEDURAL STATES: Chronic | ICD-10-CM

## 2022-04-08 DIAGNOSIS — Z90.49 ACQUIRED ABSENCE OF OTHER SPECIFIED PARTS OF DIGESTIVE TRACT: Chronic | ICD-10-CM

## 2022-04-08 DIAGNOSIS — M06.9 RHEUMATOID ARTHRITIS, UNSPECIFIED: ICD-10-CM

## 2022-04-08 LAB
ALBUMIN SERPL ELPH-MCNC: 4.7 G/DL — SIGNIFICANT CHANGE UP (ref 3.3–5)
ALP SERPL-CCNC: 149 U/L — HIGH (ref 40–120)
ALT FLD-CCNC: 12 U/L — SIGNIFICANT CHANGE UP (ref 10–45)
ANION GAP SERPL CALC-SCNC: 12 MMOL/L — SIGNIFICANT CHANGE UP (ref 5–17)
AST SERPL-CCNC: 12 U/L — SIGNIFICANT CHANGE UP (ref 10–40)
BASOPHILS # BLD AUTO: 0.08 K/UL — SIGNIFICANT CHANGE UP (ref 0–0.2)
BASOPHILS NFR BLD AUTO: 1 % — SIGNIFICANT CHANGE UP (ref 0–2)
BILIRUB SERPL-MCNC: 0.4 MG/DL — SIGNIFICANT CHANGE UP (ref 0.2–1.2)
BUN SERPL-MCNC: 11 MG/DL — SIGNIFICANT CHANGE UP (ref 7–23)
CALCIUM SERPL-MCNC: 9.9 MG/DL — SIGNIFICANT CHANGE UP (ref 8.4–10.5)
CHLORIDE SERPL-SCNC: 103 MMOL/L — SIGNIFICANT CHANGE UP (ref 96–108)
CO2 SERPL-SCNC: 24 MMOL/L — SIGNIFICANT CHANGE UP (ref 22–31)
CREAT SERPL-MCNC: 0.61 MG/DL — SIGNIFICANT CHANGE UP (ref 0.5–1.3)
CRP SERPL-MCNC: 20 MG/L — HIGH
EGFR: 102 ML/MIN/1.73M2 — SIGNIFICANT CHANGE UP
EOSINOPHIL # BLD AUTO: 0.32 K/UL — SIGNIFICANT CHANGE UP (ref 0–0.5)
EOSINOPHIL NFR BLD AUTO: 4.1 % — SIGNIFICANT CHANGE UP (ref 0–6)
ERYTHROCYTE [SEDIMENTATION RATE] IN BLOOD: 34 MM/HR — HIGH (ref 0–20)
GLUCOSE SERPL-MCNC: 151 MG/DL — HIGH (ref 70–99)
HCT VFR BLD CALC: 43.6 % — SIGNIFICANT CHANGE UP (ref 34.5–45)
HGB BLD-MCNC: 13.4 G/DL — SIGNIFICANT CHANGE UP (ref 11.5–15.5)
IMM GRANULOCYTES NFR BLD AUTO: 0.5 % — SIGNIFICANT CHANGE UP (ref 0–1.5)
LYMPHOCYTES # BLD AUTO: 2.75 K/UL — SIGNIFICANT CHANGE UP (ref 1–3.3)
LYMPHOCYTES # BLD AUTO: 35.2 % — SIGNIFICANT CHANGE UP (ref 13–44)
MCHC RBC-ENTMCNC: 25.8 PG — LOW (ref 27–34)
MCHC RBC-ENTMCNC: 30.7 GM/DL — LOW (ref 32–36)
MCV RBC AUTO: 84 FL — SIGNIFICANT CHANGE UP (ref 80–100)
MONOCYTES # BLD AUTO: 0.7 K/UL — SIGNIFICANT CHANGE UP (ref 0–0.9)
MONOCYTES NFR BLD AUTO: 9 % — SIGNIFICANT CHANGE UP (ref 2–14)
NEUTROPHILS # BLD AUTO: 3.92 K/UL — SIGNIFICANT CHANGE UP (ref 1.8–7.4)
NEUTROPHILS NFR BLD AUTO: 50.2 % — SIGNIFICANT CHANGE UP (ref 43–77)
PLATELET # BLD AUTO: 308 K/UL — SIGNIFICANT CHANGE UP (ref 150–400)
POTASSIUM SERPL-MCNC: 4.3 MMOL/L — SIGNIFICANT CHANGE UP (ref 3.5–5.3)
POTASSIUM SERPL-SCNC: 4.3 MMOL/L — SIGNIFICANT CHANGE UP (ref 3.5–5.3)
PROT SERPL-MCNC: 7 G/DL — SIGNIFICANT CHANGE UP (ref 6–8.3)
RBC # BLD: 5.19 M/UL — SIGNIFICANT CHANGE UP (ref 3.8–5.2)
RBC # FLD: 15.8 % — HIGH (ref 10.3–14.5)
SODIUM SERPL-SCNC: 140 MMOL/L — SIGNIFICANT CHANGE UP (ref 135–145)
WBC # BLD: 7.81 K/UL — SIGNIFICANT CHANGE UP (ref 3.8–10.5)
WBC # FLD AUTO: 7.81 K/UL — SIGNIFICANT CHANGE UP (ref 3.8–10.5)

## 2022-04-08 PROCEDURE — G0463: CPT

## 2022-04-08 PROCEDURE — 85652 RBC SED RATE AUTOMATED: CPT

## 2022-04-08 PROCEDURE — 99214 OFFICE O/P EST MOD 30 MIN: CPT | Mod: GC

## 2022-04-08 PROCEDURE — 80053 COMPREHEN METABOLIC PANEL: CPT

## 2022-04-08 PROCEDURE — 85025 COMPLETE CBC W/AUTO DIFF WBC: CPT

## 2022-04-08 PROCEDURE — 86140 C-REACTIVE PROTEIN: CPT

## 2022-04-11 DIAGNOSIS — M19.90 UNSPECIFIED OSTEOARTHRITIS, UNSPECIFIED SITE: ICD-10-CM

## 2022-04-11 DIAGNOSIS — M25.511 PAIN IN RIGHT SHOULDER: ICD-10-CM

## 2022-04-11 DIAGNOSIS — M79.7 FIBROMYALGIA: ICD-10-CM

## 2022-04-11 PROBLEM — K57.20 DIVERTICULITIS OF COLON WITH PERFORATION: Status: RESOLVED | Noted: 2019-12-03 | Resolved: 2022-03-09

## 2022-04-12 NOTE — HISTORY OF PRESENT ILLNESS
[___ Week(s) Ago] : [unfilled] week(s) ago [FreeTextEntry1] : Interval Hx 4-8-22:\par Presents today for a f/u visit.\par Body pains are worse. \par Most pain right shoulder, low back and hands. \par No acute episodes of joint swelling. \par No morning stiffness of joints. \par No episodes of acute swollen joints.\par Mild left knee pain also noted. \par No fever, chills, n/v/d, abdominal pain, cough, CP or SOB.\par

## 2022-04-12 NOTE — PHYSICAL EXAM
[General Appearance - Alert] : alert [General Appearance - In No Acute Distress] : in no acute distress [Sclera] : the sclera and conjunctiva were normal [PERRL With Normal Accommodation] : pupils were equal in size, round, and reactive to light [Extraocular Movements] : extraocular movements were intact [Outer Ear] : the ears and nose were normal in appearance [Oropharynx] : the oropharynx was normal [Auscultation Breath Sounds / Voice Sounds] : lungs were clear to auscultation bilaterally [Heart Rate And Rhythm] : heart rate was normal and rhythm regular [Heart Sounds] : normal S1 and S2 [Heart Sounds Gallop] : no gallops [Murmurs] : no murmurs [Heart Sounds Pericardial Friction Rub] : no pericardial rub [Bowel Sounds] : normal bowel sounds [Abdomen Soft] : soft [Abdomen Tenderness] : non-tender [Abdomen Mass (___ Cm)] : no abdominal mass palpated [Skin Color & Pigmentation] : normal skin color and pigmentation [Skin Turgor] : normal skin turgor [] : no rash [FreeTextEntry1] :  No signs of synovitis, limitation of ROM or deformity in any joint. Tenderness in multiple non articular regions of her body.

## 2022-04-12 NOTE — ASSESSMENT
[FreeTextEntry1] : 60 Y F with a PMHx of DM, HTN, Asthma, CAD, diverticulitis, Gout, OA, Fibromyalgia was initially evaluated for elevated Complement, dsDNA levels, body and joint pains in 11/2020.\par \par Impression: \par # FM\par Still uncontrolled symptoms.\par Not doing PT, stopped Cymbalta as it made her feel 'sick'. \par \par # Right shoulder pain.\par US showed rotator cuff tear.\par Not doing PT. \par \par # B/ UE neuropathy symptoms\par Got EMG that showed C4, C5, C6 nerve root abnormalities in addition to median nerve, ulnar nerve abnormalities. \par Saw Neurology who want to do MRI C spine. \par \par # Gout\par No new flares, UA at goal to 5.0\par \par # Left knee pain\par Hx and exam and Xray consistent with OA. \par \par Plan: \par Labs\par Switch therapy to Gabapentin 300 mg nightly. \par PT for knee pain and FM\par Can increase Tylenol 650 mg daily to BID. \par Right shoulder MRI, she will also see Orthopedic surgery for rotator cuff tear. \par Continue Allopurinol 200 mg daily. \par FU MRI c spine for UE neuropathic symptoms. \par Weight loss, sleep hygiene. \par \par Return in 3 months\par \par DW with Dr. Reddy. \par \par Jazmine Moon MD\par Rheumatology Fellow\par \par

## 2022-04-13 ENCOUNTER — OUTPATIENT (OUTPATIENT)
Dept: OUTPATIENT SERVICES | Facility: HOSPITAL | Age: 62
LOS: 1 days | End: 2022-04-13
Payer: MEDICAID

## 2022-04-13 VITALS
SYSTOLIC BLOOD PRESSURE: 114 MMHG | HEIGHT: 63 IN | TEMPERATURE: 97 F | HEART RATE: 78 BPM | WEIGHT: 205.03 LBS | OXYGEN SATURATION: 97 % | DIASTOLIC BLOOD PRESSURE: 77 MMHG | RESPIRATION RATE: 16 BRPM

## 2022-04-13 DIAGNOSIS — Z98.89 OTHER SPECIFIED POSTPROCEDURAL STATES: Chronic | ICD-10-CM

## 2022-04-13 DIAGNOSIS — Z90.49 ACQUIRED ABSENCE OF OTHER SPECIFIED PARTS OF DIGESTIVE TRACT: Chronic | ICD-10-CM

## 2022-04-13 DIAGNOSIS — Z01.818 ENCOUNTER FOR OTHER PREPROCEDURAL EXAMINATION: ICD-10-CM

## 2022-04-13 DIAGNOSIS — Z90.710 ACQUIRED ABSENCE OF BOTH CERVIX AND UTERUS: Chronic | ICD-10-CM

## 2022-04-13 DIAGNOSIS — Z87.19 PERSONAL HISTORY OF OTHER DISEASES OF THE DIGESTIVE SYSTEM: ICD-10-CM

## 2022-04-13 DIAGNOSIS — Z95.5 PRESENCE OF CORONARY ANGIOPLASTY IMPLANT AND GRAFT: ICD-10-CM

## 2022-04-13 DIAGNOSIS — Z98.890 OTHER SPECIFIED POSTPROCEDURAL STATES: Chronic | ICD-10-CM

## 2022-04-13 DIAGNOSIS — E11.9 TYPE 2 DIABETES MELLITUS WITHOUT COMPLICATIONS: ICD-10-CM

## 2022-04-13 DIAGNOSIS — K92.1 MELENA: ICD-10-CM

## 2022-04-13 PROCEDURE — G0463: CPT

## 2022-04-13 RX ORDER — ALBUTEROL 90 UG/1
2 AEROSOL, METERED ORAL
Qty: 0 | Refills: 0 | DISCHARGE

## 2022-04-13 RX ORDER — ZOLPIDEM TARTRATE 10 MG/1
1 TABLET ORAL
Qty: 0 | Refills: 0 | DISCHARGE

## 2022-04-13 RX ORDER — ALLOPURINOL 300 MG
1 TABLET ORAL
Qty: 0 | Refills: 0 | DISCHARGE

## 2022-04-13 RX ORDER — PREGABALIN 225 MG/1
1 CAPSULE ORAL
Qty: 0 | Refills: 0 | DISCHARGE

## 2022-04-13 RX ORDER — GABAPENTIN 400 MG/1
1 CAPSULE ORAL
Qty: 0 | Refills: 0 | DISCHARGE

## 2022-04-13 RX ORDER — CHOLESTYRAMINE 4 G/9G
0 POWDER, FOR SUSPENSION ORAL
Qty: 0 | Refills: 0 | DISCHARGE

## 2022-04-13 RX ORDER — CELECOXIB 200 MG/1
1 CAPSULE ORAL
Qty: 0 | Refills: 0 | DISCHARGE

## 2022-04-13 RX ORDER — AZELASTINE HCL 0.05 %
1 DROPS OPHTHALMIC (EYE)
Qty: 0 | Refills: 0 | DISCHARGE

## 2022-04-13 RX ORDER — ATORVASTATIN CALCIUM 80 MG/1
1 TABLET, FILM COATED ORAL
Qty: 0 | Refills: 0 | DISCHARGE

## 2022-04-13 RX ORDER — BUDESONIDE AND FORMOTEROL FUMARATE DIHYDRATE 160; 4.5 UG/1; UG/1
2 AEROSOL RESPIRATORY (INHALATION)
Qty: 0 | Refills: 0 | DISCHARGE

## 2022-04-13 NOTE — H&P PST ADULT - PROBLEM SELECTOR PLAN 1
EGD/Colonoscopy   PST instructions provided, patient verbalized understanding   CBC, CMP in HIE 4/8/2022  Cardiology eval on chart

## 2022-04-13 NOTE — H&P PST ADULT - NSICDXPASTMEDICALHX_GEN_ALL_CORE_FT
PAST MEDICAL HISTORY:  Asthma controlled    Coronary artery disease 2 stents in 2019    Diabetes mellitus type 2 in obese Hg A1c 7 ( 1/2022 ) in HIE    Diverticular disease h/o partial colectomy 2013    Fibromyalgia     History of gout     Hyperlipidemia     Hypertension     Obstructive sleep apnea of adult resolved , retested 5/2021 reports in [employer]    Peripheral neuropathy     Stented coronary artery      PAST MEDICAL HISTORY:  Asthma controlled, last inhaler use 6-7 month ago , denies hospitalizations, ER visits    Coronary artery disease 2 stents in 2019    Diabetes mellitus type 2 in obese Hg A1c 7 ( 1/2022 ) in HIE    Diverticular disease h/o partial colectomy 2013    Fibromyalgia     History of Clostridium difficile colitis 2019    History of gout     Hyperlipidemia     Hypertension     Obstructive sleep apnea of adult resolved , retested 5/2021 reports in [employer]    Peripheral neuropathy     PVC (premature ventricular contraction) on Metoprolol    Stented coronary artery two PTCA in LAD 2/2019    Vitamin B12 deficiency      PAST MEDICAL HISTORY:  Asthma controlled, last inhaler use 6-7 month ago , denies hospitalizations, ER visits    Coronary artery disease 2 stents in 2019, occasional chest discomfort, using nitroglycerin PRN - chronic, last cardiac  cath 2/2022- patent stents    Diabetes mellitus type 2 in obese Hg A1c 7 ( 1/2022 ) in HIE    Diverticular disease h/o partial colectomy 2013    Fibromyalgia     History of Clostridium difficile colitis 2019    History of gout     Hyperlipidemia     Hypertension     Obese BMI 36    Obstructive sleep apnea of adult resolved , retested 5/2021 reports in [employer]    Peripheral neuropathy     PVC (premature ventricular contraction) on Metoprolol    Stented coronary artery two PTCA in LAD 2/2019    Vitamin B12 deficiency

## 2022-04-13 NOTE — H&P PST ADULT - HISTORY OF PRESENT ILLNESS
This is a 60 yo  female with PMH of HTN. HLD ( on Rapatha),  CAD s/p 2 LAD stents ( 02/2019), DM type 2 ( well managed as per pt, endo Dr Mendosa, last A1C unknown; DM complicated by peripheral neuropathy), diverticulitis s/p Hartmans and reversal in 2013 at [facility], MYLES not on CPAP was reevaluated 5/26/ 2021,  Asthma ( controlled, as per pt last inhaller 6-7 month ago ), Cdiff, hx pf acute abd on 11/2019, fibromyalgia.  Seen and eval by Dr BOSTON Mccloud for c/c of recurrent, intermittent "chest discomfort"  and fatigue; pt states to feel "exhausted" with minimal exertion and occasional palpitations; dyspnea after ambulating 2 blocks.  Had abnormal NST ( see report below).  Holter showed PCCs.  Presents here today for [facility] for further CAD evaluation.     st< from: Nuclear Stress Test-Pharmacologic (Nuclear Stress Test-Pharmacologic .) (01.31.22 @ 11:42) >  ------------------------------------------------------------------------  GATED ANALYSIS:  Post-stress gated wall motion analysis was performed (LVEF  = 67 %;LVEDV = 58 ml.) revealing hypokinesis of the basal  inferior and basal inferoseptal walls.  ------------------------------------------------------------------------  IMPRESSIONS:Abnormal Study  * Chest Pain: No chest pain with administration of  Regadenoson.  * Symptom: Shortness of breath, nausea.  * HR Response: Appropriate.  * BP Response: Appropriate.  * Heart Rhythm: Sinus Rhythm - 83 BPM.  * Baseline ECG: Nonspecific ST-T wave abnormality.  Poor R  wave progression.  * ECG Changes: ST Depression: Less than 0.5 mm horizontal  in leads V4-V6 started at 1:00 min following regadenoson  infusion at HR of 126 bpm and persisted 4:00 min into  recovery.  These changes did not meet ischemic criteria.  * Arrhythmia: None.  * The left ventricle was normal in size.  * There are medium-sized, mild defects in the  anterolateral wall that are reversible suggestive of mild  ischemia.  * There are medium-sized, mild to moderate defects in the  basal inferior and basal inferoseptal walls that are  mostly fixed suggestive of infarct with mild kushal-infarct  ischemia.  * Post-stress gated wall motion analysis was performed  (LVEF = 67 %;LVEDV = 58 ml.) revealing hypokinesis of the  basal inferior and basal inferoseptal walls.  *** Compared with the Nuclear/Stress perfusion images of  2/26/2021, more ischemia is noted on the current exam.    < end of copied text >    c This is a 60 yo  female with PMH of HTN,  HLD ( on Rapatha),  CAD s/p 2 LAD stents ( 02/2019) on ASA/Plavix, DM type 2 ( HgA1c 7.3 ( 1/2022), diverticulitis s/p Hartmans and reversal in 2013 at [facility], chronic Cdif,  stable  Asthma, fibromyalgia, Gout, osteoarthritis. Patient reports last colonoscopy /endoscopy back in 2014. Denies melena, rectal pain or discomfort, denies Nausea, no vomiting. Denies GI symptoms. Presents to PST for scheduled EGD/colonoscopy under anesthesia on 4/20/2022. Denies covid exposure. Covid PCR 4/18 @ [facility].    This is a 60 yo  female with PMH of HTN,  HLD ( on Rapatha),  CAD s/p 2 LAD stents ( 02/2019) on ASA/Plavix, DM type 2 , HgA1c 7.3 ( 1/2022), diverticulitis s/p Hartmans and reversal in 2013 at [facility], chronic Cdif,  stable  Asthma, fibromyalgia, Gout, osteoarthritis. Patient reports last colonoscopy /endoscopy back in 2014. Denies melena, rectal pain or discomfort, denies nausea, no vomiting. Denies GI symptoms. Presents to PST for scheduled EGD/colonoscopy under anesthesia on 4/20/2022. Denies covid exposure. Covid PCR 4/18 @ [address] [facility].

## 2022-04-13 NOTE — H&P PST ADULT - PROBLEM SELECTOR PLAN 2
FS on admission   Last dose Januvia 4/19  Basaglar insuling 30 units at bedtime on 4/18 and 4/19 due to colon prep   Admelog as per Sliding scale , hold the day of procedure   Patient advised to confirm the plan with endocrinologist

## 2022-04-13 NOTE — H&P PST ADULT - NSICDXPASTSURGICALHX_GEN_ALL_CORE_FT
PAST SURGICAL HISTORY:  H/O knee surgery bilateral patella realignment age 18, later left knee meniscus age 34    History of colonoscopy and endo 2014    History of hysterectomy age 40    History of surgery on arm left humerus age 54 , tumor i- enchondroma- hardware in place    S/P bilateral breast reduction age 22    S/P cholecystectomy 1993    S/P colon resection surgery March 2013 with temporary colostomy til 9/13

## 2022-04-13 NOTE — H&P PST ADULT - OTHER CARE PROVIDERS
cardio Dr. Renetta Saldana [phone]  preop eval 4/11/2022 on chart , jamel Reddy ( [facility] ) last visit note 4/8/2022

## 2022-04-13 NOTE — H&P PST ADULT - PROBLEM SELECTOR PLAN 3
Hold Plavix 5 days prior procedure as per GI  Continue ASA daily   Cardiac eval on chart  Patent advised to confirm the plan with cardiologist

## 2022-04-13 NOTE — H&P PST ADULT - NSANTHOSAYNRD_GEN_A_CORE
was retested 5/2021 - resolved was retested 5/2021 - resolved- sleep study in [facility]

## 2022-04-13 NOTE — H&P PST ADULT - NS PRO ABUSE SCREEN SUSPICION NEGLECT YN
I will SWITCH the dose or number of times a day I take the medications listed below when I get home from the hospital:  None no

## 2022-04-13 NOTE — H&P PST ADULT - FALL HARM RISK - UNIVERSAL INTERVENTIONS
Bed in lowest position, wheels locked, appropriate side rails in place/Call bell, personal items and telephone in reach/Instruct patient to call for assistance before getting out of bed or chair/Non-slip footwear when patient is out of bed/[place] to call system/Purposeful Proactive Rounding/Room/bathroom lighting operational, light cord in reach

## 2022-04-14 ENCOUNTER — NON-APPOINTMENT (OUTPATIENT)
Age: 62
End: 2022-04-14

## 2022-04-20 ENCOUNTER — OUTPATIENT (OUTPATIENT)
Dept: OUTPATIENT SERVICES | Facility: HOSPITAL | Age: 62
LOS: 1 days | End: 2022-04-20
Payer: MEDICAID

## 2022-04-20 ENCOUNTER — RESULT REVIEW (OUTPATIENT)
Age: 62
End: 2022-04-20

## 2022-04-20 ENCOUNTER — TRANSCRIPTION ENCOUNTER (OUTPATIENT)
Age: 62
End: 2022-04-20

## 2022-04-20 VITALS
HEART RATE: 80 BPM | RESPIRATION RATE: 18 BRPM | DIASTOLIC BLOOD PRESSURE: 73 MMHG | OXYGEN SATURATION: 97 % | SYSTOLIC BLOOD PRESSURE: 118 MMHG

## 2022-04-20 VITALS
WEIGHT: 201.94 LBS | TEMPERATURE: 97 F | OXYGEN SATURATION: 98 % | SYSTOLIC BLOOD PRESSURE: 125 MMHG | HEIGHT: 64 IN | DIASTOLIC BLOOD PRESSURE: 55 MMHG | RESPIRATION RATE: 19 BRPM | HEART RATE: 68 BPM

## 2022-04-20 DIAGNOSIS — K92.1 MELENA: ICD-10-CM

## 2022-04-20 DIAGNOSIS — Z90.49 ACQUIRED ABSENCE OF OTHER SPECIFIED PARTS OF DIGESTIVE TRACT: Chronic | ICD-10-CM

## 2022-04-20 DIAGNOSIS — Z98.89 OTHER SPECIFIED POSTPROCEDURAL STATES: Chronic | ICD-10-CM

## 2022-04-20 DIAGNOSIS — Z90.710 ACQUIRED ABSENCE OF BOTH CERVIX AND UTERUS: Chronic | ICD-10-CM

## 2022-04-20 DIAGNOSIS — Z98.890 OTHER SPECIFIED POSTPROCEDURAL STATES: Chronic | ICD-10-CM

## 2022-04-20 PROCEDURE — 45378 DIAGNOSTIC COLONOSCOPY: CPT | Mod: GC

## 2022-04-20 PROCEDURE — 43239 EGD BIOPSY SINGLE/MULTIPLE: CPT | Mod: GC

## 2022-04-20 PROCEDURE — 88305 TISSUE EXAM BY PATHOLOGIST: CPT

## 2022-04-20 PROCEDURE — 88305 TISSUE EXAM BY PATHOLOGIST: CPT | Mod: 26

## 2022-04-20 PROCEDURE — 45378 DIAGNOSTIC COLONOSCOPY: CPT

## 2022-04-20 PROCEDURE — 43239 EGD BIOPSY SINGLE/MULTIPLE: CPT

## 2022-04-20 DEVICE — NET RETRV ROT ROTH 2.5MMX230CM: Type: IMPLANTABLE DEVICE | Status: FUNCTIONAL

## 2022-04-20 RX ORDER — EVOLOCUMAB 140 MG/ML
0 INJECTION, SOLUTION SUBCUTANEOUS
Qty: 0 | Refills: 0 | DISCHARGE

## 2022-04-20 RX ORDER — LIDOCAINE HCL 20 MG/ML
4 VIAL (ML) INJECTION ONCE
Refills: 0 | Status: DISCONTINUED | OUTPATIENT
Start: 2022-04-20 | End: 2022-05-04

## 2022-04-20 RX ORDER — IPRATROPIUM/ALBUTEROL SULFATE 18-103MCG
3 AEROSOL WITH ADAPTER (GRAM) INHALATION ONCE
Refills: 0 | Status: DISCONTINUED | OUTPATIENT
Start: 2022-04-20 | End: 2022-04-20

## 2022-04-20 RX ORDER — BUDESONIDE AND FORMOTEROL FUMARATE DIHYDRATE 160; 4.5 UG/1; UG/1
2 AEROSOL RESPIRATORY (INHALATION)
Qty: 0 | Refills: 0 | DISCHARGE

## 2022-04-20 RX ORDER — NITROGLYCERIN 6.5 MG
1 CAPSULE, EXTENDED RELEASE ORAL
Qty: 0 | Refills: 0 | DISCHARGE

## 2022-04-20 RX ORDER — ALBUTEROL 90 UG/1
2 AEROSOL, METERED ORAL
Qty: 0 | Refills: 0 | DISCHARGE

## 2022-04-20 NOTE — PRE PROCEDURE NOTE - PRE PROCEDURE EVALUATION
Attending Physician:   Debbie                         Procedure: EGD and colonoscopy     Indication for Procedure: GI bleed  ________________________________________________________  PAST MEDICAL & SURGICAL HISTORY:  Diabetes mellitus type 2 in obese  Hg A1c 7 ( 1/2022 ) in HIE    Hypertension    Hyperlipidemia    Peripheral neuropathy    Obstructive sleep apnea of adult  resolved , retested 5/2021 reports in [employer]    Diverticular disease  h/o partial colectomy 2013    Coronary artery disease  2 stents in 2019, occasional chest discomfort, using nitroglycerin PRN - chronic, last cardiac  cath 2/2022- patent stents    Asthma  controlled, last inhaler use 6-7 month ago , denies hospitalizations, ER visits    Fibromyalgia    Stented coronary artery  two PTCA in LAD 2/2019    History of gout    History of Clostridium difficile colitis  2019    Vitamin B12 deficiency    PVC (premature ventricular contraction)  on Metoprolol    Obese  BMI 36    S/P colon resection  surgery March 2013 with temporary colostomy til 9/13    S/P cholecystectomy  1993    H/O knee surgery  bilateral patella realignment age 18, later left knee meniscus age 34    S/P bilateral breast reduction  age 22    History of surgery on arm  left humerus age 54 , tumor i- enchondroma- hardware in place    History of hysterectomy  age 40    History of colonoscopy  and endo 2014      ALLERGIES:  Cardizem (Unknown)  Cipro (Anaphylaxis; Rash)  codeine (Diarrhea; Nausea; Vomiting)  Lipitor (Unknown)  morphine (Diarrhea; Nausea; Vomiting)  verapamil (Unknown)    HOME MEDICATIONS:  Admelog SoloStar 100 units/mL injectable solution: injectable 3 times a day (with meals)  between 2-10 units each time depending on FS   ( due to colonoscopy prep , check FS 4/18, 4/19 TID use insulin as directed) last dose 4/19 pm   allopurinol 300 mg oral tablet: 1 tab(s) orally once a day  Artificial Tears ophthalmic solution: 1 drop(s) to each affected eye 2 times a day  aspirin 81 mg oral delayed release tablet: 1 tab(s) orally every other day, history of 2 stents 2/2019 , last day of Plavix 4/15, will take ASA daily after   Basaglar KwikPen 100 units/mL subcutaneous solution: 35  subcutaneous once a day (at bedtime) give 30 units on 4/18 and 4/19 due to colonoscopy prep   budesonide-formoterol 160 mcg-4.5 mcg/inh inhalation aerosol: 2 puff(s) inhaled 2 times a day, As Needed  Colace 100 mg oral capsule: 1 cap(s) orally 3 times a day  colchicine 0.6 mg oral tablet: 1 tab(s) orally once a day, As Needed  cyanocobalamin 1000 mcg oral tablet: 1 tab(s) orally once a day  evolocumab 140 mg/mL subcutaneous solution: subcutaneous every 2 weeks  home  gabapentin 100 mg oral tablet: orally once a day  Januvia 100 mg oral tablet: 1 tab(s) orally once a day, hold the day of procedure   magnesium oxide 400 mg oral tablet: 1 tab(s) orally once a day  metoprolol succinate 100 mg oral tablet, extended release: 1 tab(s) orally once a day in PM  Metoprolol Succinate ER 50 mg oral tablet, extended release: 1 tab(s) orally once a day in AM   nitroglycerin 0.4 mg sublingual spray: 1 spray(s) sublingual every 5 minutes, As Needed, last used 2/2022  Plavix 75 mg oral tablet: 1 tab(s) orally once a day, last dose 4/15/2022  ProAir HFA 90 mcg/inh inhalation aerosol: 2 puff(s) inhaled every 6 hours, As Needed  Senna 8.6 mg oral tablet: 1 tab(s) orally once a day (at bedtime)  Vascepa 1 g oral capsule: 2 cap(s) orally 2 times a day    AICD/PPM: [ ] yes   [ ] no    PERTINENT LAB DATA:                      PHYSICAL EXAMINATION:    T(C): --  HR: --  BP: --  RR: --  SpO2: --    Constitutional: NAD  HEENT: PERRLA, EOMI,    Neck:  No JVD  Respiratory: CTAB/L  Cardiovascular: S1 and S2  Gastrointestinal: BS+, soft, NT/ND  Extremities: No peripheral edema  Neurological: A/O x 3, no focal deficits  Psychiatric: Normal mood, normal affect  Skin: No rashes    ASA Class: I [ ]  II [ ]  III [ ]  IV [ ]    COMMENTS:    The patient is a suitable candidate for the planned procedure unless box checked [ ]  No, explain:

## 2022-04-20 NOTE — ASU PATIENT PROFILE, ADULT - NSICDXPASTMEDICALHX_GEN_ALL_CORE_FT
PAST MEDICAL HISTORY:  Asthma controlled, last inhaler use 6-7 month ago , denies hospitalizations, ER visits    Coronary artery disease 2 stents in 2019, occasional chest discomfort, using nitroglycerin PRN - chronic, last cardiac  cath 2/2022- patent stents    Diabetes mellitus type 2 in obese Hg A1c 7 ( 1/2022 ) in HIE    Diverticular disease h/o partial colectomy 2013    Fibromyalgia     History of Clostridium difficile colitis 2019    History of gout     Hyperlipidemia     Hypertension     Obese BMI 36    Obstructive sleep apnea of adult resolved , retested 5/2021 reports in [employer]    Peripheral neuropathy     PVC (premature ventricular contraction) on Metoprolol    Stented coronary artery two PTCA in LAD 2/2019    Vitamin B12 deficiency

## 2022-04-20 NOTE — ASU DISCHARGE PLAN (ADULT/PEDIATRIC) - NS MD DC FALL RISK RISK
For information on Fall & Injury Prevention, visit: [email or web address] OR  [email or web address] OR  https://www.cdc.gov/steadi/patient.html

## 2022-04-20 NOTE — ASU PATIENT PROFILE, ADULT - FALL HARM RISK - UNIVERSAL INTERVENTIONS
Bed in lowest position, wheels locked, appropriate side rails in place/Call bell, personal items and telephone in reach/Instruct patient to call for assistance before getting out of bed or chair/Non-slip footwear when patient is out of bed/[place] to call system/Physically safe environment - no spills, clutter or unnecessary equipment/Purposeful Proactive Rounding/Room/bathroom lighting operational, light cord in reach

## 2022-04-22 LAB — SURGICAL PATHOLOGY STUDY: SIGNIFICANT CHANGE UP

## 2022-04-27 PROBLEM — Z87.39 PERSONAL HISTORY OF OTHER DISEASES OF THE MUSCULOSKELETAL SYSTEM AND CONNECTIVE TISSUE: Chronic | Status: ACTIVE | Noted: 2022-04-13

## 2022-04-27 PROBLEM — E66.9 OBESITY, UNSPECIFIED: Chronic | Status: ACTIVE | Noted: 2022-04-13

## 2022-04-27 PROBLEM — Z95.5 PRESENCE OF CORONARY ANGIOPLASTY IMPLANT AND GRAFT: Chronic | Status: ACTIVE | Noted: 2019-08-22

## 2022-04-27 PROBLEM — Z86.19 PERSONAL HISTORY OF OTHER INFECTIOUS AND PARASITIC DISEASES: Chronic | Status: ACTIVE | Noted: 2022-04-13

## 2022-04-27 PROBLEM — I49.3 VENTRICULAR PREMATURE DEPOLARIZATION: Chronic | Status: ACTIVE | Noted: 2022-04-13

## 2022-04-27 PROBLEM — E53.8 DEFICIENCY OF OTHER SPECIFIED B GROUP VITAMINS: Chronic | Status: ACTIVE | Noted: 2022-04-13

## 2022-04-28 ENCOUNTER — NON-APPOINTMENT (OUTPATIENT)
Age: 62
End: 2022-04-28

## 2022-05-01 ENCOUNTER — APPOINTMENT (OUTPATIENT)
Dept: MRI IMAGING | Facility: IMAGING CENTER | Age: 62
End: 2022-05-01
Payer: MEDICAID

## 2022-05-01 ENCOUNTER — OUTPATIENT (OUTPATIENT)
Dept: OUTPATIENT SERVICES | Facility: HOSPITAL | Age: 62
LOS: 1 days | End: 2022-05-01
Payer: MEDICAID

## 2022-05-01 DIAGNOSIS — Z98.89 OTHER SPECIFIED POSTPROCEDURAL STATES: Chronic | ICD-10-CM

## 2022-05-01 DIAGNOSIS — Z90.710 ACQUIRED ABSENCE OF BOTH CERVIX AND UTERUS: Chronic | ICD-10-CM

## 2022-05-01 DIAGNOSIS — Z90.49 ACQUIRED ABSENCE OF OTHER SPECIFIED PARTS OF DIGESTIVE TRACT: Chronic | ICD-10-CM

## 2022-05-01 DIAGNOSIS — Z00.8 ENCOUNTER FOR OTHER GENERAL EXAMINATION: ICD-10-CM

## 2022-05-01 DIAGNOSIS — Z98.890 OTHER SPECIFIED POSTPROCEDURAL STATES: Chronic | ICD-10-CM

## 2022-05-01 DIAGNOSIS — M54.10 RADICULOPATHY, SITE UNSPECIFIED: ICD-10-CM

## 2022-05-01 PROCEDURE — 72141 MRI NECK SPINE W/O DYE: CPT | Mod: 26

## 2022-05-01 PROCEDURE — 73221 MRI JOINT UPR EXTREM W/O DYE: CPT | Mod: 26,RT

## 2022-05-01 PROCEDURE — 72141 MRI NECK SPINE W/O DYE: CPT

## 2022-05-01 PROCEDURE — 73221 MRI JOINT UPR EXTREM W/O DYE: CPT

## 2022-05-02 ENCOUNTER — RESULT REVIEW (OUTPATIENT)
Age: 62
End: 2022-05-02

## 2022-05-02 ENCOUNTER — OUTPATIENT (OUTPATIENT)
Dept: OUTPATIENT SERVICES | Facility: HOSPITAL | Age: 62
LOS: 1 days | End: 2022-05-02
Payer: MEDICAID

## 2022-05-02 ENCOUNTER — APPOINTMENT (OUTPATIENT)
Dept: PODIATRY | Facility: HOSPITAL | Age: 62
End: 2022-05-02
Payer: MEDICAID

## 2022-05-02 VITALS
RESPIRATION RATE: 14 BRPM | HEART RATE: 76 BPM | DIASTOLIC BLOOD PRESSURE: 77 MMHG | WEIGHT: 203 LBS | BODY MASS INDEX: 35.97 KG/M2 | SYSTOLIC BLOOD PRESSURE: 136 MMHG | HEIGHT: 63 IN | TEMPERATURE: 98 F

## 2022-05-02 DIAGNOSIS — Z98.89 OTHER SPECIFIED POSTPROCEDURAL STATES: Chronic | ICD-10-CM

## 2022-05-02 DIAGNOSIS — E11.9 TYPE 2 DIABETES MELLITUS WITHOUT COMPLICATIONS: ICD-10-CM

## 2022-05-02 DIAGNOSIS — B35.1 TINEA UNGUIUM: ICD-10-CM

## 2022-05-02 DIAGNOSIS — M79.609 PAIN IN UNSPECIFIED LIMB: ICD-10-CM

## 2022-05-02 DIAGNOSIS — Z90.710 ACQUIRED ABSENCE OF BOTH CERVIX AND UTERUS: Chronic | ICD-10-CM

## 2022-05-02 DIAGNOSIS — M20.40 OTHER HAMMER TOE(S) (ACQUIRED), UNSPECIFIED FOOT: ICD-10-CM

## 2022-05-02 DIAGNOSIS — Z98.890 OTHER SPECIFIED POSTPROCEDURAL STATES: Chronic | ICD-10-CM

## 2022-05-02 DIAGNOSIS — Z90.49 ACQUIRED ABSENCE OF OTHER SPECIFIED PARTS OF DIGESTIVE TRACT: Chronic | ICD-10-CM

## 2022-05-02 PROCEDURE — G0463: CPT

## 2022-05-02 PROCEDURE — 73630 X-RAY EXAM OF FOOT: CPT

## 2022-05-02 PROCEDURE — 99212 OFFICE O/P EST SF 10 MIN: CPT

## 2022-05-02 PROCEDURE — 73630 X-RAY EXAM OF FOOT: CPT | Mod: 26,LT,RT

## 2022-05-02 RX ORDER — CICLOPIROX 80 MG/ML
8 SOLUTION TOPICAL
Qty: 2 | Refills: 7 | Status: ACTIVE | COMMUNITY
Start: 2022-05-02 | End: 1900-01-01

## 2022-05-02 NOTE — HISTORY OF PRESENT ILLNESS
[Other: ___] : [unfilled] [FreeTextEntry1] : 61 F presents to clinic complaining of b/l foot pain. Pt reports pain in b/l 2nd hammertoes. Pt states that she does not want any surgery but was looking for some conservative options to experience relief. She also reports that she noticed nail change/ discoloration to her right big toenail. Pt denies any trauma. Pt denies N/V/F/Chills.

## 2022-05-02 NOTE — PHYSICAL EXAM
[2+] : left foot dorsalis pedis 2+ [Pes Planus] : pes planus deformity [Skin Color & Pigmentation] : normal skin color and pigmentation [Skin Turgor] : normal skin turgor [] : no rash [Skin Lesions] : no skin lesions [Ankle Swelling (On Exam)] : not present [Varicose Veins Of Lower Extremities] : not present [Delayed in the Right Toes] : capillary refills normal in right toes [Delayed in the Left Toes] : capillary refills normal in the left toes [de-identified] : POP 2nd PIPJ 2nd hammer toe b/l [Foot Ulcer] : no foot ulcer [Skin Induration] : no skin induration [FreeTextEntry1] : R mary onychomycosis

## 2022-05-02 NOTE — PROCEDURE
[FreeTextEntry1] : 61 F b/l 2nd painful flexible hammer toes\par pt seen and examined\par recommended pt wear supportive sneaker style shoe \par recommend pt buy Superfeet inserts to wear in shoes\par discussed surgical treatment of hammertoes w/ pt, pt not interested in surgery at this time\par x ray b/l feet reviewed\par Rx [place] with instruction to use daily\par pt to follow up in 4 months\par

## 2022-05-10 ENCOUNTER — APPOINTMENT (OUTPATIENT)
Dept: ENDOCRINOLOGY | Facility: CLINIC | Age: 62
End: 2022-05-10
Payer: MEDICAID

## 2022-05-10 VITALS
HEIGHT: 63 IN | SYSTOLIC BLOOD PRESSURE: 124 MMHG | DIASTOLIC BLOOD PRESSURE: 79 MMHG | BODY MASS INDEX: 36.5 KG/M2 | WEIGHT: 206 LBS | OXYGEN SATURATION: 96 % | HEART RATE: 78 BPM

## 2022-05-10 DIAGNOSIS — I10 ESSENTIAL (PRIMARY) HYPERTENSION: ICD-10-CM

## 2022-05-10 LAB — GLUCOSE BLDC GLUCOMTR-MCNC: 120

## 2022-05-10 PROCEDURE — 95251 CONT GLUC MNTR ANALYSIS I&R: CPT

## 2022-05-10 PROCEDURE — 99214 OFFICE O/P EST MOD 30 MIN: CPT | Mod: 25

## 2022-05-10 RX ORDER — NITROFURANTOIN (MONOHYDRATE/MACROCRYSTALS) 25; 75 MG/1; MG/1
100 CAPSULE ORAL
Qty: 14 | Refills: 0 | Status: DISCONTINUED | COMMUNITY
Start: 2021-03-24 | End: 2022-05-10

## 2022-05-11 PROBLEM — I10 HTN (HYPERTENSION): Status: ACTIVE | Noted: 2018-04-27

## 2022-05-11 NOTE — HISTORY OF PRESENT ILLNESS
[FreeTextEntry1] : 61 year old female presents for follow up of diabetes. She has been living with diabetes for over 5 years. She was started on insulin 2 years ago. Presently she is on Basaglar 30 u qhs AND Admelog SS TIDAC (has not been using SS if BS is <150 and not eating carbs). She also takes Jardiance 10 mg daily and feels well on it. She is off of Metformin and Acarbose. She feels her GI sx have improved. She is now using the Mega to monitor her BS. Her recent A1C is 6.9. She reports that she tries her best to follow a carb balanced diet. She does struggle as she has IBS and Gout and is limited in her food choices. She has been very diligent about her diet. She does not drink any juice or soda. She has made changes since she started using the Mega to her diet. She denies any diabetic neuropathy, retinopathy, or nephropathy.  Last Optho was over 1 year ago.  She does have IBS, diverticulitis and h/o colon resection with colectomy which was reversed. 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 According to the pt, Neurosx is ok with this and has f/u in about 6 months. \par She also had an abnormal stress test and will be going for an angiogram. Started on Repatha recently by Cardiology. \par Also found to have a torn rotator cuff. Needs surgery, but holding off right now. \par Recent labs were reviewed and discussed.  [Continuous Glucose Monitoring] : Continuous Glucose Monitoring: Yes [Mega] : Mega [FreeTextEntry2] : 98 [FreeTextEntry3] : 2 [FreeTextEntry4] : 0 [de-identified] : 6.5 [FreeTextEntry5] : 133 [FreeTextEntry6] : 15.7

## 2022-05-11 NOTE — PHYSICAL EXAM
[Alert] : alert [Well Nourished] : well nourished [No Acute Distress] : no acute distress [Well Developed] : well developed [Normal Sclera/Conjunctiva] : normal sclera/conjunctiva [EOMI] : extra ocular movement intact [No Proptosis] : no proptosis [No Thyroid Nodules] : no palpable thyroid nodules [No Respiratory Distress] : no respiratory distress [No Accessory Muscle Use] : no accessory muscle use [Clear to Auscultation] : lungs were clear to auscultation bilaterally [Normal S1, S2] : normal S1 and S2 [Normal Rate] : heart rate was normal [Regular Rhythm] : with a regular rhythm [No Edema] : no peripheral edema [Pedal Pulses Normal] : the pedal pulses are present [Normal Bowel Sounds] : normal bowel sounds [Not Distended] : not distended [Soft] : abdomen soft [Normal Anterior Cervical Nodes] : no anterior cervical lymphadenopathy [No Spinal Tenderness] : no spinal tenderness [Spine Straight] : spine straight [Normal Gait] : normal gait [Normal Strength/Tone] : muscle strength and tone were normal [No Rash] : no rash [Normal] : normal [Full ROM] : with full range of motion [Normal Reflexes] : deep tendon reflexes were 2+ and symmetric [No Tremors] : no tremors [Oriented x3] : oriented to person, place, and time [Acanthosis Nigricans] : no acanthosis nigricans [Diminished Throughout Both Feet] : normal tactile sensation with monofilament testing throughout both feet [de-identified] : thyroid enlarged approx 60 g; irregular texture [de-identified] :  large well healed scar

## 2022-05-11 NOTE — ASSESSMENT
[Diabetes Foot Care] : diabetes foot care [Long Term Vascular Complications] : long term vascular complications of diabetes [Carbohydrate Consistent Diet] : carbohydrate consistent diet [Importance of Diet and Exercise] : importance of diet and exercise to improve glycemic control, achieve weight loss and improve cardiovascular health [Exercise/Effect on Glucose] : exercise/effect on glucose [Hypoglycemia Management] : hypoglycemia management [Action and use of Insulin] : action and use of short and long-acting insulin [Self Monitoring of Blood Glucose] : self monitoring of blood glucose [Insulin Self-Administration] : insulin self-administration [Injection Technique, Storage, Sharps Disposal] : injection technique, storage, and sharps disposal [Retinopathy Screening] : Patient was referred to ophthalmology for retinopathy screening [Weight Loss] : weight loss [Diabetic Medications] : Risks and benefits of diabetic medications were discussed [FreeTextEntry1] : 1. Type 2 diabetes/obesity - Sensor tracing shows excellent control. She does have some mild post prandial spikes. Will do a trial of Jardiance 25 mg daily and see if she can remain off of mealtime insulin. Continue the same medications. Focus on lifestyle modifications.  We discussed at length the importance of following a carbohydrate balanced diet and the importance of incorporating protein in meals. We also discussed appropriate alternative food choices. We discussed ways she can incorporate exercise into her daily routine. We discussed the importance of weight loss in the management of her comorbidities. We discussed the risks of continued excess weight on long term health. Must see Optho! \par 2. Thyroid enlarged - Appears euthyroid. TFTs stable. Recent thyroid sono 3/2022 shows that R nodule minimally increased in size. New nodule seen on L lobe but appears to have been seen on previous sono. Denies any more tenderness of thyroid or dysphagia. Will repeat thyroid sono 9/2022\par \par f/u with CDE in 6 weeks\par Will f/u in 3 months with Dr. Peoples\par

## 2022-05-12 ENCOUNTER — NON-APPOINTMENT (OUTPATIENT)
Age: 62
End: 2022-05-12

## 2022-05-24 ENCOUNTER — APPOINTMENT (OUTPATIENT)
Dept: NEUROLOGY | Facility: HOSPITAL | Age: 62
End: 2022-05-24

## 2022-05-24 ENCOUNTER — OUTPATIENT (OUTPATIENT)
Dept: OUTPATIENT SERVICES | Facility: HOSPITAL | Age: 62
LOS: 1 days | End: 2022-05-24
Payer: MEDICAID

## 2022-05-24 VITALS
SYSTOLIC BLOOD PRESSURE: 117 MMHG | DIASTOLIC BLOOD PRESSURE: 78 MMHG | WEIGHT: 209 LBS | HEIGHT: 63 IN | BODY MASS INDEX: 37.03 KG/M2 | HEART RATE: 74 BPM | TEMPERATURE: 96.9 F

## 2022-05-24 DIAGNOSIS — Z90.710 ACQUIRED ABSENCE OF BOTH CERVIX AND UTERUS: Chronic | ICD-10-CM

## 2022-05-24 DIAGNOSIS — R56.9 UNSPECIFIED CONVULSIONS: ICD-10-CM

## 2022-05-24 DIAGNOSIS — Z98.89 OTHER SPECIFIED POSTPROCEDURAL STATES: Chronic | ICD-10-CM

## 2022-05-24 DIAGNOSIS — G56.03 CARPAL TUNNEL SYNDROME, BILATERAL UPPER LIMBS: ICD-10-CM

## 2022-05-24 DIAGNOSIS — Z98.890 OTHER SPECIFIED POSTPROCEDURAL STATES: Chronic | ICD-10-CM

## 2022-05-24 DIAGNOSIS — G50.0 TRIGEMINAL NEURALGIA: ICD-10-CM

## 2022-05-24 DIAGNOSIS — Z90.49 ACQUIRED ABSENCE OF OTHER SPECIFIED PARTS OF DIGESTIVE TRACT: Chronic | ICD-10-CM

## 2022-05-24 DIAGNOSIS — M54.12 RADICULOPATHY, CERVICAL REGION: ICD-10-CM

## 2022-05-24 PROCEDURE — G0463: CPT

## 2022-05-24 NOTE — HISTORY OF PRESENT ILLNESS
[FreeTextEntry1] : Interval Hx 5/24/22:\par Patient presents today for follow up for cervical radiculopathy, b/l carpal tunnel and trigeminal neuralgia.\par Trigeminal neuralgia is moderately controlled on trileptal. Previous MRI brain showed compression of the L trigeminal nerve, for which Dr. Dunn offered to perform surgery but patient declined. \par She also currently has a R rotator cuff tear and was referred for surgery, which patient also declined. \par She is not currently taking any medications for her radicular pain. She was prescribed Naproxen but takes Tylenol PRN when her pain is really bad. Currently states today is a "bad day" and her pain is 8/10. Has not yet received PT as was waiting for MRI Cspine to be performed. MRI Cspine shows \par \par \par 60F R handed with a PMHx of R rotator cuff tear, DM, HTN, Asthma, CAD s/p stents, diverticulitis, Gout, OA, Fibromyalgia, trigeminal neuralgia, tremors presents to establish care. Pt saw seeing neurologist Dr. Leroy Mota but had insurance issues. \par \par Pt has had >20 years of neck pain, was a former seamstress. Progressive, worsening b/l hand weakness since 2018, which initially brought her to see a neurologist. She has reported C5-7 nerve impingements. Last MRI C spine done in 2020. She also reports R index finger numbness, no paresthesias. \par \par Pt diagnosed with L sided trigeminal neuralgia in 2019, MRI done. She is on Trileptal lowest dose twice a day, no significant difference because she has only had mild symptoms but has slightly decreased frequency of headaches, occasional ear and jaw pain and tearing. She saw neurosurgery Dr. Hammond for trigeminal neuralgia, who offered surgery but pt refused. \par \par Pt reports b/l hand tremors since 2019, intermittent but alternating hands, when eating and sewing, not related to her trigeminal neuralgia pain, lasting seconds at a time. No worsening with anxiety, no alcohol intake, no family hx of tremors. \par \par Pt reports diffuse joint pains due to her fibromyalgia, was on duloxetine 60mg daily but discontinued due to nausea. Now just taking tylenol PRN for it. She has not had physical therapy or occupational therapy recently. \par \par EMG 8/2021 with Dr. Leroy Mota\par 1. b/l C4-5 and C5-6 radiculopathy\par 2. b/l median motor neuropathy\par 3. b/l ulnar motor neuropathy\par 4. b/l median sensory neuropathy

## 2022-05-24 NOTE — ASSESSMENT
[FreeTextEntry1] : 60F R handed with a PMHx of R rotator cuff tear, DM, HTN, Asthma, CAD s/p stents, diverticulitis, Gout, OA, Fibromyalgia, trigeminal neuralgia, tremors here for a followup. Exam remarkable for RUE>LUE weakness, although confounded by torn R rotator cuff. Sensory symptoms worse in LUE. MR Cspine 5/1 with large disc protrusion at C5-6, smaller protrusion at C4-5. \par \par Impression: Worsening b/l UE weakness with neck pain likely 2/2 known b/l cervical radiculopathy seen on EMG/NCS. Also with moderately controlled L trigeminal neuralgia. \par \par Plan:\par [] PT referral \par [] Naproxen PRN for pain\par [] Trileptal 300 BID renewal for Trigeminal neuralgia\par [] RTC in 6 months, or sooner if symptoms poorly controlled\par [] consider hand surgery referral in the future if no symptomatic improvement\par [] NSGY follow up with Dr. Nava in the future for trigeminal neuralgia and cervical radiculopathy after trial of PT

## 2022-05-24 NOTE — DATA REVIEWED
[de-identified] : MRI [facility] w/o 5/1/22:\par \par   INTERPRETATION:\par \par BONES AND BONE MARROW:  There is no fracture. There is mild endplate discogenic edema at C4-5 and C5-6.\par INTERVERTEBRAL DISCS: There is disc desiccation throughout the cervical spine with loss of disc height notably at C5-6.\par ALIGNMENT:  The craniocervical junction and atlantoaxial intervals are maintained. There is mild retrolisthesis of C5 on C6.\par SPINAL CORD: The spinal cord is normal in size and signal intensity.\par EXTRASPINAL: There is no prevertebral soft tissue swelling. There is mild mucosal thickening in the right maxillary sinus.\par \par Evaluation of individual disc space levels demonstrates the following:\par \par C2-C3: There is no significant spinal canal or neural foraminal stenosis.\par C3-C4: Small central posterior disc protrusion. No significant central canal or neural foraminal stenosis.\par C4-C5: Central disc protrusion with mild central canal stenosis. No neural foraminal stenosis.\par C5-C6: Broad-based central posterior disc protrusion with moderate bilateral neural foraminal stenosis and moderate central stenosis.\par C6-C7: There is no significant spinal canal or neural foraminal stenosis.\par C7-T1: Left eccentric posterior disc osteophyte complex with minimal left neural foraminal stenosis. No right neural foraminal or central stenosis.\par \par IMPRESSION:\par C5-6 mild retrolisthesis and broad-based posterior disc protrusion with moderate bilateral neural foraminal stenosis and moderate central stenosis.\par \par C4-5 small central disc protrusion with mild central canal stenosis.\par \par Milder findings elsewhere in the cervical spine as above.\par \par \par \par \par \par \par \par \par  \par \par

## 2022-05-24 NOTE — PHYSICAL EXAM
[General Appearance - Alert] : alert [General Appearance - In No Acute Distress] : in no acute distress [Oriented To Time, Place, And Person] : oriented to person, place, and time [Impaired Insight] : insight and judgment were intact [Affect] : the affect was normal [Mood] : the mood was normal [Memory Recent] : recent memory was not impaired [Short Term Intact] : short term memory intact [Span Intact] : the attention span was normal [Fluency] : fluency intact [Comprehension] : comprehension intact [Cranial Nerves Optic (II)] : visual acuity intact bilaterally,  visual fields full to confrontation, pupils equal round and reactive to light [Cranial Nerves Oculomotor (III)] : extraocular motion intact [Cranial Nerves Trigeminal (V)] : facial sensation intact symmetrically [Cranial Nerves Facial (VII)] : face symmetrical [Cranial Nerves Vestibulocochlear (VIII)] : hearing was intact bilaterally [Cranial Nerves Accessory (XI - Cranial And Spinal)] : head turning and shoulder shrug symmetric [Cranial Nerves Hypoglossal (XII)] : there was no tongue deviation with protrusion [Motor Tone] : muscle tone was normal in all four extremities [Motor Handedness Right-Handed] : the patient is right hand dominant [Hand Weakness Right] : the hand  was weak [Hand Weakness Left] : the hand  was weak [5] : ankle plantar flexion 5/5 [Sensation Vibration Decrease] : vibration was intact [Abnormal Walk] : normal gait [1+] : Ankle jerk left 1+ [Extraocular Movements] : extraocular movements were intact [] : no respiratory distress [Skin Lesions] : no skin lesions [-4] : arm extension  -4/5 [4] : fingers abduction 4/5 left [+4] : knee flexion +4/5 [Coordination - Dysmetria Impaired Finger-to-Nose Bilateral] : not present [FreeTextEntry6] : Strength exam limited by RUE rotator cuff tear [FreeTextEntry7] : Decreased to LT on b/l UE, L worse than R [FreeTextEntry1] : No midline spinal tenderness

## 2022-06-01 ENCOUNTER — APPOINTMENT (OUTPATIENT)
Dept: ORTHOPEDIC SURGERY | Facility: HOSPITAL | Age: 62
End: 2022-06-01

## 2022-06-15 ENCOUNTER — APPOINTMENT (OUTPATIENT)
Dept: ENDOCRINOLOGY | Facility: CLINIC | Age: 62
End: 2022-06-15

## 2022-06-27 ENCOUNTER — APPOINTMENT (OUTPATIENT)
Dept: ENDOCRINOLOGY | Facility: CLINIC | Age: 62
End: 2022-06-27

## 2022-06-27 VITALS
DIASTOLIC BLOOD PRESSURE: 86 MMHG | BODY MASS INDEX: 36.32 KG/M2 | HEIGHT: 63 IN | SYSTOLIC BLOOD PRESSURE: 136 MMHG | OXYGEN SATURATION: 94 % | HEART RATE: 75 BPM | WEIGHT: 205 LBS

## 2022-06-27 LAB — GLUCOSE BLDC GLUCOMTR-MCNC: 123

## 2022-06-27 PROCEDURE — 82962 GLUCOSE BLOOD TEST: CPT

## 2022-06-27 PROCEDURE — G0108 DIAB MANAGE TRN  PER INDIV: CPT

## 2022-07-13 ENCOUNTER — APPOINTMENT (OUTPATIENT)
Dept: ORTHOPEDIC SURGERY | Facility: HOSPITAL | Age: 62
End: 2022-07-13

## 2022-07-13 ENCOUNTER — OUTPATIENT (OUTPATIENT)
Dept: OUTPATIENT SERVICES | Facility: HOSPITAL | Age: 62
LOS: 1 days | End: 2022-07-13
Payer: MEDICAID

## 2022-07-13 VITALS
HEIGHT: 63 IN | HEART RATE: 77 BPM | TEMPERATURE: 97 F | SYSTOLIC BLOOD PRESSURE: 120 MMHG | DIASTOLIC BLOOD PRESSURE: 76 MMHG | BODY MASS INDEX: 35.79 KG/M2 | RESPIRATION RATE: 14 BRPM | WEIGHT: 202 LBS

## 2022-07-13 DIAGNOSIS — Z98.89 OTHER SPECIFIED POSTPROCEDURAL STATES: Chronic | ICD-10-CM

## 2022-07-13 DIAGNOSIS — M54.12 RADICULOPATHY, CERVICAL REGION: ICD-10-CM

## 2022-07-13 DIAGNOSIS — Z98.890 OTHER SPECIFIED POSTPROCEDURAL STATES: Chronic | ICD-10-CM

## 2022-07-13 DIAGNOSIS — M75.100 UNSPECIFIED ROTATOR CUFF TEAR OR RUPTURE OF UNSPECIFIED SHOULDER, NOT SPECIFIED AS TRAUMATIC: ICD-10-CM

## 2022-07-13 DIAGNOSIS — Z90.49 ACQUIRED ABSENCE OF OTHER SPECIFIED PARTS OF DIGESTIVE TRACT: Chronic | ICD-10-CM

## 2022-07-13 DIAGNOSIS — Z90.710 ACQUIRED ABSENCE OF BOTH CERVIX AND UTERUS: Chronic | ICD-10-CM

## 2022-07-13 DIAGNOSIS — M25.50 PAIN IN UNSPECIFIED JOINT: ICD-10-CM

## 2022-07-13 PROCEDURE — G0463: CPT

## 2022-08-01 ENCOUNTER — APPOINTMENT (OUTPATIENT)
Dept: PODIATRY | Facility: HOSPITAL | Age: 62
End: 2022-08-01

## 2022-08-08 LAB
ALBUMIN SERPL ELPH-MCNC: 4.4 G/DL
ALP BLD-CCNC: 148 U/L
ALT SERPL-CCNC: 14 U/L
ANION GAP SERPL CALC-SCNC: 12 MMOL/L
AST SERPL-CCNC: 13 U/L
BILIRUB SERPL-MCNC: 0.3 MG/DL
BUN SERPL-MCNC: 15 MG/DL
CALCIUM SERPL-MCNC: 9.6 MG/DL
CHLORIDE SERPL-SCNC: 105 MMOL/L
CHOLEST SERPL-MCNC: 162 MG/DL
CO2 SERPL-SCNC: 24 MMOL/L
CREAT SERPL-MCNC: 0.68 MG/DL
EGFR: 99 ML/MIN/1.73M2
ESTIMATED AVERAGE GLUCOSE: 163 MG/DL
GLUCOSE SERPL-MCNC: 131 MG/DL
HBA1C MFR BLD HPLC: 7.3 %
HDLC SERPL-MCNC: 60 MG/DL
LDLC SERPL CALC-MCNC: 70 MG/DL
NONHDLC SERPL-MCNC: 101 MG/DL
POTASSIUM SERPL-SCNC: 4.2 MMOL/L
PROT SERPL-MCNC: 6.8 G/DL
SODIUM SERPL-SCNC: 141 MMOL/L
TRIGL SERPL-MCNC: 158 MG/DL

## 2022-08-10 ENCOUNTER — APPOINTMENT (OUTPATIENT)
Dept: ENDOCRINOLOGY | Facility: CLINIC | Age: 62
End: 2022-08-10

## 2022-08-10 VITALS
WEIGHT: 208 LBS | OXYGEN SATURATION: 94 % | DIASTOLIC BLOOD PRESSURE: 71 MMHG | SYSTOLIC BLOOD PRESSURE: 109 MMHG | HEART RATE: 71 BPM | BODY MASS INDEX: 36.86 KG/M2 | HEIGHT: 63 IN

## 2022-08-10 LAB — GLUCOSE BLDC GLUCOMTR-MCNC: 186

## 2022-08-10 PROCEDURE — 99213 OFFICE O/P EST LOW 20 MIN: CPT | Mod: 25

## 2022-08-10 PROCEDURE — 95251 CONT GLUC MNTR ANALYSIS I&R: CPT

## 2022-08-10 PROCEDURE — 82962 GLUCOSE BLOOD TEST: CPT

## 2022-08-10 NOTE — HISTORY OF PRESENT ILLNESS
[Continuous Glucose Monitoring] : Continuous Glucose Monitoring: Yes [Mega] : Emga [FreeTextEntry1] : This is a 60 yo female with type 2 DM, IBS, gout, fibromyalgia, HTN, HLD who presents fro diabetes follow up\par \par Diagnosed with DM 5 yrs ago, started insulin 2 years ago. At last endocrine visit in May 2022, advised to start trial of Jardiance 25mg daily with goal of potentially coming off of mealtime insulin. However patient notes she still needs admelog premeal insulin. She was seen by CDE in June 2022 and advised to lower Basaglar to 28U, however patient is still taking 30U qhs and admoelog ISS (if <150, not taking, maybe 2-3x). She notes dietary indiscretions, admits to loving rice at meals and estimates taking admelog 10U after, not before meals. She notes she may inject premeal admelog about 2-3x/day and somedays she may skip a dose because glucose is <150.. She has tried Metformin and acarbose in past but did not tolerate due to GI issues (has underlying IBS, h/o diverticulitis and colon resection.) She notes due to rotator cuff injury she has limited exercise but now able to walk and planning to swim more. She notes she may be getting cortisone injection in spine 1x, following with ortho.\par She is due for optho - has appt this month. No h/o retinopathy as per patient. She also denies h/o diabetic neuropathy, nephropathy, h/o CKD, CVA or MI. She denies any recent COVID infection. She is taking Vascepa and Repatha as per cardiologist.\par  [Finger Stick] : Finger Stick: No [Hypoglycemia] : Patient is not hypoglycemic. [FreeTextEntry2] : 97 [FreeTextEntry3] : 3+0 [FreeTextEntry4] : 0+0 [de-identified] : 6.5 [FreeTextEntry5] : 134

## 2022-08-10 NOTE — ASSESSMENT
[Diabetes Foot Care] : diabetes foot care [Long Term Vascular Complications] : long term vascular complications of diabetes [Carbohydrate Consistent Diet] : carbohydrate consistent diet [Importance of Diet and Exercise] : importance of diet and exercise to improve glycemic control, achieve weight loss and improve cardiovascular health [Hypoglycemia Management] : hypoglycemia management [Action and use of Insulin] : action and use of short and long-acting insulin [Injection Technique, Storage, Sharps Disposal] : injection technique, storage, and sharps disposal [Retinopathy Screening] : Patient was referred to ophthalmology for retinopathy screening [Weight Loss] : weight loss [Diabetic Medications] : Risks and benefits of diabetic medications were discussed [FreeTextEntry1] : Type 2 DM\par HgbA1c 7.3%, almost at goal\par POC glucose today is 183mg/dl (post meal and patient forgot to take premeal insulin apparently)\par Advised to not skip/miss premeal insulin doses to help prevent rebound hyperglycemia later in day.\par Patient admits to some dietary indiscretions, discussed importance of increasing vegetable intake, portion control and to increase physical exercise (patient is planning to swim more) and walk at least 30min/day if tolerated.\par She has h/o GI intolerance to Metformin and Acarbose\par She has h/o pancreatitis, cannot use GLP-1 agonist as result\par Reviewed freestyle bee download, TIR is 97%, 3% high, 0% lows. Avg daily glucose is 134mgdl, GMI noted 6.5%\par Noted dinnertime is biggest meal and corresponds with postprandial hyperglycemia at that time.\par Increase Basaglar 32U qhs\par Continue adelmog 10U TID ac for now; again advised patient to take admelog before meals!\par Has appt with ophthalmology for annual diabetic screenign on 8/26/22. No h/o retinaopthty as per aptient\par No diabetic foot uclers on exam today\par On Repatha and Vascepa, last LDL is 70. Not on statins due to myalgias. Urine alb/crt in jan 2022 is normal.\par Refilled bee sensors to [facility] today\par \par Answered all questions today; patient verbalized understanding of the above\par RTC in 3 months\par

## 2022-08-10 NOTE — PHYSICAL EXAM
[No Stigmata of Cushings Syndrome] : no stigmata of Cushings Syndrome [Normal Gait] : normal gait [Acanthosis Nigricans] : acanthosis nigricans present [No Tremors] : no tremors [Oriented x3] : oriented to person, place, and time [Abdominal Striae] : no abdominal striae [Hirsutism] : no hirsutism [de-identified] : b/l knee surgical scars

## 2022-08-12 ENCOUNTER — APPOINTMENT (OUTPATIENT)
Dept: RHEUMATOLOGY | Facility: HOSPITAL | Age: 62
End: 2022-08-12

## 2022-08-12 ENCOUNTER — OUTPATIENT (OUTPATIENT)
Dept: OUTPATIENT SERVICES | Facility: HOSPITAL | Age: 62
LOS: 1 days | End: 2022-08-12
Payer: MEDICAID

## 2022-08-12 VITALS
RESPIRATION RATE: 14 BRPM | DIASTOLIC BLOOD PRESSURE: 66 MMHG | HEIGHT: 63 IN | TEMPERATURE: 97.3 F | HEART RATE: 60 BPM | WEIGHT: 206 LBS | SYSTOLIC BLOOD PRESSURE: 102 MMHG | BODY MASS INDEX: 36.5 KG/M2

## 2022-08-12 DIAGNOSIS — Z98.890 OTHER SPECIFIED POSTPROCEDURAL STATES: Chronic | ICD-10-CM

## 2022-08-12 DIAGNOSIS — Z98.89 OTHER SPECIFIED POSTPROCEDURAL STATES: Chronic | ICD-10-CM

## 2022-08-12 DIAGNOSIS — E06.9 THYROIDITIS, UNSPECIFIED: ICD-10-CM

## 2022-08-12 DIAGNOSIS — Z90.49 ACQUIRED ABSENCE OF OTHER SPECIFIED PARTS OF DIGESTIVE TRACT: Chronic | ICD-10-CM

## 2022-08-12 DIAGNOSIS — Z90.710 ACQUIRED ABSENCE OF BOTH CERVIX AND UTERUS: Chronic | ICD-10-CM

## 2022-08-12 LAB — URATE SERPL-MCNC: 4 MG/DL — SIGNIFICANT CHANGE UP (ref 2.5–7)

## 2022-08-12 PROCEDURE — 84550 ASSAY OF BLOOD/URIC ACID: CPT

## 2022-08-12 PROCEDURE — 99213 OFFICE O/P EST LOW 20 MIN: CPT | Mod: GC

## 2022-08-12 PROCEDURE — G0463: CPT

## 2022-08-12 RX ORDER — COLCHICINE 0.6 MG/1
0.6 TABLET ORAL
Qty: 30 | Refills: 0 | Status: ACTIVE | COMMUNITY

## 2022-08-12 NOTE — REVIEW OF SYSTEMS
[Feeling Poorly] : feeling poorly [Dry Eyes] : dryness of the eyes [Arthralgias] : arthralgias [Joint Pain] : joint pain [As Noted in HPI] : as noted in HPI [Negative] : Heme/Lymph

## 2022-08-13 NOTE — ASSESSMENT
[FreeTextEntry1] : 60 yo F PMH DM2, HTN, Asthma, CAD, diverticulitis, Gout, OA, Fibromyalgia, trigeminal neuralgia, cervical radiculopathy 2/2 large C5/C6 protrusion (MRI 5/2022) c/b b/l carpal tunnel, chronic R shoulder rotator cuff tear, supraspinatus tear presenting for follow up.\par \par #Gout: No recent flares, 1/2022 uric acid = 5 \par -repeat uric acid today\par -c/w allopurinol 200mg qd\par \par #Fibromyalgia: Controlled, currently has some mild b/l hand pain\par -c/w gabapentin 300mg qhs\par -encouraged PT \par \par #chronic R shoulder rotator cuff tear: US showed rotator cuff tear. Normal strength on exam.\par -encouraged PT \par -ortho f/u\par \par #cervical radiculopathy 2/2 large C5/C6 protrusion (MRI 5/2022) c/b b/l carpal tunnel: \par -c/w Neurology and pain management follow up\par \par RTC in 6 months\par \par Discussed w/ Dr. Vogel\par

## 2022-08-13 NOTE — PHYSICAL EXAM
[General Appearance - Alert] : alert [General Appearance - In No Acute Distress] : in no acute distress [General Appearance - Well Nourished] : well nourished [Sclera] : the sclera and conjunctiva were normal [Outer Ear] : the ears and nose were normal in appearance [Neck Appearance] : the appearance of the neck was normal [Respiration, Rhythm And Depth] : normal respiratory rhythm and effort [Exaggerated Use Of Accessory Muscles For Inspiration] : no accessory muscle use [Auscultation Breath Sounds / Voice Sounds] : lungs were clear to auscultation bilaterally [Heart Rate And Rhythm] : heart rate was normal and rhythm regular [Heart Sounds] : normal S1 and S2 [Edema] : there was no peripheral edema [Bowel Sounds] : normal bowel sounds [Abdomen Soft] : soft [Abdomen Tenderness] : non-tender [Cervical Lymph Nodes Enlarged Posterior Bilaterally] : posterior cervical [Cervical Lymph Nodes Enlarged Anterior Bilaterally] : anterior cervical [Supraclavicular Lymph Nodes Enlarged Bilaterally] : supraclavicular [Abnormal Walk] : normal gait [Musculoskeletal - Swelling] : no joint swelling seen [Motor Tone] : muscle strength and tone were normal [] : no rash [No Focal Deficits] : no focal deficits [Impaired Insight] : insight and judgment were intact [FreeTextEntry1] : +tenderness to palpation of head of biceps tendon b/l, deltoid b/l, lateral epicondyles b/l, MCP b/l - no joint swelling or erythema, no ankle swelling

## 2022-08-13 NOTE — HISTORY OF PRESENT ILLNESS
[___ Month(s) Ago] : [unfilled] month(s) ago [FreeTextEntry1] : Pt says she has had a difficult time the last couple of weeks. She has a lot of stress at home as people are sick. Says she feels pain in her b/l hands and feels like her fibromyalgia might be attributing. Also complains of b/l ankle pain and swelling. Otherwise her symptoms are mild. Plans to have cervical spine steroid injection in future w/ her pain management doctor. Hesitant about spinal surgery for c-spine. Getting about 7-8 hours of sleep at night. \par \par ROS:\par -Admits to dry eyes/mouth and b/l hand weakness when she feels the pain, +trigeminal neuralgia pain\par -Denies fever, chills, chest pain, sob, abdominal pain, hematochezia, hematuria, rash, Raynaud, other joint pain

## 2022-08-15 DIAGNOSIS — M25.511 PAIN IN RIGHT SHOULDER: ICD-10-CM

## 2022-08-15 DIAGNOSIS — M54.10 RADICULOPATHY, SITE UNSPECIFIED: ICD-10-CM

## 2022-08-15 DIAGNOSIS — M75.100 UNSPECIFIED ROTATOR CUFF TEAR OR RUPTURE OF UNSPECIFIED SHOULDER, NOT SPECIFIED AS TRAUMATIC: ICD-10-CM

## 2022-08-15 DIAGNOSIS — M25.50 PAIN IN UNSPECIFIED JOINT: ICD-10-CM

## 2022-08-15 DIAGNOSIS — M10.9 GOUT, UNSPECIFIED: ICD-10-CM

## 2022-10-13 RX ORDER — FLASH GLUCOSE SENSOR
KIT MISCELLANEOUS
Qty: 2 | Refills: 5 | Status: DISCONTINUED | COMMUNITY
Start: 2020-10-29 | End: 2022-10-13

## 2022-10-18 ENCOUNTER — NON-APPOINTMENT (OUTPATIENT)
Age: 62
End: 2022-10-18

## 2022-11-01 ENCOUNTER — OUTPATIENT (OUTPATIENT)
Dept: OUTPATIENT SERVICES | Facility: HOSPITAL | Age: 62
LOS: 1 days | End: 2022-11-01
Payer: MEDICAID

## 2022-11-01 ENCOUNTER — APPOINTMENT (OUTPATIENT)
Dept: NEUROLOGY | Facility: HOSPITAL | Age: 62
End: 2022-11-01

## 2022-11-01 VITALS
WEIGHT: 202 LBS | HEART RATE: 71 BPM | BODY MASS INDEX: 35.79 KG/M2 | HEIGHT: 63 IN | DIASTOLIC BLOOD PRESSURE: 84 MMHG | RESPIRATION RATE: 14 BRPM | TEMPERATURE: 97.6 F | SYSTOLIC BLOOD PRESSURE: 128 MMHG

## 2022-11-01 DIAGNOSIS — M54.12 RADICULOPATHY, CERVICAL REGION: ICD-10-CM

## 2022-11-01 DIAGNOSIS — Z98.89 OTHER SPECIFIED POSTPROCEDURAL STATES: Chronic | ICD-10-CM

## 2022-11-01 DIAGNOSIS — Z90.49 ACQUIRED ABSENCE OF OTHER SPECIFIED PARTS OF DIGESTIVE TRACT: Chronic | ICD-10-CM

## 2022-11-01 DIAGNOSIS — Z98.890 OTHER SPECIFIED POSTPROCEDURAL STATES: Chronic | ICD-10-CM

## 2022-11-01 DIAGNOSIS — Z90.710 ACQUIRED ABSENCE OF BOTH CERVIX AND UTERUS: Chronic | ICD-10-CM

## 2022-11-01 DIAGNOSIS — R51.9 HEADACHE, UNSPECIFIED: ICD-10-CM

## 2022-11-01 LAB
ALBUMIN SERPL ELPH-MCNC: 4.8 G/DL — SIGNIFICANT CHANGE UP (ref 3.3–5)
ALP SERPL-CCNC: 148 U/L — HIGH (ref 40–120)
ALT FLD-CCNC: 15 U/L — SIGNIFICANT CHANGE UP (ref 10–45)
ANION GAP SERPL CALC-SCNC: 13 MMOL/L — SIGNIFICANT CHANGE UP (ref 5–17)
AST SERPL-CCNC: 11 U/L — SIGNIFICANT CHANGE UP (ref 10–40)
BILIRUB SERPL-MCNC: 0.3 MG/DL — SIGNIFICANT CHANGE UP (ref 0.2–1.2)
BUN SERPL-MCNC: 14 MG/DL — SIGNIFICANT CHANGE UP (ref 7–23)
CALCIUM SERPL-MCNC: 10.1 MG/DL — SIGNIFICANT CHANGE UP (ref 8.4–10.5)
CHLORIDE SERPL-SCNC: 104 MMOL/L — SIGNIFICANT CHANGE UP (ref 96–108)
CO2 SERPL-SCNC: 22 MMOL/L — SIGNIFICANT CHANGE UP (ref 22–31)
CREAT SERPL-MCNC: 0.6 MG/DL — SIGNIFICANT CHANGE UP (ref 0.5–1.3)
EGFR: 101 ML/MIN/1.73M2 — SIGNIFICANT CHANGE UP
GLUCOSE SERPL-MCNC: 131 MG/DL — HIGH (ref 70–99)
POTASSIUM SERPL-MCNC: 4.5 MMOL/L — SIGNIFICANT CHANGE UP (ref 3.5–5.3)
POTASSIUM SERPL-SCNC: 4.5 MMOL/L — SIGNIFICANT CHANGE UP (ref 3.5–5.3)
PROT SERPL-MCNC: 7.3 G/DL — SIGNIFICANT CHANGE UP (ref 6–8.3)
SODIUM SERPL-SCNC: 140 MMOL/L — SIGNIFICANT CHANGE UP (ref 135–145)

## 2022-11-01 PROCEDURE — 80053 COMPREHEN METABOLIC PANEL: CPT

## 2022-11-01 PROCEDURE — 36415 COLL VENOUS BLD VENIPUNCTURE: CPT

## 2022-11-01 PROCEDURE — G0463: CPT

## 2022-11-01 RX ORDER — GABAPENTIN 300 MG/1
300 CAPSULE ORAL
Qty: 90 | Refills: 0 | Status: DISCONTINUED | COMMUNITY
Start: 2022-04-08 | End: 2022-11-01

## 2022-11-01 RX ORDER — POLYETHYLENE GLYCOL 3350 17 G/17G
17 POWDER, FOR SOLUTION ORAL
Qty: 238 | Refills: 0 | Status: DISCONTINUED | COMMUNITY
Start: 2022-03-08 | End: 2022-11-01

## 2022-11-01 RX ORDER — ICOSAPENT ETHYL 1000 MG/1
1 CAPSULE ORAL
Qty: 120 | Refills: 0 | Status: DISCONTINUED | COMMUNITY
Start: 2020-09-23 | End: 2022-11-01

## 2022-11-01 NOTE — ASSESSMENT
[FreeTextEntry1] : 60F R handed with a PMHx of R rotator cuff tear, DM, HTN, Asthma, CAD s/p stents, diverticulitis, Gout, OA, Fibromyalgia, trigeminal neuralgia, tremors here for a followup. No gross weakness or sensory changes on exam, improved compared to prior visit. Most recent MR Bab[place]ine 5/1 with large disc protrusion at C5-6, smaller protrusion at C4-5. \par \par Impression: b/l UE weakness with neck pain likely 2/2 known b/l cervical radiculopathy seen on EMG/NCS. Also with mildly controlled L trigeminal neuralgia. Also headaches which are controlled w/ Nurtec.  \par \par Plan:\par [] Continue PT\par [] Trileptal 150mg BID for Trigeminal neuralgia\par [] Continue Nurtec 75mg qday PRN, sent pharmacy\par [] CMP to check for Na level\par [] F/u nsgy for trigeminal neuralgia and cervical radiculopathy\par [] F/u interventional pain specialist \par [] RTC in 4 months, or sooner if symptoms poorly controlled\par \par Case discussed w/ neuro attending Dr. Nissenbaum.

## 2022-11-01 NOTE — PHYSICAL EXAM
[General Appearance - Alert] : alert [General Appearance - In No Acute Distress] : in no acute distress [Oriented To Time, Place, And Person] : oriented to person, place, and time [Impaired Insight] : insight and judgment were intact [Affect] : the affect was normal [Mood] : the mood was normal [Memory Recent] : recent memory was not impaired [Fluency] : fluency intact [Cranial Nerves Optic (II)] : visual acuity intact bilaterally,  visual fields full to confrontation, pupils equal round and reactive to light [Cranial Nerves Oculomotor (III)] : extraocular motion intact [Cranial Nerves Trigeminal (V)] : facial sensation intact symmetrically [Cranial Nerves Facial (VII)] : face symmetrical [Cranial Nerves Vestibulocochlear (VIII)] : hearing was intact bilaterally [Cranial Nerves Accessory (XI - Cranial And Spinal)] : head turning and shoulder shrug symmetric [Cranial Nerves Hypoglossal (XII)] : there was no tongue deviation with protrusion [Motor Tone] : muscle tone was normal in all four extremities [Motor Handedness Right-Handed] : the patient is right hand dominant [Hand Weakness Right] : the hand  was weak [4] : fingers flexion 4/5 [Hand Weakness Left] : the hand  was weak [Sensation Vibration Decrease] : vibration was intact [Abnormal Walk] : normal gait [1+] : Patella left 1+ [Extraocular Movements] : extraocular movements were intact [Person] : oriented to person [Place] : oriented to place [Time] : oriented to time [5] : knee extension 5/5 [Sensation Tactile Decrease] : light touch was intact [No APD] : no afferent pupillary defect [Coordination - Dysmetria Impaired Finger-to-Nose Bilateral] : not present [FreeTextEntry7] : Decreased to LT on b/l UE, L worse than R [FreeTextEntry1] : No midline spinal tenderness

## 2022-11-01 NOTE — HISTORY OF PRESENT ILLNESS
[FreeTextEntry1] : Interval Hx 11/1/22:\par Patient presents today for follow up for cervical radiculopathy, and trigeminal neuralgia and headache.\par Trigeminal neuralgia is mildly controlled on trileptal 300mg qday. 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 Still endorsing weakness in the UE's (L>R), but now numbness/tingling. Has appt w/ Dr. Nava this month. She is not currently taking any medications for her radicular pain. Taking Nurtec for her headaches, that are likely cervicogenic in etiology. Since last appt, patient saw interventional pain specialist (Dr. Camejo) in [place], [place] and received cervical epidural injection x1 in 9/2022, no relief. Also received L shoulder injection w/ minimal relief. Has another appt to see same pain specialist this month. Of note, started PT only 2 weeks ago. Also endorsing mild headaches for which Nurtec is controlling, needs refill. \par \par Interval Hx 5/24/22:\par Patient presents today for follow up for cervical radiculopathy, b/l carpal tunnel and trigeminal neuralgia.\par Trigeminal neuralgia is moderately controlled on trileptal. Previous MRI brain showed compression of the L trigeminal nerve, for which Dr. Dunn offered to perform surgery but patient declined. \par She also currently has a R rotator cuff tear and was referred for surgery, which patient also declined. \par She is not currently taking any medications for her radicular pain. She was prescribed Naproxen but takes Tylenol PRN when her pain is really bad. Currently states today is a "bad day" and her pain is 8/10. Has not yet received PT as was waiting for MRI Cspine to be performed. MRI Cspine shows \par \par \par 60F R handed with a PMHx of R rotator cuff tear, DM, HTN, Asthma, CAD s/p stents, diverticulitis, Gout, OA, Fibromyalgia, trigeminal neuralgia, tremors presents to establish care. Pt saw seeing neurologist Dr. Leroy Mota but had insurance issues. \par \par Pt has had >20 years of neck pain, was a former seamstress. Progressive, worsening b/l hand weakness since 2018, which initially brought her to see a neurologist. She has reported C5-7 nerve impingements. Last MRI C spine done in 2020. She also reports R index finger numbness, no paresthesias. \par \par Pt diagnosed with L sided trigeminal neuralgia in 2019, MRI done. She is on Trileptal lowest dose twice a day, no significant difference because she has only had mild symptoms but has slightly decreased frequency of headaches, occasional ear and jaw pain and tearing. She saw neurosurgery Dr. Hammond for trigeminal neuralgia, who offered surgery but pt refused. \par \par Pt reports b/l hand tremors since 2019, intermittent but alternating hands, when eating and sewing, not related to her trigeminal neuralgia pain, lasting seconds at a time. No worsening with anxiety, no alcohol intake, no family hx of tremors. \par \par Pt reports diffuse joint pains due to her fibromyalgia, was on duloxetine 60mg daily but discontinued due to nausea. Now just taking tylenol PRN for it. She has not had physical therapy or occupational therapy recently. \par \par EMG 8/2021 with Dr. Leroy Mota\par 1. b/l C4-5 and C5-6 radiculopathy\par 2. b/l median motor neuropathy\par 3. b/l ulnar motor neuropathy\par 4. b/l median sensory neuropathy

## 2022-11-01 NOTE — REVIEW OF SYSTEMS
[SOB on Exertion] : shortness of breath during exertion [Arthralgias] : arthralgias [Limb Pain] : limb pain [As Noted in HPI] : as noted in HPI [Arm Weakness] : arm weakness [Fever] : no fever [Numbness] : no numbness [Joint Swelling] : no joint swelling

## 2022-11-01 NOTE — DATA REVIEWED
[de-identified] : MRI [facility] w/o 5/1/22:\par \par   INTERPRETATION:\par \par BONES AND BONE MARROW:  There is no fracture. There is mild endplate discogenic edema at C4-5 and C5-6.\par INTERVERTEBRAL DISCS: There is disc desiccation throughout the cervical spine with loss of disc height notably at C5-6.\par ALIGNMENT:  The craniocervical junction and atlantoaxial intervals are maintained. There is mild retrolisthesis of C5 on C6.\par SPINAL CORD: The spinal cord is normal in size and signal intensity.\par EXTRASPINAL: There is no prevertebral soft tissue swelling. There is mild mucosal thickening in the right maxillary sinus.\par \par Evaluation of individual disc space levels demonstrates the following:\par \par C2-C3: There is no significant spinal canal or neural foraminal stenosis.\par C3-C4: Small central posterior disc protrusion. No significant central canal or neural foraminal stenosis.\par C4-C5: Central disc protrusion with mild central canal stenosis. No neural foraminal stenosis.\par C5-C6: Broad-based central posterior disc protrusion with moderate bilateral neural foraminal stenosis and moderate central stenosis.\par C6-C7: There is no significant spinal canal or neural foraminal stenosis.\par C7-T1: Left eccentric posterior disc osteophyte complex with minimal left neural foraminal stenosis. No right neural foraminal or central stenosis.\par \par IMPRESSION:\par C5-6 mild retrolisthesis and broad-based posterior disc protrusion with moderate bilateral neural foraminal stenosis and moderate central stenosis.\par \par C4-5 small central disc protrusion with mild central canal stenosis.\par \par Milder findings elsewhere in the cervical spine as above.\par \par \par \par \par \par \par \par \par  \par \par

## 2022-11-08 ENCOUNTER — NON-APPOINTMENT (OUTPATIENT)
Age: 62
End: 2022-11-08

## 2022-11-11 ENCOUNTER — NON-APPOINTMENT (OUTPATIENT)
Age: 62
End: 2022-11-11

## 2022-11-11 ENCOUNTER — APPOINTMENT (OUTPATIENT)
Dept: ENDOCRINOLOGY | Facility: CLINIC | Age: 62
End: 2022-11-11

## 2022-11-11 ENCOUNTER — APPOINTMENT (OUTPATIENT)
Dept: NEUROSURGERY | Facility: CLINIC | Age: 62
End: 2022-11-11

## 2022-11-11 VITALS
HEIGHT: 60 IN | OXYGEN SATURATION: 94 % | SYSTOLIC BLOOD PRESSURE: 124 MMHG | DIASTOLIC BLOOD PRESSURE: 78 MMHG | HEART RATE: 74 BPM | TEMPERATURE: 97.3 F

## 2022-11-11 PROCEDURE — 36415 COLL VENOUS BLD VENIPUNCTURE: CPT

## 2022-11-11 PROCEDURE — 99215 OFFICE O/P EST HI 40 MIN: CPT

## 2022-11-11 NOTE — REVIEW OF SYSTEMS
[Migraine Headache] : migraine headaches [Difficulty Walking] : difficulty walking [Joint Pain] : joint pain [Negative] : Heme/Lymph

## 2022-11-11 NOTE — PHYSICAL EXAM
[General Appearance - Alert] : alert [General Appearance - Well Nourished] : well nourished [Person] : oriented to person [Oriented To Time, Place, And Person] : oriented to person, place, and time [Place] : oriented to place [Time] : oriented to time [Cranial Nerves Optic (II)] : visual acuity intact bilaterally,  pupils equal round and reactive to light [Cranial Nerves Trigeminal (V)] : facial sensation intact symmetrically [Cranial Nerves Oculomotor (III)] : extraocular motion intact [Cranial Nerves Facial (VII)] : face symmetrical [Cranial Nerves Vestibulocochlear (VIII)] : hearing was intact bilaterally [Cranial Nerves Glossopharyngeal (IX)] : tongue and palate midline [Cranial Nerves Accessory (XI - Cranial And Spinal)] : head turning and shoulder shrug symmetric [Cranial Nerves Hypoglossal (XII)] : there was no tongue deviation with protrusion [Sclera] : the sclera and conjunctiva were normal [Outer Ear] : the ears and nose were normal in appearance [Neck Appearance] : the appearance of the neck was normal [] : no respiratory distress [Respiration, Rhythm And Depth] : normal respiratory rhythm and effort [Exaggerated Use Of Accessory Muscles For Inspiration] : no accessory muscle use [Heart Rate And Rhythm] : heart rate was normal and rhythm regular [Abnormal Walk] : normal gait [Skin Color & Pigmentation] : normal skin color and pigmentation

## 2022-11-14 LAB
CHOLEST SERPL-MCNC: 189 MG/DL
CREAT SPEC-SCNC: 53 MG/DL
ESTIMATED AVERAGE GLUCOSE: 151 MG/DL
HBA1C MFR BLD HPLC: 6.9 %
HDLC SERPL-MCNC: 56 MG/DL
LDLC SERPL CALC-MCNC: 89 MG/DL
MICROALBUMIN 24H UR DL<=1MG/L-MCNC: <1.2 MG/DL
MICROALBUMIN/CREAT 24H UR-RTO: NORMAL MG/G
NONHDLC SERPL-MCNC: 133 MG/DL
TRIGL SERPL-MCNC: 221 MG/DL

## 2022-11-17 NOTE — HISTORY OF PRESENT ILLNESS
[FreeTextEntry1] : Kinjal Samaniego is a pleasant 62 year old lady who presented for consultation regarding left facial pain 10/30/21. Today she presents for f/u VISIT. She has similar pain in left temporal pain and left cheek and left occipital area pain. She also reports left facial numbness. States that she follows up with her Neurologist and Rheumatologist. States that she was prescribed Tegretol however she has not taken any as prescribed. She is currently taking Prednisone 10 mg daily. Denies pain in left jaw, and is not worsened with cold, eating and is not described as a stabbing pain.  Work up was negative for temporal arteritis. \par \par Pain presentation is V1, V2 and V3 which is more compatible with Type 2 TN. \par \par Her Neurologist is Dr. Nissenbaum and states that she takes Oxcarbazepine 150 mg 2 x daily as directed and she has not had any episodes of severe facial pain that she can currently recall in the recent past.  Medication dosing time was adjusted on 11/1/22.\par \par She reports headaches, tremors and pain in upper extremities that she states are related to cervical spine stenosis.

## 2022-11-17 NOTE — ASSESSMENT
[FreeTextEntry1] : IMPRESSION MRI BRAIN 4/20/21\par Within the limits of study described above:\par \par -No acute infarct, hemorrhage, or mass lesion.\par \par -Compression of the left trigeminal nerve just as it enters left Meckel's cave by likely dystrophic calcification arising from the left anterior tentorial leaflet. No vascular compression of the trigeminal nerves identified.\par \par Today she presented for a clinical visit without any new imaging.  She is unable to recall a recent event of severe facial pain.  Takes Oxcarbazepine 150 mg 2 x daily as directed under the care of a new Neurologist Dr. Nissenbaum.\par \par \par \par PLAN:\par 1. No new imaging at this time.\par 2. Will f/u with patient in 2 weeks to see how the new medication regimen is working to keep facial pain controlled.\par

## 2022-11-18 ENCOUNTER — APPOINTMENT (OUTPATIENT)
Dept: ENDOCRINOLOGY | Facility: CLINIC | Age: 62
End: 2022-11-18

## 2022-11-18 VITALS
OXYGEN SATURATION: 95 % | DIASTOLIC BLOOD PRESSURE: 81 MMHG | SYSTOLIC BLOOD PRESSURE: 125 MMHG | WEIGHT: 202 LBS | HEART RATE: 67 BPM | HEIGHT: 60 IN | BODY MASS INDEX: 39.66 KG/M2

## 2022-11-18 LAB — GLUCOSE BLDC GLUCOMTR-MCNC: 132

## 2022-11-18 PROCEDURE — 95251 CONT GLUC MNTR ANALYSIS I&R: CPT

## 2022-11-18 PROCEDURE — 82962 GLUCOSE BLOOD TEST: CPT

## 2022-11-18 PROCEDURE — 99213 OFFICE O/P EST LOW 20 MIN: CPT

## 2022-11-20 NOTE — HISTORY OF PRESENT ILLNESS
[Continuous Glucose Monitoring] : Continuous Glucose Monitoring: Yes [Mega] : Mega [FreeTextEntry1] : This is a 63 yo female who presents for diabetes follow up\par \par At last endocrine visit in Aug 2022, A1c noted 7.3% at that time and advised to increase Basaglar 32U qhs and continue admelog 10U TID ac. She is taking Repatha and Vascepa, last LDL is 70. Not on statins due to myalgias.\par \par Need refills for all meds, noted A1c is 6.9%. Had yeast infection last month. Lost 6lbs since last visit.  [FreeTextEntry2] : 97 [FreeTextEntry3] : 3+0 [FreeTextEntry4] : 0+0 [de-identified] : 6.5 [FreeTextEntry5] : 134 [FreeTextEntry6] : 14.2

## 2022-11-20 NOTE — ASSESSMENT
[FreeTextEntry1] : Type 2 DM\par HgbA1c 6.9%, at goal\par She has h/o GI intolerance to Metformin and Acarbose\par She has h/o pancreatitis, cannot use GLP-1 agonist as result\par Reviewed freestyle bee download, TIR is 97%, 3% high, 0% lows. Avg daily glucose is 134mgdl, GMI noted 6.5%\par Continue Basaglar 32U qhs\par Continue admelog 10U TID ac for now\par Has appt with ophthalmology for annual diabetic screening on 8/26/22. No h/o retinopathy as per patient\par No diabetic foot ulcers on exam today\par On Repatha and Vascepa (skips tablets 1month), last LDL is 70. Not on statins due to myalgias. Urine alb/crt in jan 2022 is normal.\par Refilled bee sensors to [facility] today. \par \par Answered all questions today; patient verbalized understanding of the above\par RTC in 3 months\par  [Diabetes Foot Care] : diabetes foot care [Long Term Vascular Complications] : long term vascular complications of diabetes [Carbohydrate Consistent Diet] : carbohydrate consistent diet [Importance of Diet and Exercise] : importance of diet and exercise to improve glycemic control, achieve weight loss and improve cardiovascular health [Hypoglycemia Management] : hypoglycemia management [Action and use of Insulin] : action and use of short and long-acting insulin [Self Monitoring of Blood Glucose] : self monitoring of blood glucose [Injection Technique, Storage, Sharps Disposal] : injection technique, storage, and sharps disposal [Retinopathy Screening] : Patient was referred to ophthalmology for retinopathy screening [Weight Loss] : weight loss [Diabetic Medications] : Risks and benefits of diabetic medications were discussed

## 2022-12-20 ENCOUNTER — OUTPATIENT (OUTPATIENT)
Dept: OUTPATIENT SERVICES | Facility: HOSPITAL | Age: 62
LOS: 1 days | End: 2022-12-20
Payer: MEDICAID

## 2022-12-20 ENCOUNTER — APPOINTMENT (OUTPATIENT)
Dept: GASTROENTEROLOGY | Facility: HOSPITAL | Age: 62
End: 2022-12-20

## 2022-12-20 VITALS
HEART RATE: 79 BPM | WEIGHT: 202 LBS | DIASTOLIC BLOOD PRESSURE: 74 MMHG | SYSTOLIC BLOOD PRESSURE: 118 MMHG | TEMPERATURE: 97.8 F | RESPIRATION RATE: 14 BRPM | HEIGHT: 60 IN | BODY MASS INDEX: 39.66 KG/M2

## 2022-12-20 DIAGNOSIS — Z98.890 OTHER SPECIFIED POSTPROCEDURAL STATES: Chronic | ICD-10-CM

## 2022-12-20 DIAGNOSIS — K31.84 GASTROPARESIS: ICD-10-CM

## 2022-12-20 DIAGNOSIS — E66.9 OBESITY, UNSPECIFIED: ICD-10-CM

## 2022-12-20 DIAGNOSIS — K57.30 DIVERTICULOSIS OF LARGE INTESTINE WITHOUT PERFORATION OR ABSCESS WITHOUT BLEEDING: ICD-10-CM

## 2022-12-20 DIAGNOSIS — R10.9 UNSPECIFIED ABDOMINAL PAIN: ICD-10-CM

## 2022-12-20 DIAGNOSIS — Z98.89 OTHER SPECIFIED POSTPROCEDURAL STATES: Chronic | ICD-10-CM

## 2022-12-20 DIAGNOSIS — Z90.710 ACQUIRED ABSENCE OF BOTH CERVIX AND UTERUS: Chronic | ICD-10-CM

## 2022-12-20 DIAGNOSIS — K21.9 GASTRO-ESOPHAGEAL REFLUX DISEASE WITHOUT ESOPHAGITIS: ICD-10-CM

## 2022-12-20 DIAGNOSIS — R10.32 LEFT LOWER QUADRANT PAIN: ICD-10-CM

## 2022-12-20 DIAGNOSIS — K59.00 CONSTIPATION, UNSPECIFIED: ICD-10-CM

## 2022-12-20 DIAGNOSIS — R14.0 ABDOMINAL DISTENSION (GASEOUS): ICD-10-CM

## 2022-12-20 DIAGNOSIS — Z90.49 ACQUIRED ABSENCE OF OTHER SPECIFIED PARTS OF DIGESTIVE TRACT: Chronic | ICD-10-CM

## 2022-12-20 DIAGNOSIS — R13.10 DYSPHAGIA, UNSPECIFIED: ICD-10-CM

## 2022-12-20 PROCEDURE — 85025 COMPLETE CBC W/AUTO DIFF WBC: CPT

## 2022-12-20 PROCEDURE — 99214 OFFICE O/P EST MOD 30 MIN: CPT | Mod: GC

## 2022-12-20 PROCEDURE — 36415 COLL VENOUS BLD VENIPUNCTURE: CPT

## 2022-12-20 PROCEDURE — G0463: CPT

## 2022-12-20 PROCEDURE — 80053 COMPREHEN METABOLIC PANEL: CPT

## 2022-12-20 PROCEDURE — 82977 ASSAY OF GGT: CPT

## 2022-12-20 PROCEDURE — 84080 ASSAY ALKALINE PHOSPHATASES: CPT

## 2022-12-20 RX ORDER — AVOBENZONE, HOMOSALATE, OCTISALATE, OCTOCRYLENE, AND OXYBENZONE 29.4; 147; 49; 25.4; 58.8 MG/ML; MG/ML; MG/ML; MG/ML; MG/ML
51.7 LOTION TOPICAL DAILY
Qty: 1 | Refills: 5 | Status: ACTIVE | COMMUNITY
Start: 2022-12-20 | End: 1900-01-01

## 2022-12-20 NOTE — PHYSICAL EXAM
[General Appearance - Alert] : alert [General Appearance - In No Acute Distress] : in no acute distress [General Appearance - Well Nourished] : well nourished [General Appearance - Well Developed] : well developed [General Appearance - Well-Appearing] : healthy appearing [Sclera] : the sclera and conjunctiva were normal [Extraocular Movements] : extraocular movements were intact [Outer Ear] : the ears and nose were normal in appearance [Hearing Threshold Finger Rub Not Wallace] : hearing was normal [Neck Appearance] : the appearance of the neck was normal [Neck Cervical Mass (___cm)] : no neck mass was observed [Exaggerated Use Of Accessory Muscles For Inspiration] : no accessory muscle use [Heart Rate And Rhythm] : heart rate was normal and rhythm regular [Abdomen Soft] : soft [Abdomen Tenderness] : non-tender [Abnormal Walk] : normal gait [Nail Clubbing] : no clubbing  or cyanosis of the fingernails [Skin Color & Pigmentation] : normal skin color and pigmentation [] : no rash [Oriented To Time, Place, And Person] : oriented to person, place, and time

## 2022-12-20 NOTE — END OF VISIT
[] : Fellow [FreeTextEntry3] : As modified and discussed with patient\par MD ANKIT Aranda [facility]\par Associate Professor of Medicine\par [employer] [facility] [employer]\par

## 2022-12-20 NOTE — ASSESSMENT
[FreeTextEntry1] : 61F PMH of HTN. HLD (on Rapatha), CAD s/p 2 LAD stents (02/2019), DM type 2 (well managed as per pt, endo Dr Mendosa, last A1C unknown; DM complicated by peripheral neuropathy), diverticulitis s/p Hartmans and reversal in 2013 at [facility], MYLES not on CPAP,  Asthma (controlled, as per pt), Cdiff (>10 episodes), fibromyalgia presenting for follow up. \par \par Impression: \par #LLQ abdominal pain: reporting on-going LLQ abdominal pain. Ddx unlikely acute diverticulitis episode at this time (currently afebrile without severe pain) vs constipation vs pelvic pain (Hx of ovarian cyst) vs pain 2/2 adhesions \par #Abdominal bloating: r/o gastroparesis (Hx of DM) vs possible 2/2 IBS vs SIBO \par #Globus sensation, difficulty swallowing: endorsing "slowed" transit of food through esophagus and globus sensation. EGD (4/2022) without structural abnormalities seen to explain symptoms. Will consider evaluating for esophageal motiliity d/o \par #Diverticulosis \par #CRC screening: completed 4/2022 without e/o polyps, recommended in 10 years \par #GERD: 1-2x weekly epigastric pain/reflux, now on PPI\par #Hx diverticulosis s/p Otoole's  \par #Obesity BMI 36\par \par Recommendations: \par - Fiber 35g daily\par - Miralax titrated to 1 soft BM daily \par - CT a/p to evaluate LLQ abd pain \par - NM gastric emptying study \par - Esophageal manometry to evaluate difficulty swallowing\par - PPI daily for GERD\par - Weight loss discussed\par - RTC 1-2 months for close follow up of symptoms / work up \par \par \par Jose Lott MD\par GI Fellow PGY6

## 2022-12-20 NOTE — HISTORY OF PRESENT ILLNESS
[Heartburn] : denies heartburn [Nausea] : denies nausea [Vomiting] : denies vomiting [Diarrhea] : denies diarrhea [Constipation] : improvement in constipation [Yellow Skin Or Eyes (Jaundice)] : denies jaundice [Abdominal Pain] : denies abdominal pain [Abdominal Swelling] : denies abdominal swelling [Rectal Pain] : denies rectal pain [de-identified] : 61F PMH of HTN. HLD (on Rapatha), CAD s/p 2 LAD stents (02/2019), DM type 2 (well managed as per pt, endo Dr Mendosa, last A1C unknown; DM complicated by peripheral neuropathy), diverticulitis s/p Hartmans and reversal in 2013 at [facility], MYLES not on CPAP,  Asthma (controlled, as per pt), Cdiff (>10 episodes), fibromyalgia presenting for follow up. \par \par Interval Hx: \par Pt reporting LLQ abdominal pain x several weeks, denies associated diarrhea, fevers, or severity of abdominal pain. She's concerned this may be an early presentation of previous diverticulitis attacks. \par \par Also reporting significant abdominal distension + bloating after meals. Endorsing belching more frequently throughout the day as well. Denies n/v/d, weight loss, early satiety. \par \par Regarding swallowing -- she reports she feels food transits "very slowly" through the esophagus and intermittent globus sensation. Denies odynophagia, weight loss, n/v, early satiety. \par \par EGD (4/2022): gastritis (H. pylori negative) + esophageal biopsies wnl\par Colonoscopy (4/2022): diverticulosis, healthy colo-colonic anastomosis, internal hemorrhoids\par \par Previous clinic visit Hx: \par Having episodes of hematochezia intermittently x 1 year (sometimes scant, sometimes more than scant amounts) -- relates this to hemorrhoids, however has not had colonoscopy since 2014. Reports she's had difficulty obtaining a colonoscopy 2/2 multiple episodes of c diff infections (reports 10+ in her lifetime). Also has heartburn/reflux 1-2x per week late at night, doesn’t take PPI (says it makes her "feel sick") \par \par Currently denies abd pain, n/v/d/f/c, melena, weight loss. Improved constipation since taking senna. Recently had a cath 2/23/22 showing CAD with patent stents. Continues on ASA/plavix. \par \par Denies family Hx of CRC, gastric, ovarian, endometrial, pancreatic, uterine malignancy.

## 2022-12-21 LAB
ALBUMIN SERPL ELPH-MCNC: 4.5 G/DL — SIGNIFICANT CHANGE UP (ref 3.3–5)
ALP SERPL-CCNC: 153 U/L — HIGH (ref 40–120)
ALT FLD-CCNC: 14 U/L — SIGNIFICANT CHANGE UP (ref 10–45)
ANION GAP SERPL CALC-SCNC: 15 MMOL/L — SIGNIFICANT CHANGE UP (ref 5–17)
AST SERPL-CCNC: 12 U/L — SIGNIFICANT CHANGE UP (ref 10–40)
BASOPHILS # BLD AUTO: 0.08 K/UL — SIGNIFICANT CHANGE UP (ref 0–0.2)
BASOPHILS NFR BLD AUTO: 0.8 % — SIGNIFICANT CHANGE UP (ref 0–2)
BILIRUB SERPL-MCNC: 0.3 MG/DL — SIGNIFICANT CHANGE UP (ref 0.2–1.2)
BUN SERPL-MCNC: 14 MG/DL — SIGNIFICANT CHANGE UP (ref 7–23)
CALCIUM SERPL-MCNC: 10.2 MG/DL — SIGNIFICANT CHANGE UP (ref 8.4–10.5)
CHLORIDE SERPL-SCNC: 102 MMOL/L — SIGNIFICANT CHANGE UP (ref 96–108)
CO2 SERPL-SCNC: 23 MMOL/L — SIGNIFICANT CHANGE UP (ref 22–31)
CREAT SERPL-MCNC: 0.59 MG/DL — SIGNIFICANT CHANGE UP (ref 0.5–1.3)
EGFR: 102 ML/MIN/1.73M2 — SIGNIFICANT CHANGE UP
EOSINOPHIL # BLD AUTO: 0.3 K/UL — SIGNIFICANT CHANGE UP (ref 0–0.5)
EOSINOPHIL NFR BLD AUTO: 3 % — SIGNIFICANT CHANGE UP (ref 0–6)
GGT SERPL-CCNC: 22 U/L — SIGNIFICANT CHANGE UP (ref 8–40)
GLUCOSE SERPL-MCNC: 97 MG/DL — SIGNIFICANT CHANGE UP (ref 70–99)
HCT VFR BLD CALC: 44.4 % — SIGNIFICANT CHANGE UP (ref 34.5–45)
HGB BLD-MCNC: 14.1 G/DL — SIGNIFICANT CHANGE UP (ref 11.5–15.5)
IMM GRANULOCYTES NFR BLD AUTO: 0.4 % — SIGNIFICANT CHANGE UP (ref 0–0.9)
LYMPHOCYTES # BLD AUTO: 3.54 K/UL — HIGH (ref 1–3.3)
LYMPHOCYTES # BLD AUTO: 35.4 % — SIGNIFICANT CHANGE UP (ref 13–44)
MCHC RBC-ENTMCNC: 26.8 PG — LOW (ref 27–34)
MCHC RBC-ENTMCNC: 31.8 GM/DL — LOW (ref 32–36)
MCV RBC AUTO: 84.3 FL — SIGNIFICANT CHANGE UP (ref 80–100)
MONOCYTES # BLD AUTO: 0.91 K/UL — HIGH (ref 0–0.9)
MONOCYTES NFR BLD AUTO: 9.1 % — SIGNIFICANT CHANGE UP (ref 2–14)
NEUTROPHILS # BLD AUTO: 5.12 K/UL — SIGNIFICANT CHANGE UP (ref 1.8–7.4)
NEUTROPHILS NFR BLD AUTO: 51.3 % — SIGNIFICANT CHANGE UP (ref 43–77)
PLATELET # BLD AUTO: 268 K/UL — SIGNIFICANT CHANGE UP (ref 150–400)
POTASSIUM SERPL-MCNC: 4.1 MMOL/L — SIGNIFICANT CHANGE UP (ref 3.5–5.3)
POTASSIUM SERPL-SCNC: 4.1 MMOL/L — SIGNIFICANT CHANGE UP (ref 3.5–5.3)
PROT SERPL-MCNC: 7.2 G/DL — SIGNIFICANT CHANGE UP (ref 6–8.3)
RBC # BLD: 5.27 M/UL — HIGH (ref 3.8–5.2)
RBC # FLD: 15.2 % — HIGH (ref 10.3–14.5)
SODIUM SERPL-SCNC: 139 MMOL/L — SIGNIFICANT CHANGE UP (ref 135–145)
WBC # BLD: 9.99 K/UL — SIGNIFICANT CHANGE UP (ref 3.8–10.5)
WBC # FLD AUTO: 9.99 K/UL — SIGNIFICANT CHANGE UP (ref 3.8–10.5)

## 2022-12-26 LAB
ALP BONE SERPL-MCNC: 51 % — SIGNIFICANT CHANGE UP (ref 14–68)
ALP FLD-CCNC: 154 IU/L — HIGH (ref 44–121)
ALP INTEST CFR SERPL: 0 % — SIGNIFICANT CHANGE UP (ref 0–18)
ALP LIVER SERPL-CCNC: 49 % — SIGNIFICANT CHANGE UP (ref 18–85)

## 2023-01-24 ENCOUNTER — NON-APPOINTMENT (OUTPATIENT)
Age: 63
End: 2023-01-24

## 2023-01-26 ENCOUNTER — OUTPATIENT (OUTPATIENT)
Dept: OUTPATIENT SERVICES | Facility: HOSPITAL | Age: 63
LOS: 1 days | End: 2023-01-26
Payer: MEDICAID

## 2023-01-26 ENCOUNTER — APPOINTMENT (OUTPATIENT)
Dept: CV DIAGNOSTICS | Facility: HOSPITAL | Age: 63
End: 2023-01-26

## 2023-01-26 DIAGNOSIS — Z90.49 ACQUIRED ABSENCE OF OTHER SPECIFIED PARTS OF DIGESTIVE TRACT: Chronic | ICD-10-CM

## 2023-01-26 DIAGNOSIS — I25.10 ATHEROSCLEROTIC HEART DISEASE OF NATIVE CORONARY ARTERY WITHOUT ANGINA PECTORIS: ICD-10-CM

## 2023-01-26 DIAGNOSIS — Z90.710 ACQUIRED ABSENCE OF BOTH CERVIX AND UTERUS: Chronic | ICD-10-CM

## 2023-01-26 DIAGNOSIS — Z98.89 OTHER SPECIFIED POSTPROCEDURAL STATES: Chronic | ICD-10-CM

## 2023-01-26 DIAGNOSIS — Z98.890 OTHER SPECIFIED POSTPROCEDURAL STATES: Chronic | ICD-10-CM

## 2023-01-26 PROCEDURE — 78452 HT MUSCLE IMAGE SPECT MULT: CPT | Mod: MH

## 2023-01-26 PROCEDURE — 93016 CV STRESS TEST SUPVJ ONLY: CPT

## 2023-01-26 PROCEDURE — 93018 CV STRESS TEST I&R ONLY: CPT

## 2023-01-26 PROCEDURE — 78452 HT MUSCLE IMAGE SPECT MULT: CPT | Mod: 26,MH

## 2023-01-26 PROCEDURE — 93017 CV STRESS TEST TRACING ONLY: CPT

## 2023-01-26 PROCEDURE — A9500: CPT

## 2023-02-03 ENCOUNTER — RESULT REVIEW (OUTPATIENT)
Age: 63
End: 2023-02-03

## 2023-02-03 ENCOUNTER — OUTPATIENT (OUTPATIENT)
Dept: OUTPATIENT SERVICES | Facility: HOSPITAL | Age: 63
LOS: 1 days | End: 2023-02-03
Payer: MEDICAID

## 2023-02-03 ENCOUNTER — APPOINTMENT (OUTPATIENT)
Dept: RHEUMATOLOGY | Facility: HOSPITAL | Age: 63
End: 2023-02-03
Payer: MEDICAID

## 2023-02-03 VITALS
SYSTOLIC BLOOD PRESSURE: 112 MMHG | HEART RATE: 68 BPM | HEIGHT: 60 IN | BODY MASS INDEX: 39.46 KG/M2 | TEMPERATURE: 96.2 F | WEIGHT: 201 LBS | DIASTOLIC BLOOD PRESSURE: 71 MMHG | RESPIRATION RATE: 16 BRPM

## 2023-02-03 DIAGNOSIS — Z98.89 OTHER SPECIFIED POSTPROCEDURAL STATES: Chronic | ICD-10-CM

## 2023-02-03 DIAGNOSIS — Z90.710 ACQUIRED ABSENCE OF BOTH CERVIX AND UTERUS: Chronic | ICD-10-CM

## 2023-02-03 DIAGNOSIS — M06.9 RHEUMATOID ARTHRITIS, UNSPECIFIED: ICD-10-CM

## 2023-02-03 DIAGNOSIS — M17.5 OTHER UNILATERAL SECONDARY OSTEOARTHRITIS OF KNEE: ICD-10-CM

## 2023-02-03 DIAGNOSIS — Z98.890 OTHER SPECIFIED POSTPROCEDURAL STATES: Chronic | ICD-10-CM

## 2023-02-03 DIAGNOSIS — M79.7 FIBROMYALGIA: ICD-10-CM

## 2023-02-03 DIAGNOSIS — Z90.49 ACQUIRED ABSENCE OF OTHER SPECIFIED PARTS OF DIGESTIVE TRACT: Chronic | ICD-10-CM

## 2023-02-03 PROCEDURE — 99213 OFFICE O/P EST LOW 20 MIN: CPT | Mod: GC

## 2023-02-03 PROCEDURE — 73564 X-RAY EXAM KNEE 4 OR MORE: CPT | Mod: 26,LT,RT

## 2023-02-03 PROCEDURE — 73564 X-RAY EXAM KNEE 4 OR MORE: CPT

## 2023-02-03 PROCEDURE — G0463: CPT

## 2023-02-03 RX ORDER — OXCARBAZEPINE 150 MG/1
150 TABLET, FILM COATED ORAL
Qty: 60 | Refills: 3 | Status: DISCONTINUED | COMMUNITY
Start: 2022-11-01 | End: 2023-02-03

## 2023-02-03 RX ORDER — POLYETHYLENE GLYCOL 3350 17 G/17G
17 POWDER, FOR SOLUTION ORAL DAILY
Qty: 1 | Refills: 11 | Status: DISCONTINUED | COMMUNITY
Start: 2022-12-20 | End: 2023-02-03

## 2023-02-03 RX ORDER — OXCARBAZEPINE 300 MG/1
300 TABLET, FILM COATED ORAL TWICE DAILY
Qty: 30 | Refills: 3 | Status: DISCONTINUED | COMMUNITY
End: 2023-02-03

## 2023-02-03 RX ORDER — BACLOFEN 10 MG/1
10 TABLET ORAL 3 TIMES DAILY
Qty: 90 | Refills: 0 | Status: ACTIVE | COMMUNITY
Start: 2023-02-03 | End: 1900-01-01

## 2023-02-03 RX ORDER — PANTOPRAZOLE 40 MG/1
40 TABLET, DELAYED RELEASE ORAL DAILY
Qty: 60 | Refills: 3 | Status: DISCONTINUED | COMMUNITY
Start: 2022-12-20 | End: 2023-02-03

## 2023-02-03 RX ORDER — DICLOFENAC SODIUM 1% 10 MG/G
1 GEL TOPICAL DAILY
Qty: 1 | Refills: 2 | Status: ACTIVE | COMMUNITY
Start: 2023-02-03 | End: 1900-01-01

## 2023-02-03 RX ORDER — ALBUTEROL SULFATE 90 UG/1
108 (90 BASE) AEROSOL, METERED RESPIRATORY (INHALATION)
Refills: 0 | Status: DISCONTINUED | COMMUNITY
End: 2023-02-03

## 2023-02-03 RX ORDER — RANOLAZINE 500 MG/1
500 TABLET, EXTENDED RELEASE ORAL
Qty: 60 | Refills: 0 | Status: DISCONTINUED | COMMUNITY
Start: 2022-03-02 | End: 2023-02-03

## 2023-02-05 NOTE — REVIEW OF SYSTEMS
[Feeling Poorly] : feeling poorly [Arthralgias] : arthralgias [Joint Pain] : joint pain [As Noted in HPI] : as noted in HPI [Negative] : Heme/Lymph [Joint Swelling] : joint swelling [Limb Pain] : limb pain [Dry Eyes] : no dryness of the eyes [FreeTextEntry9] : left knee and R shoulder. Neck pain w/muscle contraction

## 2023-02-05 NOTE — ASSESSMENT
[FreeTextEntry1] : 61 yo F PMH DM2, HTN, Asthma, CAD, diverticulitis, Gout, OA, Fibromyalgia, trigeminal neuralgia, cervical radiculopathy 2/2 large C5/C6 protrusion (MRI 5/2022) c/b b/l carpal tunnel, chronic R shoulder rotator cuff tear, supraspinatus tear presenting for follow up.\par \par #Gout\par 2016\par Has Gout (never had arthrocentesis though)\par Initial manifestation 1st MTPs\par Last flare up: 2020\par Risk factors: DM2, obesity \par Last uric acid 4 on 8/12/2022\par Currently on ULT with Allopurinol.\par \par #OA\par B/l knee L>R\par Previous hx of patellar aliment surgery b/l, and L meniscal tear repair 2017\par 1/25/22-XR of the knees showed b/l tibiofemoral OA change presence of peripheral joint osteophyte L>R. Medial Left knee narrowing\par \par \par \par #Left shoulder supraspinatus tear \par  MRI of the left shoulder showed full tickness tear of the supraspinatus tendon \par Moderate tendinosis of the infraspinatus tendon with shallow. Partial thickness tearing of the middle fibers of the distal subscapular with superimposed tendinosis\par s/p left shoulder steroid injection per pain medicine \par On PT\par \par #Cervical spine \par 11/11/2022 MRI cervical spine mild retrolisthesis and broad-based posterior disc protrusion with bilateral neural foraminal stenosis and moderate central stenosis \par Epidural injection per pain medicine\par \par #Fibromyalgia \par 2021 sleep study negative for MYLES\par Previous treatment gabapentin and Cymbalta ([id number]) stopped taking due to headache \par \par #De Quervin tenosynovitis of L thumb\par \par  \par Plan: \par -Recheck XR of the knee and discuss possible hyaluronic acid injection depending on the result. Provide cane for her OA\par -Recommend Voltaren gel for De Quervin tenosynovitis and conservative management w/ice and exercise \par -Discussed to start low dose of amitriptyline for fibromyalgia w/direction to titrate up for fibromyalgia \par -Cervical muscle contraction, continue seeing pain medicine and recommend baclofen \par -Refer to PT \par  -cw with allopurinol 200 mg daily w/diet management \par \par \par RTC in 1 month\par TERE Mercado\par Valente Parikh MD\par Rheumatology fellow\par \par \par

## 2023-02-05 NOTE — PHYSICAL EXAM
[General Appearance - Alert] : alert [General Appearance - In No Acute Distress] : in no acute distress [General Appearance - Well Nourished] : well nourished [Sclera] : the sclera and conjunctiva were normal [Outer Ear] : the ears and nose were normal in appearance [Neck Appearance] : the appearance of the neck was normal [Respiration, Rhythm And Depth] : normal respiratory rhythm and effort [Exaggerated Use Of Accessory Muscles For Inspiration] : no accessory muscle use [Auscultation Breath Sounds / Voice Sounds] : lungs were clear to auscultation bilaterally [Heart Rate And Rhythm] : heart rate was normal and rhythm regular [Heart Sounds] : normal S1 and S2 [Edema] : there was no peripheral edema [Bowel Sounds] : normal bowel sounds [Abdomen Soft] : soft [Abdomen Tenderness] : non-tender [Cervical Lymph Nodes Enlarged Posterior Bilaterally] : posterior cervical [Cervical Lymph Nodes Enlarged Anterior Bilaterally] : anterior cervical [Supraclavicular Lymph Nodes Enlarged Bilaterally] : supraclavicular [Abnormal Walk] : normal gait [Musculoskeletal - Swelling] : no joint swelling seen [Motor Tone] : muscle strength and tone were normal [] : no rash [No Focal Deficits] : no focal deficits [Impaired Insight] : insight and judgment were intact [FreeTextEntry1] : finkelstein test positive L pollicis muscle, R knee infrapatellar mild effusion and tendernesss on palpation of the R area of the patellar R knee, R shoulder +ve empty can test.

## 2023-02-05 NOTE — HISTORY OF PRESENT ILLNESS
[___ Month(s) Ago] : [unfilled] month(s) ago [FreeTextEntry1] : Patient remains with left knee pain with is interfering for her ADLs. Patient is also having multiple pain concerning for fibromyalgia but is not taking gabapentin+Cymbalta due to headache. Patient is going to the pain medicine w/minimal improvement after epidural for degenerative cervical pain. Patient endorsed left abductor pollicis pain. \par \par ROS:\par Positive: b/l hand weakness when she feels the pain, +trigeminal neuralgia pain, left knee tenderness, R  thumb pain\par Negative: fever, chills, chest pain, sob, abdominal pain, hematochezia, hematuria, rash, Raynaud [History of Tophi] : the patient has no history of tophi [Erosions on Xrays] : no erosions on xrays [Current Joint Pain] : no current joint pain [FreeTextEntry3] : 2016 [FreeTextEntry7] : 2020 [FreeTextEntry4] : allopurinol, and colchicine before

## 2023-02-06 DIAGNOSIS — M19.90 UNSPECIFIED OSTEOARTHRITIS, UNSPECIFIED SITE: ICD-10-CM

## 2023-02-06 DIAGNOSIS — M25.511 PAIN IN RIGHT SHOULDER: ICD-10-CM

## 2023-02-06 DIAGNOSIS — M79.7 FIBROMYALGIA: ICD-10-CM

## 2023-02-06 DIAGNOSIS — M17.5 OTHER UNILATERAL SECONDARY OSTEOARTHRITIS OF KNEE: ICD-10-CM

## 2023-02-06 LAB
ALBUMIN SERPL ELPH-MCNC: 4.5 G/DL
ALP BLD-CCNC: 137 U/L
ALT SERPL-CCNC: 15 U/L
ANION GAP SERPL CALC-SCNC: 11 MMOL/L
AST SERPL-CCNC: 11 U/L
BILIRUB SERPL-MCNC: 0.3 MG/DL
BUN SERPL-MCNC: 11 MG/DL
CALCIUM SERPL-MCNC: 9.9 MG/DL
CHLORIDE SERPL-SCNC: 104 MMOL/L
CHOLEST SERPL-MCNC: 182 MG/DL
CO2 SERPL-SCNC: 25 MMOL/L
CREAT SERPL-MCNC: 0.59 MG/DL
CREAT SPEC-SCNC: 63 MG/DL
EGFR: 102 ML/MIN/1.73M2
ESTIMATED AVERAGE GLUCOSE: 166 MG/DL
GLUCOSE SERPL-MCNC: 128 MG/DL
HBA1C MFR BLD HPLC: 7.4 %
HDLC SERPL-MCNC: 62 MG/DL
LDLC SERPL CALC-MCNC: 88 MG/DL
MICROALBUMIN 24H UR DL<=1MG/L-MCNC: <1.2 MG/DL
MICROALBUMIN/CREAT 24H UR-RTO: NORMAL MG/G
NONHDLC SERPL-MCNC: 120 MG/DL
POTASSIUM SERPL-SCNC: 4.4 MMOL/L
PROT SERPL-MCNC: 7 G/DL
SODIUM SERPL-SCNC: 141 MMOL/L
T4 FREE SERPL-MCNC: 1 NG/DL
TRIGL SERPL-MCNC: 162 MG/DL
TSH SERPL-ACNC: 2.36 UIU/ML

## 2023-02-07 ENCOUNTER — OUTPATIENT (OUTPATIENT)
Dept: OUTPATIENT SERVICES | Facility: HOSPITAL | Age: 63
LOS: 1 days | End: 2023-02-07
Payer: MEDICAID

## 2023-02-07 ENCOUNTER — APPOINTMENT (OUTPATIENT)
Dept: NEUROLOGY | Facility: HOSPITAL | Age: 63
End: 2023-02-07

## 2023-02-07 VITALS
HEART RATE: 65 BPM | RESPIRATION RATE: 18 BRPM | TEMPERATURE: 96.6 F | HEIGHT: 63 IN | BODY MASS INDEX: 35.61 KG/M2 | WEIGHT: 201 LBS | SYSTOLIC BLOOD PRESSURE: 121 MMHG | DIASTOLIC BLOOD PRESSURE: 77 MMHG

## 2023-02-07 DIAGNOSIS — R51.9 HEADACHE, UNSPECIFIED: ICD-10-CM

## 2023-02-07 PROCEDURE — G0463: CPT

## 2023-02-07 NOTE — DISCUSSION/SUMMARY
[FreeTextEntry1] : Patient presents today for follow up for cervical radiculopathy, and trigeminal neuralgia and headache. Pt states the nurtec has been helping her the most and that she recently saw her rheumatologist who also prescribed her nortriptaline and diclofec (both of which she has not taken yet due to scheduled cardiac cath procedure). Pt states her headaches (L sided originating in the neck radiating up, 7/10, persistent, with photophobia/phonophobia, neck stiffness) has been worse than last time she was here.  Still endorsing weakness in the UE's (L>R), with numbness/tingling.\par \par Impression: HAs likely of cervicogenic origin. \par \par Recommendations:\par Continue trileptal 150 mg BID and nurtec 75 mg Qd\par Agree with recent rheum recommendations- amitriptaline as prescribed\par Recommend f/u with her neurosurgeon, rheumatologist\par Recommend PT\par F/U in resident clinic in 4-6 months.\par \par Case discussed with neurologist Dr. Nissenbaum. \par \par

## 2023-02-07 NOTE — PHYSICAL EXAM
[General Appearance - Alert] : alert [General Appearance - In No Acute Distress] : in no acute distress [Oriented To Time, Place, And Person] : oriented to person, place, and time [Impaired Insight] : insight and judgment were intact [Affect] : the affect was normal [Mood] : the mood was normal [Memory Recent] : recent memory was not impaired [Person] : oriented to person [Place] : oriented to place [Time] : oriented to time [Fluency] : fluency intact [Cranial Nerves Optic (II)] : visual acuity intact bilaterally,  visual fields full to confrontation, pupils equal round and reactive to light [Cranial Nerves Oculomotor (III)] : extraocular motion intact [Cranial Nerves Trigeminal (V)] : facial sensation intact symmetrically [Cranial Nerves Facial (VII)] : face symmetrical [Cranial Nerves Vestibulocochlear (VIII)] : hearing was intact bilaterally [Cranial Nerves Accessory (XI - Cranial And Spinal)] : head turning and shoulder shrug symmetric [Cranial Nerves Hypoglossal (XII)] : there was no tongue deviation with protrusion [Motor Tone] : muscle tone was normal in all four extremities [Motor Handedness Right-Handed] : the patient is right hand dominant [Hand Weakness Right] : the hand  was weak [4] : fingers flexion 4/5 [Hand Weakness Left] : the hand  was weak [5] : ankle plantar flexion 5/5 [Sensation Tactile Decrease] : light touch was intact [Sensation Vibration Decrease] : vibration was intact [Abnormal Walk] : normal gait [Extraocular Movements] : extraocular movements were intact [No APD] : no afferent pupillary defect [1+] : Ankle jerk left 1+ [PERRL With Normal Accommodation] : pupils were equal in size, round, reactive to light, with normal accommodation [Coordination - Dysmetria Impaired Finger-to-Nose Bilateral] : not present [FreeTextEntry7] : Decreased to LT on b/l UE, L worse than R [FreeTextEntry1] : No midline spinal tenderness

## 2023-02-07 NOTE — DATA REVIEWED
[de-identified] : Mr brain: \par \par  MR Head w/wo IV Cont             Final\par \par No Documents Attached\par \par \par \par \par   EXAM:  MR BRAIN WAW IC\par \par \par PROCEDURE DATE:  04/20/2021\par \par \par \par INTERPRETATION:  CLINICAL INDICATION: Rule out trigeminal neuralgia. Left facial pain. Follow-up.\par \par TECHNIQUE: Multi-planar multi-sequential MR imaging of the brain was performed before and after the intravenous administration of contrast. 9.5 ml of Gadavist IV contrast was administered.\par \par COMPARISON: Outside [facility] brain 8/25/2020.\par \par FINDINGS:\par Limited evaluation due to motion artifact on postcontrast images. Within these limits:\par \par No acute infarction, intracranial hemorrhage or mass lesion. No abnormal intracranial enhancement is identified.\par \par No hydrocephalus. No extra-axial fluid collections. The skull base flow voids are present.\par \par High-resolution T2 sequence demonstrates no mass within the basal cisterns. There is no evidence for vascular compression of the bilateral cisternal trigeminal nerves. However, there is calcification arising from the anterior tentorium (9-39) (101-57) appearing to compress the left trigeminal nerve just as it enters left Meckel's cave. This calcification is also noted on outside CT from 9/22/2020.\par \par The visualized intraorbital contents are normal. The imaged portions of the paranasal sinuses are clear. Trace left mastoid effusion. The visualized osseous structures, soft tissues and partially visualized parotid glands appear normal.\par \par IMPRESSION:\par Within the limits of study described above:\par \par -No acute infarct, hemorrhage, or mass lesion.\par \par -Compression of the left trigeminal nerve just as it enters left Meckel's cave by likely dystrophic calcification arising from the left anterior tentorial leaflet. No vascular compression of the trigeminal nerves identified.\par \par \par \par \par \par \par ISABEL HORTON M.D., ATTENDING RADIOLOGIST\par This document has been electronically signed. Apr 21 2021  3:05PM\par \par MR C spine: \par \par  MR Cervical Spine No Cont             Final\par \par No Documents Attached\par \par \par \par Changed By [facility] - Reason: DOCTOR ORDERED \par \par \par \par   EXAM: [id number] - MR SPINE CERVICAL  - ORDERED BY: UMA WHITING\par \par \par PROCEDURE DATE:  05/01/2022\par \par \par \par INTERPRETATION:  EXAMINATION:MR CERVICAL SPINE\par \par CLINICAL INDICATION:radiculopathy, worsening weakness;\par \par TECHNIQUE: Multi-planar, multi-sequence MR of the cervical spine was performed without intravenous contrast.\par \par COMPARISON: None.\par \par INTERPRETATION:\par \par BONES AND BONE MARROW:  There is no fracture. There is mild endplate discogenic edema at C4-5 and C5-6.\par INTERVERTEBRAL DISCS: There is disc desiccation throughout the cervical spine with loss of disc height notably at C5-6.\par ALIGNMENT:  The craniocervical junction and atlantoaxial intervals are maintained. There is mild retrolisthesis of C5 on C6.\par SPINAL CORD: The spinal cord is normal in size and signal intensity.\par EXTRASPINAL: There is no prevertebral soft tissue swelling. There is mild mucosal thickening in the right maxillary sinus.\par \par Evaluation of individual disc space levels demonstrates the following:\par \par C2-C3: There is no significant spinal canal or neural foraminal stenosis.\par C3-C4: Small central posterior disc protrusion. No significant central canal or neural foraminal stenosis.\par C4-C5: Central disc protrusion with mild central canal stenosis. No neural foraminal stenosis.\par C5-C6: Broad-based central posterior disc protrusion with moderate bilateral neural foraminal stenosis and moderate central stenosis.\par C6-C7: There is no significant spinal canal or neural foraminal stenosis.\par C7-T1: Left eccentric posterior disc osteophyte complex with minimal left neural foraminal stenosis. No right neural foraminal or central stenosis.\par \par IMPRESSION:\par C5-6 mild retrolisthesis and broad-based posterior disc protrusion with moderate bilateral neural foraminal stenosis and moderate central stenosis.\par \par C4-5 small central disc protrusion with mild central canal stenosis.\par \par Milder findings elsewhere in the cervical spine as above.\par \par \par \par \par \par \par \par  [de-identified] : MRI [facility] w/o 5/1/22:\par \par   INTERPRETATION:\par \par BONES AND BONE MARROW:  There is no fracture. There is mild endplate discogenic edema at C4-5 and C5-6.\par INTERVERTEBRAL DISCS: There is disc desiccation throughout the cervical spine with loss of disc height notably at C5-6.\par ALIGNMENT:  The craniocervical junction and atlantoaxial intervals are maintained. There is mild retrolisthesis of C5 on C6.\par SPINAL CORD: The spinal cord is normal in size and signal intensity.\par EXTRASPINAL: There is no prevertebral soft tissue swelling. There is mild mucosal thickening in the right maxillary sinus.\par \par Evaluation of individual disc space levels demonstrates the following:\par \par C2-C3: There is no significant spinal canal or neural foraminal stenosis.\par C3-C4: Small central posterior disc protrusion. No significant central canal or neural foraminal stenosis.\par C4-C5: Central disc protrusion with mild central canal stenosis. No neural foraminal stenosis.\par C5-C6: Broad-based central posterior disc protrusion with moderate bilateral neural foraminal stenosis and moderate central stenosis.\par C6-C7: There is no significant spinal canal or neural foraminal stenosis.\par C7-T1: Left eccentric posterior disc osteophyte complex with minimal left neural foraminal stenosis. No right neural foraminal or central stenosis.\par \par IMPRESSION:\par C5-6 mild retrolisthesis and broad-based posterior disc protrusion with moderate bilateral neural foraminal stenosis and moderate central stenosis.\par \par C4-5 small central disc protrusion with mild central canal stenosis.\par \par Milder findings elsewhere in the cervical spine as above.\par \par \par \par \par \par \par \par \par  \par \par

## 2023-02-07 NOTE — REVIEW OF SYSTEMS
[As Noted in HPI] : as noted in HPI [Arm Weakness] : arm weakness [SOB on Exertion] : shortness of breath during exertion [Arthralgias] : arthralgias [Limb Pain] : limb pain [Fever] : no fever [Numbness] : no numbness [Joint Swelling] : no joint swelling

## 2023-02-07 NOTE — HISTORY OF PRESENT ILLNESS
[FreeTextEntry1] : 2/7/23: \par Patient presents today for follow up for cervical radiculopathy, and trigeminal neuralgia and headache. Pt states the nurte has been helping her the most and that she recently saw her rheumatologist who also prescribed her nortriptaline and diclofec (both of which she has not taken yet due to scheduled cardiac cath procedure). Pt states her headaches (L sided originating in the neck radiating up, 7/10, persistent, with photophobia/phonophobia, neck stiffness) has been worse than last time she was here.  Still endorsing weakness in the UE's (L>R), with numbness/tingling.\par \par Interval Hx 11/1/22:\par Patient presents today for follow up for cervical radiculopathy, and trigeminal neuralgia and headache.\par Trigeminal neuralgia is mildly controlled on trileptal 300mg qday. 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 Still endorsing weakness in the UE's (L>R), but now numbness/tingling. Has appt w/ Dr. Nava this month. She is not currently taking any medications for her radicular pain. Taking Nurtec for her headaches, that are likely cervicogenic in etiology. Since last appt, patient saw interventional pain specialist (Dr. Camejo) in [place], [place] and received cervical epidural injection x1 in 9/2022, no relief. Also received L shoulder injection w/ minimal relief. Has another appt to see same pain specialist this month. Of note, started PT only 2 weeks ago. Also endorsing mild headaches for which Nurtec is controlling, needs refill. \par \par Interval Hx 5/24/22:\par Patient presents today for follow up for cervical radiculopathy, b/l carpal tunnel and trigeminal neuralgia.\par Trigeminal neuralgia is moderately controlled on trileptal. Previous MRI brain showed compression of the L trigeminal nerve, for which Dr. Dunn offered to perform surgery but patient declined. \par She also currently has a R rotator cuff tear and was referred for surgery, which patient also declined. \par She is not currently taking any medications for her radicular pain. She was prescribed Naproxen but takes Tylenol PRN when her pain is really bad. Currently states today is a "bad day" and her pain is 8/10. Has not yet received PT as was waiting for MRI Cspine to be performed. MRI Cspine shows \par \par \par 60F R handed with a PMHx of R rotator cuff tear, DM, HTN, Asthma, CAD s/p stents, diverticulitis, Gout, OA, Fibromyalgia, trigeminal neuralgia, tremors presents to establish care. Pt saw seeing neurologist Dr. Leroy Mota but had insurance issues. \par \par Pt has had >20 years of neck pain, was a former seamstress. Progressive, worsening b/l hand weakness since 2018, which initially brought her to see a neurologist. She has reported C5-7 nerve impingements. Last MRI C spine done in 2020. She also reports R index finger numbness, no paresthesias. \par \par Pt diagnosed with L sided trigeminal neuralgia in 2019, MRI done. She is on Trileptal lowest dose twice a day, no significant difference because she has only had mild symptoms but has slightly decreased frequency of headaches, occasional ear and jaw pain and tearing. She saw neurosurgery Dr. Hammond for trigeminal neuralgia, who offered surgery but pt refused. \par \par Pt reports b/l hand tremors since 2019, intermittent but alternating hands, when eating and sewing, not related to her trigeminal neuralgia pain, lasting seconds at a time. No worsening with anxiety, no alcohol intake, no family hx of tremors. \par \par Pt reports diffuse joint pains due to her fibromyalgia, was on duloxetine 60mg daily but discontinued due to nausea. Now just taking tylenol PRN for it. She has not had physical therapy or occupational therapy recently. \par \par EMG 8/2021 with Dr. Leroy Mota\par 1. b/l C4-5 and C5-6 radiculopathy\par 2. b/l median motor neuropathy\par 3. b/l ulnar motor neuropathy\par 4. b/l median sensory neuropathy

## 2023-02-08 PROBLEM — M79.7 FIBROMYALGIA: Status: ACTIVE | Noted: 2018-04-27

## 2023-02-10 DIAGNOSIS — M17.9 OSTEOARTHRITIS OF KNEE, UNSPECIFIED: ICD-10-CM

## 2023-02-17 ENCOUNTER — TRANSCRIPTION ENCOUNTER (OUTPATIENT)
Age: 63
End: 2023-02-17

## 2023-02-17 ENCOUNTER — OUTPATIENT (OUTPATIENT)
Dept: OUTPATIENT SERVICES | Facility: HOSPITAL | Age: 63
LOS: 1 days | End: 2023-02-17
Payer: MEDICAID

## 2023-02-17 VITALS
SYSTOLIC BLOOD PRESSURE: 145 MMHG | TEMPERATURE: 99 F | HEIGHT: 64 IN | WEIGHT: 199.96 LBS | HEART RATE: 69 BPM | DIASTOLIC BLOOD PRESSURE: 74 MMHG | RESPIRATION RATE: 22 BRPM | OXYGEN SATURATION: 97 %

## 2023-02-17 VITALS
OXYGEN SATURATION: 96 % | HEART RATE: 62 BPM | SYSTOLIC BLOOD PRESSURE: 172 MMHG | DIASTOLIC BLOOD PRESSURE: 75 MMHG | RESPIRATION RATE: 17 BRPM

## 2023-02-17 DIAGNOSIS — Z98.89 OTHER SPECIFIED POSTPROCEDURAL STATES: Chronic | ICD-10-CM

## 2023-02-17 DIAGNOSIS — Z98.890 OTHER SPECIFIED POSTPROCEDURAL STATES: Chronic | ICD-10-CM

## 2023-02-17 DIAGNOSIS — R94.39 ABNORMAL RESULT OF OTHER CARDIOVASCULAR FUNCTION STUDY: ICD-10-CM

## 2023-02-17 DIAGNOSIS — Z90.710 ACQUIRED ABSENCE OF BOTH CERVIX AND UTERUS: Chronic | ICD-10-CM

## 2023-02-17 DIAGNOSIS — Z90.49 ACQUIRED ABSENCE OF OTHER SPECIFIED PARTS OF DIGESTIVE TRACT: Chronic | ICD-10-CM

## 2023-02-17 LAB
ALBUMIN SERPL ELPH-MCNC: 4.5 G/DL — SIGNIFICANT CHANGE UP (ref 3.3–5)
ALP SERPL-CCNC: 145 U/L — HIGH (ref 40–120)
ALT FLD-CCNC: 17 U/L — SIGNIFICANT CHANGE UP (ref 10–45)
ANION GAP SERPL CALC-SCNC: 17 MMOL/L — SIGNIFICANT CHANGE UP (ref 5–17)
AST SERPL-CCNC: 15 U/L — SIGNIFICANT CHANGE UP (ref 10–40)
BILIRUB SERPL-MCNC: 0.4 MG/DL — SIGNIFICANT CHANGE UP (ref 0.2–1.2)
BUN SERPL-MCNC: 13 MG/DL — SIGNIFICANT CHANGE UP (ref 7–23)
CALCIUM SERPL-MCNC: 9.8 MG/DL — SIGNIFICANT CHANGE UP (ref 8.4–10.5)
CHLORIDE SERPL-SCNC: 104 MMOL/L — SIGNIFICANT CHANGE UP (ref 96–108)
CO2 SERPL-SCNC: 18 MMOL/L — LOW (ref 22–31)
CREAT SERPL-MCNC: 0.58 MG/DL — SIGNIFICANT CHANGE UP (ref 0.5–1.3)
EGFR: 102 ML/MIN/1.73M2 — SIGNIFICANT CHANGE UP
GLUCOSE BLDC GLUCOMTR-MCNC: 131 MG/DL — HIGH (ref 70–99)
GLUCOSE SERPL-MCNC: 146 MG/DL — HIGH (ref 70–99)
HCT VFR BLD CALC: 45.8 % — HIGH (ref 34.5–45)
HGB BLD-MCNC: 13.9 G/DL — SIGNIFICANT CHANGE UP (ref 11.5–15.5)
MCHC RBC-ENTMCNC: 25.3 PG — LOW (ref 27–34)
MCHC RBC-ENTMCNC: 30.3 GM/DL — LOW (ref 32–36)
MCV RBC AUTO: 83.3 FL — SIGNIFICANT CHANGE UP (ref 80–100)
NRBC # BLD: 0 /100 WBCS — SIGNIFICANT CHANGE UP (ref 0–0)
PLATELET # BLD AUTO: 247 K/UL — SIGNIFICANT CHANGE UP (ref 150–400)
POTASSIUM SERPL-MCNC: 4.1 MMOL/L — SIGNIFICANT CHANGE UP (ref 3.5–5.3)
POTASSIUM SERPL-SCNC: 4.1 MMOL/L — SIGNIFICANT CHANGE UP (ref 3.5–5.3)
PROT SERPL-MCNC: 7.5 G/DL — SIGNIFICANT CHANGE UP (ref 6–8.3)
RBC # BLD: 5.5 M/UL — HIGH (ref 3.8–5.2)
RBC # FLD: 15.1 % — HIGH (ref 10.3–14.5)
SODIUM SERPL-SCNC: 139 MMOL/L — SIGNIFICANT CHANGE UP (ref 135–145)
WBC # BLD: 7.7 K/UL — SIGNIFICANT CHANGE UP (ref 3.8–10.5)
WBC # FLD AUTO: 7.7 K/UL — SIGNIFICANT CHANGE UP (ref 3.8–10.5)

## 2023-02-17 PROCEDURE — 93010 ELECTROCARDIOGRAM REPORT: CPT

## 2023-02-17 PROCEDURE — 93458 L HRT ARTERY/VENTRICLE ANGIO: CPT

## 2023-02-17 PROCEDURE — 99152 MOD SED SAME PHYS/QHP 5/>YRS: CPT

## 2023-02-17 PROCEDURE — C1769: CPT

## 2023-02-17 PROCEDURE — 36415 COLL VENOUS BLD VENIPUNCTURE: CPT

## 2023-02-17 PROCEDURE — 85027 COMPLETE CBC AUTOMATED: CPT

## 2023-02-17 PROCEDURE — 93458 L HRT ARTERY/VENTRICLE ANGIO: CPT | Mod: 26

## 2023-02-17 PROCEDURE — 82962 GLUCOSE BLOOD TEST: CPT

## 2023-02-17 PROCEDURE — C1887: CPT

## 2023-02-17 PROCEDURE — 80053 COMPREHEN METABOLIC PANEL: CPT

## 2023-02-17 PROCEDURE — 93005 ELECTROCARDIOGRAM TRACING: CPT

## 2023-02-17 PROCEDURE — C1894: CPT

## 2023-02-17 RX ORDER — DOCUSATE SODIUM 100 MG
1 CAPSULE ORAL
Qty: 0 | Refills: 0 | DISCHARGE

## 2023-02-17 RX ORDER — ALLOPURINOL 300 MG
1 TABLET ORAL
Qty: 0 | Refills: 0 | DISCHARGE

## 2023-02-17 RX ORDER — COLCHICINE 0.6 MG
1 TABLET ORAL
Qty: 0 | Refills: 0 | DISCHARGE

## 2023-02-17 RX ORDER — METOPROLOL TARTRATE 50 MG
1 TABLET ORAL
Qty: 0 | Refills: 0 | DISCHARGE

## 2023-02-17 RX ORDER — ICOSAPENT ETHYL 500 MG/1
2 CAPSULE, LIQUID FILLED ORAL
Qty: 0 | Refills: 0 | DISCHARGE

## 2023-02-17 RX ORDER — GABAPENTIN 400 MG/1
0 CAPSULE ORAL
Qty: 0 | Refills: 0 | DISCHARGE

## 2023-02-17 RX ORDER — SENNA PLUS 8.6 MG/1
1 TABLET ORAL
Qty: 0 | Refills: 0 | DISCHARGE

## 2023-02-17 NOTE — H&P CARDIOLOGY - HISTORY OF PRESENT ILLNESS
63 yo  female with PMH of HTN. HLD ( on Rapatha),  CAD s/p 2 LAD stents ( 02/2019), DM type 2 ( well managed as per pt, endo Dr Mendosa, last A1C unknown; DM complicated by peripheral neuropathy), diverticulitis s/p Hartmans and reversal in 2013 at [facility], MYLES not on CPAP,  Asthma ( controlled, as per pt), Cdiff, hx pf acute abd on 11/2019, fibromyalgia.  Seen and eval by Dr BOSTON Mccloud for c/c of recurrent, intermittent "chest discomfort"  and fatigue; pt states to feel "exhausted" with minimal exertion and occasional palpitations; dyspnea after ambulating 2 blocks.  Had abnormal NST ( see report below).  Holter showed PCCs.  Presents here today for [facility] for further CAD evaluation.         GATED ANALYSIS:  Post-stress gated wall motion analysis was performed (LVEF  = 67 %;LVEDV = 58 ml.) revealing hypokinesis of the basal  inferior and basal inferoseptal walls.  ------------------------------------------------------------------------  IMPRESSIONS:Abnormal Study  * Chest Pain: No chest pain with administration of  Regadenoson.  * Symptom: Shortness of breath, nausea.  * HR Response: Appropriate.  * BP Response: Appropriate.  * Heart Rhythm: Sinus Rhythm - 83 BPM.  * Baseline ECG: Nonspecific ST-T wave abnormality.  Poor R  wave progression.  * ECG Changes: ST Depression: Less than 0.5 mm horizontal  in leads V4-V6 started at 1:00 min following regadenoson  infusion at HR of 126 bpm and persisted 4:00 min into  recovery.  These changes did not meet ischemic criteria.  * Arrhythmia: None.  * The left ventricle was normal in size.  * There are medium-sized, mild defects in the  anterolateral wall that are reversible suggestive of mild  ischemia.  * There are medium-sized, mild to moderate defects in the  basal inferior and basal inferoseptal walls that are  mostly fixed suggestive of infarct with mild kushal-infarct  ischemia.  * Post-stress gated wall motion analysis was performed  (LVEF = 67 %;LVEDV = 58 ml.) revealing hypokinesis of the  basal inferior and basal inferoseptal walls.  *** Compared with the Nuclear/Stress perfusion images of  2/26/2021, more ischemia is noted on the current exam.    < end of copied text >    c 63 yo  female with PMH of HTN. HLD ( on Rapatha),  CAD s/p 2 LAD stents ( 02/2019), DM type 2 ( well managed as per pt, oc [facility], last A1C unknown; DM complicated by peripheral neuropathy), diverticulitis s/p Hartmans and reversal in 2013 at [facility], MYLES not on CPAP,  Asthma ( controlled, as per pt), Cdiff, hx pf acute abd on 11/2019, fibromyalgia.  Seen and eval by Dr BOSTON Mccloud for ALVARADO and CP on exertion. Pain occurs with minimal activity such as making the bed and is releived by rest and NTG SL.  Had abnormal NST and is referred for [facility] cath today.     No fever or chills, no N/V/D or abd pain . No N/V/D or abd pain .

## 2023-02-17 NOTE — ASU DISCHARGE PLAN (ADULT/PEDIATRIC) - CARE PROVIDER_API CALL
Renetta Sadlana  CARDIOLOGY  [address], [address]  [place] [address]  Phone: [phone]  Fax: [phone]  Established Patient  Follow Up Time: 2 weeks

## 2023-03-01 ENCOUNTER — APPOINTMENT (OUTPATIENT)
Dept: ENDOCRINOLOGY | Facility: CLINIC | Age: 63
End: 2023-03-01
Payer: MEDICAID

## 2023-03-01 VITALS
HEART RATE: 71 BPM | WEIGHT: 201 LBS | BODY MASS INDEX: 35.61 KG/M2 | DIASTOLIC BLOOD PRESSURE: 73 MMHG | SYSTOLIC BLOOD PRESSURE: 115 MMHG | HEIGHT: 63 IN | OXYGEN SATURATION: 94 %

## 2023-03-01 LAB — GLUCOSE BLDC GLUCOMTR-MCNC: 151

## 2023-03-01 PROCEDURE — 95251 CONT GLUC MNTR ANALYSIS I&R: CPT

## 2023-03-01 PROCEDURE — 99203 OFFICE O/P NEW LOW 30 MIN: CPT | Mod: 25

## 2023-03-01 PROCEDURE — 82962 GLUCOSE BLOOD TEST: CPT

## 2023-03-01 RX ORDER — EVOLOCUMAB 140 MG/ML
140 INJECTION, SOLUTION SUBCUTANEOUS
Refills: 0 | Status: ACTIVE | COMMUNITY
Start: 2018-07-16

## 2023-03-01 RX ORDER — ISOPROPYL ALCOHOL 0.75 G/1
SWAB TOPICAL
Qty: 3 | Refills: 3 | Status: ACTIVE | COMMUNITY
Start: 2020-03-25 | End: 1900-01-01

## 2023-03-01 NOTE — HISTORY OF PRESENT ILLNESS
[Continuous Glucose Monitoring] : Continuous Glucose Monitoring: Yes [Mega] : Mega [FreeTextEntry1] : This is a 63 yo female who presents for diabetes follow up\par \par At last endocrine visit in Aug 2022, A1c noted 7.3% at that time and advised to increase Basaglar 32U qhs and continue admelog 10U TID ac. She is taking Repatha and Vascepa, last LDL is 70. Not on statins due to myalgias.\par Saw ophthalmology in Jan 2023, no diabetic retinopathy, no cataracts.\par She admits skips Basalgar if bedtime glucose is 120s.\par She needs refills for all meds, noted A1c is slightly uptrending is 7.4%. She notes she is being followed with cardiology regularly, had recent angiogram which was reportedly normal. She denies any new complaints today. \par \par   [FreeTextEntry2] : 99 [FreeTextEntry3] : 1+0 [FreeTextEntry4] : 0+0 [de-identified] : 6.4 [FreeTextEntry5] : 129 [FreeTextEntry6] : 14.9

## 2023-03-01 NOTE — PHYSICAL EXAM
[Alert] : alert [No Acute Distress] : no acute distress [Well Developed] : well developed [Normal Sclera/Conjunctiva] : normal sclera/conjunctiva [EOMI] : extra ocular movement intact [No Proptosis] : no proptosis [No Lid Lag] : no lid lag [No LAD] : no lymphadenopathy [Supple] : the neck was supple [No Thyroid Nodules] : no palpable thyroid nodules [No Respiratory Distress] : no respiratory distress [No Accessory Muscle Use] : no accessory muscle use [Normal Rate and Effort] : normal respiratory rate and effort [Normal Bowel Sounds] : normal bowel sounds [Not Tender] : non-tender [Soft] : abdomen soft [No Stigmata of Cushings Syndrome] : no stigmata of Cushings Syndrome [Normal Gait] : normal gait [No Involuntary Movements] : no involuntary movements were seen [Normal Sensation on Monofilament Testing] : normal sensation on monofilament testing of lower extremities [Oriented x3] : oriented to person, place, and time [Normal Insight/Judgement] : insight and judgment were intact [Abdominal Striae] : no abdominal striae [Acanthosis Nigricans] : no acanthosis nigricans [Foot Ulcers] : no foot ulcers

## 2023-03-01 NOTE — ASSESSMENT
[Diabetes Foot Care] : diabetes foot care [Long Term Vascular Complications] : long term vascular complications of diabetes [Carbohydrate Consistent Diet] : carbohydrate consistent diet [Importance of Diet and Exercise] : importance of diet and exercise to improve glycemic control, achieve weight loss and improve cardiovascular health [Hypoglycemia Management] : hypoglycemia management [Action and use of Insulin] : action and use of short and long-acting insulin [Self Monitoring of Blood Glucose] : self monitoring of blood glucose [Injection Technique, Storage, Sharps Disposal] : injection technique, storage, and sharps disposal [Retinopathy Screening] : Patient was referred to ophthalmology for retinopathy screening [Weight Loss] : weight loss [Diabetic Medications] : Risks and benefits of diabetic medications were discussed [FreeTextEntry1] : Type 2 DM\par Noted recent HgbA1c is 7.4%, slight uptrending goal<7%,\par She has h/o GI intolerance to Metformin and Acarbose\par She has h/o pancreatitis, cannot use GLP-1 agonist as result\par Reviewed freestyle bee download, TIR is 99%, 1% high, 0% lows. Avg daily glucose is 129mgdl, GMI noted 6.4%\par Advised she must be taking basaglar conisistently even if bedtime glucose is normal, as she had been skipping which likely contributed to mild increase in A1c levels. Encouraged to continue with lifestyle modification\par Continue Basaglar 32U qhs\par Continue Admelog 10U TID ac \par Continue Jardiance 25mg daily\par Has appt with ophthalmology for annual diabetic screening on 8/26/22. No h/o retinopathy as per patient\par No diabetic foot ulcers on exam today\par On Repatha and Vascepa (skips tablets 1month), last LDL is 70. Not on statins due to myalgias. Urine alb/crt in Feb 2023 is normal.\par Refilled bee sensors to [facility] today. \par \par Answered all questions today; patient verbalized understanding of the above\par RTC in 3 months\par

## 2023-03-03 ENCOUNTER — APPOINTMENT (OUTPATIENT)
Dept: RHEUMATOLOGY | Facility: HOSPITAL | Age: 63
End: 2023-03-03
Payer: MEDICAID

## 2023-03-03 ENCOUNTER — OUTPATIENT (OUTPATIENT)
Dept: OUTPATIENT SERVICES | Facility: HOSPITAL | Age: 63
LOS: 1 days | End: 2023-03-03
Payer: MEDICAID

## 2023-03-03 VITALS
TEMPERATURE: 97 F | RESPIRATION RATE: 14 BRPM | BODY MASS INDEX: 35.61 KG/M2 | SYSTOLIC BLOOD PRESSURE: 119 MMHG | DIASTOLIC BLOOD PRESSURE: 80 MMHG | WEIGHT: 201 LBS | HEART RATE: 71 BPM | HEIGHT: 63 IN

## 2023-03-03 DIAGNOSIS — Z90.710 ACQUIRED ABSENCE OF BOTH CERVIX AND UTERUS: Chronic | ICD-10-CM

## 2023-03-03 DIAGNOSIS — Z98.89 OTHER SPECIFIED POSTPROCEDURAL STATES: Chronic | ICD-10-CM

## 2023-03-03 DIAGNOSIS — Z90.49 ACQUIRED ABSENCE OF OTHER SPECIFIED PARTS OF DIGESTIVE TRACT: Chronic | ICD-10-CM

## 2023-03-03 DIAGNOSIS — Z98.890 OTHER SPECIFIED POSTPROCEDURAL STATES: Chronic | ICD-10-CM

## 2023-03-03 DIAGNOSIS — M06.9 RHEUMATOID ARTHRITIS, UNSPECIFIED: ICD-10-CM

## 2023-03-03 DIAGNOSIS — M80.80XP OTHER OSTEOPOROSIS WITH CURRENT PATHOLOGICAL FRACTURE, UNSPECIFIED SITE, SUBSEQUENT ENCOUNTER FOR FRACTURE WITH MALUNION: ICD-10-CM

## 2023-03-03 LAB
24R-OH-CALCIDIOL SERPL-MCNC: 28.9 NG/ML — LOW (ref 30–80)
ALP SERPL-CCNC: 139 U/L — HIGH (ref 40–120)
CALCIUM SERPL-MCNC: 9.8 MG/DL — SIGNIFICANT CHANGE UP (ref 8.4–10.5)
PHOSPHATE SERPL-MCNC: 3.3 MG/DL — SIGNIFICANT CHANGE UP (ref 2.5–4.5)
PTH-INTACT FLD-MCNC: 53 PG/ML — SIGNIFICANT CHANGE UP (ref 15–65)

## 2023-03-03 PROCEDURE — 99212 OFFICE O/P EST SF 10 MIN: CPT | Mod: GC

## 2023-03-03 PROCEDURE — 84075 ASSAY ALKALINE PHOSPHATASE: CPT

## 2023-03-03 PROCEDURE — 82652 VIT D 1 25-DIHYDROXY: CPT

## 2023-03-03 PROCEDURE — 83970 ASSAY OF PARATHORMONE: CPT

## 2023-03-03 PROCEDURE — G0463: CPT

## 2023-03-03 PROCEDURE — 82306 VITAMIN D 25 HYDROXY: CPT

## 2023-03-03 PROCEDURE — 84080 ASSAY ALKALINE PHOSPHATASES: CPT

## 2023-03-03 PROCEDURE — 82310 ASSAY OF CALCIUM: CPT

## 2023-03-03 PROCEDURE — 84100 ASSAY OF PHOSPHORUS: CPT

## 2023-03-03 RX ORDER — ALLOPURINOL 300 MG/1
300 TABLET ORAL DAILY
Qty: 30 | Refills: 3 | Status: DISCONTINUED | COMMUNITY
Start: 2022-01-28 | End: 2023-03-03

## 2023-03-03 RX ORDER — MUPIROCIN 20 MG/G
2 OINTMENT TOPICAL
Qty: 22 | Refills: 0 | Status: ACTIVE | COMMUNITY
Start: 2023-02-06

## 2023-03-03 RX ORDER — DOCUSATE SODIUM 100 MG/1
100 CAPSULE ORAL 3 TIMES DAILY
Refills: 0 | Status: DISCONTINUED | COMMUNITY
Start: 2021-04-09 | End: 2023-03-03

## 2023-03-03 RX ORDER — HYLAN G-F 20 16MG/2ML
16 SYRINGE (ML) INTRAARTICULAR
Qty: 2 | Refills: 0 | Status: DISCONTINUED | COMMUNITY
Start: 2023-02-10 | End: 2023-03-03

## 2023-03-03 RX ORDER — AMITRIPTYLINE HYDROCHLORIDE 25 MG/1
25 TABLET, FILM COATED ORAL
Qty: 60 | Refills: 0 | Status: DISCONTINUED | COMMUNITY
Start: 2023-02-03 | End: 2023-03-03

## 2023-03-03 RX ORDER — METRONIDAZOLE 7.5 MG/G
0.75 GEL VAGINAL
Qty: 70 | Refills: 0 | Status: DISCONTINUED | COMMUNITY
Start: 2022-10-13

## 2023-03-03 RX ORDER — ALLOPURINOL 100 MG/1
100 TABLET ORAL
Refills: 0 | Status: ACTIVE | COMMUNITY
Start: 2023-03-03

## 2023-03-03 RX ORDER — NEOMYCIN SULFATE, POLYMYXIN B SULFATE AND DEXAMETHASONE 3.5; 10000; 1 MG/ML; [USP'U]/ML; MG/ML
3.5-10000-0.1 SUSPENSION OPHTHALMIC
Qty: 10 | Refills: 0 | Status: DISCONTINUED | COMMUNITY
Start: 2022-09-16

## 2023-03-03 RX ORDER — KETOCONAZOLE 20 MG/G
2 CREAM TOPICAL
Qty: 15 | Refills: 0 | Status: ACTIVE | COMMUNITY
Start: 2023-02-06

## 2023-03-03 RX ORDER — CLOPIDOGREL BISULFATE 75 MG/1
75 TABLET, FILM COATED ORAL
Qty: 30 | Refills: 0 | Status: DISCONTINUED | COMMUNITY
Start: 2020-07-29 | End: 2023-03-03

## 2023-03-04 LAB — VIT D25+D1,25 OH+D1,25 PNL SERPL-MCNC: 31.9 PG/ML — SIGNIFICANT CHANGE UP (ref 19.9–79.3)

## 2023-03-05 LAB
ALP BONE SERPL-MCNC: 50 % — SIGNIFICANT CHANGE UP (ref 14–68)
ALP FLD-CCNC: 136 IU/L — HIGH (ref 44–121)
ALP INTEST CFR SERPL: 2 % — SIGNIFICANT CHANGE UP (ref 0–18)
ALP LIVER SERPL-CCNC: 48 % — SIGNIFICANT CHANGE UP (ref 18–85)

## 2023-03-09 ENCOUNTER — OUTPATIENT (OUTPATIENT)
Dept: OUTPATIENT SERVICES | Facility: HOSPITAL | Age: 63
LOS: 1 days | End: 2023-03-09
Payer: MEDICAID

## 2023-03-09 DIAGNOSIS — Z98.89 OTHER SPECIFIED POSTPROCEDURAL STATES: Chronic | ICD-10-CM

## 2023-03-09 DIAGNOSIS — Z98.890 OTHER SPECIFIED POSTPROCEDURAL STATES: Chronic | ICD-10-CM

## 2023-03-09 DIAGNOSIS — Z90.710 ACQUIRED ABSENCE OF BOTH CERVIX AND UTERUS: Chronic | ICD-10-CM

## 2023-03-09 DIAGNOSIS — M06.9 RHEUMATOID ARTHRITIS, UNSPECIFIED: ICD-10-CM

## 2023-03-09 DIAGNOSIS — Z90.49 ACQUIRED ABSENCE OF OTHER SPECIFIED PARTS OF DIGESTIVE TRACT: Chronic | ICD-10-CM

## 2023-03-09 PROCEDURE — 84105 ASSAY OF URINE PHOSPHORUS: CPT

## 2023-03-10 DIAGNOSIS — M19.90 UNSPECIFIED OSTEOARTHRITIS, UNSPECIFIED SITE: ICD-10-CM

## 2023-03-10 LAB
COLLECT DURATION TIME UR: 24 HR — SIGNIFICANT CHANGE UP
PHOSPHATE CL 24H UR+SERPL-VRATE: 1.4 G/24 H — HIGH (ref 0.4–1.3)
TOTAL VOLUME - 24 HOUR: 2900 ML — SIGNIFICANT CHANGE UP
URINE CREATININE CALCULATION: 2 G/24 H — HIGH (ref 0.8–1.8)

## 2023-03-10 NOTE — REVIEW OF SYSTEMS
[Feeling Poorly] : feeling poorly [Arthralgias] : arthralgias [Joint Pain] : joint pain [Joint Swelling] : joint swelling [Limb Pain] : limb pain [As Noted in HPI] : as noted in HPI [Negative] : Heme/Lymph [Dry Eyes] : no dryness of the eyes [FreeTextEntry9] : R hand>L. Neck pain w/muscle contraction

## 2023-03-10 NOTE — ASSESSMENT
[FreeTextEntry1] : 61 yo F PMH DM2, HTN, Asthma, CAD, diverticulitis, Gout, OA, Fibromyalgia, trigeminal neuralgia, cervical radiculopathy 2/2 large C5/C6 protrusion (MRI 5/2022) c/b b/l carpal tunnel, chronic R shoulder rotator cuff tear, supraspinatus tear presenting for follow up.\par \par #Gout\par 2016\par Has Gout (never had arthrocentesis though)\par Initial manifestation 1st MTPs\par Last flare up: 2020\par Risk factors: DM2, obesity \par Last uric acid 4 on 8/12/2022\par Currently on ULT with Allopurinol.\par \par #OA\par B/l knee L>R\par Previous hx of patellar aliment surgery b/l, and L meniscal tear repair 2017\par 1/25/22-XR of the knees showed b/l tibiofemoral OA change presence of peripheral joint osteophyte L>R. Medial Left knee narrowing\par XR 2/3/23 showed tricompartment OA b/l knee \par \par \par \par #Left shoulder supraspinatus tear \par  MRI of the left shoulder showed full tickness tear of the supraspinatus tendon \par Moderate tendinosis of the infraspinatus tendon with shallow. Partial thickness tearing of the middle fibers of the distal subscapular with superimposed tendinosis\par s/p left shoulder steroid injection per pain medicine \par On PT\par \par #Cervical spine \par 11/11/2022 MRI cervical spine mild retrolisthesis and broad-based posterior disc protrusion with bilateral neural foraminal stenosis and moderate central stenosis \par Epidural injection per pain medicine\par \par #Fibromyalgia \par 2021 sleep study negative for MYLES\par Previous treatment gabapentin and Cymbalta ([id number]) stopped taking due to headache \par \par #De Quervin tenosynovitis of L thumb\par \par #HCM\par 3/12/2021 DEXA scan normal \par  \par #Elevate ALK\par \par Plan: \par -Keep on cane for her OA and PT \par -OT therapy and volteragn gel for her R hand OA and conservative management w/ice and exercise \par -Cervical muscle contraction, continue seeing pain medicine and recommend baclofen \par -cw with allopurinol 200 mg daily w/diet management but unclear if this patient has gout. We will start tapering down on next visit  \par -recheck dexa scan \par -Check ALK, FGF23, urine phosphorus 24 hours \par \par RTC in 3 month\par DW Dr. [place]\par Valente Parikh MD\par Rheumatology fellow\par \par \par

## 2023-03-10 NOTE — END OF VISIT
[] : Fellow [FreeTextEntry3] : Patient seen with Dr Parikh. OA and tendonitis multiple joints. Currently stable

## 2023-03-10 NOTE — HISTORY OF PRESENT ILLNESS
[___ Month(s) Ago] : [unfilled] month(s) ago [FreeTextEntry1] : Insurance did not approved the left knee hyaluronic acid. Patient endorsed that the left knee pain is better and would like to hold on treatment. Patient did not tolerate well the amitriptyline and stopped taking.  Her R hand is worse today as well as neck pain. \par \par ROS:\par Positive: b/l hand weakness R>L, +trigeminal neuralgia pain, left knee tenderness, R  thumb pain\par Negative: fever, chills, chest pain, sob, abdominal pain, hematochezia, hematuria, rash, Raynaud [Erosions on Xrays] : no erosions on xrays [History of Tophi] : the patient has no history of tophi [Current Joint Pain] : no current joint pain [FreeTextEntry3] : 2016 [FreeTextEntry7] : 2020 [FreeTextEntry4] : allopurinol, and colchicine before

## 2023-03-10 NOTE — PHYSICAL EXAM
[General Appearance - Alert] : alert [General Appearance - Well Nourished] : well nourished [General Appearance - In No Acute Distress] : in no acute distress [Sclera] : the sclera and conjunctiva were normal [Outer Ear] : the ears and nose were normal in appearance [Neck Appearance] : the appearance of the neck was normal [Respiration, Rhythm And Depth] : normal respiratory rhythm and effort [Exaggerated Use Of Accessory Muscles For Inspiration] : no accessory muscle use [Auscultation Breath Sounds / Voice Sounds] : lungs were clear to auscultation bilaterally [Heart Rate And Rhythm] : heart rate was normal and rhythm regular [Heart Sounds] : normal S1 and S2 [Edema] : there was no peripheral edema [Bowel Sounds] : normal bowel sounds [Abdomen Soft] : soft [Abdomen Tenderness] : non-tender [Cervical Lymph Nodes Enlarged Posterior Bilaterally] : posterior cervical [Cervical Lymph Nodes Enlarged Anterior Bilaterally] : anterior cervical [Abnormal Walk] : normal gait [Supraclavicular Lymph Nodes Enlarged Bilaterally] : supraclavicular [Musculoskeletal - Swelling] : no joint swelling seen [Motor Tone] : muscle strength and tone were normal [] : no rash [No Focal Deficits] : no focal deficits [Impaired Insight] : insight and judgment were intact [FreeTextEntry1] : finkelstein test positive L pollicis muscle, R knee infrapatellar mild effusion and tendernesss on palpation of the R area of the patellar R knee, R shoulder +ve empty can test.

## 2023-03-16 ENCOUNTER — NON-APPOINTMENT (OUTPATIENT)
Age: 63
End: 2023-03-16

## 2023-04-04 RX ORDER — EMPAGLIFLOZIN 25 MG/1
25 TABLET, FILM COATED ORAL
Qty: 30 | Refills: 5 | Status: DISCONTINUED | COMMUNITY
Start: 2021-07-21 | End: 2023-04-04

## 2023-04-06 ENCOUNTER — EMERGENCY (EMERGENCY)
Facility: HOSPITAL | Age: 63
LOS: 1 days | Discharge: HOME | End: 2023-04-06
Attending: STUDENT IN AN ORGANIZED HEALTH CARE EDUCATION/TRAINING PROGRAM
Payer: MEDICAID

## 2023-04-06 VITALS
HEIGHT: 64 IN | RESPIRATION RATE: 19 BRPM | SYSTOLIC BLOOD PRESSURE: 102 MMHG | WEIGHT: 201.94 LBS | DIASTOLIC BLOOD PRESSURE: 57 MMHG | TEMPERATURE: 98 F | OXYGEN SATURATION: 96 % | HEART RATE: 79 BPM

## 2023-04-06 VITALS
HEART RATE: 76 BPM | OXYGEN SATURATION: 98 % | TEMPERATURE: 98 F | RESPIRATION RATE: 16 BRPM | DIASTOLIC BLOOD PRESSURE: 62 MMHG | SYSTOLIC BLOOD PRESSURE: 108 MMHG

## 2023-04-06 DIAGNOSIS — Z90.49 ACQUIRED ABSENCE OF OTHER SPECIFIED PARTS OF DIGESTIVE TRACT: Chronic | ICD-10-CM

## 2023-04-06 DIAGNOSIS — Z98.89 OTHER SPECIFIED POSTPROCEDURAL STATES: Chronic | ICD-10-CM

## 2023-04-06 DIAGNOSIS — Z98.890 OTHER SPECIFIED POSTPROCEDURAL STATES: Chronic | ICD-10-CM

## 2023-04-06 DIAGNOSIS — Z90.710 ACQUIRED ABSENCE OF BOTH CERVIX AND UTERUS: Chronic | ICD-10-CM

## 2023-04-06 LAB
ALBUMIN SERPL ELPH-MCNC: 4.5 G/DL — SIGNIFICANT CHANGE UP (ref 3.3–5)
ALP SERPL-CCNC: 143 U/L — HIGH (ref 40–120)
ALT FLD-CCNC: 14 U/L — SIGNIFICANT CHANGE UP (ref 10–45)
ANION GAP SERPL CALC-SCNC: 12 MMOL/L — SIGNIFICANT CHANGE UP (ref 5–17)
APPEARANCE UR: CLEAR — SIGNIFICANT CHANGE UP
AST SERPL-CCNC: 17 U/L — SIGNIFICANT CHANGE UP (ref 10–40)
BASE EXCESS BLDV CALC-SCNC: 0.1 MMOL/L — SIGNIFICANT CHANGE UP (ref -2–3)
BASOPHILS # BLD AUTO: 0.07 K/UL — SIGNIFICANT CHANGE UP (ref 0–0.2)
BASOPHILS NFR BLD AUTO: 0.9 % — SIGNIFICANT CHANGE UP (ref 0–2)
BILIRUB SERPL-MCNC: 0.3 MG/DL — SIGNIFICANT CHANGE UP (ref 0.2–1.2)
BILIRUB UR-MCNC: NEGATIVE — SIGNIFICANT CHANGE UP
BLD GP AB SCN SERPL QL: NEGATIVE — SIGNIFICANT CHANGE UP
BUN SERPL-MCNC: 13 MG/DL — SIGNIFICANT CHANGE UP (ref 7–23)
CA-I SERPL-SCNC: 1.25 MMOL/L — SIGNIFICANT CHANGE UP (ref 1.15–1.33)
CALCIUM SERPL-MCNC: 9.8 MG/DL — SIGNIFICANT CHANGE UP (ref 8.4–10.5)
CHLORIDE BLDV-SCNC: 106 MMOL/L — SIGNIFICANT CHANGE UP (ref 96–108)
CHLORIDE SERPL-SCNC: 103 MMOL/L — SIGNIFICANT CHANGE UP (ref 96–108)
CO2 BLDV-SCNC: 27 MMOL/L — HIGH (ref 22–26)
CO2 SERPL-SCNC: 24 MMOL/L — SIGNIFICANT CHANGE UP (ref 22–31)
COLOR SPEC: SIGNIFICANT CHANGE UP
CREAT SERPL-MCNC: 0.59 MG/DL — SIGNIFICANT CHANGE UP (ref 0.5–1.3)
DIFF PNL FLD: NEGATIVE — SIGNIFICANT CHANGE UP
EGFR: 102 ML/MIN/1.73M2 — SIGNIFICANT CHANGE UP
EOSINOPHIL # BLD AUTO: 0.34 K/UL — SIGNIFICANT CHANGE UP (ref 0–0.5)
EOSINOPHIL NFR BLD AUTO: 4.5 % — SIGNIFICANT CHANGE UP (ref 0–6)
GAS PNL BLDV: 137 MMOL/L — SIGNIFICANT CHANGE UP (ref 136–145)
GAS PNL BLDV: SIGNIFICANT CHANGE UP
GAS PNL BLDV: SIGNIFICANT CHANGE UP
GLUCOSE BLDV-MCNC: 138 MG/DL — HIGH (ref 70–99)
GLUCOSE SERPL-MCNC: 133 MG/DL — HIGH (ref 70–99)
GLUCOSE UR QL: ABNORMAL
HCO3 BLDV-SCNC: 26 MMOL/L — SIGNIFICANT CHANGE UP (ref 22–29)
HCT VFR BLD CALC: 43.4 % — SIGNIFICANT CHANGE UP (ref 34.5–45)
HCT VFR BLDA CALC: 43 % — SIGNIFICANT CHANGE UP (ref 34.5–46.5)
HGB BLD CALC-MCNC: 14.2 G/DL — SIGNIFICANT CHANGE UP (ref 11.7–16.1)
HGB BLD-MCNC: 13.8 G/DL — SIGNIFICANT CHANGE UP (ref 11.5–15.5)
IMM GRANULOCYTES NFR BLD AUTO: 0.4 % — SIGNIFICANT CHANGE UP (ref 0–0.9)
KETONES UR-MCNC: NEGATIVE — SIGNIFICANT CHANGE UP
LACTATE BLDV-MCNC: 2 MMOL/L — SIGNIFICANT CHANGE UP (ref 0.5–2)
LEUKOCYTE ESTERASE UR-ACNC: NEGATIVE — SIGNIFICANT CHANGE UP
LIDOCAIN IGE QN: 30 U/L — SIGNIFICANT CHANGE UP (ref 7–60)
LYMPHOCYTES # BLD AUTO: 2.51 K/UL — SIGNIFICANT CHANGE UP (ref 1–3.3)
LYMPHOCYTES # BLD AUTO: 33.2 % — SIGNIFICANT CHANGE UP (ref 13–44)
MAGNESIUM SERPL-MCNC: 1.9 MG/DL — SIGNIFICANT CHANGE UP (ref 1.6–2.6)
MCHC RBC-ENTMCNC: 26.5 PG — LOW (ref 27–34)
MCHC RBC-ENTMCNC: 31.8 GM/DL — LOW (ref 32–36)
MCV RBC AUTO: 83.5 FL — SIGNIFICANT CHANGE UP (ref 80–100)
MONOCYTES # BLD AUTO: 0.61 K/UL — SIGNIFICANT CHANGE UP (ref 0–0.9)
MONOCYTES NFR BLD AUTO: 8.1 % — SIGNIFICANT CHANGE UP (ref 2–14)
NEUTROPHILS # BLD AUTO: 4 K/UL — SIGNIFICANT CHANGE UP (ref 1.8–7.4)
NEUTROPHILS NFR BLD AUTO: 52.9 % — SIGNIFICANT CHANGE UP (ref 43–77)
NITRITE UR-MCNC: NEGATIVE — SIGNIFICANT CHANGE UP
NRBC # BLD: 0 /100 WBCS — SIGNIFICANT CHANGE UP (ref 0–0)
PCO2 BLDV: 46 MMHG — HIGH (ref 39–42)
PH BLDV: 7.36 — SIGNIFICANT CHANGE UP (ref 7.32–7.43)
PH UR: 5.5 — SIGNIFICANT CHANGE UP (ref 5–8)
PLATELET # BLD AUTO: 226 K/UL — SIGNIFICANT CHANGE UP (ref 150–400)
PO2 BLDV: 35 MMHG — SIGNIFICANT CHANGE UP (ref 25–45)
POTASSIUM BLDV-SCNC: 4.1 MMOL/L — SIGNIFICANT CHANGE UP (ref 3.5–5.1)
POTASSIUM SERPL-MCNC: 4.4 MMOL/L — SIGNIFICANT CHANGE UP (ref 3.5–5.3)
POTASSIUM SERPL-SCNC: 4.4 MMOL/L — SIGNIFICANT CHANGE UP (ref 3.5–5.3)
PROT SERPL-MCNC: 7.6 G/DL — SIGNIFICANT CHANGE UP (ref 6–8.3)
PROT UR-MCNC: NEGATIVE — SIGNIFICANT CHANGE UP
RBC # BLD: 5.2 M/UL — SIGNIFICANT CHANGE UP (ref 3.8–5.2)
RBC # FLD: 15 % — HIGH (ref 10.3–14.5)
RH IG SCN BLD-IMP: POSITIVE — SIGNIFICANT CHANGE UP
SAO2 % BLDV: 57.6 % — LOW (ref 67–88)
SODIUM SERPL-SCNC: 139 MMOL/L — SIGNIFICANT CHANGE UP (ref 135–145)
SP GR SPEC: 1.02 — SIGNIFICANT CHANGE UP (ref 1.01–1.02)
UROBILINOGEN FLD QL: NEGATIVE — SIGNIFICANT CHANGE UP
WBC # BLD: 7.56 K/UL — SIGNIFICANT CHANGE UP (ref 3.8–10.5)
WBC # FLD AUTO: 7.56 K/UL — SIGNIFICANT CHANGE UP (ref 3.8–10.5)

## 2023-04-06 PROCEDURE — 82803 BLOOD GASES ANY COMBINATION: CPT

## 2023-04-06 PROCEDURE — 81003 URINALYSIS AUTO W/O SCOPE: CPT

## 2023-04-06 PROCEDURE — 83690 ASSAY OF LIPASE: CPT

## 2023-04-06 PROCEDURE — 85014 HEMATOCRIT: CPT

## 2023-04-06 PROCEDURE — 82435 ASSAY OF BLOOD CHLORIDE: CPT

## 2023-04-06 PROCEDURE — 74177 CT ABD & PELVIS W/CONTRAST: CPT | Mod: MA

## 2023-04-06 PROCEDURE — 74177 CT ABD & PELVIS W/CONTRAST: CPT | Mod: 26,MA

## 2023-04-06 PROCEDURE — 99284 EMERGENCY DEPT VISIT MOD MDM: CPT

## 2023-04-06 PROCEDURE — 86901 BLOOD TYPING SEROLOGIC RH(D): CPT

## 2023-04-06 PROCEDURE — 82947 ASSAY GLUCOSE BLOOD QUANT: CPT

## 2023-04-06 PROCEDURE — 86900 BLOOD TYPING SEROLOGIC ABO: CPT

## 2023-04-06 PROCEDURE — 83605 ASSAY OF LACTIC ACID: CPT

## 2023-04-06 PROCEDURE — 85025 COMPLETE CBC W/AUTO DIFF WBC: CPT

## 2023-04-06 PROCEDURE — 87086 URINE CULTURE/COLONY COUNT: CPT

## 2023-04-06 PROCEDURE — 80053 COMPREHEN METABOLIC PANEL: CPT

## 2023-04-06 PROCEDURE — 86850 RBC ANTIBODY SCREEN: CPT

## 2023-04-06 PROCEDURE — 85018 HEMOGLOBIN: CPT

## 2023-04-06 PROCEDURE — 99284 EMERGENCY DEPT VISIT MOD MDM: CPT | Mod: 25

## 2023-04-06 PROCEDURE — 83735 ASSAY OF MAGNESIUM: CPT

## 2023-04-06 PROCEDURE — 84132 ASSAY OF SERUM POTASSIUM: CPT

## 2023-04-06 PROCEDURE — 84295 ASSAY OF SERUM SODIUM: CPT

## 2023-04-06 PROCEDURE — 96374 THER/PROPH/DIAG INJ IV PUSH: CPT | Mod: XU

## 2023-04-06 PROCEDURE — 82330 ASSAY OF CALCIUM: CPT

## 2023-04-06 RX ORDER — ACETAMINOPHEN 500 MG
1000 TABLET ORAL ONCE
Refills: 0 | Status: COMPLETED | OUTPATIENT
Start: 2023-04-06 | End: 2023-04-06

## 2023-04-06 RX ADMIN — Medication 400 MILLIGRAM(S): at 08:38

## 2023-04-06 NOTE — ED ADULT NURSE REASSESSMENT NOTE - NS ED NURSE REASSESS COMMENT FT1
Patient feels better, instructed to ff-up with Gastroenterologist in 1-3 days and verbalized understanding. Left ED in stable condition.

## 2023-04-06 NOTE — ED PROVIDER NOTE - PATIENT PORTAL LINK FT
You can access the FollowMyHealth Patient Portal offered by [facility] by registering at the following website: [email or web address]. By joining [employer]’s FollowMyHealth portal, you will also be able to view your health information using other applications (apps) compatible with our system.

## 2023-04-06 NOTE — ED ADULT NURSE NOTE - NSICDXFAMILYHX_GEN_ALL_CORE_FT
FAMILY HISTORY:  Family history of hypertension    Sibling  Still living? Yes, Estimated age: 51-60  Family history of diabetes mellitus type II, Age at diagnosis: 41-50  Family history of heart disease, Age at diagnosis: 41-50

## 2023-04-06 NOTE — ED PROVIDER NOTE - CARE PROVIDERS DIRECT ADDRESSES
[email or web address]

## 2023-04-06 NOTE — ED ADULT NURSE NOTE - OBJECTIVE STATEMENT
received a 62 F aaox4 ambulatory came to ED with 1 week of abdominal discomfort worse this past 3 days associated with nausea without vomiting. ABle to pass gas and bowel movement, reports sometimes has diarrhea and sometimes constipation, last BM yesterday. Patient also reports pain in urination. Patient denies any chills or fever, sob or chest pain. h/o Asthma controlled, last inhaler use 6-7 month ago , denies hospitalizations, ER visits  Coronary artery disease 2 stents in 2019, occasional chest discomfort, using nitroglycerin PRN - chronic, last cardiac  cath 2/2022- patent stents  Diabetes mellitus type 2 in obese Hg A1c 7 ( 1/2022 ) in HIE Diverticular disease h/o partial colectomy 2013 Fibromyalgia History of Clostridium difficile colitis 2019 History of gout Hyperlipidemia Hypertension Obese BMI 36Obstructive sleep apnea of adult resolved , Peripheral neuropathy PVC (premature ventricular contraction) on Metoprolol Stented coronary artery two PTCA in LAD 2/2019 Vitamin B12 deficiency Cholecystectomy, hysterectomy and colon resection.  RT AC 18g IV access inserted, labs drawn pending orders. Post-Care Instructions: I reviewed with the patient in detail post-care instructions. Patient is not to engage in any heavy lifting, exercise, or swimming for the next 14 days. Should the patient develop any fevers, chills, bleeding, severe pain patient will contact the office immediately.

## 2023-04-06 NOTE — ED PROVIDER NOTE - OBJECTIVE STATEMENT
62F PMH HTN, HLD, CAD s/p 2 LAD stents ( 02/2019), DM2, diverticulitis s/p Hartmans and reversal in 2013, Asthma, Cdiff, Presenting to the emergency department with 1 week of worsening suprapubic and left lower quadrant abdominal pain associated with intermittent nausea, night sweats and chills, no fever measured.  Patient also reports intermittent watery diarrhea over the past week.  No vomiting, dysuria, hematuria.  Denies chest pain, SOB.  No recent travel, hospitalization, or antibiotic use.  Patient reports having gastroenteritis about a month ago, states symptoms had improved until the past week where her abdominal pain returned.

## 2023-04-06 NOTE — ED PROVIDER NOTE - PROGRESS NOTE DETAILS
Jody Preston DO (PGY2): Patient symptoms improving after medication.  Labs without emergent findings.  CT showing multiple abdominal hernias, no signs of obstruction or strangulation.  Patient is aware of these hernias.  States she has not followed with her colorectal doctor in a while.  Recommending that patient see her colorectal doctor for follow-up. Pt made aware of lab and imaging results, including incidental findings. Questions regarding their symptoms were addressed. Advised to follow up with colorectal, GI. Given strict return precautions. Pt verbalized understanding.

## 2023-04-06 NOTE — ED PROVIDER NOTE - CARE PROVIDER_API CALL
Derek Roberto)  ColonRectal Surgery; Surgery  [facility], [address] [place] [address]  Phone: [phone]  Fax: [phone]  Follow Up Time:     Rajiv Rhoades)  ColonRectal Surgery; Surgery  [address], [address]  [place] [address]  Phone: [phone]  Fax: [phone]  Follow Up Time:

## 2023-04-06 NOTE — ED PROVIDER NOTE - ATTENDING CONTRIBUTION TO CARE
I, Geronimo Hicks, performed a history and physical exam of the patient and discussed their management with the resident and /or advanced care provider. I reviewed the resident and /or ACP's note and agree with the documented findings and plan of care. I was present and available for all procedures.    62-year-old female with past with a history of HTN, HLD, CAD, diverticulitis status post ostomy reversal who presents with a weeks worth of abdominal pain that is mostly in the lower abdomen and suprapubic region associate with any dysuria or hematuria with some nausea but denies any vomiting as well as some intermittent diarrhea.  On exam patient is comfortable appearing in no acute distress, minimal abdominal tenderness appreciated in the suprapubic and left lower quadrant without any rebound or guarding, no CVA tenderness appreciated remaining exam largely within normal limits.  Patient ordered for basic belly labs with CT abdomen pelvis.  Presentation could possibly represent recurrence of diverticulitis/colitis will also rule out UTI or other acute intra-abdominal pathology. If W/up WNL likely DC home.

## 2023-04-06 NOTE — ED PROVIDER NOTE - CLINICAL SUMMARY MEDICAL DECISION MAKING FREE TEXT BOX
62F PMH HTN, HLD, CAD s/p 2 LAD stents ( 02/2019), DM2, diverticulitis s/p Hartmans and reversal in 2013, Asthma, Cdiff, Presenting to the emergency department with 1 week of worsening suprapubic and left lower quadrant abdominal pain associated with intermittent nausea, night sweats and chills, no fever measured. Given hx and physical, ddx includes but is not limited to UTI, diverticulitis, colitis, intra-abdominal abscess. Plan for labs, UA, CT, meds, reassess

## 2023-04-06 NOTE — ED PROVIDER NOTE - PHYSICAL EXAMINATION
GENERAL: Awake. Alert. NAD. Well nourished.  HEENT: NC/AT, Conjunctiva pink, no scleral icterus. Airway patent. Moist mucous membranes.  LUNGS: CTAB. No wheezes or rales noted.  CARDIAC: Chest non-tender to palpation. RRR.  ABDOMEN: No masses noted. Soft, TTP suprapubic and LLQ, ND, no rebound, no guarding.  EXT: No edema, no calf tenderness, distal pulses 2+ bilaterally  NEURO: A&Ox3. Moving all extremities. Sensation and strength intact throughout.   SKIN: Warm and dry.   PSYCH: Normal affect.

## 2023-04-06 NOTE — ED PROVIDER NOTE - NSFOLLOWUPINSTRUCTIONS_ED_ALL_ED_FT
Please see colorectal surgery and GI, the hospital call you to help set up the appointment with the colorectal surgery.  There are also numbers listed below for your referral.  Please return to the emergency department for any worsening symptoms or those listed below.    Abdominal Pain    Many things can cause abdominal pain. Many times, abdominal pain is not caused by a disease and will improve without treatment. Your health care provider will do a physical exam to determine if there is a dangerous cause of your pain; blood tests and imaging may help determine the cause of your pain. However, in many cases, no cause may be found and you may need further testing as an outpatient. Monitor your abdominal pain for any changes.     SEEK IMMEDIATE MEDICAL CARE IF YOU HAVE ANY OF THE FOLLOWING SYMPTOMS: worsening abdominal pain, uncontrollable vomiting, profuse diarrhea, inability to have bowel movements or pass gas, black or bloody stools, fever accompanying chest pain or back pain, or fainting. These symptoms may represent a serious problem that is an emergency. Do not wait to see if the symptoms will go away. Get medical help right away. Call 911 and do not drive yourself to the hospital.

## 2023-04-07 LAB
CULTURE RESULTS: SIGNIFICANT CHANGE UP
SPECIMEN SOURCE: SIGNIFICANT CHANGE UP

## 2023-04-10 ENCOUNTER — NON-APPOINTMENT (OUTPATIENT)
Age: 63
End: 2023-04-10

## 2023-06-02 ENCOUNTER — LABORATORY RESULT (OUTPATIENT)
Age: 63
End: 2023-06-02

## 2023-06-02 ENCOUNTER — APPOINTMENT (OUTPATIENT)
Dept: RHEUMATOLOGY | Facility: HOSPITAL | Age: 63
End: 2023-06-02
Payer: MEDICAID

## 2023-06-02 ENCOUNTER — OUTPATIENT (OUTPATIENT)
Dept: OUTPATIENT SERVICES | Facility: HOSPITAL | Age: 63
LOS: 1 days | End: 2023-06-02
Payer: MEDICAID

## 2023-06-02 VITALS
HEIGHT: 63 IN | TEMPERATURE: 97.6 F | WEIGHT: 116 LBS | DIASTOLIC BLOOD PRESSURE: 62 MMHG | HEART RATE: 86 BPM | SYSTOLIC BLOOD PRESSURE: 92 MMHG | RESPIRATION RATE: 17 BRPM | BODY MASS INDEX: 20.55 KG/M2

## 2023-06-02 DIAGNOSIS — Z90.710 ACQUIRED ABSENCE OF BOTH CERVIX AND UTERUS: Chronic | ICD-10-CM

## 2023-06-02 DIAGNOSIS — M06.9 RHEUMATOID ARTHRITIS, UNSPECIFIED: ICD-10-CM

## 2023-06-02 DIAGNOSIS — Z98.89 OTHER SPECIFIED POSTPROCEDURAL STATES: Chronic | ICD-10-CM

## 2023-06-02 DIAGNOSIS — Z98.890 OTHER SPECIFIED POSTPROCEDURAL STATES: Chronic | ICD-10-CM

## 2023-06-02 DIAGNOSIS — Z90.49 ACQUIRED ABSENCE OF OTHER SPECIFIED PARTS OF DIGESTIVE TRACT: Chronic | ICD-10-CM

## 2023-06-02 DIAGNOSIS — M19.049 PRIMARY OSTEOARTHRITIS, UNSPECIFIED HAND: ICD-10-CM

## 2023-06-02 PROCEDURE — 99214 OFFICE O/P EST MOD 30 MIN: CPT | Mod: GC

## 2023-06-02 PROCEDURE — G0463: CPT

## 2023-06-02 RX ORDER — ICOSAPENT ETHYL 1000 MG/1
1 CAPSULE ORAL TWICE DAILY
Refills: 0 | Status: DISCONTINUED | COMMUNITY
Start: 2022-11-01 | End: 2023-06-02

## 2023-06-05 ENCOUNTER — APPOINTMENT (OUTPATIENT)
Dept: ENDOCRINOLOGY | Facility: CLINIC | Age: 63
End: 2023-06-05
Payer: MEDICAID

## 2023-06-05 VITALS
BODY MASS INDEX: 37.43 KG/M2 | HEIGHT: 63 IN | HEART RATE: 78 BPM | SYSTOLIC BLOOD PRESSURE: 124 MMHG | DIASTOLIC BLOOD PRESSURE: 74 MMHG | WEIGHT: 211.25 LBS | OXYGEN SATURATION: 96 %

## 2023-06-05 LAB — GLUCOSE BLDC GLUCOMTR-MCNC: 138

## 2023-06-05 PROCEDURE — G0108 DIAB MANAGE TRN  PER INDIV: CPT

## 2023-06-05 PROCEDURE — 82962 GLUCOSE BLOOD TEST: CPT

## 2023-06-05 PROCEDURE — 95251 CONT GLUC MNTR ANALYSIS I&R: CPT

## 2023-06-07 RX ORDER — METHYLPREDNISOLONE 4 MG/1
4 TABLET ORAL TWICE DAILY
Qty: 1 | Refills: 0 | Status: ACTIVE | COMMUNITY
Start: 2023-06-07 | End: 1900-01-01

## 2023-06-08 NOTE — ASSESSMENT
[FreeTextEntry1] : 61 yo F PMH DM2, HTN, Asthma, CAD, diverticulitis, Gout, OA, Fibromyalgia, trigeminal neuralgia, cervical radiculopathy 2/2 large C5/C6 protrusion (MRI 5/2022) c/b b/l carpal tunnel, chronic R shoulder rotator cuff tear, supraspinatus tear presenting for follow up.\par \par #Gout\par 2016\par Has Gout (never had arthrocentesis though)\par Initial manifestation 1st MTPs\par Last flare up: 2020\par Risk factors: DM2, obesity \par Last uric acid 4 on 8/12/2022\par Currently on ULT with Allopurinol.\par \par #OA\par B/l knee L>R\par Previous hx of patellar aliment surgery b/l, and L meniscal tear repair 2017\par 1/25/22-XR of the knees showed b/l tibiofemoral OA change presence of peripheral joint osteophyte L>R. Medial Left knee narrowing\par XR hand multiple osteophyte \par XR 2/3/23 showed tricompartment OA b/l knee \par \par \par \par #Left shoulder supraspinatus tear \par  MRI of the left shoulder showed full tickness tear of the supraspinatus tendon \par Moderate tendinosis of the infraspinatus tendon with shallow. Partial thickness tearing of the middle fibers of the distal subscapular with superimposed tendinosis\par s/p left shoulder steroid injection per pain medicine \par On PT\par \par #Cervical spine \par 11/11/2022 MRI cervical spine mild retrolisthesis and broad-based posterior disc protrusion with bilateral neural foraminal stenosis and moderate central stenosis \par Epidural injection per pain medicine\par \par #Fibromyalgia \par 2021 sleep study negative for MYLES\par Previous treatment gabapentin and Cymbalta ([id number]) stopped taking due to headache \par \par #[place]ervin tenosynovitis of L thumb\par \par #HCM\par 3/12/2021 DEXA scan normal \par  \par #Elevate ALK\par \par Plan: \par -Discussed w/patient regarding to start medication for fibromyalgia. Patient refused starting medications. Patient prefer physical therapy, occupational therapy and exercise plus weight change. Send OT and PT\par -Referral to hand surgery for b/l carpal tunnel syndrome.  \par -Cervical muscle contraction, continue seeing pain medicine and recommend baclofen. Patient will see [facility] orthopedic for opinion\par -cw with allopurinol 200 mg daily w/diet management\par \par RTC in 3 month\par DW Dr. [place]\par Valente Parikh MD\par Rheumatology fellow\par \par \par

## 2023-06-08 NOTE — PHYSICAL EXAM
[General Appearance - Alert] : alert [General Appearance - Well Nourished] : well nourished [General Appearance - In No Acute Distress] : in no acute distress [Sclera] : the sclera and conjunctiva were normal [Outer Ear] : the ears and nose were normal in appearance [Neck Appearance] : the appearance of the neck was normal [Respiration, Rhythm And Depth] : normal respiratory rhythm and effort [Exaggerated Use Of Accessory Muscles For Inspiration] : no accessory muscle use [Auscultation Breath Sounds / Voice Sounds] : lungs were clear to auscultation bilaterally [Heart Sounds] : normal S1 and S2 [Heart Rate And Rhythm] : heart rate was normal and rhythm regular [Edema] : there was no peripheral edema [Bowel Sounds] : normal bowel sounds [Abdomen Tenderness] : non-tender [Abdomen Soft] : soft [Cervical Lymph Nodes Enlarged Posterior Bilaterally] : posterior cervical [Cervical Lymph Nodes Enlarged Anterior Bilaterally] : anterior cervical [Supraclavicular Lymph Nodes Enlarged Bilaterally] : supraclavicular [Abnormal Walk] : normal gait [Musculoskeletal - Swelling] : no joint swelling seen [Motor Tone] : muscle strength and tone were normal [No Focal Deficits] : no focal deficits [] : no rash [Impaired Insight] : insight and judgment were intact [FreeTextEntry1] : B/l MCPs tenderness but no swelling. R shoulder +ve empty can test.

## 2023-06-08 NOTE — HISTORY OF PRESENT ILLNESS
[___ Month(s) Ago] : [unfilled] month(s) ago [FreeTextEntry1] : 63 yo F PMH DM2, HTN, Asthma, CAD, diverticulitis, Gout, OA, Fibromyalgia, trigeminal neuralgia, cervical radiculopathy 2/2 large C5/C6 protrusion (MRI 5/2022) c/b b/l carpal tunnel, chronic R shoulder rotator cuff tear, supraspinatus tear presenting for follow up.\par \par History\par Initially referred by PCP for possible inflammatory arthritis 11/2020\par DEMETRICE 1:80 homogeneous\par Negative 6/2016: SCL, anti-centromere, RNP, anti-smith, anti-JO1, RF, CCP, SSA, SSB, dsDNA, TPO\par \par \par #Gout\par 2016\par Has Gout (never had arthrocentesis though)\par Initial manifestation 1st MTPs\par Last flare up: 2020\par Risk factors: DM2, obesity \par Currently on ULT with Allopurinol.\par \par #OA\par B/l knee L>R\par Previous hx of patellar aliment surgery b/l, and L meniscal tear repair 2017\par 1/25/22-XR of the knees showed b/l tibiofemoral OA change presence of peripheral joint osteophyte L>R. Medial Left knee narrowing\par XR 2/3/23 showed tricompartment OA b/l knee\par \par \par \par #Left shoulder supraspinatus tear \par  MRI of the left shoulder showed full tickness tear of the supraspinatus tendon \par Moderate tendinosis of the infraspinatus tendon with shallow. Partial thickness tearing of the middle fibers of the distal subscapular with superimposed tendinosis\par s/p left shoulder steroid injection per pain medicine \par On PT\par \par #Cervical spine \par 11/11/2022 MRI cervical spine mild retrolisthesis and broad-based posterior disc protrusion with bilateral neural foraminal stenosis and moderate central stenosis \par Epidural injection per pain medicine\par \par #Fibromyalgia \par 2021 sleep study negative for MYLES\par Previous treatment gabapentin and Cymbalta ([id number]) stopped taking due to headache \par Amitriptyline stopped due to nausea and abdominal pain \par \par #Carpal tunnel syndrome \par +ve b/l carpal tunnel syndrome \par  \par \par  \par  [History of Tophi] : the patient has no history of tophi [Erosions on Xrays] : no erosions on xrays [Current Joint Pain] : no current joint pain [FreeTextEntry3] : 2016 [FreeTextEntry7] : 2020 [FreeTextEntry4] : allopurinol, and colchicine before

## 2023-06-09 DIAGNOSIS — M19.049 PRIMARY OSTEOARTHRITIS, UNSPECIFIED HAND: ICD-10-CM

## 2023-06-28 ENCOUNTER — APPOINTMENT (OUTPATIENT)
Dept: ORTHOPEDIC SURGERY | Facility: HOSPITAL | Age: 63
End: 2023-06-28

## 2023-06-28 ENCOUNTER — OUTPATIENT (OUTPATIENT)
Dept: OUTPATIENT SERVICES | Facility: HOSPITAL | Age: 63
LOS: 1 days | End: 2023-06-28
Payer: MEDICAID

## 2023-06-28 VITALS
WEIGHT: 205 LBS | HEIGHT: 63 IN | DIASTOLIC BLOOD PRESSURE: 82 MMHG | TEMPERATURE: 96.5 F | HEART RATE: 69 BPM | SYSTOLIC BLOOD PRESSURE: 118 MMHG | BODY MASS INDEX: 36.32 KG/M2

## 2023-06-28 DIAGNOSIS — Z98.89 OTHER SPECIFIED POSTPROCEDURAL STATES: Chronic | ICD-10-CM

## 2023-06-28 DIAGNOSIS — Z90.710 ACQUIRED ABSENCE OF BOTH CERVIX AND UTERUS: Chronic | ICD-10-CM

## 2023-06-28 DIAGNOSIS — Z98.890 OTHER SPECIFIED POSTPROCEDURAL STATES: Chronic | ICD-10-CM

## 2023-06-28 DIAGNOSIS — M79.641 PAIN IN RIGHT HAND: ICD-10-CM

## 2023-06-28 DIAGNOSIS — Z90.49 ACQUIRED ABSENCE OF OTHER SPECIFIED PARTS OF DIGESTIVE TRACT: Chronic | ICD-10-CM

## 2023-06-28 DIAGNOSIS — M79.609 PAIN IN UNSPECIFIED LIMB: ICD-10-CM

## 2023-06-28 DIAGNOSIS — M79.642 PAIN IN LEFT HAND: ICD-10-CM

## 2023-06-28 PROCEDURE — G0463: CPT

## 2023-06-28 RX ORDER — METOPROLOL SUCCINATE 100 MG/1
100 TABLET, EXTENDED RELEASE ORAL
Refills: 0 | Status: DISCONTINUED | COMMUNITY
End: 2023-06-28

## 2023-06-28 RX ORDER — NAPROXEN 375 MG/1
375 TABLET, DELAYED RELEASE ORAL
Qty: 90 | Refills: 2 | Status: DISCONTINUED | COMMUNITY
Start: 2023-03-03 | End: 2023-06-28

## 2023-06-28 RX ORDER — COVID-19 ANTIGEN TEST
KIT MISCELLANEOUS
Qty: 2 | Refills: 0 | Status: DISCONTINUED | COMMUNITY
Start: 2023-01-24 | End: 2023-06-28

## 2023-07-05 ENCOUNTER — APPOINTMENT (OUTPATIENT)
Dept: ORTHOPEDIC SURGERY | Facility: HOSPITAL | Age: 63
End: 2023-07-05

## 2023-07-05 ENCOUNTER — OUTPATIENT (OUTPATIENT)
Dept: OUTPATIENT SERVICES | Facility: HOSPITAL | Age: 63
LOS: 1 days | End: 2023-07-05
Payer: MEDICAID

## 2023-07-05 VITALS
DIASTOLIC BLOOD PRESSURE: 83 MMHG | HEIGHT: 63 IN | TEMPERATURE: 96.7 F | SYSTOLIC BLOOD PRESSURE: 125 MMHG | HEART RATE: 68 BPM | WEIGHT: 205 LBS | BODY MASS INDEX: 36.32 KG/M2

## 2023-07-05 DIAGNOSIS — Z90.710 ACQUIRED ABSENCE OF BOTH CERVIX AND UTERUS: Chronic | ICD-10-CM

## 2023-07-05 DIAGNOSIS — Z98.89 OTHER SPECIFIED POSTPROCEDURAL STATES: Chronic | ICD-10-CM

## 2023-07-05 DIAGNOSIS — Z98.890 OTHER SPECIFIED POSTPROCEDURAL STATES: Chronic | ICD-10-CM

## 2023-07-05 DIAGNOSIS — M25.511 PAIN IN RIGHT SHOULDER: ICD-10-CM

## 2023-07-05 DIAGNOSIS — Z90.49 ACQUIRED ABSENCE OF OTHER SPECIFIED PARTS OF DIGESTIVE TRACT: Chronic | ICD-10-CM

## 2023-07-05 DIAGNOSIS — M79.9 SOFT TISSUE DISORDER, UNSPECIFIED: ICD-10-CM

## 2023-07-05 PROCEDURE — G0463: CPT

## 2023-07-10 ENCOUNTER — NON-APPOINTMENT (OUTPATIENT)
Age: 63
End: 2023-07-10

## 2023-07-11 ENCOUNTER — TRANSCRIPTION ENCOUNTER (OUTPATIENT)
Age: 63
End: 2023-07-11

## 2023-07-11 ENCOUNTER — OUTPATIENT (OUTPATIENT)
Dept: OUTPATIENT SERVICES | Facility: HOSPITAL | Age: 63
LOS: 1 days | End: 2023-07-11
Payer: MEDICAID

## 2023-07-11 VITALS
DIASTOLIC BLOOD PRESSURE: 65 MMHG | WEIGHT: 199.96 LBS | HEART RATE: 85 BPM | OXYGEN SATURATION: 100 % | HEIGHT: 64 IN | RESPIRATION RATE: 15 BRPM | TEMPERATURE: 98 F | SYSTOLIC BLOOD PRESSURE: 135 MMHG

## 2023-07-11 VITALS
SYSTOLIC BLOOD PRESSURE: 136 MMHG | HEART RATE: 78 BPM | RESPIRATION RATE: 15 BRPM | DIASTOLIC BLOOD PRESSURE: 63 MMHG | OXYGEN SATURATION: 96 %

## 2023-07-11 DIAGNOSIS — Z98.890 OTHER SPECIFIED POSTPROCEDURAL STATES: Chronic | ICD-10-CM

## 2023-07-11 DIAGNOSIS — I50.22 CHRONIC SYSTOLIC (CONGESTIVE) HEART FAILURE: ICD-10-CM

## 2023-07-11 DIAGNOSIS — Z90.49 ACQUIRED ABSENCE OF OTHER SPECIFIED PARTS OF DIGESTIVE TRACT: Chronic | ICD-10-CM

## 2023-07-11 DIAGNOSIS — Z98.89 OTHER SPECIFIED POSTPROCEDURAL STATES: Chronic | ICD-10-CM

## 2023-07-11 DIAGNOSIS — Z90.710 ACQUIRED ABSENCE OF BOTH CERVIX AND UTERUS: Chronic | ICD-10-CM

## 2023-07-11 LAB
ALBUMIN SERPL ELPH-MCNC: 4.5 G/DL — SIGNIFICANT CHANGE UP (ref 3.3–5)
ALP SERPL-CCNC: 130 U/L — HIGH (ref 40–120)
ALT FLD-CCNC: 17 U/L — SIGNIFICANT CHANGE UP (ref 10–45)
ANION GAP SERPL CALC-SCNC: 12 MMOL/L — SIGNIFICANT CHANGE UP (ref 5–17)
AST SERPL-CCNC: 12 U/L — SIGNIFICANT CHANGE UP (ref 10–40)
BILIRUB SERPL-MCNC: 0.4 MG/DL — SIGNIFICANT CHANGE UP (ref 0.2–1.2)
BUN SERPL-MCNC: 13 MG/DL — SIGNIFICANT CHANGE UP (ref 7–23)
CALCIUM SERPL-MCNC: 9.9 MG/DL — SIGNIFICANT CHANGE UP (ref 8.4–10.5)
CHLORIDE SERPL-SCNC: 105 MMOL/L — SIGNIFICANT CHANGE UP (ref 96–108)
CO2 SERPL-SCNC: 25 MMOL/L — SIGNIFICANT CHANGE UP (ref 22–31)
CREAT SERPL-MCNC: 0.61 MG/DL — SIGNIFICANT CHANGE UP (ref 0.5–1.3)
EGFR: 101 ML/MIN/1.73M2 — SIGNIFICANT CHANGE UP
GLUCOSE BLDC GLUCOMTR-MCNC: 124 MG/DL — HIGH (ref 70–99)
GLUCOSE SERPL-MCNC: 139 MG/DL — HIGH (ref 70–99)
HCT VFR BLD CALC: 42 % — SIGNIFICANT CHANGE UP (ref 34.5–45)
HGB BLD-MCNC: 13.2 G/DL — SIGNIFICANT CHANGE UP (ref 11.5–15.5)
MCHC RBC-ENTMCNC: 26.4 PG — LOW (ref 27–34)
MCHC RBC-ENTMCNC: 31.4 GM/DL — LOW (ref 32–36)
MCV RBC AUTO: 84 FL — SIGNIFICANT CHANGE UP (ref 80–100)
NRBC # BLD: 0 /100 WBCS — SIGNIFICANT CHANGE UP (ref 0–0)
PLATELET # BLD AUTO: 244 K/UL — SIGNIFICANT CHANGE UP (ref 150–400)
POTASSIUM SERPL-MCNC: 3.8 MMOL/L — SIGNIFICANT CHANGE UP (ref 3.5–5.3)
POTASSIUM SERPL-SCNC: 3.8 MMOL/L — SIGNIFICANT CHANGE UP (ref 3.5–5.3)
PROT SERPL-MCNC: 7.3 G/DL — SIGNIFICANT CHANGE UP (ref 6–8.3)
RBC # BLD: 5 M/UL — SIGNIFICANT CHANGE UP (ref 3.8–5.2)
RBC # FLD: 15 % — HIGH (ref 10.3–14.5)
SODIUM SERPL-SCNC: 142 MMOL/L — SIGNIFICANT CHANGE UP (ref 135–145)
WBC # BLD: 7.14 K/UL — SIGNIFICANT CHANGE UP (ref 3.8–10.5)
WBC # FLD AUTO: 7.14 K/UL — SIGNIFICANT CHANGE UP (ref 3.8–10.5)

## 2023-07-11 PROCEDURE — 82962 GLUCOSE BLOOD TEST: CPT

## 2023-07-11 PROCEDURE — 93010 ELECTROCARDIOGRAM REPORT: CPT

## 2023-07-11 PROCEDURE — C1769: CPT

## 2023-07-11 PROCEDURE — 93454 CORONARY ARTERY ANGIO S&I: CPT | Mod: 26

## 2023-07-11 PROCEDURE — 93454 CORONARY ARTERY ANGIO S&I: CPT

## 2023-07-11 PROCEDURE — C1894: CPT

## 2023-07-11 PROCEDURE — 85027 COMPLETE CBC AUTOMATED: CPT

## 2023-07-11 PROCEDURE — 80053 COMPREHEN METABOLIC PANEL: CPT

## 2023-07-11 PROCEDURE — 36415 COLL VENOUS BLD VENIPUNCTURE: CPT

## 2023-07-11 PROCEDURE — C1887: CPT

## 2023-07-11 PROCEDURE — 93005 ELECTROCARDIOGRAM TRACING: CPT

## 2023-07-11 RX ORDER — SODIUM CHLORIDE 9 MG/ML
1000 INJECTION INTRAMUSCULAR; INTRAVENOUS; SUBCUTANEOUS
Refills: 0 | Status: DISCONTINUED | OUTPATIENT
Start: 2023-07-11 | End: 2023-07-25

## 2023-07-11 RX ORDER — SODIUM CHLORIDE 9 MG/ML
250 INJECTION INTRAMUSCULAR; INTRAVENOUS; SUBCUTANEOUS ONCE
Refills: 0 | Status: COMPLETED | OUTPATIENT
Start: 2023-07-11 | End: 2023-07-11

## 2023-07-11 RX ADMIN — SODIUM CHLORIDE 75 MILLILITER(S): 9 INJECTION INTRAMUSCULAR; INTRAVENOUS; SUBCUTANEOUS at 08:02

## 2023-07-11 RX ADMIN — SODIUM CHLORIDE 250 MILLILITER(S): 9 INJECTION INTRAMUSCULAR; INTRAVENOUS; SUBCUTANEOUS at 08:02

## 2023-07-11 NOTE — H&P CARDIOLOGY - NSICDXFAMILYHX_GEN_ALL_CORE_FT
FAMILY HISTORY:  Family history of hypertension    Sibling  Still living? Yes, Estimated age: 51-60  Family history of diabetes mellitus type II, Age at diagnosis: 41-50  Family history of heart disease, Age at diagnosis: 41-50     No

## 2023-07-11 NOTE — ASU PATIENT PROFILE, ADULT - FALL HARM RISK - UNIVERSAL INTERVENTIONS
Bed in lowest position, wheels locked, appropriate side rails in place/Call bell, personal items and telephone in reach/Instruct patient to call for assistance before getting out of bed or chair/Non-slip footwear when patient is out of bed/[place] to call system/Physically safe environment - no spills, clutter or unnecessary equipment/Purposeful Proactive Rounding/Room/bathroom lighting operational, light cord in reach

## 2023-07-11 NOTE — ASU DISCHARGE PLAN (ADULT/PEDIATRIC) - CARE PROVIDER_API CALL
Renetta Saldana  Cardiac Electrophysiology  [address], [address]  [place] [address]  Phone: [phone]  Fax: [phone]  Follow Up Time: 2 weeks

## 2023-07-11 NOTE — H&P CARDIOLOGY - EKG AND INTERPRETATION
Face to Face Note  -  Durable Medical Equipment    John H Ganser, M.D. - NPI: [id number]  I certify that this patient is under my care and that they had a durable medical equipment(DME)face to face encounter by myself that meets the physician DME face-to-face encounter requirements with this patient on:    Date of encounter:   Patient:                    MRN:                       YOB: 2017  Cheli Parks  [id number]  1939     The encounter with the patient was in whole, or in part, for the following medical condition, which is the primary reason for durable medical equipment:  Post-Op Surgery    I certify that, based on my findings, the following durable medical equipment is medically necessary:  Oxygen.    HOME O2 Saturation Measurements:(Values must be present for Home Oxygen orders)         ,     ,         My Clinical findings support the need for the above equipment due to:  Hypoxia, Other - Dyspnea    Supporting Symptoms: Dyspnea on exertion, fatigue      NSR

## 2023-07-11 NOTE — H&P CARDIOLOGY - HISTORY OF PRESENT ILLNESS
This is a 62 year old  female with PMH of HTN, HLD ( on Rapatha),  CAD s/p 2 LAD stents ( 02/2019), DM type 2 ( well managed as per pt, endo Dr Radha Osman, last A1C unknown,  DM complicated by peripheral neuropathy, diverticulitis s/p Hartmans and reversal in 2013 at [facility], MYLES not on CPAP,  Asthma (controlled, as per pt), Cdiff, hx pf acute abd on 11/2019, fibromyalgia.  Seen and referred by her cardiologist Dr BOSTON Mccloud for recurrent chest pain on exertion consistent with angina, takes NTG SL PRN and patch, same presentation as last where she had LHC (2/2023) revealed mild ot moderated CAD, 40% distal LAD. She is here for [facility] cath today. She denies chest pain, palpitations, dyspnea, dizziness.

## 2023-07-11 NOTE — ASU DISCHARGE PLAN (ADULT/PEDIATRIC) - ASU DC SPECIAL INSTRUCTIONSFT
Wound Care:     The day AFTER your procedure remove bandage GENTLY, and clean using  mild soap and gentle warm, water stream, pat dry, leave OPEN to air.  YOU MAY SHOWER.   DO NOT apply lotions, creams, ointments, powder, parfumes to your incision site.  DO NOT SOAK your site for 1 week (no baths, no pools, no tubs, etc...)  Check  your groin and /or wrist daily. A small amount of bruising, and soreness are normal    ACTIVITY: for 24 hours   - DO NOT DRIVE  - DO NOT make any important decisions or sign legal documents   - DO NOT operate heavy machinates   - you may resume sexual activity in 48 hours, unless otherwise instructed by your cardiologist     If your procedure was done through the WRIST: for the NEXT 3 DAYS:  - avoid pushing, pulling, with that affected wrist   - avoid repeated movement of that hand and wrist (eg: typing, hammering)  - DO NOT LIFT anything more than 5 lbs     If your procedure was done through the GROIN: for the NEXT 5 DAYS  - Limit climbing stairs, DO NOT soak in bathtub or pool  - no strenuous activities, pushing, pulling, straining  - Do not lift anything 10 Lbs or heavier     MEDICATION:   take your medications as explained (see discharge paperwork)   If you received a STENT, you will be taking antiplatelet medications to KEEP YOUR STENT OPEN (eg: Aspirin, Plavix, Brilinta, Effient, etc).  Take as prescribed DO NOT STOP taking them without consulting with your cardiologist first.     Follow heart healthy diet recommended by your doctor, if you smoke STOP SMOKING (may call [phone] for center of tobacco control if you need assistance)     CALL your doctor to make appointment in 2 WEEKS     ***CALL YOUR DOCTOR***  if you experience: fever, chills, body aches, or severe pain, swelling, redness, heat or yellow discharge at incision site  If you experience Bleeding or excruciating pain at the procedural site, swelling (golf ball size) at your procedural site  If you experience CHEST PAIN  If you experience extremity numbness, tingling, temperature change (of your procedural site)   If you are unable to reach your doctor, you may contact:   -Cardiology Office at [facility] at [phone] or   - [facility] [phone]  - [facility] [phone]

## 2023-07-11 NOTE — H&P CARDIOLOGY - CONSTITUTIONAL
ETT intubation for Curosurf and vent support    Time out was performed prior to procedure.  The baby was intubated at first attempt with # 2.5 ETT without stylet. Noted were shannan equal breath sounds, pink color, mist in the ETT and gold color of Co2 detector. The baby tolerated the procedure well. The ETT was advanced in to 7 cms. After Curosurf administration, the baby was placed on conventional vent.    The baby tolerated the procedure well.   detailed exam

## 2023-08-01 ENCOUNTER — OUTPATIENT (OUTPATIENT)
Dept: OUTPATIENT SERVICES | Facility: HOSPITAL | Age: 63
LOS: 1 days | End: 2023-08-01
Payer: MEDICAID

## 2023-08-01 ENCOUNTER — APPOINTMENT (OUTPATIENT)
Dept: GASTROENTEROLOGY | Facility: HOSPITAL | Age: 63
End: 2023-08-01
Payer: MEDICAID

## 2023-08-01 VITALS
TEMPERATURE: 97 F | RESPIRATION RATE: 14 BRPM | OXYGEN SATURATION: 98 % | HEIGHT: 63 IN | HEART RATE: 79 BPM | BODY MASS INDEX: 36.32 KG/M2 | WEIGHT: 205 LBS | SYSTOLIC BLOOD PRESSURE: 132 MMHG | DIASTOLIC BLOOD PRESSURE: 79 MMHG

## 2023-08-01 DIAGNOSIS — Z98.890 OTHER SPECIFIED POSTPROCEDURAL STATES: Chronic | ICD-10-CM

## 2023-08-01 DIAGNOSIS — Z98.89 OTHER SPECIFIED POSTPROCEDURAL STATES: Chronic | ICD-10-CM

## 2023-08-01 DIAGNOSIS — R10.9 UNSPECIFIED ABDOMINAL PAIN: ICD-10-CM

## 2023-08-01 DIAGNOSIS — Z90.49 ACQUIRED ABSENCE OF OTHER SPECIFIED PARTS OF DIGESTIVE TRACT: Chronic | ICD-10-CM

## 2023-08-01 DIAGNOSIS — Z90.710 ACQUIRED ABSENCE OF BOTH CERVIX AND UTERUS: Chronic | ICD-10-CM

## 2023-08-01 PROCEDURE — G0463: CPT

## 2023-08-01 PROCEDURE — 99213 OFFICE O/P EST LOW 20 MIN: CPT | Mod: GC

## 2023-08-01 RX ORDER — ESOMEPRAZOLE MAGNESIUM 40 MG/1
40 CAPSULE, DELAYED RELEASE ORAL DAILY
Qty: 30 | Refills: 3 | Status: ACTIVE | COMMUNITY
Start: 2023-08-01 | End: 1900-01-01

## 2023-08-03 NOTE — HISTORY OF PRESENT ILLNESS
[FreeTextEntry1] : 61F PMH of HTN. HLD (on Rapatha), CAD s/p 2 LAD stents (02/2019), DM type 2 (well managed as per pt, endo Dr Mendosa, last A1C unknown; DM complicated by peripheral neuropathy), diverticulitis s/p Hartmans and reversal in 2013 at [facility], MYLES not on CPAP, Asthma (controlled, as per pt), Cdiff (>10 episodes), fibromyalgia presenting for follow up.   #Constipation: Patient is using sinacot about 3 times a week. She has not been using miralax. She normally has one BM per day. Most of the time she has to strain when going to the bathroom and will sometimes see some blood on the toilet paper. Patient reports good hydration.   # Belching with abdominal bloating: Patient experiences this bloating and tightness in her abdomen at all different times of the day. It is not associated with food. She denies an urge to defecate with this pain. She denies nausea and vomiting with this belching and abdominal bloating. Patient denies history of h.pylori; she has not been on PPI therapy. She has had these symptoms for multiple years. This all started since her colon surgery in 2013.  EGD (4/2022): gastritis (H. pylori negative) + esophageal biopsies wnl  Colonoscopy (4/2022): diverticulosis, healthy colo-colonic anastomosis, internal hemorrhoids

## 2023-08-03 NOTE — PHYSICAL EXAM
[Normal] : heart rate was normal and rhythm regular, normal S1 and S2, no murmurs [de-identified] : obese abdomen that was mildly tender in the left lower quadrant, could not appreciate abd hernias

## 2023-08-03 NOTE — REVIEW OF SYSTEMS
[Abdominal Pain] : abdominal pain [Constipation] : constipation [Heartburn] : heartburn [Bloating (gassiness)] : bloating [Negative] : Psychiatric [Vomiting] : no vomiting [Diarrhea] : no diarrhea [Melena (black stool)] : no melena [Bleeding] : no bleeding [Fecal Incontinence (soiling)] : no fecal incontinence

## 2023-08-03 NOTE — ASSESSMENT
[FreeTextEntry1] : 61F PMH of HTN. HLD (on Rapatha), CAD s/p 2 LAD stents (02/2019), DM type 2 (well managed as per pt, endo Dr Mendosa, last A1C unknown; DM complicated by peripheral neuropathy), diverticulitis s/p Hartmans and reversal in 2013 at [facility], MYLES not on CPAP, Asthma (controlled, as per pt), Cdiff (>10 episodes), fibromyalgia presenting for follow up for abdominal pain and bloating as well as GERD.  Case discussed with Dr. Debbie Avila  PGY4

## 2023-08-03 NOTE — ASU DISCHARGE PLAN (ADULT/PEDIATRIC) - D. IF YOU HAD ANY TYPE OF SEDATION, YOU MAY EXPERIENCE LIGHTHEADEDNESS, DIZZINESS, OR SLEEPINESS FOLLOWING YOUR PROCEDURE. A RESPONSIBLE ADULT SHOULD STAY WITH YOU FOR AT LEAST 24 HOURS FOLLOWING YOUR PROCEDURE.
Patient is followed by [facility] antico clinic, called [facility] again and was transferred to writer.    Patient reports called [facility] on [address] to refill warfarin and was told insurance would not approve as was too soon - per medication list, warfarin 5 mg tablet sig: take 1 tablet Sun, Tues, Thurs and 1.5 tablets all other days was refilled 6/15/23 #40 tablets with 5RF, this is patient's current dose, last INR check was 7/24 with next due 8/21.    Patient states has enough warfarin for today however will then be out of medication.    Advised pt will send new Rx with comment to fill Rx today as pt will be out of medication after today; patient prefers to receive one-month supply at a time.    Rx sent - advised pt if any further issues to call back.   Statement Selected

## 2023-08-03 NOTE — END OF VISIT
[] : Fellow [FreeTextEntry3] : As modified and discussed with patient MD ANKIT Aranda [facility] Associate Professor of Medicine [facility]

## 2023-08-07 DIAGNOSIS — K21.9 GASTRO-ESOPHAGEAL REFLUX DISEASE WITHOUT ESOPHAGITIS: ICD-10-CM

## 2023-08-07 DIAGNOSIS — R14.0 ABDOMINAL DISTENSION (GASEOUS): ICD-10-CM

## 2023-08-07 DIAGNOSIS — K59.00 CONSTIPATION, UNSPECIFIED: ICD-10-CM

## 2023-08-07 DIAGNOSIS — K46.9 UNSPECIFIED ABDOMINAL HERNIA WITHOUT OBSTRUCTION OR GANGRENE: ICD-10-CM

## 2023-08-08 ENCOUNTER — APPOINTMENT (OUTPATIENT)
Dept: NEUROLOGY | Facility: HOSPITAL | Age: 63
End: 2023-08-08
Payer: MEDICAID

## 2023-08-08 ENCOUNTER — OUTPATIENT (OUTPATIENT)
Dept: OUTPATIENT SERVICES | Facility: HOSPITAL | Age: 63
LOS: 1 days | End: 2023-08-08
Payer: MEDICAID

## 2023-08-08 VITALS
RESPIRATION RATE: 14 BRPM | TEMPERATURE: 98 F | WEIGHT: 205 LBS | BODY MASS INDEX: 36.32 KG/M2 | DIASTOLIC BLOOD PRESSURE: 63 MMHG | HEART RATE: 83 BPM | SYSTOLIC BLOOD PRESSURE: 99 MMHG | HEIGHT: 63 IN | OXYGEN SATURATION: 98 %

## 2023-08-08 DIAGNOSIS — G44.89 OTHER HEADACHE SYNDROME: ICD-10-CM

## 2023-08-08 DIAGNOSIS — Z98.890 OTHER SPECIFIED POSTPROCEDURAL STATES: Chronic | ICD-10-CM

## 2023-08-08 DIAGNOSIS — Z98.89 OTHER SPECIFIED POSTPROCEDURAL STATES: Chronic | ICD-10-CM

## 2023-08-08 DIAGNOSIS — R51.9 HEADACHE, UNSPECIFIED: ICD-10-CM

## 2023-08-08 DIAGNOSIS — Z90.710 ACQUIRED ABSENCE OF BOTH CERVIX AND UTERUS: Chronic | ICD-10-CM

## 2023-08-08 DIAGNOSIS — Z90.49 ACQUIRED ABSENCE OF OTHER SPECIFIED PARTS OF DIGESTIVE TRACT: Chronic | ICD-10-CM

## 2023-08-08 PROCEDURE — 99212 OFFICE O/P EST SF 10 MIN: CPT

## 2023-08-08 PROCEDURE — G0463: CPT

## 2023-08-08 RX ORDER — OXCARBAZEPINE 150 MG/1
150 TABLET, FILM COATED ORAL TWICE DAILY
Qty: 60 | Refills: 6 | Status: ACTIVE | COMMUNITY
Start: 2023-02-07 | End: 1900-01-01

## 2023-08-08 NOTE — END OF VISIT
[] : Resident [FreeTextEntry3] : doing well on trileptal and nurtec, continue to monitor blood work ~q6m. rtc 6m

## 2023-08-08 NOTE — REVIEW OF SYSTEMS
[Numbness] : numbness [Negative] : ENT [Fever] : no fever [Chills] : no chills [Feeling Poorly] : not feeling poorly [Feeling Tired] : not feeling tired [Recent Weight Gain (___ Lbs)] : no recent weight gain [Recent Weight Loss (___ Lbs)] : no recent weight loss [Ataxia] : no ataxia

## 2023-08-08 NOTE — PHYSICAL EXAM
[FreeTextEntry1] : Constitutional: alert and in no acute distress. Psychiatric: oriented to person, place, and time, insight and judgment were intact, the affect was normal, the mood was normal and recent memory was not impaired. Neurologic:   Orientation: oriented to person, oriented to place and oriented to time.   Language: fluency intact.   Cranial Nerves: visual acuity intact bilaterally, visual fields full to confrontation, pupils equal round and reactive to light, extraocular motion intact, facial numbness on L side (chronic), face symmetrical, hearing was intact bilaterally, head turning and shoulder shrug symmetric and there was no tongue deviation with protrusion.   Motor: muscle tone was normal in all four extremities.   Motor Strength:. the patient is right hand dominant.  Right upper extremity: shoulder abduction 5/5, arm flexion 5/5, arm extension 5/5, , the hand  was normal,  fingers abduction 5/5 right. Left upper extremity shoulder abduction 5/5, arm flexion 5/5, arm extension 5/5, wrist flexion 5/5, wrist extension 5/5, the hand  was normal, fingers abduction 5/5 left. Right lower extremity strength: hip flexion 5/5,knee flexion 5/5, knee extension 5/5, ankle dorsiflexion 5/5, ankle plantar flexion 5/5. Left lower extremity strength: hip flexion 5/5, knee flexion 5/5, knee extension 5/5, ankle dorsiflexion 5/5, ankle plantar flexion 5/5.   Sensory exam: light touch was intact and vibration was intact . Decreased to LT on b/l UE, L worse than R.   Coordination:. normal gait. Finger to nose dysmetria was not present.   Deep tendon reflexes: Biceps right 2+. Biceps left 2+.  Triceps right 2+. Triceps left 2+.  Brachioradialis right 2+. Brachioradialis left 2+.  Patella right 1+. Patella left 1+.  Ankle jerk right 1+. Ankle jerk left 1+.   Bilateral toes down

## 2023-08-08 NOTE — HISTORY OF PRESENT ILLNESS
[FreeTextEntry1] : 8/8/23 Patient presents today for follow up for cervical radiculopathy, trigeminal neuralgia and headaches. She states that her nurtec has been effective and that her headaches are infrequent, maybe 1-2 a month, intensity 4/10 if they occur with some photo/phonophobia. She says that she is currently going through a sinus infection. Continues to endorse symptoms of trigeminal neuralgia with shooting pain from her left ear to the front of her face. However, she feels that they are managed well with the oxcarb. Recent blood work in June demonstrated normal electrolyte levels. The patient acknowledged her diagnosis of angina and fibromyalgia which is currently affecting her mobility but she does not believe that it is affecting her daily lifestyle. She did not want to take any medications for her fibromyalgia because she feels like she's taking too many medications. OK with continuing her nurtec and oxcarb.    2/7/23:  Patient presents today for follow up for cervical radiculopathy, and trigeminal neuralgia and headache. Pt states the nurtec has been helping her the most and that she recently saw her rheumatologist who also prescribed her nortriptaline and diclofec (both of which she has not taken yet due to scheduled cardiac cath procedure). Pt states her headaches (L sided originating in the neck radiating up, 7/10, persistent, with photophobia/phonophobia, neck stiffness) has been worse than last time she was here. Still endorsing weakness in the UE's (L>R), with numbness/tingling.    Interval Hx 11/1/22:  Patient presents today for follow up for cervical radiculopathy, and trigeminal neuralgia and headache.  Trigeminal neuralgia is mildly controlled on trileptal 300mg qday. 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 Still endorsing weakness in the UE's (L>R), but now numbness/tingling. Has appt w/ Dr. Nava this month. She is not currently taking any medications for her radicular pain. Taking Nurtec for her headaches, that are likely cervicogenic in etiology. Since last appt, patient saw interventional pain specialist (Dr. Camejo) in [place], [place] and received cervical epidural injection x1 in 9/2022, no relief. Also received L shoulder injection w/ minimal relief. Has another appt to see same pain specialist this month. Of note, started PT only 2 weeks ago. Also endorsing mild headaches for which Nurtec is controlling, needs refill.    Interval Hx 5/24/22:  Patient presents today for follow up for cervical radiculopathy, b/l carpal tunnel and trigeminal neuralgia.  Trigeminal neuralgia is moderately controlled on trileptal. Previous MRI brain showed compression of the L trigeminal nerve, for which Dr. Dunn offered to perform surgery but patient declined.  She also currently has a R rotator cuff tear and was referred for surgery, which patient also declined.  She is not currently taking any medications for her radicular pain. She was prescribed Naproxen but takes Tylenol PRN when her pain is really bad. Currently states today is a "bad day" and her pain is 8/10. Has not yet received PT as was waiting for MRI Cspine to be performed. MRI Cspine shows      60F R handed with a PMHx of R rotator cuff tear, DM, HTN, Asthma, CAD s/p stents, diverticulitis, Gout, OA, Fibromyalgia, trigeminal neuralgia, tremors presents to establish care. Pt saw seeing neurologist Dr. Leroy Mota but had insurance issues.    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 Progressive, worsening b/l hand weakness since 2018, which initially brought her to see a neurologist. She has reported C5-7 nerve impingements. Last MRI C spine done in 2020. She also reports R index finger numbness, no paresthesias.    Pt diagnosed with L sided trigeminal neuralgia in 2019, MRI done. She is on Trileptal lowest dose twice a day, no significant difference because she has only had mild symptoms but has slightly decreased frequency of headaches, occasional ear and jaw pain and tearing. She saw neurosurgery Dr. Hammond for trigeminal neuralgia, who offered surgery but pt refused.    Pt reports b/l hand tremors since 2019, intermittent but alternating hands, when eating and sewing, not related to her trigeminal neuralgia pain, lasting seconds at a time. No worsening with anxiety, no alcohol intake, no family hx of tremors.    Pt reports diffuse joint pains due to her fibromyalgia, was on duloxetine 60mg daily but discontinued due to nausea. Now just taking tylenol PRN for it. She has not had physical therapy or occupational therapy recently.    EMG 8/2021 with Dr. Leroy Mota  1. b/l C4-5 and C5-6 radiculopathy  2. b/l median motor neuropathy  3. b/l ulnar motor neuropathy  4. b/l median sensory neuropathy

## 2023-08-08 NOTE — DISCUSSION/SUMMARY
[FreeTextEntry1] : Patient presents today for follow up for cervical radiculopathy, and trigeminal neuralgia and headache. Currently going through sinus infection but overall doing well with nurtec and trileptal.   Impression: HAs likely of cervicogenic origin vs trigeminal neuralgia.  Recommendations: Continue trileptal 150 mg BID and nurtec 75 mg Qd F/U in resident clinic in 6 months, please draw CBC and CMP prior to visit.  Case discussed with neurologist Dr. Davis.

## 2023-08-09 ENCOUNTER — APPOINTMENT (OUTPATIENT)
Dept: ENDOCRINOLOGY | Facility: CLINIC | Age: 63
End: 2023-08-09

## 2023-08-09 RX ORDER — RIMEGEPANT SULFATE 75 MG/75MG
TABLET, ORALLY DISINTEGRATING ORAL
Qty: 30 | Refills: 6 | Status: DISCONTINUED | COMMUNITY
Start: 2023-05-02 | End: 2023-08-09

## 2023-08-17 RX ORDER — PEN NEEDLE, DIABETIC 29 G X1/2"
31G X 8 MM NEEDLE, DISPOSABLE MISCELLANEOUS
Qty: 3 | Refills: 3 | Status: ACTIVE | COMMUNITY
Start: 2020-03-06 | End: 1900-01-01

## 2023-08-30 ENCOUNTER — APPOINTMENT (OUTPATIENT)
Dept: SURGERY | Facility: CLINIC | Age: 63
End: 2023-08-30
Payer: MEDICAID

## 2023-08-30 VITALS
SYSTOLIC BLOOD PRESSURE: 113 MMHG | OXYGEN SATURATION: 96 % | DIASTOLIC BLOOD PRESSURE: 76 MMHG | BODY MASS INDEX: 36.32 KG/M2 | HEIGHT: 63 IN | HEART RATE: 78 BPM | WEIGHT: 205 LBS | TEMPERATURE: 98.2 F

## 2023-08-30 PROCEDURE — 99203 OFFICE O/P NEW LOW 30 MIN: CPT

## 2023-09-01 ENCOUNTER — APPOINTMENT (OUTPATIENT)
Dept: RHEUMATOLOGY | Facility: HOSPITAL | Age: 63
End: 2023-09-01
Payer: MEDICAID

## 2023-09-01 ENCOUNTER — OUTPATIENT (OUTPATIENT)
Dept: OUTPATIENT SERVICES | Facility: HOSPITAL | Age: 63
LOS: 1 days | End: 2023-09-01
Payer: MEDICAID

## 2023-09-01 VITALS
DIASTOLIC BLOOD PRESSURE: 82 MMHG | TEMPERATURE: 96.7 F | BODY MASS INDEX: 36.68 KG/M2 | HEIGHT: 63 IN | SYSTOLIC BLOOD PRESSURE: 133 MMHG | WEIGHT: 207 LBS | HEART RATE: 73 BPM

## 2023-09-01 DIAGNOSIS — Z90.710 ACQUIRED ABSENCE OF BOTH CERVIX AND UTERUS: Chronic | ICD-10-CM

## 2023-09-01 DIAGNOSIS — M10.9 GOUT, UNSPECIFIED: ICD-10-CM

## 2023-09-01 DIAGNOSIS — M06.9 RHEUMATOID ARTHRITIS, UNSPECIFIED: ICD-10-CM

## 2023-09-01 DIAGNOSIS — M19.90 UNSPECIFIED OSTEOARTHRITIS, UNSPECIFIED SITE: ICD-10-CM

## 2023-09-01 DIAGNOSIS — Z98.890 OTHER SPECIFIED POSTPROCEDURAL STATES: Chronic | ICD-10-CM

## 2023-09-01 DIAGNOSIS — Z90.49 ACQUIRED ABSENCE OF OTHER SPECIFIED PARTS OF DIGESTIVE TRACT: Chronic | ICD-10-CM

## 2023-09-01 DIAGNOSIS — Z98.89 OTHER SPECIFIED POSTPROCEDURAL STATES: Chronic | ICD-10-CM

## 2023-09-01 LAB
24R-OH-CALCIDIOL SERPL-MCNC: 26.9 NG/ML — LOW (ref 30–80)
URATE SERPL-MCNC: 6.3 MG/DL — SIGNIFICANT CHANGE UP (ref 2.5–7)

## 2023-09-01 PROCEDURE — G0463: CPT

## 2023-09-01 PROCEDURE — 99214 OFFICE O/P EST MOD 30 MIN: CPT | Mod: GC

## 2023-09-01 PROCEDURE — 36415 COLL VENOUS BLD VENIPUNCTURE: CPT

## 2023-09-01 PROCEDURE — 84550 ASSAY OF BLOOD/URIC ACID: CPT

## 2023-09-01 PROCEDURE — 82306 VITAMIN D 25 HYDROXY: CPT

## 2023-09-05 NOTE — HISTORY OF PRESENT ILLNESS
[___ Month(s) Ago] : [unfilled] month(s) ago [FreeTextEntry1] : The patient endorsed worsening R shoulder pain and R foot pain concerning Quintero's neuroma. The patient stated that she is scheduled for abdominal hernia surgery next month.    ROS: Positive: R shoulder pain, R plantar tenderness between 2nd and 3rd, R  thumb pain Negative: fever, chills, chest pain, sob, abdominal pain, hematochezia, hematuria, rash, Raynaud [History of Tophi] : the patient has no history of tophi [Erosions on Xrays] : no erosions on xrays [Current Joint Pain] : no current joint pain [FreeTextEntry3] : 2016 [FreeTextEntry7] : 2020 [FreeTextEntry4] : allopurinol, and colchicine before

## 2023-09-05 NOTE — REVIEW OF SYSTEMS
[Feeling Poorly] : feeling poorly [Arthralgias] : arthralgias [Joint Pain] : joint pain [Joint Swelling] : joint swelling [Limb Pain] : limb pain [As Noted in HPI] : as noted in HPI [Negative] : Heme/Lymph [Dry Eyes] : no dryness of the eyes [FreeTextEntry9] : bilateral CMC tenderness, R shoulder tenderness, R plantar tenderness

## 2023-09-05 NOTE — PHYSICAL EXAM
[General Appearance - Alert] : alert [General Appearance - In No Acute Distress] : in no acute distress [General Appearance - Well Nourished] : well nourished [Sclera] : the sclera and conjunctiva were normal [Outer Ear] : the ears and nose were normal in appearance [Neck Appearance] : the appearance of the neck was normal [Respiration, Rhythm And Depth] : normal respiratory rhythm and effort [Exaggerated Use Of Accessory Muscles For Inspiration] : no accessory muscle use [Auscultation Breath Sounds / Voice Sounds] : lungs were clear to auscultation bilaterally [Heart Rate And Rhythm] : heart rate was normal and rhythm regular [Heart Sounds] : normal S1 and S2 [Edema] : there was no peripheral edema [Bowel Sounds] : normal bowel sounds [Abdomen Soft] : soft [Abdomen Tenderness] : non-tender [Cervical Lymph Nodes Enlarged Posterior Bilaterally] : posterior cervical [Cervical Lymph Nodes Enlarged Anterior Bilaterally] : anterior cervical [Supraclavicular Lymph Nodes Enlarged Bilaterally] : supraclavicular [Abnormal Walk] : normal gait [Musculoskeletal - Swelling] : no joint swelling seen [Motor Tone] : muscle strength and tone were normal [] : no rash [No Focal Deficits] : no focal deficits [Impaired Insight] : insight and judgment were intact [FreeTextEntry1] : +ve CMC tenderness +ve Grind test, R empty cane +ve, R Gabby's maneuver

## 2023-09-05 NOTE — ASSESSMENT
[FreeTextEntry1] : 62 yo F PMH DM2, HTN, Asthma, CAD, diverticulitis, Gout, OA, Fibromyalgia, trigeminal neuralgia, cervical radiculopathy 2/2 large C5/C6 protrusion (MRI 5/2022) c/b b/l carpal tunnel, chronic R shoulder rotator cuff tear, supraspinatus tear presenting for follow up.  #Gout 2016 Has Gout (never had arthrocentesis though) Initial manifestation 1st MTPs Last flare up: 2020 Risk factors: DM2, obesity  Last uric acid ~5 3/2023  Currently on ULT with 200 mg Allopurinol.  #OA B/l knee L>R Previous hx of patellar aliment surgery b/l, and L meniscal tear repair 2017 1/25/22-XR of the knees showed b/l tibiofemoral OA change presence of peripheral joint osteophyte L>R. Medial Left knee narrowing +ve Grind's test b/l CMC joint  #Left shoulder supraspinatus tear   MRI of the left shoulder showed full tickness tear of the supraspinatus tendon  Moderate tendinosis of the infraspinatus tendon with shallow. Partial thickness tearing of the middle fibers of the distal subscapular with superimposed tendinosis s/p left shoulder steroid injection per pain medicine  On PT Has been following with orthopedic   #Cervical spine  11/11/2022 MRI cervical spine mild retrolisthesis and broad-based posterior disc protrusion with bilateral neural foraminal stenosis and moderate central stenosis  Epidural injection per pain medicine  #Fibromyalgia  2021 sleep study negative for MYLES Previous treatment gabapentin and Cymbalta ([id number]) stopped taking due to headache   #R Quintero's neuroma Noticed on exam +ve Milder's test   #De Quervin tenosynovitis of L thumb    Plan:  -Recommend to use Quintero's neuroma pads -Recommend PT and OT for OA  -Send task to orthopedic recommending further evaluation for her R shoulder severe AC joint spurs  -Recommend to keep doing exercise and weight loss   -Patient will have abdominal hernia surgery  -We will check vuric acid    RTC in 3 month DW Dr. Shefali Parikh MD Rheumatology fellow

## 2023-09-08 DIAGNOSIS — M19.90 UNSPECIFIED OSTEOARTHRITIS, UNSPECIFIED SITE: ICD-10-CM

## 2023-09-08 DIAGNOSIS — M10.9 GOUT, UNSPECIFIED: ICD-10-CM

## 2023-09-13 ENCOUNTER — APPOINTMENT (OUTPATIENT)
Dept: ENDOCRINOLOGY | Facility: CLINIC | Age: 63
End: 2023-09-13
Payer: MEDICAID

## 2023-09-13 VITALS
BODY MASS INDEX: 36.68 KG/M2 | HEART RATE: 74 BPM | OXYGEN SATURATION: 96 % | HEIGHT: 63 IN | SYSTOLIC BLOOD PRESSURE: 120 MMHG | DIASTOLIC BLOOD PRESSURE: 77 MMHG | WEIGHT: 207 LBS

## 2023-09-13 LAB
GLUCOSE BLDC GLUCOMTR-MCNC: 139
HBA1C MFR BLD HPLC: 7.1

## 2023-09-13 PROCEDURE — 82962 GLUCOSE BLOOD TEST: CPT

## 2023-09-13 PROCEDURE — 99214 OFFICE O/P EST MOD 30 MIN: CPT | Mod: 25

## 2023-09-13 PROCEDURE — G0108 DIAB MANAGE TRN  PER INDIV: CPT

## 2023-09-13 PROCEDURE — 95251 CONT GLUC MNTR ANALYSIS I&R: CPT

## 2023-09-13 PROCEDURE — 83036 HEMOGLOBIN GLYCOSYLATED A1C: CPT | Mod: QW

## 2023-09-13 RX ORDER — METOPROLOL TARTRATE 50 MG/1
50 TABLET, FILM COATED ORAL DAILY
Refills: 0 | Status: ACTIVE | COMMUNITY
Start: 2023-09-13

## 2023-09-13 RX ORDER — METOPROLOL TARTRATE 25 MG/1
25 TABLET, FILM COATED ORAL DAILY
Refills: 0 | Status: ACTIVE | COMMUNITY
Start: 2023-06-28

## 2023-09-26 ENCOUNTER — OUTPATIENT (OUTPATIENT)
Dept: OUTPATIENT SERVICES | Facility: HOSPITAL | Age: 63
LOS: 1 days | End: 2023-09-26
Payer: MEDICAID

## 2023-09-26 ENCOUNTER — APPOINTMENT (OUTPATIENT)
Dept: GASTROENTEROLOGY | Facility: HOSPITAL | Age: 63
End: 2023-09-26
Payer: MEDICAID

## 2023-09-26 VITALS
HEART RATE: 74 BPM | DIASTOLIC BLOOD PRESSURE: 81 MMHG | TEMPERATURE: 96.3 F | RESPIRATION RATE: 18 BRPM | OXYGEN SATURATION: 98 % | SYSTOLIC BLOOD PRESSURE: 126 MMHG | HEIGHT: 64 IN

## 2023-09-26 DIAGNOSIS — Z90.710 ACQUIRED ABSENCE OF BOTH CERVIX AND UTERUS: Chronic | ICD-10-CM

## 2023-09-26 DIAGNOSIS — Z90.49 ACQUIRED ABSENCE OF OTHER SPECIFIED PARTS OF DIGESTIVE TRACT: Chronic | ICD-10-CM

## 2023-09-26 DIAGNOSIS — K76.9 LIVER DISEASE, UNSPECIFIED: ICD-10-CM

## 2023-09-26 DIAGNOSIS — K46.9 UNSPECIFIED ABDOMINAL HERNIA WITHOUT OBSTRUCTION OR GANGRENE: ICD-10-CM

## 2023-09-26 DIAGNOSIS — Z98.890 OTHER SPECIFIED POSTPROCEDURAL STATES: Chronic | ICD-10-CM

## 2023-09-26 DIAGNOSIS — K59.00 CONSTIPATION, UNSPECIFIED: ICD-10-CM

## 2023-09-26 DIAGNOSIS — K46.9 UNSPECIFIED ABDOMINAL HERNIA W/OUT OBSTRUCTION OR GANGRENE: ICD-10-CM

## 2023-09-26 DIAGNOSIS — K21.9 GASTRO-ESOPHAGEAL REFLUX DISEASE W/OUT ESOPHAGITIS: ICD-10-CM

## 2023-09-26 DIAGNOSIS — K57.30 DIVERTICULOSIS OF LARGE INTESTINE WITHOUT PERFORATION OR ABSCESS WITHOUT BLEEDING: ICD-10-CM

## 2023-09-26 DIAGNOSIS — R74.8 ABNORMAL LEVELS OF OTHER SERUM ENZYMES: ICD-10-CM

## 2023-09-26 DIAGNOSIS — Z98.89 OTHER SPECIFIED POSTPROCEDURAL STATES: Chronic | ICD-10-CM

## 2023-09-26 DIAGNOSIS — K21.9 GASTRO-ESOPHAGEAL REFLUX DISEASE WITHOUT ESOPHAGITIS: ICD-10-CM

## 2023-09-26 DIAGNOSIS — R14.0 ABDOMINAL DISTENSION (GASEOUS): ICD-10-CM

## 2023-09-26 LAB — GGT SERPL-CCNC: 27 U/L — SIGNIFICANT CHANGE UP (ref 8–40)

## 2023-09-26 PROCEDURE — 36415 COLL VENOUS BLD VENIPUNCTURE: CPT

## 2023-09-26 PROCEDURE — 82977 ASSAY OF GGT: CPT

## 2023-09-26 PROCEDURE — G0463: CPT

## 2023-09-26 PROCEDURE — 99214 OFFICE O/P EST MOD 30 MIN: CPT | Mod: GC

## 2023-09-26 PROCEDURE — 84080 ASSAY ALKALINE PHOSPHATASES: CPT

## 2023-09-30 LAB
ALP BONE SERPL-MCNC: 49 % — SIGNIFICANT CHANGE UP (ref 14–68)
ALP FLD-CCNC: 147 IU/L — HIGH (ref 44–121)
ALP INTEST CFR SERPL: 4 % — SIGNIFICANT CHANGE UP (ref 0–18)
ALP LIVER SERPL-CCNC: 47 % — SIGNIFICANT CHANGE UP (ref 18–85)

## 2023-10-06 ENCOUNTER — APPOINTMENT (OUTPATIENT)
Dept: ULTRASOUND IMAGING | Facility: IMAGING CENTER | Age: 63
End: 2023-10-06

## 2023-10-10 ENCOUNTER — OUTPATIENT (OUTPATIENT)
Dept: OUTPATIENT SERVICES | Facility: HOSPITAL | Age: 63
LOS: 1 days | Discharge: HOME | End: 2023-10-10
Payer: MEDICAID

## 2023-10-10 VITALS
DIASTOLIC BLOOD PRESSURE: 68 MMHG | TEMPERATURE: 98 F | HEIGHT: 65 IN | WEIGHT: 211.64 LBS | HEART RATE: 60 BPM | SYSTOLIC BLOOD PRESSURE: 108 MMHG | OXYGEN SATURATION: 98 % | RESPIRATION RATE: 18 BRPM

## 2023-10-10 DIAGNOSIS — Z98.890 OTHER SPECIFIED POSTPROCEDURAL STATES: Chronic | ICD-10-CM

## 2023-10-10 DIAGNOSIS — K43.9 VENTRAL HERNIA WITHOUT OBSTRUCTION OR GANGRENE: ICD-10-CM

## 2023-10-10 DIAGNOSIS — Z98.89 OTHER SPECIFIED POSTPROCEDURAL STATES: Chronic | ICD-10-CM

## 2023-10-10 DIAGNOSIS — Z90.710 ACQUIRED ABSENCE OF BOTH CERVIX AND UTERUS: Chronic | ICD-10-CM

## 2023-10-10 DIAGNOSIS — I10 ESSENTIAL (PRIMARY) HYPERTENSION: ICD-10-CM

## 2023-10-10 DIAGNOSIS — Z01.818 ENCOUNTER FOR OTHER PREPROCEDURAL EXAMINATION: ICD-10-CM

## 2023-10-10 DIAGNOSIS — I25.10 ATHEROSCLEROTIC HEART DISEASE OF NATIVE CORONARY ARTERY WITHOUT ANGINA PECTORIS: ICD-10-CM

## 2023-10-10 DIAGNOSIS — E11.9 TYPE 2 DIABETES MELLITUS WITHOUT COMPLICATIONS: ICD-10-CM

## 2023-10-10 DIAGNOSIS — Z90.49 ACQUIRED ABSENCE OF OTHER SPECIFIED PARTS OF DIGESTIVE TRACT: Chronic | ICD-10-CM

## 2023-10-10 LAB
ALBUMIN SERPL ELPH-MCNC: 3.6 G/DL — SIGNIFICANT CHANGE UP (ref 3.3–5)
ALP SERPL-CCNC: 137 U/L — HIGH (ref 40–120)
ALT FLD-CCNC: 20 U/L — SIGNIFICANT CHANGE UP (ref 12–78)
ANION GAP SERPL CALC-SCNC: 5 MMOL/L — SIGNIFICANT CHANGE UP (ref 5–17)
AST SERPL-CCNC: 10 U/L — LOW (ref 15–37)
BILIRUB SERPL-MCNC: 0.4 MG/DL — SIGNIFICANT CHANGE UP (ref 0.2–1.2)
BUN SERPL-MCNC: 15 MG/DL — SIGNIFICANT CHANGE UP (ref 7–23)
CALCIUM SERPL-MCNC: 9.4 MG/DL — SIGNIFICANT CHANGE UP (ref 8.5–10.1)
CHLORIDE SERPL-SCNC: 106 MMOL/L — SIGNIFICANT CHANGE UP (ref 96–108)
CO2 SERPL-SCNC: 25 MMOL/L — SIGNIFICANT CHANGE UP (ref 22–31)
CREAT SERPL-MCNC: 0.71 MG/DL — SIGNIFICANT CHANGE UP (ref 0.5–1.3)
EGFR: 95 ML/MIN/1.73M2 — SIGNIFICANT CHANGE UP
GLUCOSE SERPL-MCNC: 115 MG/DL — HIGH (ref 70–99)
HCT VFR BLD CALC: 42.1 % — SIGNIFICANT CHANGE UP (ref 34.5–45)
HGB BLD-MCNC: 13.4 G/DL — SIGNIFICANT CHANGE UP (ref 11.5–15.5)
INR BLD: 0.96 RATIO — SIGNIFICANT CHANGE UP (ref 0.85–1.18)
MCHC RBC-ENTMCNC: 26.5 PG — LOW (ref 27–34)
MCHC RBC-ENTMCNC: 31.8 G/DL — LOW (ref 32–36)
MCV RBC AUTO: 83.2 FL — SIGNIFICANT CHANGE UP (ref 80–100)
NRBC # BLD: 0 /100 WBCS — SIGNIFICANT CHANGE UP (ref 0–0)
PLATELET # BLD AUTO: 263 K/UL — SIGNIFICANT CHANGE UP (ref 150–400)
POTASSIUM SERPL-MCNC: 3.8 MMOL/L — SIGNIFICANT CHANGE UP (ref 3.5–5.3)
POTASSIUM SERPL-SCNC: 3.8 MMOL/L — SIGNIFICANT CHANGE UP (ref 3.5–5.3)
PROT SERPL-MCNC: 7.5 GM/DL — SIGNIFICANT CHANGE UP (ref 6–8.3)
PROTHROM AB SERPL-ACNC: 11.4 SEC — SIGNIFICANT CHANGE UP (ref 9.5–13)
RBC # BLD: 5.06 M/UL — SIGNIFICANT CHANGE UP (ref 3.8–5.2)
RBC # FLD: 14.6 % — HIGH (ref 10.3–14.5)
SODIUM SERPL-SCNC: 136 MMOL/L — SIGNIFICANT CHANGE UP (ref 135–145)
WBC # BLD: 10.65 K/UL — HIGH (ref 3.8–10.5)
WBC # FLD AUTO: 10.65 K/UL — HIGH (ref 3.8–10.5)

## 2023-10-10 PROCEDURE — 93010 ELECTROCARDIOGRAM REPORT: CPT

## 2023-10-10 NOTE — H&P PST ADULT - NSICDXPROCEDURE_GEN_ALL_CORE_FT
PROCEDURES:  Repair, hernia, ventral, laparoscopic, using component separation technique 10-Oct-2023 11:59:47  Brandon Christine P

## 2023-10-10 NOTE — H&P PST ADULT - MUSCULOSKELETAL
details… ROM intact/no joint swelling/normal gait/strength 5/5 bilateral upper extremities/strength 5/5 bilateral lower extremities

## 2023-10-10 NOTE — H&P PST ADULT - PROBLEM SELECTOR PLAN 1
labs - cbc, pt/ptt, cmp, t&s, nose cx, ekg, Vitamin d, hemoglobin a1c     M/C required     preop 3 day hibiclens instruction reviewed and given. instructed on if nose cx positive use mupirocin 5 days and checklist given  take routine meds DOS with sips of water. avoid NSAID and OTC supplements. verbalized understanding   information on proper nutrition, increase protein and better food choices provided in packet  anesthesiologist to review pst labs, ekg, medical clearances and optimization for surgery labs - cbc, pt/ptt, cmp, ekg  M/C required  and cardiac     preop 3 day hibiclens instruction reviewed and given. instructed on if nose cx positive use mupirocin 5 days and checklist given  take routine meds DOS with sips of water. avoid NSAID and OTC supplements. verbalized understanding   information on proper nutrition, increase protein and better food choices provided in packet  anesthesiologist to review pst labs, ekg, medical clearances and optimization for surgery

## 2023-10-10 NOTE — H&P PST ADULT - SKIN/BREAST
Called patient to schedule a screening mammogram PATIENTPHONEMESSAGE: left message.-       
details…

## 2023-10-10 NOTE — H&P PST ADULT - HISTORY OF PRESENT ILLNESS
63 year old female presents to PST. Patient has a PMHX of. Patient has abdominal pain 2/2 to ventral hernia and has a planned     Goal is to be pain free and get back to all activities.     Patient denies any recent travel, cough, fever, chills or recent sick contacts.        63 year old female presents to PST. Patient has a PMHX of 2 stents in 2019, recent cath 8/2023, htn, hld, PVC, dm,  Patient has abdominal pain 2/2 to ventral hernia and has a planned robotic Ventral hernia repair with Dr. Jarvis on 10/24/23.    Goal is to be pain free and get back to all activities.     Patient denies any recent travel, cough, fever, chills or recent sick contacts.        63 year old female presents to PST. Patient has a PMHX of 2 stents in 2019, recent cath 8/2023, htn, hld, PVC, dm.  Patient has abdominal pain 2/2 to ventral hernia and has a planned robotic Ventral hernia repair with Dr. Jarvis on 10/24/23.    Goal is to be pain free and get back to all activities.     Patient denies any recent travel, cough, fever, chills or recent sick contacts.

## 2023-10-10 NOTE — H&P PST ADULT - ASSESSMENT
Ventral hernia for planned Robotic Ventral hernia repair  CAPRINI SCORE [CLOT]    AGE RELATED RISK FACTORS                                                       MOBILITY RELATED FACTORS  [ ] Age 41-60 years                                            (1 Point)                  [ ] Bed rest                                                        (1 Point)  [x ] Age: 61-74 years                                           (2 Points)                 [ ] Plaster cast                                                   (2 Points)  [ ] Age= 75 years                                              (3 Points)                 [ ] Bed bound for more than 72 hours                 (2 Points)    DISEASE RELATED RISK FACTORS                                               GENDER SPECIFIC FACTORS  [ ] Edema in the lower extremities                       (1 Point)                  [ ] Pregnancy                                                     (1 Point)  [ ] Varicose veins                                               (1 Point)                  [ ] Post-partum < 6 weeks                                   (1 Point)             [x ] BMI > 25 Kg/m2                                            (1 Point)                  [ ] Hormonal therapy  or oral contraception          (1 Point)                 [ ] Sepsis (in the previous month)                        (1 Point)                  [ ] History of pregnancy complications                 (1 point)  [ ] Pneumonia or serious lung disease                                               [ ] Unexplained or recurrent                     (1 Point)           (in the previous month)                               (1 Point)  [ ] Abnormal pulmonary function test                     (1 Point)                 SURGERY RELATED RISK FACTORS  [ ] Acute myocardial infarction                              (1 Point)                 [ ]  Section                                             (1 Point)  [ ] Congestive heart failure (in the previous month)  (1 Point)               [ ] Minor surgery                                                  (1 Point)   [ ] Inflammatory bowel disease                             (1 Point)                 [ ] Arthroscopic surgery                                        (2 Points)  [ ] Central venous access                                      (2 Points)                [ x] General surgery lasting more than 45 minutes   (2 Points)       [ ] Stroke (in the previous month)                          (5 Points)               [ ] Elective arthroplasty                                         (5 Points)                                                                                                                                               HEMATOLOGY RELATED FACTORS                                                 TRAUMA RELATED RISK FACTORS  [ ] Prior episodes of VTE                                     (3 Points)                [ ] Fracture of the hip, pelvis, or leg                       (5 Points)  [ ] Positive family history for VTE                         (3 Points)                 [ ] Acute spinal cord injury (in the previous month)  (5 Points)  [ ] Prothrombin [id number] A                                     (3 Points)                 [ ] Paralysis  (less than 1 month)                             (5 Points)  [ ] Factor V Leiden                                             (3 Points)                  [ ] Multiple Trauma within 1 month                        (5 Points)  [ ] Lupus anticoagulants                                     (3 Points)                                                           [ ] Anticardiolipin antibodies                               (3 Points)                                                       [ ] High homocysteine in the blood                      (3 Points)                                             [ ] Other congenital or acquired thrombophilia      (3 Points)                                                [ ] Heparin induced thrombocytopenia                  (3 Points)                                          Total Score [    5      ]    Caprini Score 0 - 2:  Low Risk, No VTE Prophylaxis required for most patients, encourage ambulation  Caprini Score 3 - 6:  At Risk, pharmacologic VTE prophylaxis is indicated for most patients (in the absence of a contraindication)  Caprini Score Greater than or = 7:  High Risk, pharmacologic VTE prophylaxis is indicated for most patients (in the absence of a contraindication)

## 2023-10-23 ENCOUNTER — TRANSCRIPTION ENCOUNTER (OUTPATIENT)
Age: 63
End: 2023-10-23

## 2023-10-24 ENCOUNTER — INPATIENT (INPATIENT)
Facility: HOSPITAL | Age: 63
LOS: 3 days | Discharge: HOME | End: 2023-10-28
Attending: SURGERY | Admitting: SURGERY
Payer: MEDICAID

## 2023-10-24 ENCOUNTER — APPOINTMENT (OUTPATIENT)
Dept: SURGERY | Facility: HOSPITAL | Age: 63
End: 2023-10-24

## 2023-10-24 VITALS
DIASTOLIC BLOOD PRESSURE: 66 MMHG | OXYGEN SATURATION: 95 % | TEMPERATURE: 98 F | SYSTOLIC BLOOD PRESSURE: 103 MMHG | HEART RATE: 71 BPM | HEIGHT: 64 IN | WEIGHT: 205.03 LBS | RESPIRATION RATE: 16 BRPM

## 2023-10-24 DIAGNOSIS — Z98.890 OTHER SPECIFIED POSTPROCEDURAL STATES: Chronic | ICD-10-CM

## 2023-10-24 DIAGNOSIS — Z98.89 OTHER SPECIFIED POSTPROCEDURAL STATES: Chronic | ICD-10-CM

## 2023-10-24 DIAGNOSIS — Z90.49 ACQUIRED ABSENCE OF OTHER SPECIFIED PARTS OF DIGESTIVE TRACT: Chronic | ICD-10-CM

## 2023-10-24 DIAGNOSIS — Z90.710 ACQUIRED ABSENCE OF BOTH CERVIX AND UTERUS: Chronic | ICD-10-CM

## 2023-10-24 LAB
GLUCOSE BLDC GLUCOMTR-MCNC: 129 MG/DL — HIGH (ref 70–99)
GLUCOSE BLDC GLUCOMTR-MCNC: 129 MG/DL — HIGH (ref 70–99)
GLUCOSE BLDC GLUCOMTR-MCNC: 163 MG/DL — HIGH (ref 70–99)
GLUCOSE BLDC GLUCOMTR-MCNC: 163 MG/DL — HIGH (ref 70–99)
GLUCOSE BLDC GLUCOMTR-MCNC: 189 MG/DL — HIGH (ref 70–99)
GLUCOSE BLDC GLUCOMTR-MCNC: 189 MG/DL — HIGH (ref 70–99)
GLUCOSE BLDC GLUCOMTR-MCNC: 215 MG/DL — HIGH (ref 70–99)
GLUCOSE BLDC GLUCOMTR-MCNC: 215 MG/DL — HIGH (ref 70–99)

## 2023-10-24 PROCEDURE — 49596 RPR AA HRN 1ST > 10 NCR/STRN: CPT | Mod: AS

## 2023-10-24 PROCEDURE — 49596 RPR AA HRN 1ST > 10 NCR/STRN: CPT

## 2023-10-24 DEVICE — MESH HERNIA VENTRAL SYMBOTEX COMPOSITE RECTANGLE 15 X 10CM: Type: IMPLANTABLE DEVICE | Status: FUNCTIONAL

## 2023-10-24 DEVICE — IMPLANTABLE DEVICE: Type: IMPLANTABLE DEVICE | Status: FUNCTIONAL

## 2023-10-24 DEVICE — ETHICON HERNIA SECURESTRAP 5MM SIZE 25: Type: IMPLANTABLE DEVICE | Status: FUNCTIONAL

## 2023-10-24 RX ORDER — DEXTROSE 50 % IN WATER 50 %
15 SYRINGE (ML) INTRAVENOUS ONCE
Refills: 0 | Status: DISCONTINUED | OUTPATIENT
Start: 2023-10-24 | End: 2023-10-28

## 2023-10-24 RX ORDER — ASPIRIN/CALCIUM CARB/MAGNESIUM 324 MG
81 TABLET ORAL DAILY
Refills: 0 | Status: DISCONTINUED | OUTPATIENT
Start: 2023-10-24 | End: 2023-10-28

## 2023-10-24 RX ORDER — GLUCAGON INJECTION, SOLUTION 0.5 MG/.1ML
1 INJECTION, SOLUTION SUBCUTANEOUS ONCE
Refills: 0 | Status: DISCONTINUED | OUTPATIENT
Start: 2023-10-24 | End: 2023-10-28

## 2023-10-24 RX ORDER — MECLIZINE HCL 12.5 MG
1 TABLET ORAL
Refills: 0 | DISCHARGE

## 2023-10-24 RX ORDER — HEPARIN SODIUM 5000 [USP'U]/ML
5000 INJECTION INTRAVENOUS; SUBCUTANEOUS EVERY 8 HOURS
Refills: 0 | Status: DISCONTINUED | OUTPATIENT
Start: 2023-10-24 | End: 2023-10-28

## 2023-10-24 RX ORDER — INSULIN GLARGINE 100 [IU]/ML
30 INJECTION, SOLUTION SUBCUTANEOUS AT BEDTIME
Refills: 0 | Status: DISCONTINUED | OUTPATIENT
Start: 2023-10-24 | End: 2023-10-28

## 2023-10-24 RX ORDER — INSULIN LISPRO 100/ML
VIAL (ML) SUBCUTANEOUS AT BEDTIME
Refills: 0 | Status: DISCONTINUED | OUTPATIENT
Start: 2023-10-24 | End: 2023-10-28

## 2023-10-24 RX ORDER — DEXTROSE 50 % IN WATER 50 %
12.5 SYRINGE (ML) INTRAVENOUS ONCE
Refills: 0 | Status: DISCONTINUED | OUTPATIENT
Start: 2023-10-24 | End: 2023-10-28

## 2023-10-24 RX ORDER — NITROGLYCERIN 6.5 MG
1 CAPSULE, EXTENDED RELEASE ORAL DAILY
Refills: 0 | Status: DISCONTINUED | OUTPATIENT
Start: 2023-10-24 | End: 2023-10-24

## 2023-10-24 RX ORDER — KETOROLAC TROMETHAMINE 30 MG/ML
15 SYRINGE (ML) INJECTION EVERY 6 HOURS
Refills: 0 | Status: DISCONTINUED | OUTPATIENT
Start: 2023-10-24 | End: 2023-10-25

## 2023-10-24 RX ORDER — SODIUM CHLORIDE 9 MG/ML
3 INJECTION INTRAMUSCULAR; INTRAVENOUS; SUBCUTANEOUS EVERY 8 HOURS
Refills: 0 | Status: DISCONTINUED | OUTPATIENT
Start: 2023-10-24 | End: 2023-10-24

## 2023-10-24 RX ORDER — SODIUM CHLORIDE 9 MG/ML
1000 INJECTION, SOLUTION INTRAVENOUS
Refills: 0 | Status: DISCONTINUED | OUTPATIENT
Start: 2023-10-24 | End: 2023-10-27

## 2023-10-24 RX ORDER — ALBUTEROL 90 UG/1
1 AEROSOL, METERED ORAL
Refills: 0 | Status: DISCONTINUED | OUTPATIENT
Start: 2023-10-24 | End: 2023-10-28

## 2023-10-24 RX ORDER — FENTANYL CITRATE 50 UG/ML
50 INJECTION INTRAVENOUS
Refills: 0 | Status: DISCONTINUED | OUTPATIENT
Start: 2023-10-24 | End: 2023-10-24

## 2023-10-24 RX ORDER — DEXTROSE 50 % IN WATER 50 %
25 SYRINGE (ML) INTRAVENOUS ONCE
Refills: 0 | Status: DISCONTINUED | OUTPATIENT
Start: 2023-10-24 | End: 2023-10-28

## 2023-10-24 RX ORDER — SODIUM CHLORIDE 9 MG/ML
1000 INJECTION, SOLUTION INTRAVENOUS
Refills: 0 | Status: DISCONTINUED | OUTPATIENT
Start: 2023-10-24 | End: 2023-10-28

## 2023-10-24 RX ORDER — FENTANYL CITRATE 50 UG/ML
25 INJECTION INTRAVENOUS
Refills: 0 | Status: DISCONTINUED | OUTPATIENT
Start: 2023-10-24 | End: 2023-10-24

## 2023-10-24 RX ORDER — SITAGLIPTIN 50 MG/1
1 TABLET, FILM COATED ORAL
Qty: 0 | Refills: 0 | DISCHARGE

## 2023-10-24 RX ORDER — PREGABALIN 225 MG/1
1 CAPSULE ORAL
Qty: 0 | Refills: 0 | DISCHARGE

## 2023-10-24 RX ORDER — KETOROLAC TROMETHAMINE 30 MG/ML
30 SYRINGE (ML) INJECTION ONCE
Refills: 0 | Status: DISCONTINUED | OUTPATIENT
Start: 2023-10-24 | End: 2023-10-24

## 2023-10-24 RX ORDER — ERGOCALCIFEROL 1.25 MG/1
0 CAPSULE ORAL
Refills: 0 | DISCHARGE

## 2023-10-24 RX ORDER — INSULIN LISPRO 100/ML
VIAL (ML) SUBCUTANEOUS
Refills: 0 | Status: DISCONTINUED | OUTPATIENT
Start: 2023-10-24 | End: 2023-10-28

## 2023-10-24 RX ORDER — EVOLOCUMAB 140 MG/ML
140 INJECTION, SOLUTION SUBCUTANEOUS
Refills: 0 | DISCHARGE

## 2023-10-24 RX ORDER — METOPROLOL TARTRATE 50 MG
25 TABLET ORAL
Refills: 0 | Status: DISCONTINUED | OUTPATIENT
Start: 2023-10-24 | End: 2023-10-28

## 2023-10-24 RX ORDER — COLCHICINE 0.6 MG
1 TABLET ORAL
Refills: 0 | DISCHARGE

## 2023-10-24 RX ORDER — ONDANSETRON 8 MG/1
4 TABLET, FILM COATED ORAL ONCE
Refills: 0 | Status: COMPLETED | OUTPATIENT
Start: 2023-10-24 | End: 2023-10-24

## 2023-10-24 RX ORDER — ALLOPURINOL 300 MG
1 TABLET ORAL
Qty: 0 | Refills: 0 | DISCHARGE

## 2023-10-24 RX ORDER — INSULIN LISPRO 100/ML
0 VIAL (ML) SUBCUTANEOUS
Qty: 0 | Refills: 0 | DISCHARGE

## 2023-10-24 RX ORDER — NITROGLYCERIN 6.5 MG
1 CAPSULE, EXTENDED RELEASE ORAL
Refills: 0 | DISCHARGE

## 2023-10-24 RX ORDER — ASPIRIN/CALCIUM CARB/MAGNESIUM 324 MG
1 TABLET ORAL
Qty: 0 | Refills: 0 | DISCHARGE

## 2023-10-24 RX ORDER — ACETAMINOPHEN 500 MG
1000 TABLET ORAL EVERY 6 HOURS
Refills: 0 | Status: COMPLETED | OUTPATIENT
Start: 2023-10-24 | End: 2023-10-25

## 2023-10-24 RX ORDER — INSULIN GLARGINE 100 [IU]/ML
35 INJECTION, SOLUTION SUBCUTANEOUS
Qty: 0 | Refills: 0 | DISCHARGE

## 2023-10-24 RX ORDER — CLOPIDOGREL BISULFATE 75 MG/1
1 TABLET, FILM COATED ORAL
Qty: 0 | Refills: 0 | DISCHARGE

## 2023-10-24 RX ORDER — METOPROLOL TARTRATE 50 MG
1 TABLET ORAL
Qty: 0 | Refills: 0 | DISCHARGE

## 2023-10-24 RX ORDER — PREGABALIN 225 MG/1
0 CAPSULE ORAL
Refills: 0 | DISCHARGE

## 2023-10-24 RX ORDER — ALLOPURINOL 300 MG
100 TABLET ORAL DAILY
Refills: 0 | Status: DISCONTINUED | OUTPATIENT
Start: 2023-10-24 | End: 2023-10-28

## 2023-10-24 RX ORDER — MAGNESIUM OXIDE 400 MG ORAL TABLET 241.3 MG
0 TABLET ORAL
Refills: 0 | DISCHARGE

## 2023-10-24 RX ORDER — SODIUM CHLORIDE 9 MG/ML
1000 INJECTION, SOLUTION INTRAVENOUS
Refills: 0 | Status: DISCONTINUED | OUTPATIENT
Start: 2023-10-24 | End: 2023-10-24

## 2023-10-24 RX ORDER — ONDANSETRON 8 MG/1
4 TABLET, FILM COATED ORAL EVERY 6 HOURS
Refills: 0 | Status: DISCONTINUED | OUTPATIENT
Start: 2023-10-24 | End: 2023-10-28

## 2023-10-24 RX ORDER — MAGNESIUM OXIDE 400 MG ORAL TABLET 241.3 MG
1 TABLET ORAL
Qty: 0 | Refills: 0 | DISCHARGE

## 2023-10-24 RX ADMIN — Medication 15 MILLIGRAM(S): at 21:49

## 2023-10-24 RX ADMIN — SODIUM CHLORIDE 100 MILLILITER(S): 9 INJECTION, SOLUTION INTRAVENOUS at 23:08

## 2023-10-24 RX ADMIN — HEPARIN SODIUM 5000 UNIT(S): 5000 INJECTION INTRAVENOUS; SUBCUTANEOUS at 21:49

## 2023-10-24 RX ADMIN — ONDANSETRON 4 MILLIGRAM(S): 8 TABLET, FILM COATED ORAL at 18:00

## 2023-10-24 RX ADMIN — Medication 30 MILLIGRAM(S): at 14:50

## 2023-10-24 RX ADMIN — Medication 4: at 18:09

## 2023-10-24 RX ADMIN — Medication 100 MILLIGRAM(S): at 16:45

## 2023-10-24 RX ADMIN — ONDANSETRON 4 MILLIGRAM(S): 8 TABLET, FILM COATED ORAL at 15:15

## 2023-10-24 RX ADMIN — Medication 15 MILLIGRAM(S): at 22:49

## 2023-10-24 RX ADMIN — Medication 400 MILLIGRAM(S): at 18:00

## 2023-10-24 RX ADMIN — Medication 30 MILLIGRAM(S): at 14:00

## 2023-10-24 RX ADMIN — SODIUM CHLORIDE 75 MILLILITER(S): 9 INJECTION, SOLUTION INTRAVENOUS at 13:45

## 2023-10-24 RX ADMIN — INSULIN GLARGINE 30 UNIT(S): 100 INJECTION, SOLUTION SUBCUTANEOUS at 21:50

## 2023-10-24 NOTE — ASU PATIENT PROFILE, ADULT - URINARY CATHETER
"Dawn Caldwell is a 27 year old female patient.  1. 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 rate 16, height 5' 4\" (1.626 m), weight 108 lb (49 kg), last menstrual period 05/16/2017, SpO2 98 %.    Subjective:  Symptoms:  Improved.  No shortness of breath, malaise, cough, chest pain, weakness, headache, chest pressure, anorexia, diarrhea or anxiety.    Diet:  Adequate intake.    Activity level: Normal.    Pain:  She reports no pain.      Objective:  General Appearance:  Comfortable, well-appearing and in no acute distress.    Vital signs: (most recent): Blood pressure 106/69, pulse 57, temperature 97  F (36.1  C), temperature source Tympanic, resp. rate 16, height 5' 4\" (1.626 m), weight 108 lb (49 kg), last menstrual period 05/16/2017, SpO2 98 %.  Vital signs are normal.    Lungs:  Normal respiratory rate and normal effort.  Breath sounds clear to auscultation.    Heart: Normal rate.  Regular rhythm.    Neurological: Patient is alert and oriented to person, place and time.  Normal strength.    Skin:  Warm and dry.      Assessment:    Condition: In serious condition.  Improving.   (Opiate Dependence and Withdrawal   ).     Plan:   (Continue detox  [facility]   Suboxone maintenance    20 minutes were spent with patient with more than 50% of time spent in counseling and coordination of care ).       Brant Estevez  6/28/2017    " no

## 2023-10-24 NOTE — ASU PATIENT PROFILE, ADULT - NSSUBSTANCEUSE_GEN_ALL_CORE_SD
alert   Chest: Nontender  Cardiac: sinus   Lungs:Clear to auscultation and percussion. Abdomen: Soft, NT, ND, +BS  Extremities: no edema   Vascular:  Equal 2+ peripheral pulses. Lab Data:  CBC:   Recent Labs     08/23/20  0513 08/24/20  0001 08/25/20  0307   WBC 8.2 8.5 7.7   HGB 11.0* 10.2* 9.8*   HCT 33.4* 31.4* 30.0*   MCV 88.4 90.0 90.1    301 263     BMP:   Recent Labs     08/23/20  0513 08/24/20  0001 08/25/20  0307   * 128* 130*   K 3.8 4.5 4.3   CL 99 94* 97*   CO2 25 28 27   BUN 14 20 17   CREATININE 0.7* 1.1 0.8*     LIVER PROFILE: No results for input(s): AST, ALT, LIPASE, BILIDIR, BILITOT, ALKPHOS in the last 72 hours. Invalid input(s): AMYLASE,  ALB  PT/INR:   Recent Labs     08/23/20  1610 08/24/20  0001 08/24/20  0810   PROTIME 13.4 12.8 12.3   INR 1.11 1.06 1.02     APTT:   Recent Labs     08/23/20  1610 08/24/20  0001 08/24/20  0810   APTT 47.7* 51.1* 34.5     BNP:  No results for input(s): BNP in the last 72 hours.       Assessment:  Patient Active Problem List    Diagnosis Date Noted    Severe malnutrition ([address] 08/20/2020    Femoral artery occlusion, right ([address] 08/13/2020    Acute encephalopathy 08/13/2020    Hemiparesis of left nondominant side as late effect of cerebral infarction ([address]     Dysphagia due to recent stroke     Dysarthria due to acute stroke ([address]     Gait disturbance     Simple chronic bronchitis (HCC)     Urinary tract infection associated with indwelling urethral catheter ([address]     Acute cystitis without hematuria     Acute cerebrovascular accident (CVA) ([address] 04/10/2020    Dizziness 04/08/2020       Elmer Gresham MD 8/25/2020 9:53 AM caffeine

## 2023-10-24 NOTE — BRIEF OPERATIVE NOTE - OPERATION/FINDINGS
multiple defects along the entire midline with large inferior defect , 30x25 symbotex mesh used and 10x10 symbotex

## 2023-10-24 NOTE — PATIENT PROFILE ADULT - FALL HARM RISK - RISK INTERVENTIONS
Assistance OOB with selected safe patient handling equipment/Assistance with ambulation/Communicate Fall Risk and Risk Factors to all staff, patient, and family/Monitor gait and stability/Reinforce activity limits and safety measures with patient and family/Sit up slowly, dangle for a short time, stand at bedside before walking/Use of alarms - bed, chair and/or voice tab/Visual Cue: Yellow wristband/Bed in lowest position, wheels locked, appropriate side rails in place/Call bell, personal items and telephone in reach/Instruct patient to call for assistance before getting out of bed or chair/Non-slip footwear when patient is out of bed/[place] to call system/Physically safe environment - no spills, clutter or unnecessary equipment/Purposeful Proactive Rounding/Room/bathroom lighting operational, light cord in reach Assistance OOB with selected safe patient handling equipment/Assistance with ambulation/Communicate Fall Risk and Risk Factors to all staff, patient, and family/Discuss with provider need for PT consult/Monitor gait and stability/Provide patient with walking aids - walker, cane, crutches/Reinforce activity limits and safety measures with patient and family/Sit up slowly, dangle for a short time, stand at bedside before walking/Use of alarms - bed, chair and/or voice tab/Visual Cue: Yellow wristband/Bed in lowest position, wheels locked, appropriate side rails in place/Call bell, personal items and telephone in reach/Instruct patient to call for assistance before getting out of bed or chair/Non-slip footwear when patient is out of bed/[place] to call system/Physically safe environment - no spills, clutter or unnecessary equipment/Purposeful Proactive Rounding/Room/bathroom lighting operational, light cord in reach

## 2023-10-24 NOTE — PROGRESS NOTE ADULT - SUBJECTIVE AND OBJECTIVE BOX
Postoperative Day #: 0 s/p robotic assisted ventral and incisional hernia repair   Patient seen and examined bedside. complaining of abdominal soreness.  pt s/p toradol @ 2 pm. also endorses some nausea, no vomiting, s/p zofran 3 pm  Denies diarrhea, fevers, chills. no appetite to eat. has not urinated, or passed flatus/bm        T(F): 97.5 (10-24-23 @ 17:00), Max: 98.5 (10-24-23 @ 06:43)  HR: 76 (10-24-23 @ 17:00) (69 - 79)  BP: 132/84 (10-24-23 @ 17:00) (103/66 - 133/73)  RR: 16 (10-24-23 @ 17:00) (15 - 19)  SpO2: 94% (10-24-23 @ 17:00) (94% - 98%)  Wt(kg): --  CAPILLARY BLOOD GLUCOSE      POCT Blood Glucose.: 189 mg/dL (24 Oct 2023 14:13)  POCT Blood Glucose.: 129 mg/dL (24 Oct 2023 07:24)      PHYSICAL EXAM:  General: NAD, mildly uncomfortable 2/2 pain   Neuro:  Alert & oriented x 3  HEENT: NCAT, EOMI, conjunctiva clear  CV: +S1+S2 regular rate and rhythm  Lung: respirations nonlabored, good inspiratory effort  Abdomen: soft, nondistended. pt with abdominal binder, and laparoscopic incisions with steri strips, C/D/I.   appropriate incisional tenderness  Extremities: no pedal edema or calf tenderness noted       A/P: s/p robotic assisted ventral and incisional hernia repair with mesh   -multimodal pain control with ofirmev and toradol RTC  -continue VTE prophylaxis   -Increase activity with OOB, Ambulate  -educated on proper incentive spirometry use  -continue local wound care  -f/u AM labs  -will discuss with surgical attending

## 2023-10-24 NOTE — BRIEF OPERATIVE NOTE - NSICDXBRIEFPROCEDURE_GEN_ALL_CORE_FT
PROCEDURES:  Repair, hernia, nonreducible, abdominal wall, anterior, without history of prior repair, greater than 10 cm, with mesh insertion 24-Oct-2023 14:09:48  Jeremiah Martinez

## 2023-10-24 NOTE — ASU PATIENT PROFILE, ADULT - IS PATIENT PREGNANT?
decreased ability to use legs for bridging/pushing/impaired ability to control trunk for mobility/decreased ability to use arms for pushing/pulling no

## 2023-10-24 NOTE — ASU PATIENT PROFILE, ADULT - FALL HARM RISK - UNIVERSAL INTERVENTIONS
Bed in lowest position, wheels locked, appropriate side rails in place/Call bell, personal items and telephone in reach/Instruct patient to call for assistance before getting out of bed or chair/Non-slip footwear when patient is out of bed/[place] to call system/Physically safe environment - no spills, clutter or unnecessary equipment/Purposeful Proactive Rounding/Room/bathroom lighting operational, light cord in reach

## 2023-10-25 LAB
A1C WITH ESTIMATED AVERAGE GLUCOSE RESULT: 7.5 % — HIGH (ref 4–5.6)
A1C WITH ESTIMATED AVERAGE GLUCOSE RESULT: 7.5 % — HIGH (ref 4–5.6)
ANION GAP SERPL CALC-SCNC: 7 MMOL/L — SIGNIFICANT CHANGE UP (ref 5–17)
ANION GAP SERPL CALC-SCNC: 7 MMOL/L — SIGNIFICANT CHANGE UP (ref 5–17)
BUN SERPL-MCNC: 14 MG/DL — SIGNIFICANT CHANGE UP (ref 7–23)
BUN SERPL-MCNC: 14 MG/DL — SIGNIFICANT CHANGE UP (ref 7–23)
CALCIUM SERPL-MCNC: 8.5 MG/DL — SIGNIFICANT CHANGE UP (ref 8.5–10.1)
CALCIUM SERPL-MCNC: 8.5 MG/DL — SIGNIFICANT CHANGE UP (ref 8.5–10.1)
CHLORIDE SERPL-SCNC: 107 MMOL/L — SIGNIFICANT CHANGE UP (ref 96–108)
CHLORIDE SERPL-SCNC: 107 MMOL/L — SIGNIFICANT CHANGE UP (ref 96–108)
CK MB BLD-MCNC: 0.4 % — SIGNIFICANT CHANGE UP (ref 0–3.5)
CK MB BLD-MCNC: 0.4 % — SIGNIFICANT CHANGE UP (ref 0–3.5)
CK MB BLD-MCNC: 0.5 % — SIGNIFICANT CHANGE UP (ref 0–3.5)
CK MB BLD-MCNC: 0.5 % — SIGNIFICANT CHANGE UP (ref 0–3.5)
CK MB CFR SERPL CALC: 3.1 NG/ML — SIGNIFICANT CHANGE UP (ref 0.5–3.6)
CK MB CFR SERPL CALC: 3.1 NG/ML — SIGNIFICANT CHANGE UP (ref 0.5–3.6)
CK MB CFR SERPL CALC: 3.6 NG/ML — SIGNIFICANT CHANGE UP (ref 0.5–3.6)
CK MB CFR SERPL CALC: 3.6 NG/ML — SIGNIFICANT CHANGE UP (ref 0.5–3.6)
CK SERPL-CCNC: 757 U/L — HIGH (ref 26–192)
CK SERPL-CCNC: 757 U/L — HIGH (ref 26–192)
CK SERPL-CCNC: 826 U/L — HIGH (ref 26–192)
CK SERPL-CCNC: 826 U/L — HIGH (ref 26–192)
CO2 SERPL-SCNC: 25 MMOL/L — SIGNIFICANT CHANGE UP (ref 22–31)
CO2 SERPL-SCNC: 25 MMOL/L — SIGNIFICANT CHANGE UP (ref 22–31)
CREAT SERPL-MCNC: 0.8 MG/DL — SIGNIFICANT CHANGE UP (ref 0.5–1.3)
CREAT SERPL-MCNC: 0.8 MG/DL — SIGNIFICANT CHANGE UP (ref 0.5–1.3)
EGFR: 83 ML/MIN/1.73M2 — SIGNIFICANT CHANGE UP
EGFR: 83 ML/MIN/1.73M2 — SIGNIFICANT CHANGE UP
ESTIMATED AVERAGE GLUCOSE: 169 MG/DL — HIGH (ref 68–114)
ESTIMATED AVERAGE GLUCOSE: 169 MG/DL — HIGH (ref 68–114)
GLUCOSE BLDC GLUCOMTR-MCNC: 133 MG/DL — HIGH (ref 70–99)
GLUCOSE BLDC GLUCOMTR-MCNC: 133 MG/DL — HIGH (ref 70–99)
GLUCOSE BLDC GLUCOMTR-MCNC: 135 MG/DL — HIGH (ref 70–99)
GLUCOSE BLDC GLUCOMTR-MCNC: 135 MG/DL — HIGH (ref 70–99)
GLUCOSE BLDC GLUCOMTR-MCNC: 138 MG/DL — HIGH (ref 70–99)
GLUCOSE BLDC GLUCOMTR-MCNC: 138 MG/DL — HIGH (ref 70–99)
GLUCOSE BLDC GLUCOMTR-MCNC: 142 MG/DL — HIGH (ref 70–99)
GLUCOSE BLDC GLUCOMTR-MCNC: 142 MG/DL — HIGH (ref 70–99)
GLUCOSE SERPL-MCNC: 130 MG/DL — HIGH (ref 70–99)
GLUCOSE SERPL-MCNC: 130 MG/DL — HIGH (ref 70–99)
HCT VFR BLD CALC: 35.9 % — SIGNIFICANT CHANGE UP (ref 34.5–45)
HCT VFR BLD CALC: 35.9 % — SIGNIFICANT CHANGE UP (ref 34.5–45)
HGB BLD-MCNC: 11.2 G/DL — LOW (ref 11.5–15.5)
HGB BLD-MCNC: 11.2 G/DL — LOW (ref 11.5–15.5)
MAGNESIUM SERPL-MCNC: 2 MG/DL — SIGNIFICANT CHANGE UP (ref 1.6–2.6)
MAGNESIUM SERPL-MCNC: 2 MG/DL — SIGNIFICANT CHANGE UP (ref 1.6–2.6)
MCHC RBC-ENTMCNC: 26 PG — LOW (ref 27–34)
MCHC RBC-ENTMCNC: 26 PG — LOW (ref 27–34)
MCHC RBC-ENTMCNC: 31.2 G/DL — LOW (ref 32–36)
MCHC RBC-ENTMCNC: 31.2 G/DL — LOW (ref 32–36)
MCV RBC AUTO: 83.5 FL — SIGNIFICANT CHANGE UP (ref 80–100)
MCV RBC AUTO: 83.5 FL — SIGNIFICANT CHANGE UP (ref 80–100)
NRBC # BLD: 0 /100 WBCS — SIGNIFICANT CHANGE UP (ref 0–0)
NRBC # BLD: 0 /100 WBCS — SIGNIFICANT CHANGE UP (ref 0–0)
PHOSPHATE SERPL-MCNC: 3.2 MG/DL — SIGNIFICANT CHANGE UP (ref 2.5–4.5)
PHOSPHATE SERPL-MCNC: 3.2 MG/DL — SIGNIFICANT CHANGE UP (ref 2.5–4.5)
PLATELET # BLD AUTO: 277 K/UL — SIGNIFICANT CHANGE UP (ref 150–400)
PLATELET # BLD AUTO: 277 K/UL — SIGNIFICANT CHANGE UP (ref 150–400)
POTASSIUM SERPL-MCNC: 3.9 MMOL/L — SIGNIFICANT CHANGE UP (ref 3.5–5.3)
POTASSIUM SERPL-MCNC: 3.9 MMOL/L — SIGNIFICANT CHANGE UP (ref 3.5–5.3)
POTASSIUM SERPL-SCNC: 3.9 MMOL/L — SIGNIFICANT CHANGE UP (ref 3.5–5.3)
POTASSIUM SERPL-SCNC: 3.9 MMOL/L — SIGNIFICANT CHANGE UP (ref 3.5–5.3)
RBC # BLD: 4.3 M/UL — SIGNIFICANT CHANGE UP (ref 3.8–5.2)
RBC # BLD: 4.3 M/UL — SIGNIFICANT CHANGE UP (ref 3.8–5.2)
RBC # FLD: 14.8 % — HIGH (ref 10.3–14.5)
RBC # FLD: 14.8 % — HIGH (ref 10.3–14.5)
SODIUM SERPL-SCNC: 139 MMOL/L — SIGNIFICANT CHANGE UP (ref 135–145)
SODIUM SERPL-SCNC: 139 MMOL/L — SIGNIFICANT CHANGE UP (ref 135–145)
TROPONIN I, HIGH SENSITIVITY RESULT: 13.5 NG/L — SIGNIFICANT CHANGE UP
TROPONIN I, HIGH SENSITIVITY RESULT: 13.5 NG/L — SIGNIFICANT CHANGE UP
TROPONIN I, HIGH SENSITIVITY RESULT: 9.7 NG/L — SIGNIFICANT CHANGE UP
TROPONIN I, HIGH SENSITIVITY RESULT: 9.7 NG/L — SIGNIFICANT CHANGE UP
WBC # BLD: 11.37 K/UL — HIGH (ref 3.8–10.5)
WBC # BLD: 11.37 K/UL — HIGH (ref 3.8–10.5)
WBC # FLD AUTO: 11.37 K/UL — HIGH (ref 3.8–10.5)
WBC # FLD AUTO: 11.37 K/UL — HIGH (ref 3.8–10.5)

## 2023-10-25 PROCEDURE — 93010 ELECTROCARDIOGRAM REPORT: CPT

## 2023-10-25 RX ORDER — IPRATROPIUM/ALBUTEROL SULFATE 18-103MCG
3 AEROSOL WITH ADAPTER (GRAM) INHALATION EVERY 6 HOURS
Refills: 0 | Status: DISCONTINUED | OUTPATIENT
Start: 2023-10-25 | End: 2023-10-28

## 2023-10-25 RX ORDER — VALSARTAN 80 MG/1
40 TABLET ORAL DAILY
Refills: 0 | Status: DISCONTINUED | OUTPATIENT
Start: 2023-10-25 | End: 2023-10-28

## 2023-10-25 RX ORDER — ACETAMINOPHEN 500 MG
975 TABLET ORAL EVERY 6 HOURS
Refills: 0 | Status: COMPLETED | OUTPATIENT
Start: 2023-10-26 | End: 2023-10-28

## 2023-10-25 RX ORDER — ACETAMINOPHEN 500 MG
1000 TABLET ORAL ONCE
Refills: 0 | Status: COMPLETED | OUTPATIENT
Start: 2023-10-25 | End: 2023-10-25

## 2023-10-25 RX ORDER — CLOPIDOGREL BISULFATE 75 MG/1
75 TABLET, FILM COATED ORAL DAILY
Refills: 0 | Status: DISCONTINUED | OUTPATIENT
Start: 2023-10-25 | End: 2023-10-28

## 2023-10-25 RX ORDER — NITROGLYCERIN 6.5 MG
0.4 CAPSULE, EXTENDED RELEASE ORAL
Refills: 0 | Status: DISCONTINUED | OUTPATIENT
Start: 2023-10-25 | End: 2023-10-28

## 2023-10-25 RX ORDER — ALBUTEROL 90 UG/1
1 AEROSOL, METERED ORAL EVERY 4 HOURS
Refills: 0 | Status: DISCONTINUED | OUTPATIENT
Start: 2023-10-25 | End: 2023-10-28

## 2023-10-25 RX ADMIN — Medication 3 MILLILITER(S): at 23:16

## 2023-10-25 RX ADMIN — CLOPIDOGREL BISULFATE 75 MILLIGRAM(S): 75 TABLET, FILM COATED ORAL at 12:28

## 2023-10-25 RX ADMIN — Medication 3 MILLILITER(S): at 17:47

## 2023-10-25 RX ADMIN — Medication 100 MILLIGRAM(S): at 12:28

## 2023-10-25 RX ADMIN — Medication 400 MILLIGRAM(S): at 00:10

## 2023-10-25 RX ADMIN — Medication 3 MILLILITER(S): at 12:00

## 2023-10-25 RX ADMIN — Medication 15 MILLIGRAM(S): at 08:39

## 2023-10-25 RX ADMIN — Medication 400 MILLIGRAM(S): at 12:28

## 2023-10-25 RX ADMIN — HEPARIN SODIUM 5000 UNIT(S): 5000 INJECTION INTRAVENOUS; SUBCUTANEOUS at 05:23

## 2023-10-25 RX ADMIN — Medication 25 MILLIGRAM(S): at 17:55

## 2023-10-25 RX ADMIN — Medication 0.4 MILLIGRAM(S): at 19:08

## 2023-10-25 RX ADMIN — INSULIN GLARGINE 30 UNIT(S): 100 INJECTION, SOLUTION SUBCUTANEOUS at 22:05

## 2023-10-25 RX ADMIN — Medication 81 MILLIGRAM(S): at 12:29

## 2023-10-25 RX ADMIN — Medication 1000 MILLIGRAM(S): at 01:10

## 2023-10-25 RX ADMIN — SODIUM CHLORIDE 100 MILLILITER(S): 9 INJECTION, SOLUTION INTRAVENOUS at 12:29

## 2023-10-25 RX ADMIN — Medication 400 MILLIGRAM(S): at 22:25

## 2023-10-25 RX ADMIN — Medication 25 MILLIGRAM(S): at 05:22

## 2023-10-25 RX ADMIN — HEPARIN SODIUM 5000 UNIT(S): 5000 INJECTION INTRAVENOUS; SUBCUTANEOUS at 22:05

## 2023-10-25 RX ADMIN — Medication 400 MILLIGRAM(S): at 05:23

## 2023-10-25 RX ADMIN — HEPARIN SODIUM 5000 UNIT(S): 5000 INJECTION INTRAVENOUS; SUBCUTANEOUS at 14:18

## 2023-10-25 RX ADMIN — Medication 1000 MILLIGRAM(S): at 06:23

## 2023-10-25 RX ADMIN — Medication 1000 MILLIGRAM(S): at 23:25

## 2023-10-25 NOTE — PROGRESS NOTE ADULT - SUBJECTIVE AND OBJECTIVE BOX
Patient seen and examined at bedside, patient complaining of chest tightness, associated to her asthma   Abdominal pain is well controlled.  Denies nausea and vomiting. Tolerating diet.  Denies chest pain, dyspnea, cough.      MEDICATIONS  (STANDING):  acetaminophen   IVPB .. 1000 milliGRAM(s) IV Intermittent every 6 hours  albuterol/ipratropium for Nebulization 3 milliLiter(s) Nebulizer every 6 hours  allopurinol 100 milliGRAM(s) Oral daily  aspirin enteric coated 81 milliGRAM(s) Oral daily  clopidogrel Tablet 75 milliGRAM(s) Oral daily  dextrose 5%. 1000 milliLiter(s) (100 mL/Hr) IV Continuous <Continuous>  dextrose 5%. 1000 milliLiter(s) (50 mL/Hr) IV Continuous <Continuous>  dextrose 50% Injectable 12.5 Gram(s) IV Push once  dextrose 50% Injectable 25 Gram(s) IV Push once  dextrose 50% Injectable 25 Gram(s) IV Push once  glucagon  Injectable 1 milliGRAM(s) IntraMuscular once  heparin   Injectable 5000 Unit(s) SubCutaneous every 8 hours  insulin glargine Injectable (LANTUS) 30 Unit(s) SubCutaneous at bedtime  insulin lispro (ADMELOG) corrective regimen sliding scale   SubCutaneous at bedtime  insulin lispro (ADMELOG) corrective regimen sliding scale   SubCutaneous three times a day before meals  ketorolac   Injectable 15 milliGRAM(s) IV Push every 6 hours  lactated ringers. 1000 milliLiter(s) (100 mL/Hr) IV Continuous <Continuous>  metoprolol succinate ER 25 milliGRAM(s) Oral two times a day    MEDICATIONS  (PRN):  albuterol    90 MICROgram(s) HFA Inhaler 1 Puff(s) Inhalation two times a day PRN Shortness of Breath and/or Wheezing  albuterol    90 MICROgram(s) HFA Inhaler 1 Puff(s) Inhalation every 4 hours PRN Shortness of Breath and/or Wheezing  dextrose Oral Gel 15 Gram(s) Oral once PRN Blood Glucose LESS THAN 70 milliGRAM(s)/deciliter  ondansetron Injectable 4 milliGRAM(s) IV Push every 6 hours PRN Nausea and/or Vomiting      Vital Signs Last 24 Hrs  T(C): 36.6 (25 Oct 2023 08:00), Max: 37 (25 Oct 2023 00:00)  T(F): 97.9 (25 Oct 2023 08:00), Max: 98.6 (25 Oct 2023 00:00)  HR: 75 (25 Oct 2023 08:00) (69 - 87)  BP: 129/77 (25 Oct 2023 08:00) (111/60 - 133/73)  RR: 18 (25 Oct 2023 08:00) (15 - 20)  SpO2: 95% (25 Oct 2023 08:00) (94% - 98%)    Parameters below as of 25 Oct 2023 08:00  Patient On (Oxygen Delivery Method): nasal cannula  O2 Flow (L/min): 2    PHYSICAL EXAM:  General: NAD  Neuro:  Alert & oriented x 3  HEENT: NCAT, EOMI, conjunctiva clear, on Nasal canula  CV: +S1+S2  Lung: Respirations nonlabored, good inspiratory effort  Abdomen: soft, NTND. Steri-strips intact  Extremities: no pedal edema or calf tenderness noted         LABS:                        11.2   11.37 )-----------( 277      ( 25 Oct 2023 05:55 )             35.9     10-25    139  |  107  |  14  ----------------------------<  130<H>  3.9   |  25  |  0.80    Ca    8.5      25 Oct 2023 05:55  Phos  3.2     10-25  Mg     2.0     10-25        Urinalysis Basic - ( 25 Oct 2023 05:55 )    Color: x / Appearance: x / SG: x / pH: x  Gluc: 130 mg/dL / Ketone: x  / Bili: x / Urobili: x   Blood: x / Protein: x / Nitrite: x   Leuk Esterase: x / RBC: x / WBC x   Sq Epi: x / Non Sq Epi: x / Bacteria: x

## 2023-10-25 NOTE — PROGRESS NOTE ADULT - SUBJECTIVE AND OBJECTIVE BOX
STATUS POST:  Robotic assisted incisional hernia repair    POST OPERATIVE DAY #1    Vital Signs Last 24 Hrs  T(C): 36.9 (25 Oct 2023 04:01), Max: 37 (25 Oct 2023 00:00)  T(F): 98.5 (25 Oct 2023 04:01), Max: 98.6 (25 Oct 2023 00:00)  HR: 87 (25 Oct 2023 04:01) (69 - 87)  BP: 111/60 (25 Oct 2023 04:01) (111/60 - 133/73)  BP(mean): --  RR: 18 (25 Oct 2023 04:01) (15 - 20)  SpO2: 95% (25 Oct 2023 04:01) (94% - 98%)    Parameters below as of 25 Oct 2023 04:01  Patient On (Oxygen Delivery Method): nasal cannula          SUBJECTIVE: Pt seen and examined. Laying comfortably in bed. Pain is well controlled. She is voiding. Had some nausea which has resolved. No flatus.     PHYSICAL EXAM:  Constitutional: Patient well nourish. well developed.  Eyes:  PERRL, EOMI, Conjunctiva clear.  ENMT:  WNL  Gastrointestinal:  Abd soft ND appropriately tender. Binder in place.  Wound: C/D/I  Extremities:  No edema, no calf tenderrness,  Neurological: AxAxOx3                    I&O's Summary    I&O's Detail      MEDICATIONS  (STANDING):  acetaminophen   IVPB .. 1000 milliGRAM(s) IV Intermittent every 6 hours  allopurinol 100 milliGRAM(s) Oral daily  aspirin enteric coated 81 milliGRAM(s) Oral daily  dextrose 5%. 1000 milliLiter(s) (50 mL/Hr) IV Continuous <Continuous>  dextrose 5%. 1000 milliLiter(s) (100 mL/Hr) IV Continuous <Continuous>  dextrose 50% Injectable 25 Gram(s) IV Push once  dextrose 50% Injectable 25 Gram(s) IV Push once  dextrose 50% Injectable 12.5 Gram(s) IV Push once  glucagon  Injectable 1 milliGRAM(s) IntraMuscular once  heparin   Injectable 5000 Unit(s) SubCutaneous every 8 hours  insulin glargine Injectable (LANTUS) 30 Unit(s) SubCutaneous at bedtime  insulin lispro (ADMELOG) corrective regimen sliding scale   SubCutaneous at bedtime  insulin lispro (ADMELOG) corrective regimen sliding scale   SubCutaneous three times a day before meals  ketorolac   Injectable 15 milliGRAM(s) IV Push every 6 hours  lactated ringers. 1000 milliLiter(s) (100 mL/Hr) IV Continuous <Continuous>  metoprolol succinate ER 25 milliGRAM(s) Oral two times a day    MEDICATIONS  (PRN):  albuterol    90 MICROgram(s) HFA Inhaler 1 Puff(s) Inhalation two times a day PRN Shortness of Breath and/or Wheezing  dextrose Oral Gel 15 Gram(s) Oral once PRN Blood Glucose LESS THAN 70 milliGRAM(s)/deciliter  ondansetron Injectable 4 milliGRAM(s) IV Push every 6 hours PRN Nausea and/or Vomiting      LABS:                        11.2   11.37 )-----------( 277      ( 25 Oct 2023 05:55 )             35.9     10-25    139  |  107  |  14  ----------------------------<  130<H>  3.9   |  25  |  0.80    Ca    8.5      25 Oct 2023 05:55  Phos  3.2     10-25  Mg     2.0     10-25        Urinalysis Basic - ( 25 Oct 2023 05:55 )    Color: x / Appearance: x / SG: x / pH: x  Gluc: 130 mg/dL / Ketone: x  / Bili: x / Urobili: x   Blood: x / Protein: x / Nitrite: x   Leuk Esterase: x / RBC: x / WBC x   Sq Epi: x / Non Sq Epi: x / Bacteria: x        RADIOLOGY & ADDITIONAL STUDIES:

## 2023-10-25 NOTE — CHART NOTE - NSCHARTNOTEFT_GEN_A_CORE
Surgery PA    Called by RN that patient is complaining of chest pain. Patient is POD #1, s/p incisional hernia repair. Per patient, she has a history of cardiac stents, and angina, for which she take nitroglycerin for. States pain is similar to her angina    STAT ECG with sinus at 84, and some PVCs, no ST elevations or depressions  Follow up cardiac enzymes x3  Nitroglycerin  Pending cardiology consult  Discussed with Dr. Jarvis

## 2023-10-26 LAB
ANION GAP SERPL CALC-SCNC: 6 MMOL/L — SIGNIFICANT CHANGE UP (ref 5–17)
ANION GAP SERPL CALC-SCNC: 6 MMOL/L — SIGNIFICANT CHANGE UP (ref 5–17)
BUN SERPL-MCNC: 7 MG/DL — SIGNIFICANT CHANGE UP (ref 7–23)
BUN SERPL-MCNC: 7 MG/DL — SIGNIFICANT CHANGE UP (ref 7–23)
CALCIUM SERPL-MCNC: 8.3 MG/DL — LOW (ref 8.5–10.1)
CALCIUM SERPL-MCNC: 8.3 MG/DL — LOW (ref 8.5–10.1)
CHLORIDE SERPL-SCNC: 108 MMOL/L — SIGNIFICANT CHANGE UP (ref 96–108)
CHLORIDE SERPL-SCNC: 108 MMOL/L — SIGNIFICANT CHANGE UP (ref 96–108)
CK MB BLD-MCNC: 0.2 % — SIGNIFICANT CHANGE UP (ref 0–3.5)
CK MB BLD-MCNC: 0.2 % — SIGNIFICANT CHANGE UP (ref 0–3.5)
CK MB CFR SERPL CALC: 1.9 NG/ML — SIGNIFICANT CHANGE UP (ref 0.5–3.6)
CK MB CFR SERPL CALC: 1.9 NG/ML — SIGNIFICANT CHANGE UP (ref 0.5–3.6)
CK SERPL-CCNC: 843 U/L — HIGH (ref 26–192)
CK SERPL-CCNC: 843 U/L — HIGH (ref 26–192)
CO2 SERPL-SCNC: 27 MMOL/L — SIGNIFICANT CHANGE UP (ref 22–31)
CO2 SERPL-SCNC: 27 MMOL/L — SIGNIFICANT CHANGE UP (ref 22–31)
CREAT SERPL-MCNC: 0.63 MG/DL — SIGNIFICANT CHANGE UP (ref 0.5–1.3)
CREAT SERPL-MCNC: 0.63 MG/DL — SIGNIFICANT CHANGE UP (ref 0.5–1.3)
EGFR: 100 ML/MIN/1.73M2 — SIGNIFICANT CHANGE UP
EGFR: 100 ML/MIN/1.73M2 — SIGNIFICANT CHANGE UP
GLUCOSE BLDC GLUCOMTR-MCNC: 136 MG/DL — HIGH (ref 70–99)
GLUCOSE BLDC GLUCOMTR-MCNC: 136 MG/DL — HIGH (ref 70–99)
GLUCOSE BLDC GLUCOMTR-MCNC: 141 MG/DL — HIGH (ref 70–99)
GLUCOSE BLDC GLUCOMTR-MCNC: 141 MG/DL — HIGH (ref 70–99)
GLUCOSE BLDC GLUCOMTR-MCNC: 147 MG/DL — HIGH (ref 70–99)
GLUCOSE BLDC GLUCOMTR-MCNC: 147 MG/DL — HIGH (ref 70–99)
GLUCOSE BLDC GLUCOMTR-MCNC: 166 MG/DL — HIGH (ref 70–99)
GLUCOSE BLDC GLUCOMTR-MCNC: 166 MG/DL — HIGH (ref 70–99)
GLUCOSE SERPL-MCNC: 162 MG/DL — HIGH (ref 70–99)
GLUCOSE SERPL-MCNC: 162 MG/DL — HIGH (ref 70–99)
HCT VFR BLD CALC: 35.7 % — SIGNIFICANT CHANGE UP (ref 34.5–45)
HCT VFR BLD CALC: 35.7 % — SIGNIFICANT CHANGE UP (ref 34.5–45)
HGB BLD-MCNC: 11.2 G/DL — LOW (ref 11.5–15.5)
HGB BLD-MCNC: 11.2 G/DL — LOW (ref 11.5–15.5)
MAGNESIUM SERPL-MCNC: 2.1 MG/DL — SIGNIFICANT CHANGE UP (ref 1.6–2.6)
MAGNESIUM SERPL-MCNC: 2.1 MG/DL — SIGNIFICANT CHANGE UP (ref 1.6–2.6)
MCHC RBC-ENTMCNC: 26.3 PG — LOW (ref 27–34)
MCHC RBC-ENTMCNC: 26.3 PG — LOW (ref 27–34)
MCHC RBC-ENTMCNC: 31.4 G/DL — LOW (ref 32–36)
MCHC RBC-ENTMCNC: 31.4 G/DL — LOW (ref 32–36)
MCV RBC AUTO: 83.8 FL — SIGNIFICANT CHANGE UP (ref 80–100)
MCV RBC AUTO: 83.8 FL — SIGNIFICANT CHANGE UP (ref 80–100)
NRBC # BLD: 0 /100 WBCS — SIGNIFICANT CHANGE UP (ref 0–0)
NRBC # BLD: 0 /100 WBCS — SIGNIFICANT CHANGE UP (ref 0–0)
PHOSPHATE SERPL-MCNC: 2.1 MG/DL — LOW (ref 2.5–4.5)
PHOSPHATE SERPL-MCNC: 2.1 MG/DL — LOW (ref 2.5–4.5)
PLATELET # BLD AUTO: 272 K/UL — SIGNIFICANT CHANGE UP (ref 150–400)
PLATELET # BLD AUTO: 272 K/UL — SIGNIFICANT CHANGE UP (ref 150–400)
POTASSIUM SERPL-MCNC: 3.6 MMOL/L — SIGNIFICANT CHANGE UP (ref 3.5–5.3)
POTASSIUM SERPL-MCNC: 3.6 MMOL/L — SIGNIFICANT CHANGE UP (ref 3.5–5.3)
POTASSIUM SERPL-SCNC: 3.6 MMOL/L — SIGNIFICANT CHANGE UP (ref 3.5–5.3)
POTASSIUM SERPL-SCNC: 3.6 MMOL/L — SIGNIFICANT CHANGE UP (ref 3.5–5.3)
RBC # BLD: 4.26 M/UL — SIGNIFICANT CHANGE UP (ref 3.8–5.2)
RBC # BLD: 4.26 M/UL — SIGNIFICANT CHANGE UP (ref 3.8–5.2)
RBC # FLD: 14.8 % — HIGH (ref 10.3–14.5)
RBC # FLD: 14.8 % — HIGH (ref 10.3–14.5)
SODIUM SERPL-SCNC: 141 MMOL/L — SIGNIFICANT CHANGE UP (ref 135–145)
SODIUM SERPL-SCNC: 141 MMOL/L — SIGNIFICANT CHANGE UP (ref 135–145)
TROPONIN I, HIGH SENSITIVITY RESULT: 15.9 NG/L — SIGNIFICANT CHANGE UP
TROPONIN I, HIGH SENSITIVITY RESULT: 15.9 NG/L — SIGNIFICANT CHANGE UP
TROPONIN I, HIGH SENSITIVITY RESULT: 20.7 NG/L — SIGNIFICANT CHANGE UP
TROPONIN I, HIGH SENSITIVITY RESULT: 20.7 NG/L — SIGNIFICANT CHANGE UP
WBC # BLD: 11.38 K/UL — HIGH (ref 3.8–10.5)
WBC # BLD: 11.38 K/UL — HIGH (ref 3.8–10.5)
WBC # FLD AUTO: 11.38 K/UL — HIGH (ref 3.8–10.5)
WBC # FLD AUTO: 11.38 K/UL — HIGH (ref 3.8–10.5)

## 2023-10-26 PROCEDURE — 71045 X-RAY EXAM CHEST 1 VIEW: CPT | Mod: 26

## 2023-10-26 PROCEDURE — 99232 SBSQ HOSP IP/OBS MODERATE 35: CPT

## 2023-10-26 PROCEDURE — 99222 1ST HOSP IP/OBS MODERATE 55: CPT

## 2023-10-26 RX ORDER — FUROSEMIDE 40 MG
20 TABLET ORAL ONCE
Refills: 0 | Status: COMPLETED | OUTPATIENT
Start: 2023-10-26 | End: 2023-10-26

## 2023-10-26 RX ORDER — ISOSORBIDE MONONITRATE 60 MG/1
30 TABLET, EXTENDED RELEASE ORAL DAILY
Refills: 0 | Status: DISCONTINUED | OUTPATIENT
Start: 2023-10-26 | End: 2023-10-28

## 2023-10-26 RX ORDER — POTASSIUM PHOSPHATE, MONOBASIC POTASSIUM PHOSPHATE, DIBASIC 236; 224 MG/ML; MG/ML
30 INJECTION, SOLUTION INTRAVENOUS ONCE
Refills: 0 | Status: COMPLETED | OUTPATIENT
Start: 2023-10-26 | End: 2023-10-26

## 2023-10-26 RX ADMIN — ALBUTEROL 1 PUFF(S): 90 AEROSOL, METERED ORAL at 17:09

## 2023-10-26 RX ADMIN — Medication 25 MILLIGRAM(S): at 06:05

## 2023-10-26 RX ADMIN — Medication 3 MILLILITER(S): at 23:31

## 2023-10-26 RX ADMIN — POTASSIUM PHOSPHATE, MONOBASIC POTASSIUM PHOSPHATE, DIBASIC 83.33 MILLIMOLE(S): 236; 224 INJECTION, SOLUTION INTRAVENOUS at 08:55

## 2023-10-26 RX ADMIN — Medication 25 MILLIGRAM(S): at 17:09

## 2023-10-26 RX ADMIN — HEPARIN SODIUM 5000 UNIT(S): 5000 INJECTION INTRAVENOUS; SUBCUTANEOUS at 14:17

## 2023-10-26 RX ADMIN — Medication 975 MILLIGRAM(S): at 21:41

## 2023-10-26 RX ADMIN — SODIUM CHLORIDE 100 MILLILITER(S): 9 INJECTION, SOLUTION INTRAVENOUS at 00:02

## 2023-10-26 RX ADMIN — HEPARIN SODIUM 5000 UNIT(S): 5000 INJECTION INTRAVENOUS; SUBCUTANEOUS at 21:34

## 2023-10-26 RX ADMIN — Medication 3 MILLILITER(S): at 11:01

## 2023-10-26 RX ADMIN — HEPARIN SODIUM 5000 UNIT(S): 5000 INJECTION INTRAVENOUS; SUBCUTANEOUS at 06:05

## 2023-10-26 RX ADMIN — SODIUM CHLORIDE 100 MILLILITER(S): 9 INJECTION, SOLUTION INTRAVENOUS at 21:36

## 2023-10-26 RX ADMIN — ISOSORBIDE MONONITRATE 30 MILLIGRAM(S): 60 TABLET, EXTENDED RELEASE ORAL at 18:57

## 2023-10-26 RX ADMIN — INSULIN GLARGINE 30 UNIT(S): 100 INJECTION, SOLUTION SUBCUTANEOUS at 21:34

## 2023-10-26 RX ADMIN — Medication 100 MILLIGRAM(S): at 12:35

## 2023-10-26 RX ADMIN — Medication 20 MILLIGRAM(S): at 14:48

## 2023-10-26 RX ADMIN — Medication 3 MILLILITER(S): at 17:56

## 2023-10-26 RX ADMIN — Medication 2: at 08:34

## 2023-10-26 RX ADMIN — CLOPIDOGREL BISULFATE 75 MILLIGRAM(S): 75 TABLET, FILM COATED ORAL at 12:35

## 2023-10-26 RX ADMIN — Medication 3 MILLILITER(S): at 05:10

## 2023-10-26 RX ADMIN — Medication 81 MILLIGRAM(S): at 12:35

## 2023-10-26 RX ADMIN — VALSARTAN 40 MILLIGRAM(S): 80 TABLET ORAL at 06:05

## 2023-10-26 NOTE — PROGRESS NOTE ADULT - SUBJECTIVE AND OBJECTIVE BOX
Continues to be SOB. No significant abdominal or chest pain. Wounds clean abdomen soft. Labs noted . To get cxr. Cardiology to see. DH

## 2023-10-26 NOTE — PROGRESS NOTE ADULT - SUBJECTIVE AND OBJECTIVE BOX
SURGERY PROGRESS HPI:  POD#2  Pt seen and examined at bedside. Chest pain improved. Post-op pain is well controlled on pain medication. Pt denies complaints. Pt tolerating small amount of regular diet. Pt denies nausea and vomiting. Voiding well. Pt denies chest pain, SOB, dizziness, fever, chills. Ambulating.    Vital Signs Last 24 Hrs  T(C): 37 (26 Oct 2023 05:04), Max: 37 (26 Oct 2023 05:04)  T(F): 98.6 (26 Oct 2023 05:04), Max: 98.6 (26 Oct 2023 05:04)  HR: 72 (26 Oct 2023 05:32) (72 - 90)  BP: 162/81 (26 Oct 2023 05:04) (129/77 - 169/90)  BP(mean): --  RR: 18 (26 Oct 2023 05:04) (18 - 18)  SpO2: 98% (26 Oct 2023 05:32) (93% - 99%)    Parameters below as of 26 Oct 2023 05:32  Patient On (Oxygen Delivery Method): nasal cannula      PHYSICAL EXAM:    CONSTITUTIONAL: NAD  HEENT:  Atraumatic, Normocephalic  RESPIRATORY: Clear to ausculation, bilaterally   CARDIOVASCULAR: RRR S1S2  GASTROINTESTINAL: Abdominal binder in place. Dressings clean/dry/intact. non distended, +BS, soft, appropriate incisional tenderness  MUSCULOSKELETAL: calf soft, non tender b/l    I&O's Detail    25 Oct 2023 07:01  -  26 Oct 2023 06:10  --------------------------------------------------------  IN:    Oral Fluid: 720 mL  Total IN: 720 mL    OUT:  Total OUT: 0 mL    Total NET: 720 mL    LABS:                        11.2   11.37 )-----------( 277      ( 25 Oct 2023 05:55 )             35.9     10-25    139  |  107  |  14  ----------------------------<  130<H>  3.9   |  25  |  0.80    Ca    8.5      25 Oct 2023 05:55  Phos  3.2     10-25  Mg     2.0     10-25      Urinalysis Basic - ( 25 Oct 2023 05:55 )    Color: x / Appearance: x / SG: x / pH: x  Gluc: 130 mg/dL / Ketone: x  / Bili: x / Urobili: x   Blood: x / Protein: x / Nitrite: x   Leuk Esterase: x / RBC: x / WBC x   Sq Epi: x / Non Sq Epi: x / Bacteria: x        64 yo female s/p Robotic assisted repair of incisional hernia with mesh POD#2. Chest pain 10/25.  -f/u cardiology consult  -continue diet  -OOB, pain control   SURGERY PROGRESS HPI:  POD#2  Pt seen and examined at bedside. Chest pain improved. Post-op pain is well controlled on pain medication. Pt denies complaints. Pt tolerating small amount of regular diet. Pt denies nausea and vomiting. Voiding well. Pt denies chest pain, SOB, dizziness, fever, chills. Ambulating.    Vital Signs Last 24 Hrs  T(C): 37 (26 Oct 2023 05:04), Max: 37 (26 Oct 2023 05:04)  T(F): 98.6 (26 Oct 2023 05:04), Max: 98.6 (26 Oct 2023 05:04)  HR: 72 (26 Oct 2023 05:32) (72 - 90)  BP: 162/81 (26 Oct 2023 05:04) (129/77 - 169/90)  BP(mean): --  RR: 18 (26 Oct 2023 05:04) (18 - 18)  SpO2: 98% (26 Oct 2023 05:32) (93% - 99%)    Parameters below as of 26 Oct 2023 05:32  Patient On (Oxygen Delivery Method): nasal cannula      PHYSICAL EXAM:    CONSTITUTIONAL: NAD  HEENT:  Atraumatic, Normocephalic  RESPIRATORY: Clear to ausculation, bilaterally   CARDIOVASCULAR: RRR S1S2  GASTROINTESTINAL: Abdominal binder in place. Steri strips clean/dry/intact. non distended, +BS, soft, appropriate incisional tenderness  MUSCULOSKELETAL: calf soft, non tender b/l    I&O's Detail    25 Oct 2023 07:01  -  26 Oct 2023 06:10  --------------------------------------------------------  IN:    Oral Fluid: 720 mL  Total IN: 720 mL    OUT:  Total OUT: 0 mL    Total NET: 720 mL    LABS:                        11.2   11.37 )-----------( 277      ( 25 Oct 2023 05:55 )             35.9     10-25    139  |  107  |  14  ----------------------------<  130<H>  3.9   |  25  |  0.80    Ca    8.5      25 Oct 2023 05:55  Phos  3.2     10-25  Mg     2.0     10-25      Urinalysis Basic - ( 25 Oct 2023 05:55 )    Color: x / Appearance: x / SG: x / pH: x  Gluc: 130 mg/dL / Ketone: x  / Bili: x / Urobili: x   Blood: x / Protein: x / Nitrite: x   Leuk Esterase: x / RBC: x / WBC x   Sq Epi: x / Non Sq Epi: x / Bacteria: x        62 yo female s/p Robotic assisted repair of incisional hernia with mesh POD#2. Chest pain 10/25.  -f/u cardiology consult  -continue diet  -OOB, pain control

## 2023-10-26 NOTE — CONSULT NOTE ADULT - ASSESSMENT
64 yo female s/p Robotic assisted repair of incisional hernia with mesh POD#2.  HTN. HLD (on [place]),  CAD s/p 2 LAD stents 02/2019 -> cath 7/2023 with PATENT stents w/ otherwise mild dz, DM type 2, diverticulitis s/p Hartmans and reversal in 2013 at [facility], MYLES not on CPAP,  Asthma, Cdiff, hx pf acute abd on 11/2019, fibromyalgia.    Episode of CP last night.  Brooks neg  EKG: sinus 86bpm w/ PACs  Trop neg x 4    -cont asa/plavix/metoprolol  -doubt ACS... Brooks neg and EKG non-ischemic; cath 3 months ago unremarkable  -2D echo  -will add Imdur and follow with you

## 2023-10-26 NOTE — CONSULT NOTE ADULT - SUBJECTIVE AND OBJECTIVE BOX
CARDIOLOGY CONSULT NOTE    Patient is a 63y Female with a known history of :    HPI:  64 yo female s/p Robotic assisted repair of incisional hernia with mesh POD#2.  HTN. HLD (on [place]),  CAD s/p 2 LAD stents 02/2019 -> cath 7/2023 with PATENT stents w/ otherwise mild dz, DM type 2, diverticulitis s/p Hartmans and reversal in 2013 at [facility], MYLES not on CPAP,  Asthma, Cdiff, hx pf acute abd on 11/2019, fibromyalgia.    Episode of CP last night.  Brooks neg  EKG: sinus 86bpm w/ PACs      REVIEW OF SYSTEMS:  CONSTITUTIONAL: No fever, weight loss, or fatigue  EYES: No eye pain, visual disturbances, or discharge  ENMT:  No difficulty hearing, tinnitus, vertigo; No sinus or throat pain  NECK: No pain or stiffness  BREASTS: No pain, masses, or nipple discharge  RESPIRATORY: No cough, wheezing, chills or hemoptysis; No shortness of breath  CARDIOVASCULAR: No chest pain, palpitations, dizziness, or leg swelling  GASTROINTESTINAL: No abdominal or epigastric pain. No nausea, vomiting, or hematemesis; No diarrhea or constipation. No melena or hematochezia.  GENITOURINARY: No dysuria, frequency, hematuria, or incontinence  NEUROLOGICAL: No headaches, memory loss, loss of strength, numbness, or tremors  SKIN: No itching, burning, rashes, or lesions   LYMPH NODES: No enlarged glands  ENDOCRINE: No heat or cold intolerance; No hair loss  MUSCULOSKELETAL: No joint pain or swelling; No muscle, back, or extremity pain  PSYCHIATRIC: No depression, anxiety, mood swings, or difficulty sleeping  HEME/LYMPH: No easy bruising, or bleeding gums  ALLERGY AND IMMUNOLOGIC: No hives or eczema    MEDICATIONS  (STANDING):  albuterol/ipratropium for Nebulization 3 milliLiter(s) Nebulizer every 6 hours  allopurinol 100 milliGRAM(s) Oral daily  aspirin enteric coated 81 milliGRAM(s) Oral daily  clopidogrel Tablet 75 milliGRAM(s) Oral daily  glucagon  Injectable 1 milliGRAM(s) IntraMuscular once  heparin   Injectable 5000 Unit(s) SubCutaneous every 8 hours  insulin glargine Injectable (LANTUS) 30 Unit(s) SubCutaneous at bedtime  insulin lispro (ADMELOG) corrective regimen sliding scale   SubCutaneous three times a day before meals  insulin lispro (ADMELOG) corrective regimen sliding scale   SubCutaneous at bedtime  lactated ringers. 1000 milliLiter(s) (100 mL/Hr) IV Continuous <Continuous>  metoprolol succinate ER 25 milliGRAM(s) Oral two times a day  valsartan 40 milliGRAM(s) Oral daily    MEDICATIONS  (PRN):  acetaminophen     Tablet .. 975 milliGRAM(s) Oral every 6 hours PRN Moderate Pain (4 - 6)  albuterol    90 MICROgram(s) HFA Inhaler 1 Puff(s) Inhalation every 4 hours PRN Shortness of Breath and/or Wheezing  albuterol    90 MICROgram(s) HFA Inhaler 1 Puff(s) Inhalation two times a day PRN Shortness of Breath and/or Wheezing  dextrose Oral Gel 15 Gram(s) Oral once PRN Blood Glucose LESS THAN 70 milliGRAM(s)/deciliter  nitroglycerin     SubLingual 0.4 milliGRAM(s) SubLingual every 5 minutes PRN Chest Pain  ondansetron Injectable 4 milliGRAM(s) IV Push every 6 hours PRN Nausea and/or Vomiting      ALLERGIES: Cipro (Anaphylaxis; Rash)  codeine (Diarrhea; Nausea; Vomiting)  Keflex (Hives)  adhesives (Other)  morphine (Diarrhea; Nausea; Vomiting)      FAMILY HISTORY:  Family history of heart disease (Sibling)    Family history of hypertension    Family history of diabetes mellitus type II (Sibling)        PHYSICAL EXAMINATION:  -----------------------------  T(C): 36.3 (10-26-23 @ 11:35), Max: 37 (10-26-23 @ 05:04)  HR: 77 (10-26-23 @ 11:35) (72 - 90)  BP: 147/79 (10-26-23 @ 11:35) (147/79 - 162/92)  RR: 18 (10-26-23 @ 11:35) (18 - 18)  SpO2: 96% (10-26-23 @ 14:52) (93% - 99%)      Constitutional: well developed, normal appearance, well groomed, well nourished, no deformities and no acute distress.   Eyes: the conjunctiva exhibited no abnormalities and the eyelids demonstrated no xanthelasmas.   HEENT: normal oral mucosa, no oral pallor and no oral cyanosis.   Neck: normal jugular venous A waves present, normal jugular venous V waves present and no jugular venous bernal A waves.   Pulmonary: no respiratory distress, normal respiratory rhythm and effort, no accessory muscle use and lungs were clear to auscultation bilaterally.   Cardiovascular: heart rate and rhythm were normal, normal S1 and S2 and no murmur, gallop, rub, heave or thrill are present.   Abdomen: soft, non-tender, no hepato-splenomegaly and no abdominal mass palpated.   Musculoskeletal: the gait could not be assessed..   Extremities: no clubbing of the fingernails, no localized cyanosis, no petechial hemorrhages and no ischemic changes.   Skin: normal skin color and pigmentation, no rash, no venous stasis, no skin lesions, no skin ulcer and no xanthoma was observed.   Psychiatric: oriented to person, place, and time, the affect was normal, the mood was normal and not feeling anxious.     LABS:   --------  10-26    141  |  108  |  7   ----------------------------<  162<H>  3.6   |  27  |  0.63    Ca    8.3<L>      26 Oct 2023 06:17  Phos  2.1     10-26  Mg     2.1     10-26                           11.2   11.38 )-----------( 272      ( 26 Oct 2023 06:17 )             35.7

## 2023-10-27 ENCOUNTER — TRANSCRIPTION ENCOUNTER (OUTPATIENT)
Age: 63
End: 2023-10-27

## 2023-10-27 LAB
ANION GAP SERPL CALC-SCNC: 7 MMOL/L — SIGNIFICANT CHANGE UP (ref 5–17)
ANION GAP SERPL CALC-SCNC: 7 MMOL/L — SIGNIFICANT CHANGE UP (ref 5–17)
BUN SERPL-MCNC: 8 MG/DL — SIGNIFICANT CHANGE UP (ref 7–23)
BUN SERPL-MCNC: 8 MG/DL — SIGNIFICANT CHANGE UP (ref 7–23)
CALCIUM SERPL-MCNC: 8.6 MG/DL — SIGNIFICANT CHANGE UP (ref 8.5–10.1)
CALCIUM SERPL-MCNC: 8.6 MG/DL — SIGNIFICANT CHANGE UP (ref 8.5–10.1)
CHLORIDE SERPL-SCNC: 106 MMOL/L — SIGNIFICANT CHANGE UP (ref 96–108)
CHLORIDE SERPL-SCNC: 106 MMOL/L — SIGNIFICANT CHANGE UP (ref 96–108)
CO2 SERPL-SCNC: 27 MMOL/L — SIGNIFICANT CHANGE UP (ref 22–31)
CO2 SERPL-SCNC: 27 MMOL/L — SIGNIFICANT CHANGE UP (ref 22–31)
CREAT SERPL-MCNC: 0.51 MG/DL — SIGNIFICANT CHANGE UP (ref 0.5–1.3)
CREAT SERPL-MCNC: 0.51 MG/DL — SIGNIFICANT CHANGE UP (ref 0.5–1.3)
EGFR: 105 ML/MIN/1.73M2 — SIGNIFICANT CHANGE UP
EGFR: 105 ML/MIN/1.73M2 — SIGNIFICANT CHANGE UP
GLUCOSE BLDC GLUCOMTR-MCNC: 130 MG/DL — HIGH (ref 70–99)
GLUCOSE BLDC GLUCOMTR-MCNC: 130 MG/DL — HIGH (ref 70–99)
GLUCOSE BLDC GLUCOMTR-MCNC: 143 MG/DL — HIGH (ref 70–99)
GLUCOSE BLDC GLUCOMTR-MCNC: 143 MG/DL — HIGH (ref 70–99)
GLUCOSE BLDC GLUCOMTR-MCNC: 144 MG/DL — HIGH (ref 70–99)
GLUCOSE BLDC GLUCOMTR-MCNC: 144 MG/DL — HIGH (ref 70–99)
GLUCOSE BLDC GLUCOMTR-MCNC: 149 MG/DL — HIGH (ref 70–99)
GLUCOSE BLDC GLUCOMTR-MCNC: 149 MG/DL — HIGH (ref 70–99)
GLUCOSE SERPL-MCNC: 139 MG/DL — HIGH (ref 70–99)
GLUCOSE SERPL-MCNC: 139 MG/DL — HIGH (ref 70–99)
HCT VFR BLD CALC: 32.5 % — LOW (ref 34.5–45)
HCT VFR BLD CALC: 32.5 % — LOW (ref 34.5–45)
HGB BLD-MCNC: 10.2 G/DL — LOW (ref 11.5–15.5)
HGB BLD-MCNC: 10.2 G/DL — LOW (ref 11.5–15.5)
MAGNESIUM SERPL-MCNC: 2.1 MG/DL — SIGNIFICANT CHANGE UP (ref 1.6–2.6)
MAGNESIUM SERPL-MCNC: 2.1 MG/DL — SIGNIFICANT CHANGE UP (ref 1.6–2.6)
MCHC RBC-ENTMCNC: 25.8 PG — LOW (ref 27–34)
MCHC RBC-ENTMCNC: 25.8 PG — LOW (ref 27–34)
MCHC RBC-ENTMCNC: 31.4 G/DL — LOW (ref 32–36)
MCHC RBC-ENTMCNC: 31.4 G/DL — LOW (ref 32–36)
MCV RBC AUTO: 82.3 FL — SIGNIFICANT CHANGE UP (ref 80–100)
MCV RBC AUTO: 82.3 FL — SIGNIFICANT CHANGE UP (ref 80–100)
NRBC # BLD: 0 /100 WBCS — SIGNIFICANT CHANGE UP (ref 0–0)
NRBC # BLD: 0 /100 WBCS — SIGNIFICANT CHANGE UP (ref 0–0)
PHOSPHATE SERPL-MCNC: 2.1 MG/DL — LOW (ref 2.5–4.5)
PHOSPHATE SERPL-MCNC: 2.1 MG/DL — LOW (ref 2.5–4.5)
PLATELET # BLD AUTO: 258 K/UL — SIGNIFICANT CHANGE UP (ref 150–400)
PLATELET # BLD AUTO: 258 K/UL — SIGNIFICANT CHANGE UP (ref 150–400)
POTASSIUM SERPL-MCNC: 3.6 MMOL/L — SIGNIFICANT CHANGE UP (ref 3.5–5.3)
POTASSIUM SERPL-MCNC: 3.6 MMOL/L — SIGNIFICANT CHANGE UP (ref 3.5–5.3)
POTASSIUM SERPL-SCNC: 3.6 MMOL/L — SIGNIFICANT CHANGE UP (ref 3.5–5.3)
POTASSIUM SERPL-SCNC: 3.6 MMOL/L — SIGNIFICANT CHANGE UP (ref 3.5–5.3)
RBC # BLD: 3.95 M/UL — SIGNIFICANT CHANGE UP (ref 3.8–5.2)
RBC # BLD: 3.95 M/UL — SIGNIFICANT CHANGE UP (ref 3.8–5.2)
RBC # FLD: 14.6 % — HIGH (ref 10.3–14.5)
RBC # FLD: 14.6 % — HIGH (ref 10.3–14.5)
SODIUM SERPL-SCNC: 140 MMOL/L — SIGNIFICANT CHANGE UP (ref 135–145)
SODIUM SERPL-SCNC: 140 MMOL/L — SIGNIFICANT CHANGE UP (ref 135–145)
WBC # BLD: 10.68 K/UL — HIGH (ref 3.8–10.5)
WBC # BLD: 10.68 K/UL — HIGH (ref 3.8–10.5)
WBC # FLD AUTO: 10.68 K/UL — HIGH (ref 3.8–10.5)
WBC # FLD AUTO: 10.68 K/UL — HIGH (ref 3.8–10.5)

## 2023-10-27 PROCEDURE — 93306 TTE W/DOPPLER COMPLETE: CPT | Mod: 26

## 2023-10-27 PROCEDURE — 99231 SBSQ HOSP IP/OBS SF/LOW 25: CPT

## 2023-10-27 RX ORDER — FUROSEMIDE 40 MG
20 TABLET ORAL ONCE
Refills: 0 | Status: COMPLETED | OUTPATIENT
Start: 2023-10-27 | End: 2023-10-27

## 2023-10-27 RX ORDER — POTASSIUM PHOSPHATE, MONOBASIC POTASSIUM PHOSPHATE, DIBASIC 236; 224 MG/ML; MG/ML
30 INJECTION, SOLUTION INTRAVENOUS ONCE
Refills: 0 | Status: COMPLETED | OUTPATIENT
Start: 2023-10-27 | End: 2023-10-27

## 2023-10-27 RX ADMIN — Medication 3 MILLILITER(S): at 11:18

## 2023-10-27 RX ADMIN — Medication 975 MILLIGRAM(S): at 21:57

## 2023-10-27 RX ADMIN — Medication 3 MILLILITER(S): at 05:02

## 2023-10-27 RX ADMIN — VALSARTAN 40 MILLIGRAM(S): 80 TABLET ORAL at 05:53

## 2023-10-27 RX ADMIN — Medication 25 MILLIGRAM(S): at 05:53

## 2023-10-27 RX ADMIN — HEPARIN SODIUM 5000 UNIT(S): 5000 INJECTION INTRAVENOUS; SUBCUTANEOUS at 16:48

## 2023-10-27 RX ADMIN — Medication 20 MILLIGRAM(S): at 09:49

## 2023-10-27 RX ADMIN — Medication 975 MILLIGRAM(S): at 11:56

## 2023-10-27 RX ADMIN — Medication 3 MILLILITER(S): at 23:13

## 2023-10-27 RX ADMIN — HEPARIN SODIUM 5000 UNIT(S): 5000 INJECTION INTRAVENOUS; SUBCUTANEOUS at 05:56

## 2023-10-27 RX ADMIN — INSULIN GLARGINE 30 UNIT(S): 100 INJECTION, SOLUTION SUBCUTANEOUS at 21:53

## 2023-10-27 RX ADMIN — CLOPIDOGREL BISULFATE 75 MILLIGRAM(S): 75 TABLET, FILM COATED ORAL at 11:53

## 2023-10-27 RX ADMIN — Medication 975 MILLIGRAM(S): at 12:26

## 2023-10-27 RX ADMIN — ALBUTEROL 1 PUFF(S): 90 AEROSOL, METERED ORAL at 17:52

## 2023-10-27 RX ADMIN — Medication 100 MILLIGRAM(S): at 11:53

## 2023-10-27 RX ADMIN — HEPARIN SODIUM 5000 UNIT(S): 5000 INJECTION INTRAVENOUS; SUBCUTANEOUS at 21:55

## 2023-10-27 RX ADMIN — Medication 81 MILLIGRAM(S): at 11:53

## 2023-10-27 RX ADMIN — Medication 25 MILLIGRAM(S): at 17:52

## 2023-10-27 RX ADMIN — ISOSORBIDE MONONITRATE 30 MILLIGRAM(S): 60 TABLET, EXTENDED RELEASE ORAL at 11:53

## 2023-10-27 RX ADMIN — POTASSIUM PHOSPHATE, MONOBASIC POTASSIUM PHOSPHATE, DIBASIC 83.33 MILLIMOLE(S): 236; 224 INJECTION, SOLUTION INTRAVENOUS at 16:48

## 2023-10-27 NOTE — PROGRESS NOTE ADULT - ASSESSMENT
62 yo female w/ PMHX of DM-2, Asthma, MYLES not on CPAP, HLD, HTN, CAD s/p 2 LAD stents 02/2019 -> cath 7/2023 with PATENT stents w/ otherwise mild dz, DM type 2, diverticulitis s/p Hartmans and reversal in 2013 at [facility], MYLES not on CPAP,  Asthma, Cdiff, hx pf acute abd on 11/2019, fibromyalgia.  s/p Robotic assisted repair of incisional hernia with mesh   Episode of CP last night.  Brooks neg  EKG: sinus 86bpm w/ PACs  Trop neg x 4    Cardiac Cath 7/11/23: mild atherosclerosis  Cardiac Cath 2/17/23: mild atherosclerosis    1) Chest Pain HX CAD w/ stents 2019  2) s/p Robotic assisted repair of incisional hernia with mesh   3) HTN    - cont DAPT asa 81mg/plavix 75mg  - c/w Metoprolol xl 25mg, Imdur 25mg, Diovan 40mg  - 2D echo pending  - doubt ACS in view of multiple recent cardiac catheterizations which showed mild atherosclerosis disease, negative troponin x 4  - will sign off, re-consult as needed          
63y Female, S/P Robotic incisional hernia repair with mesh, POD #1, now with asthma exacerbation    - Start nebulizer treatment  - Will restart Plavix  - continue local wound care  - DVT prophylaxis, Incentive Spirometer, OOB, Ambulating, pain control  - Discussed with Dr. Jarvis  
62 yo female POD#3 s/p robotic assisted incisional hernia repair.    -continue nebulizer treatments  -Incentive spirometer  -another dose of lasix  -IVL  -OOB ambulate  -Possible d/c later today if respiratory status improves
64 yo female s/p Robotic assisted repair of incisional hernia. Doing well.    -continue diet  -OOB  -d/c planning after breakfast/lunch  -continue toradol and tylenol for pain control  -to restart Plavix tomorrow am  - Discussed PO Tylenol for pain control at home.  -f/u 1 week

## 2023-10-27 NOTE — DISCHARGE NOTE PROVIDER - NSDCFUSCHEDAPPT_GEN_ALL_CORE_FT
Trang Peoples  [facility]  ENDOCRIN [address]  Scheduled Appointment: 10/30/2023    [facility]  RHEUM  [address]  Scheduled Appointment: 12/01/2023

## 2023-10-27 NOTE — DISCHARGE NOTE PROVIDER - HOSPITAL COURSE
64yo F with PMH of 2 stents in 2019, recent cath 8/2023, htn, hld, PVC, DM, presents to [facility] for robotic incisional hernia repair with mesh on 10/24. Post operative course c/b chest pain- now resolved, with normal cardiac enzyme results. And hypoxia requiring supplemental O2, now removed. Remainder of hospital course unremarkable. 64yo F with PMH of 2 stents in 2019, recent cath 8/2023, htn, hld, PVC, DM, presents to [facility] for robotic incisional hernia repair with mesh on 10/24. Post operative course c/b chest pain- now resolved, with normal cardiac enzyme results, And hypoxia/wheezing requiring supplemental O2 and duonebs now resolved. Remainder of hospital course unremarkable.

## 2023-10-27 NOTE — DISCHARGE NOTE PROVIDER - NSDCMRMEDTOKEN_GEN_ALL_CORE_FT
Admelog SoloStar 100 units/mL injectable solution: injectable 3 times a day (with meals)  between 2-10 units each time depending on FS   ( due to colonoscopy prep , check FS 4/18, 4/19 TID use insulin as directed) last dose 4/19 pm   albuterol 90 mcg/inh inhalation aerosol: 2 inhaled  allopurinol 100 mg oral tablet: 1 tab(s) orally once a day  aspirin 81 mg oral delayed release tablet: 1 tab(s) orally every other day, history of 2 stents 2/2019 , last day of Plavix 4/15, will take ASA daily after   Basaglar KwikPen 100 units/mL subcutaneous solution: 35  subcutaneous once a day (at bedtime) give 30 units on 4/18 and 4/19 due to colonoscopy prep   colchicine 0.6 mg oral tablet: 1 orally  cyanocobalamin 1000 mcg oral tablet: 1 tab(s) orally once a day  Diovan 40 mg oral tablet: 1 tab(s) orally  Januvia 100 mg oral tablet: 1 tab(s) orally once a day, hold the day of procedure   magnesium oxide:   magnesium oxide 400 mg oral tablet: 1 tab(s) orally once a day  meclizine 25 mg oral tablet: 1 orally once a day  Metoprolol Succinate ER 50 mg oral tablet, extended release: 1 tab(s) orally 2 times a day  Nitro-Dur 0.1 mg/hr transdermal film, extended release: 1 patch transdermally once a day as needed for  moderate pain  Plavix 75 mg oral tablet: 1 tab(s) orally once a day, last dose 4/15/2022  Repatha SureClick 140 mg/mL subcutaneous solution: 140 subcutaneously  vitamin b12:   vitamin d:    acetaminophen 325 mg oral tablet: 3 tab(s) orally every 6 hours As needed Moderate Pain (4 - 6)  Admelog SoloStar 100 units/mL injectable solution: injectable 3 times a day (with meals)  between 2-10 units each time depending on FS   ( due to colonoscopy prep , check FS 4/18, 4/19 TID use insulin as directed) last dose 4/19 pm   albuterol 90 mcg/inh inhalation aerosol: 1 puff(s) inhaled every 4 hours As needed Shortness of Breath and/or Wheezing  allopurinol 100 mg oral tablet: 1 tab(s) orally once a day  aspirin 81 mg oral delayed release tablet: 1 tab(s) orally every other day, history of 2 stents 2/2019 , last day of Plavix 4/15, will take ASA daily after   Basaglar KwikPen 100 units/mL subcutaneous solution: 35  subcutaneous once a day (at bedtime) give 30 units on 4/18 and 4/19 due to colonoscopy prep   colchicine 0.6 mg oral tablet: 1 orally  cyanocobalamin 1000 mcg oral tablet: 1 tab(s) orally once a day  cyclobenzaprine 5 mg oral tablet: 1 tab(s) orally 3 times a day as needed for  moderate pain do not drive, drink alcohol, or operate machinery secondary to sedative side effects MDD: 3  Diovan 40 mg oral tablet: 1 tab(s) orally once a day  ipratropium-albuterol 0.5 mg-2.5 mg/3 mL inhalation solution: 3 milliliter(s) inhaled every 6 hours  isosorbide mononitrate 30 mg oral tablet, extended release: 1 tab(s) orally once a day  Januvia 100 mg oral tablet: 1 tab(s) orally once a day, hold the day of procedure   magnesium oxide:   magnesium oxide 400 mg oral tablet: 1 tab(s) orally once a day  meclizine 25 mg oral tablet: 1 orally once a day  Metoprolol Succinate ER 50 mg oral tablet, extended release: 1 tab(s) orally 2 times a day  Nitro-Dur 0.1 mg/hr transdermal film, extended release: 1 patch transdermally once a day as needed for  moderate pain  Plavix 75 mg oral tablet: 1 tab(s) orally once a day, last dose 4/15/2022  Repatha SureClick 140 mg/mL subcutaneous solution: 140 subcutaneously  vitamin b12:   vitamin d:

## 2023-10-27 NOTE — DISCHARGE NOTE PROVIDER - CARE PROVIDER_API CALL
Clint Jarvis  Surgery  [address] [place] [address]  Phone: [phone]  Fax: [phone]  Follow Up Time: 2 weeks

## 2023-10-27 NOTE — PROGRESS NOTE ADULT - SUBJECTIVE AND OBJECTIVE BOX
Patient is a 63y old  Female who presents with a chief complaint of Incisional hernia (27 Oct 2023 09:37)      INTERVAL HX:  Patient seen and examined @ bedside.  No respiratory distress, chest pain or SOB.    PAST MEDICAL & SURGICAL HISTORY:  Diabetes mellitus type 2 in obese  Hg A1c 7 ( 1/2022 ) in HIE  Hypertension  Hyperlipidemia  Peripheral neuropathy  Obstructive sleep apnea of adult resolved , retested 5/2021 reports in [employer]  Diverticular disease h/o partial colectomy 2013  Coronary artery disease 2 stents in 2019, occasional chest discomfort, using nitroglycerin PRN - chronic, last cardiac  cath 2/2022- patent stents  Asthma controlled, last inhaler use 6-7 month ago , denies hospitalizations, ER visits  Fibromyalgia  Stented coronary artery two PTCA in LAD 2/2019  History of gout  History of Clostridium difficile colitis 2019  Vitamin B12 deficiency  PVC (premature ventricular contraction) on Metoprolol  Obese  BMI 36  S/P colon resection surgery March 2013 with temporary colostomy til 9/13  S/P cholecystectomy 1993  H/O knee surgery bilateral patella realignment age 18, later left knee meniscus age 34  S/P bilateral breast reduction age 22  History of surgery on arm left humerus age 54 , tumor i- enchondroma- hardware in place  History of hysterectomy  age 40  History of colonoscopy  and endo 2014        	  MEDICATIONS:  MEDICATIONS  (STANDING):  albuterol/ipratropium for Nebulization 3 milliLiter(s) Nebulizer every 6 hours  allopurinol 100 milliGRAM(s) Oral daily  aspirin enteric coated 81 milliGRAM(s) Oral daily  clopidogrel Tablet 75 milliGRAM(s) Oral daily  dextrose 5%. 1000 milliLiter(s) (100 mL/Hr) IV Continuous <Continuous>  dextrose 5%. 1000 milliLiter(s) (50 mL/Hr) IV Continuous <Continuous>  dextrose 50% Injectable 12.5 Gram(s) IV Push once  dextrose 50% Injectable 25 Gram(s) IV Push once  dextrose 50% Injectable 25 Gram(s) IV Push once  glucagon  Injectable 1 milliGRAM(s) IntraMuscular once  heparin   Injectable 5000 Unit(s) SubCutaneous every 8 hours  insulin glargine Injectable (LANTUS) 30 Unit(s) SubCutaneous at bedtime  insulin lispro (ADMELOG) corrective regimen sliding scale   SubCutaneous three times a day before meals  insulin lispro (ADMELOG) corrective regimen sliding scale   SubCutaneous at bedtime  isosorbide   mononitrate ER Tablet (IMDUR) 30 milliGRAM(s) Oral daily  metoprolol succinate ER 25 milliGRAM(s) Oral two times a day  valsartan 40 milliGRAM(s) Oral daily    MEDICATIONS  (PRN):  acetaminophen     Tablet .. 975 milliGRAM(s) Oral every 6 hours PRN Moderate Pain (4 - 6)  albuterol    90 MICROgram(s) HFA Inhaler 1 Puff(s) Inhalation every 4 hours PRN Shortness of Breath and/or Wheezing  albuterol    90 MICROgram(s) HFA Inhaler 1 Puff(s) Inhalation two times a day PRN Shortness of Breath and/or Wheezing  dextrose Oral Gel 15 Gram(s) Oral once PRN Blood Glucose LESS THAN 70 milliGRAM(s)/deciliter  nitroglycerin     SubLingual 0.4 milliGRAM(s) SubLingual every 5 minutes PRN Chest Pain  ondansetron Injectable 4 milliGRAM(s) IV Push every 6 hours PRN Nausea and/or Vomiting      Vitals:  T(F): 98.3 (10-27-23 @ 09:03), Max: 99 (10-26-23 @ 23:19)  HR: 76 (10-27-23 @ 09:03) (75 - 97)  BP: 120/71 (10-27-23 @ 09:03) (105/57 - 147/79)  RR: 16 (10-27-23 @ 09:03) (16 - 18)  SpO2: 93% (10-27-23 @ 09:03) (93% - 97%)  Wt(kg): --    10-26 @ 07:01  -  10-27 @ 07:00  --------------------------------------------------------  IN:    Oral Fluid: 720 mL  Total IN: 720 mL    OUT:  Total OUT: 0 mL    Total NET: 720 mL      10-27 @ 07:01  -  10-27 @ 10:59  --------------------------------------------------------  IN:    Oral Fluid: 240 mL  Total IN: 240 mL    OUT:  Total OUT: 0 mL    Total NET: 240 mL        Weight (kg): 98 (10-24 @ 17:00)  BMI (kg/m2): 37.1 (10-24 @ 17:00)      PHYSICAL EXAM:  General: Awake, responsive  CV: S1 S2 RRR  Lungs: CTABL  GI: Soft, BS +, ND, NT s/p robotic surgery, incision clean and dry  Extremities: No edema    TELEMETRY: not on tele  	    LABS:	 	    CARDIAC MARKERS:          27 Oct 2023 07:58    140    |  106    |  8      ----------------------------<  139    3.6     |  27     |  0.51   26 Oct 2023 06:17    141    |  108    |  7      ----------------------------<  162    3.6     |  27     |  0.63   25 Oct 2023 05:55    139    |  107    |  14     ----------------------------<  130    3.9     |  25     |  0.80     Ca    8.6        27 Oct 2023 07:58  Phos  2.1       27 Oct 2023 07:58  Mg     2.1       27 Oct 2023 07:58                            10.2   10.68 )-----------( 258      ( 27 Oct 2023 07:58 )             32.5 ,                       11.2   11.38 )-----------( 272      ( 26 Oct 2023 06:17 )             35.7 ,                       11.2   11.37 )-----------( 277      ( 25 Oct 2023 05:55 )             35.9

## 2023-10-27 NOTE — PROGRESS NOTE ADULT - SUBJECTIVE AND OBJECTIVE BOX
STATUS POST:  Robotic assisted incisional hernia repair    POST OPERATIVE DAY #3    Vital Signs Last 24 Hrs  T(C): 36.8 (27 Oct 2023 09:03), Max: 37.2 (26 Oct 2023 23:19)  T(F): 98.3 (27 Oct 2023 09:03), Max: 99 (26 Oct 2023 23:19)  HR: 76 (27 Oct 2023 09:03) (75 - 97)  BP: 120/71 (27 Oct 2023 09:03) (105/57 - 147/79)  BP(mean): --  RR: 16 (27 Oct 2023 09:03) (16 - 18)  SpO2: 93% (27 Oct 2023 09:03) (93% - 97%)    Parameters below as of 27 Oct 2023 09:03  Patient On (Oxygen Delivery Method): room air          SUBJECTIVE: Pt seen and examined. Sitting up in chair off O2. States breathing is improving. Noticed big difference s/p lasix yesterday. She does state some wheezing. Pain is well controlled. Tolerating diet but has little appetite. + flatus.      PHYSICAL EXAM:  Constitutional: Patient well nourish. well developed.  Eyes:  PERRL, EOMI, Conjunctiva clear.  ENMT:  WNL  Respiratory:  + audible wheezing.   Abd: soft NT, ND Binder in place  Extremities:  No edema, no calf tenderrness,  Neurological: AxAxOx3                    I&O's Summary    26 Oct 2023 07:01  -  27 Oct 2023 07:00  --------------------------------------------------------  IN: 720 mL / OUT: 0 mL / NET: 720 mL      I&O's Detail    26 Oct 2023 07:01  -  27 Oct 2023 07:00  --------------------------------------------------------  IN:    Oral Fluid: 720 mL  Total IN: 720 mL    OUT:  Total OUT: 0 mL    Total NET: 720 mL          MEDICATIONS  (STANDING):  albuterol/ipratropium for Nebulization 3 milliLiter(s) Nebulizer every 6 hours  allopurinol 100 milliGRAM(s) Oral daily  aspirin enteric coated 81 milliGRAM(s) Oral daily  clopidogrel Tablet 75 milliGRAM(s) Oral daily  dextrose 5%. 1000 milliLiter(s) (100 mL/Hr) IV Continuous <Continuous>  dextrose 5%. 1000 milliLiter(s) (50 mL/Hr) IV Continuous <Continuous>  dextrose 50% Injectable 12.5 Gram(s) IV Push once  dextrose 50% Injectable 25 Gram(s) IV Push once  dextrose 50% Injectable 25 Gram(s) IV Push once  furosemide   Injectable 20 milliGRAM(s) IV Push once  glucagon  Injectable 1 milliGRAM(s) IntraMuscular once  heparin   Injectable 5000 Unit(s) SubCutaneous every 8 hours  insulin glargine Injectable (LANTUS) 30 Unit(s) SubCutaneous at bedtime  insulin lispro (ADMELOG) corrective regimen sliding scale   SubCutaneous at bedtime  insulin lispro (ADMELOG) corrective regimen sliding scale   SubCutaneous three times a day before meals  isosorbide   mononitrate ER Tablet (IMDUR) 30 milliGRAM(s) Oral daily  metoprolol succinate ER 25 milliGRAM(s) Oral two times a day  valsartan 40 milliGRAM(s) Oral daily    MEDICATIONS  (PRN):  acetaminophen     Tablet .. 975 milliGRAM(s) Oral every 6 hours PRN Moderate Pain (4 - 6)  albuterol    90 MICROgram(s) HFA Inhaler 1 Puff(s) Inhalation two times a day PRN Shortness of Breath and/or Wheezing  albuterol    90 MICROgram(s) HFA Inhaler 1 Puff(s) Inhalation every 4 hours PRN Shortness of Breath and/or Wheezing  dextrose Oral Gel 15 Gram(s) Oral once PRN Blood Glucose LESS THAN 70 milliGRAM(s)/deciliter  nitroglycerin     SubLingual 0.4 milliGRAM(s) SubLingual every 5 minutes PRN Chest Pain  ondansetron Injectable 4 milliGRAM(s) IV Push every 6 hours PRN Nausea and/or Vomiting      LABS:                        10.2   10.68 )-----------( 258      ( 27 Oct 2023 07:58 )             32.5     10-27    140  |  106  |  8   ----------------------------<  139<H>  3.6   |  27  |  0.51    Ca    8.6      27 Oct 2023 07:58  Phos  2.1     10-27  Mg     2.1     10-27        Urinalysis Basic - ( 27 Oct 2023 07:58 )    Color: x / Appearance: x / SG: x / pH: x  Gluc: 139 mg/dL / Ketone: x  / Bili: x / Urobili: x   Blood: x / Protein: x / Nitrite: x   Leuk Esterase: x / RBC: x / WBC x   Sq Epi: x / Non Sq Epi: x / Bacteria: x        RADIOLOGY & ADDITIONAL STUDIES:

## 2023-10-27 NOTE — DISCHARGE NOTE PROVIDER - NSDCCPCAREPLAN_GEN_ALL_CORE_FT
PRINCIPAL DISCHARGE DIAGNOSIS  Diagnosis: Hernia of anterior abdominal wall  Assessment and Plan of Treatment:

## 2023-10-27 NOTE — PROGRESS NOTE ADULT - NS ATTEND AMEND GEN_ALL_CORE FT
hypertension, dyslipidemia, CAD, status post incisional hernia repair and atypical chest pain with negative biomarkers.  Continue anti-ischemic and antihypertensive regimen along with aspirin and Plavix.  Echocardiogram today shows normal left ventricular systolic function, grade 1 diastolic dysfunction and no significant valvular heart disease. Signing off.  Call back if needed.

## 2023-10-27 NOTE — CHART NOTE - NSCHARTNOTEFT_GEN_A_CORE
Patient seen and examined at bedside resting. Admits to wheezing with ambulation, patient weaned off NC this AM.   Patient also admits to lack of appetite and would like to see how she does with dinner.   Plan to d/c in AM if wheezing and appetite improve. Cleared from cardiac standpoint.   d/w Dr. Jarvis

## 2023-10-27 NOTE — DISCHARGE NOTE PROVIDER - NSDCFUADDAPPT_GEN_ALL_CORE_FT
follow up with your primary care doctor and cardiologist within 2 weeks!    Follow up with Dr. Jarvis in 1-2 weeks. Please call to schedule an appointment.   NOTIFY YOUR SURGEON IF: You have any bleeding that does not stop, any pus draining from your wound, any fever (over 100.4 F) or chills, persistent nausea/vomiting, persistent diarrhea, or if your pain is not controlled on your discharge pain medications.    Wound: Leave the white steri strips in place, they will fall off on their own in approximately 5-7 days or they will be removed at your follow up appointment.

## 2023-10-27 NOTE — DISCHARGE NOTE PROVIDER - NSDCCPTREATMENT_GEN_ALL_CORE_FT
PRINCIPAL PROCEDURE  Procedure: Repair, hernia, nonreducible, abdominal wall, anterior, without history of prior repair, greater than 10 cm, with mesh insertion  Findings and Treatment:

## 2023-10-28 ENCOUNTER — TRANSCRIPTION ENCOUNTER (OUTPATIENT)
Age: 63
End: 2023-10-28

## 2023-10-28 VITALS
RESPIRATION RATE: 18 BRPM | SYSTOLIC BLOOD PRESSURE: 122 MMHG | DIASTOLIC BLOOD PRESSURE: 73 MMHG | OXYGEN SATURATION: 94 % | TEMPERATURE: 99 F | HEART RATE: 99 BPM

## 2023-10-28 LAB
ANION GAP SERPL CALC-SCNC: 8 MMOL/L — SIGNIFICANT CHANGE UP (ref 5–17)
ANION GAP SERPL CALC-SCNC: 8 MMOL/L — SIGNIFICANT CHANGE UP (ref 5–17)
BUN SERPL-MCNC: 8 MG/DL — SIGNIFICANT CHANGE UP (ref 7–23)
BUN SERPL-MCNC: 8 MG/DL — SIGNIFICANT CHANGE UP (ref 7–23)
CALCIUM SERPL-MCNC: 8.8 MG/DL — SIGNIFICANT CHANGE UP (ref 8.5–10.1)
CALCIUM SERPL-MCNC: 8.8 MG/DL — SIGNIFICANT CHANGE UP (ref 8.5–10.1)
CHLORIDE SERPL-SCNC: 108 MMOL/L — SIGNIFICANT CHANGE UP (ref 96–108)
CHLORIDE SERPL-SCNC: 108 MMOL/L — SIGNIFICANT CHANGE UP (ref 96–108)
CO2 SERPL-SCNC: 27 MMOL/L — SIGNIFICANT CHANGE UP (ref 22–31)
CO2 SERPL-SCNC: 27 MMOL/L — SIGNIFICANT CHANGE UP (ref 22–31)
CREAT SERPL-MCNC: 0.51 MG/DL — SIGNIFICANT CHANGE UP (ref 0.5–1.3)
CREAT SERPL-MCNC: 0.51 MG/DL — SIGNIFICANT CHANGE UP (ref 0.5–1.3)
EGFR: 105 ML/MIN/1.73M2 — SIGNIFICANT CHANGE UP
EGFR: 105 ML/MIN/1.73M2 — SIGNIFICANT CHANGE UP
GLUCOSE BLDC GLUCOMTR-MCNC: 120 MG/DL — HIGH (ref 70–99)
GLUCOSE BLDC GLUCOMTR-MCNC: 120 MG/DL — HIGH (ref 70–99)
GLUCOSE BLDC GLUCOMTR-MCNC: 94 MG/DL — SIGNIFICANT CHANGE UP (ref 70–99)
GLUCOSE BLDC GLUCOMTR-MCNC: 94 MG/DL — SIGNIFICANT CHANGE UP (ref 70–99)
GLUCOSE SERPL-MCNC: 90 MG/DL — SIGNIFICANT CHANGE UP (ref 70–99)
GLUCOSE SERPL-MCNC: 90 MG/DL — SIGNIFICANT CHANGE UP (ref 70–99)
HCT VFR BLD CALC: 32.9 % — LOW (ref 34.5–45)
HCT VFR BLD CALC: 32.9 % — LOW (ref 34.5–45)
HGB BLD-MCNC: 10.3 G/DL — LOW (ref 11.5–15.5)
HGB BLD-MCNC: 10.3 G/DL — LOW (ref 11.5–15.5)
MAGNESIUM SERPL-MCNC: 2.3 MG/DL — SIGNIFICANT CHANGE UP (ref 1.6–2.6)
MAGNESIUM SERPL-MCNC: 2.3 MG/DL — SIGNIFICANT CHANGE UP (ref 1.6–2.6)
MCHC RBC-ENTMCNC: 25.9 PG — LOW (ref 27–34)
MCHC RBC-ENTMCNC: 25.9 PG — LOW (ref 27–34)
MCHC RBC-ENTMCNC: 31.3 G/DL — LOW (ref 32–36)
MCHC RBC-ENTMCNC: 31.3 G/DL — LOW (ref 32–36)
MCV RBC AUTO: 82.9 FL — SIGNIFICANT CHANGE UP (ref 80–100)
MCV RBC AUTO: 82.9 FL — SIGNIFICANT CHANGE UP (ref 80–100)
NRBC # BLD: 0 /100 WBCS — SIGNIFICANT CHANGE UP (ref 0–0)
NRBC # BLD: 0 /100 WBCS — SIGNIFICANT CHANGE UP (ref 0–0)
PHOSPHATE SERPL-MCNC: 2.6 MG/DL — SIGNIFICANT CHANGE UP (ref 2.5–4.5)
PHOSPHATE SERPL-MCNC: 2.6 MG/DL — SIGNIFICANT CHANGE UP (ref 2.5–4.5)
PLATELET # BLD AUTO: 291 K/UL — SIGNIFICANT CHANGE UP (ref 150–400)
PLATELET # BLD AUTO: 291 K/UL — SIGNIFICANT CHANGE UP (ref 150–400)
POTASSIUM SERPL-MCNC: 3.6 MMOL/L — SIGNIFICANT CHANGE UP (ref 3.5–5.3)
POTASSIUM SERPL-MCNC: 3.6 MMOL/L — SIGNIFICANT CHANGE UP (ref 3.5–5.3)
POTASSIUM SERPL-SCNC: 3.6 MMOL/L — SIGNIFICANT CHANGE UP (ref 3.5–5.3)
POTASSIUM SERPL-SCNC: 3.6 MMOL/L — SIGNIFICANT CHANGE UP (ref 3.5–5.3)
RBC # BLD: 3.97 M/UL — SIGNIFICANT CHANGE UP (ref 3.8–5.2)
RBC # BLD: 3.97 M/UL — SIGNIFICANT CHANGE UP (ref 3.8–5.2)
RBC # FLD: 14.7 % — HIGH (ref 10.3–14.5)
RBC # FLD: 14.7 % — HIGH (ref 10.3–14.5)
SODIUM SERPL-SCNC: 143 MMOL/L — SIGNIFICANT CHANGE UP (ref 135–145)
SODIUM SERPL-SCNC: 143 MMOL/L — SIGNIFICANT CHANGE UP (ref 135–145)
WBC # BLD: 10.2 K/UL — SIGNIFICANT CHANGE UP (ref 3.8–10.5)
WBC # BLD: 10.2 K/UL — SIGNIFICANT CHANGE UP (ref 3.8–10.5)
WBC # FLD AUTO: 10.2 K/UL — SIGNIFICANT CHANGE UP (ref 3.8–10.5)
WBC # FLD AUTO: 10.2 K/UL — SIGNIFICANT CHANGE UP (ref 3.8–10.5)

## 2023-10-28 RX ORDER — ALBUTEROL 90 UG/1
2 AEROSOL, METERED ORAL
Refills: 0 | DISCHARGE

## 2023-10-28 RX ORDER — ALBUTEROL 90 UG/1
1 AEROSOL, METERED ORAL
Qty: 0 | Refills: 0 | DISCHARGE
Start: 2023-10-28

## 2023-10-28 RX ORDER — IPRATROPIUM/ALBUTEROL SULFATE 18-103MCG
3 AEROSOL WITH ADAPTER (GRAM) INHALATION
Qty: 0 | Refills: 0 | DISCHARGE
Start: 2023-10-28

## 2023-10-28 RX ORDER — ISOSORBIDE MONONITRATE 60 MG/1
1 TABLET, EXTENDED RELEASE ORAL
Qty: 30 | Refills: 0
Start: 2023-10-28 | End: 2023-11-26

## 2023-10-28 RX ORDER — CYCLOBENZAPRINE HYDROCHLORIDE 10 MG/1
1 TABLET, FILM COATED ORAL
Qty: 12 | Refills: 0
Start: 2023-10-28 | End: 2023-10-31

## 2023-10-28 RX ORDER — VALSARTAN 80 MG/1
1 TABLET ORAL
Qty: 0 | Refills: 0 | DISCHARGE

## 2023-10-28 RX ORDER — ACETAMINOPHEN 500 MG
3 TABLET ORAL
Qty: 0 | Refills: 0 | DISCHARGE
Start: 2023-10-28

## 2023-10-28 RX ORDER — CYCLOBENZAPRINE HYDROCHLORIDE 10 MG/1
5 TABLET, FILM COATED ORAL THREE TIMES A DAY
Refills: 0 | Status: DISCONTINUED | OUTPATIENT
Start: 2023-10-28 | End: 2023-10-28

## 2023-10-28 RX ADMIN — Medication 3 MILLILITER(S): at 11:03

## 2023-10-28 RX ADMIN — Medication 100 MILLIGRAM(S): at 12:26

## 2023-10-28 RX ADMIN — HEPARIN SODIUM 5000 UNIT(S): 5000 INJECTION INTRAVENOUS; SUBCUTANEOUS at 06:51

## 2023-10-28 RX ADMIN — CLOPIDOGREL BISULFATE 75 MILLIGRAM(S): 75 TABLET, FILM COATED ORAL at 12:26

## 2023-10-28 RX ADMIN — ISOSORBIDE MONONITRATE 30 MILLIGRAM(S): 60 TABLET, EXTENDED RELEASE ORAL at 12:26

## 2023-10-28 RX ADMIN — Medication 975 MILLIGRAM(S): at 12:27

## 2023-10-28 RX ADMIN — VALSARTAN 40 MILLIGRAM(S): 80 TABLET ORAL at 06:08

## 2023-10-28 RX ADMIN — Medication 81 MILLIGRAM(S): at 12:26

## 2023-10-28 RX ADMIN — Medication 975 MILLIGRAM(S): at 13:10

## 2023-10-28 RX ADMIN — ALBUTEROL 1 PUFF(S): 90 AEROSOL, METERED ORAL at 09:05

## 2023-10-28 RX ADMIN — Medication 25 MILLIGRAM(S): at 06:08

## 2023-10-28 RX ADMIN — CYCLOBENZAPRINE HYDROCHLORIDE 5 MILLIGRAM(S): 10 TABLET, FILM COATED ORAL at 09:28

## 2023-10-28 NOTE — DISCHARGE NOTE NURSING/CASE MANAGEMENT/SOCIAL WORK - PATIENT PORTAL LINK FT
You can access the FollowMyHealth Patient Portal offered by [facility] by registering at the following website: [email or web address]. By joining [employer]’s FollowMyHealth portal, you will also be able to view your health information using other applications (apps) compatible with our system.

## 2023-10-28 NOTE — DISCHARGE NOTE NURSING/CASE MANAGEMENT/SOCIAL WORK - NSDCPEFALRISK_GEN_ALL_CORE
For information on Fall & Injury Prevention, visit: [email or web address] OR  [email or web address] OR  https://www.cdc.gov/steadi/patient.html

## 2023-10-28 NOTE — PROGRESS NOTE ADULT - SUBJECTIVE AND OBJECTIVE BOX
Postoperative Day #:4 s/p robotic assisted incisional/ventral hernia repair with mesh   Patient seen and examined bedside resting comfortably.  sob resolved wheezing improved  Abdominal pain is well controlled. + flatus   Denies nausea, vomiting, diarrhea, fevers, chills. Tolerating diet.  ambulating without difficulty       T(F): 97.9 (10-28-23 @ 05:07), Max: 98.8 (10-27-23 @ 23:44)  HR: 81 (10-28-23 @ 05:07) (77 - 88)  BP: 112/70 (10-28-23 @ 05:07) (107/67 - 144/80)  RR: 17 (10-28-23 @ 05:07) (16 - 17)  SpO2: 92% (10-28-23 @ 05:07) (92% - 98%)  Wt(kg): --  CAPILLARY BLOOD GLUCOSE      POCT Blood Glucose.: 94 mg/dL (28 Oct 2023 08:04)  POCT Blood Glucose.: 143 mg/dL (27 Oct 2023 21:33)  POCT Blood Glucose.: 144 mg/dL (27 Oct 2023 16:48)  POCT Blood Glucose.: 149 mg/dL (27 Oct 2023 11:16)      PHYSICAL EXAM:  General: NAD  Neuro:  Alert & oriented x 3  HEENT: NCAT, EOMI, conjunctiva clear  CV: +S1+S2 regular rate and rhythm  Lung:respirations nonlabored, good inspiratory effort  Abdomen: soft, nondistended, appropriate incisional tenderness. laparoscopic incisions with steri strips, C/D/I   Extremities: no pedal edema or calf tenderness noted     LABS:                        10.3   10.20 )-----------( 291      ( 28 Oct 2023 06:23 )             32.9     10-28    143  |  108  |  8   ----------------------------<  90  3.6   |  27  |  0.51    Ca    8.8      28 Oct 2023 06:23  Phos  2.6     10-28  Mg     2.3     10-28          I&O:         A/P: 64 yo F POD 4 s/p robotic assisted incisional/ventral hernia repair with mesh c/b sob/cp.   ecg stable, cxr mild congestive changes. echo wnl. tx with lasix and duonebs. eval by cardio added imdur, and s/o  pt clinically improved.  -d/c planning today  -c/w incentive spirometry and duonebs at home  -will Follow up outpt with Dr Jarvis in 1-2 weeks  -case discussed with surgical attending, Dr Jarvis

## 2023-10-30 ENCOUNTER — APPOINTMENT (OUTPATIENT)
Dept: ENDOCRINOLOGY | Facility: CLINIC | Age: 63
End: 2023-10-30
Payer: MEDICAID

## 2023-10-30 PROCEDURE — 99213 OFFICE O/P EST LOW 20 MIN: CPT | Mod: 95

## 2023-11-01 ENCOUNTER — APPOINTMENT (OUTPATIENT)
Dept: SURGERY | Facility: CLINIC | Age: 63
End: 2023-11-01
Payer: MEDICAID

## 2023-11-01 VITALS
HEART RATE: 104 BPM | WEIGHT: 207 LBS | BODY MASS INDEX: 35.34 KG/M2 | HEIGHT: 64 IN | DIASTOLIC BLOOD PRESSURE: 84 MMHG | OXYGEN SATURATION: 97 % | SYSTOLIC BLOOD PRESSURE: 130 MMHG

## 2023-11-01 PROCEDURE — 99024 POSTOP FOLLOW-UP VISIT: CPT

## 2023-11-02 DIAGNOSIS — E78.5 HYPERLIPIDEMIA, UNSPECIFIED: ICD-10-CM

## 2023-11-02 DIAGNOSIS — I25.10 ATHEROSCLEROTIC HEART DISEASE OF NATIVE CORONARY ARTERY WITHOUT ANGINA PECTORIS: ICD-10-CM

## 2023-11-02 DIAGNOSIS — I10 ESSENTIAL (PRIMARY) HYPERTENSION: ICD-10-CM

## 2023-11-02 DIAGNOSIS — E11.42 TYPE 2 DIABETES MELLITUS WITH DIABETIC POLYNEUROPATHY: ICD-10-CM

## 2023-11-02 DIAGNOSIS — I49.3 VENTRICULAR PREMATURE DEPOLARIZATION: ICD-10-CM

## 2023-11-02 DIAGNOSIS — R07.89 OTHER CHEST PAIN: ICD-10-CM

## 2023-11-02 DIAGNOSIS — Z95.5 PRESENCE OF CORONARY ANGIOPLASTY IMPLANT AND GRAFT: ICD-10-CM

## 2023-11-02 DIAGNOSIS — K43.0 INCISIONAL HERNIA WITH OBSTRUCTION, WITHOUT GANGRENE: ICD-10-CM

## 2023-11-02 DIAGNOSIS — J45.901 UNSPECIFIED ASTHMA WITH (ACUTE) EXACERBATION: ICD-10-CM

## 2023-11-02 DIAGNOSIS — M10.9 GOUT, UNSPECIFIED: ICD-10-CM

## 2023-11-02 DIAGNOSIS — R09.02 HYPOXEMIA: ICD-10-CM

## 2023-11-02 DIAGNOSIS — M79.7 FIBROMYALGIA: ICD-10-CM

## 2023-11-02 DIAGNOSIS — Z90.49 ACQUIRED ABSENCE OF OTHER SPECIFIED PARTS OF DIGESTIVE TRACT: ICD-10-CM

## 2023-11-15 ENCOUNTER — APPOINTMENT (OUTPATIENT)
Dept: SURGERY | Facility: CLINIC | Age: 63
End: 2023-11-15
Payer: MEDICAID

## 2023-11-15 VITALS
SYSTOLIC BLOOD PRESSURE: 133 MMHG | DIASTOLIC BLOOD PRESSURE: 83 MMHG | WEIGHT: 207 LBS | TEMPERATURE: 96.6 F | HEIGHT: 64 IN | HEART RATE: 77 BPM | OXYGEN SATURATION: 96 % | BODY MASS INDEX: 35.34 KG/M2

## 2023-11-15 PROCEDURE — 99211 OFF/OP EST MAY X REQ PHY/QHP: CPT

## 2023-12-01 ENCOUNTER — APPOINTMENT (OUTPATIENT)
Dept: RHEUMATOLOGY | Facility: HOSPITAL | Age: 63
End: 2023-12-01
Payer: MEDICAID

## 2023-12-01 ENCOUNTER — OUTPATIENT (OUTPATIENT)
Dept: OUTPATIENT SERVICES | Facility: HOSPITAL | Age: 63
LOS: 1 days | End: 2023-12-01
Payer: MEDICAID

## 2023-12-01 VITALS
BODY MASS INDEX: 34.15 KG/M2 | DIASTOLIC BLOOD PRESSURE: 78 MMHG | RESPIRATION RATE: 16 BRPM | SYSTOLIC BLOOD PRESSURE: 114 MMHG | HEART RATE: 81 BPM | HEIGHT: 64 IN | OXYGEN SATURATION: 97 % | WEIGHT: 200 LBS

## 2023-12-01 DIAGNOSIS — M06.9 RHEUMATOID ARTHRITIS, UNSPECIFIED: ICD-10-CM

## 2023-12-01 DIAGNOSIS — Z98.890 OTHER SPECIFIED POSTPROCEDURAL STATES: Chronic | ICD-10-CM

## 2023-12-01 DIAGNOSIS — M25.512 PAIN IN LEFT SHOULDER: ICD-10-CM

## 2023-12-01 DIAGNOSIS — Z98.89 OTHER SPECIFIED POSTPROCEDURAL STATES: Chronic | ICD-10-CM

## 2023-12-01 DIAGNOSIS — Z90.49 ACQUIRED ABSENCE OF OTHER SPECIFIED PARTS OF DIGESTIVE TRACT: Chronic | ICD-10-CM

## 2023-12-01 DIAGNOSIS — Z90.710 ACQUIRED ABSENCE OF BOTH CERVIX AND UTERUS: Chronic | ICD-10-CM

## 2023-12-01 PROCEDURE — 99213 OFFICE O/P EST LOW 20 MIN: CPT | Mod: GC

## 2023-12-01 PROCEDURE — G0463: CPT

## 2023-12-01 RX ORDER — DICLOFENAC SODIUM 1% 10 MG/G
1 GEL TOPICAL DAILY
Qty: 1 | Refills: 2 | Status: ACTIVE | COMMUNITY
Start: 2023-12-01 | End: 1900-01-01

## 2023-12-04 DIAGNOSIS — M25.512 PAIN IN LEFT SHOULDER: ICD-10-CM

## 2023-12-07 ENCOUNTER — OUTPATIENT (OUTPATIENT)
Dept: OUTPATIENT SERVICES | Facility: HOSPITAL | Age: 63
LOS: 1 days | End: 2023-12-07
Payer: MEDICAID

## 2023-12-07 ENCOUNTER — APPOINTMENT (OUTPATIENT)
Dept: ULTRASOUND IMAGING | Facility: CLINIC | Age: 63
End: 2023-12-07
Payer: MEDICAID

## 2023-12-07 DIAGNOSIS — Z98.890 OTHER SPECIFIED POSTPROCEDURAL STATES: Chronic | ICD-10-CM

## 2023-12-07 DIAGNOSIS — Z90.49 ACQUIRED ABSENCE OF OTHER SPECIFIED PARTS OF DIGESTIVE TRACT: Chronic | ICD-10-CM

## 2023-12-07 DIAGNOSIS — M25.512 PAIN IN LEFT SHOULDER: ICD-10-CM

## 2023-12-07 DIAGNOSIS — Z98.89 OTHER SPECIFIED POSTPROCEDURAL STATES: Chronic | ICD-10-CM

## 2023-12-07 DIAGNOSIS — Z90.710 ACQUIRED ABSENCE OF BOTH CERVIX AND UTERUS: Chronic | ICD-10-CM

## 2023-12-07 PROCEDURE — 76881 US COMPL JOINT R-T W/IMG: CPT | Mod: 26,LT

## 2023-12-07 PROCEDURE — 76881 US COMPL JOINT R-T W/IMG: CPT

## 2023-12-08 DIAGNOSIS — M75.102 UNSPECIFIED ROTATOR CUFF TEAR OR RUPTURE OF LEFT SHOULDER, NOT SPECIFIED AS TRAUMATIC: ICD-10-CM

## 2023-12-13 ENCOUNTER — APPOINTMENT (OUTPATIENT)
Dept: SURGERY | Facility: CLINIC | Age: 63
End: 2023-12-13
Payer: MEDICAID

## 2023-12-13 VITALS
OXYGEN SATURATION: 98 % | WEIGHT: 200 LBS | HEIGHT: 64 IN | HEART RATE: 70 BPM | BODY MASS INDEX: 34.15 KG/M2 | DIASTOLIC BLOOD PRESSURE: 82 MMHG | TEMPERATURE: 97 F | SYSTOLIC BLOOD PRESSURE: 123 MMHG

## 2023-12-13 PROCEDURE — 99212 OFFICE O/P EST SF 10 MIN: CPT | Mod: 25

## 2023-12-18 ENCOUNTER — APPOINTMENT (OUTPATIENT)
Dept: ORTHOPEDIC SURGERY | Facility: CLINIC | Age: 63
End: 2023-12-18
Payer: MEDICAID

## 2023-12-18 VITALS
DIASTOLIC BLOOD PRESSURE: 82 MMHG | BODY MASS INDEX: 33.32 KG/M2 | HEIGHT: 65 IN | HEART RATE: 79 BPM | WEIGHT: 200 LBS | SYSTOLIC BLOOD PRESSURE: 122 MMHG

## 2023-12-18 DIAGNOSIS — M77.8 OTHER ENTHESOPATHIES, NOT ELSEWHERE CLASSIFIED: ICD-10-CM

## 2023-12-18 PROCEDURE — 73030 X-RAY EXAM OF SHOULDER: CPT | Mod: RT

## 2023-12-18 PROCEDURE — 99214 OFFICE O/P EST MOD 30 MIN: CPT | Mod: 25

## 2023-12-18 NOTE — DISCUSSION/SUMMARY
[de-identified] : The patient has tendinitis of both shoulders.  She had apparent surgery for enchondroma of the left proximal humerus 15 to 20 years ago.  She has fixation devices seen on the x-rays.  Ultrasound studies suggest partial supraspinatus tear on the left.  Clinically the patient does not have significant rotator cuff tear.  She does have tendinitis bilaterally.  She is referred for physical therapy.  She will be reevaluated in 1 month.

## 2023-12-18 NOTE — HISTORY OF PRESENT ILLNESS
[de-identified] : 63-year-old RHD female presents for initial evaluation of left shoulder pain x 2 months. Denies trauma or injury. She complains of intermittent sharp pain over the anterior shoulder worse with reaching overhead, lifting and sleeping on the left side. She has been applying heat and taking Advil with some relief. She saw her rheumatologist Dr. Parikh who referred her for US and recommended PT which she has not yet started. She is also having pain in her right shoulder. Denies paresthesia or prior injuries. She reports a history of left humerus enchondroma, surgery 2010.

## 2023-12-18 NOTE — PHYSICAL EXAM
[Rad] : radial 2+ and symmetric bilaterally [Normal] : Alert and in no acute distress [Poor Appearance] : well-appearing [Acute Distress] : not in acute distress [de-identified] : The patient has no respiratory distress. Mood and affect are normal. The patient is alert and oriented to person, place and time. Examination of the cervical spine demonstrates no deformity. There is tenderness of the left paracervical muscles and the left trapezius muscle. There is mild muscle spasm. Cervical spine range of motion is right lateral rotation of 40, left lateral rotation of 40, extension of 45 and flexion of 45. Upper extremity neurologic exam is intact with regard to sensation. Motor function is 5 over 5 in all groups in the upper extremities. Deep tendon reflexes are 2+ and equal at the biceps, triceps and brachial radialis. Examination of the right shoulder demonstrates no deformity. The skin is intact. There is no erythema. There is tenderness anteriorly and on the dorsum of the shoulder at the AC joint. Impingement sign is positive There is no instability. Drop arm test is negative. Empty can test is negative. Liftoff test is negative. McDonald test is negative.  Examination of the left shoulder demonstrates no deformity. The skin is intact. There is no erythema. There is tenderness anteriorly. Impingement sign is positive There is no instability. Drop arm test is negative. Empty can test is negative. Liftoff test is negative. McDonald test is negative. [de-identified] : EXAM: [id number] - US JOINT COMPLETE LT  - ORDERED BY: JUAN FELDMAN PROCEDURE DATE:  12/07/2023 INTERPRETATION:  Examination: LEFT shoulder ultrasound.  HISTORY: LEFT shoulder pain and limited range of motion. Evaluate for rotator cuff tear.  TECHNIQUE:  Real-time ultrasound was obtained of the LEFT shoulder.  FINDINGS:  AC Joint: Mild AC Joint arthropathy  Biceps: Long head of biceps tendon is intact without evidence of tendinosis, tear, tenosynovitis, or subluxation.  Subscapularis: Intact without sonographic evidence of tendinosis or tear.  Supraspinatus: High-grade partial-thickness articular surface tear of the posterior aspect of the supraspinatus tendon.  Infraspinatus: Tendinosis without sonographic evidence of tear.  Posterior glenohumeral joint: No evidence of joint effusion.   IMPRESSION: 1.  High-grade partial-thickness articular surface tear of the supraspinatus tendon. 2.  Tendinosis without tear of the infraspinatus tendon.  --- End of Report ---   MADAY GLOVER MD; Attending Radiologist This document has been electronically signed. Dec  7 2023  2:46PM   AP, transscapular and axillary x-rays of the right shoulder demonstrate no fracture, no dislocation and no bony abnormality.  AP, transscapular and axillary x-rays of the left shoulder demonstrate no acute fracture or dislocation.  There are fixation devices and bone cement in the proximal left humerus from prior surgery.

## 2023-12-19 ENCOUNTER — NON-APPOINTMENT (OUTPATIENT)
Age: 63
End: 2023-12-19

## 2024-01-18 ENCOUNTER — APPOINTMENT (OUTPATIENT)
Dept: RADIOLOGY | Facility: CLINIC | Age: 64
End: 2024-01-18
Payer: MEDICAID

## 2024-01-18 PROCEDURE — 77080 DXA BONE DENSITY AXIAL: CPT

## 2024-01-19 ENCOUNTER — APPOINTMENT (OUTPATIENT)
Dept: ENDOCRINOLOGY | Facility: CLINIC | Age: 64
End: 2024-01-19
Payer: MEDICAID

## 2024-01-19 VITALS
BODY MASS INDEX: 33.15 KG/M2 | HEART RATE: 75 BPM | SYSTOLIC BLOOD PRESSURE: 108 MMHG | WEIGHT: 199 LBS | OXYGEN SATURATION: 97 % | DIASTOLIC BLOOD PRESSURE: 70 MMHG | HEIGHT: 65 IN

## 2024-01-19 DIAGNOSIS — Z13.820 ENCOUNTER FOR SCREENING FOR OSTEOPOROSIS: ICD-10-CM

## 2024-01-19 LAB — GLUCOSE BLDC GLUCOMTR-MCNC: 131

## 2024-01-19 PROCEDURE — 82962 GLUCOSE BLOOD TEST: CPT

## 2024-01-19 PROCEDURE — 99213 OFFICE O/P EST LOW 20 MIN: CPT | Mod: 25

## 2024-01-19 PROCEDURE — 95251 CONT GLUC MNTR ANALYSIS I&R: CPT

## 2024-01-19 RX ORDER — ERGOCALCIFEROL 1.25 MG/1
1.25 MG CAPSULE, LIQUID FILLED ORAL
Qty: 90 | Refills: 0 | Status: ACTIVE | COMMUNITY
Start: 2020-08-10 | End: 1900-01-01

## 2024-01-19 RX ORDER — ELECTROLYTES/DEXTROSE
32G X 4 MM SOLUTION, ORAL ORAL
Qty: 1 | Refills: 2 | Status: ACTIVE | COMMUNITY
Start: 2024-01-19 | End: 1900-01-01

## 2024-01-19 NOTE — HISTORY OF PRESENT ILLNESS
[FreeTextEntry1] : This is a 64 yo female who presents for diabetes follow up  Patient had hernia repair on 10/24/23 at [facility] and had recovered well. At last endocrine visit in Oct 2023 (telehealth), she as taking much lower dose of insulin post op but today notes she is back to regular insulin regimen - Basaglar 35U qhs and continue admelog 8-10U TID ac.  She is off Jardiance  now.  She is taking Repatha and Vascepa, last LDL is 70. Not on statins due to myalgias. Saw ophthalmology in Jan 2024, no diabetic retinopathy, no cataracts.   Regarding thyroid nodules, has not done sono since Sept 2022- h/o b/l subcentimeter thyroid nodules [Continuous Glucose Monitoring] : Continuous Glucose Monitoring: Yes [Mega] : Mega [Finger Stick] : Finger Stick: No [Hypoglycemia] : Patient is not hypoglycemic. [FreeTextEntry2] : 96 [FreeTextEntry3] : 4+0 [FreeTextEntry4] : 0+0 [de-identified] : 6.5 [FreeTextEntry5] : 132 [FreeTextEntry6] : 17.4

## 2024-01-19 NOTE — ASSESSMENT
[Diabetes Foot Care] : diabetes foot care [Long Term Vascular Complications] : long term vascular complications of diabetes [Carbohydrate Consistent Diet] : carbohydrate consistent diet [Importance of Diet and Exercise] : importance of diet and exercise to improve glycemic control, achieve weight loss and improve cardiovascular health [Hypoglycemia Management] : hypoglycemia management [Self Monitoring of Blood Glucose] : self monitoring of blood glucose [Retinopathy Screening] : Patient was referred to ophthalmology for retinopathy screening [FreeTextEntry1] : Type 2 DM Noted HgbA1c from Dec 2023 is 6.9%, at goal Recovering well due to hernia surgery, and back on insulin requirements Revieed Mega 2 sensor tracing, noted TIR 96%, 4% high and 0% lows. Noted minor occasional spikes during fasting however otherwise in range and will not change insulin regimen at this time. Encouraged to continue with diet and exercise, as tolerated Continue Basaglar 35U qhs Continue admelog 8-10U TID ac Will scan recent labs from Dec 2023 into chart and reviewed with patient today Saw ophthalmologist last week, no diabetic retinopathy  Osteoporosis screening Patient following with rheum is on vitamin D supplementation for vitamin D deficiency, will check level now Bloodwork was collected in office today, will f/u results accordingly.  Reviewed DXA scan- noted osteopenia of femoral neck, FRAX score is low, no therapy indicated for now  Thyroid nodules h/o bilateral subcentimeter thyroid nodules as per last sonogram in Sept 2022 Clinically euthyroid, not on thyroid medication Repeat sonogram now  Answered all questions today; patient verbalized understanding of the above RTO in 3 months.

## 2024-01-24 ENCOUNTER — APPOINTMENT (OUTPATIENT)
Dept: SURGERY | Facility: CLINIC | Age: 64
End: 2024-01-24
Payer: MEDICAID

## 2024-01-24 VITALS
SYSTOLIC BLOOD PRESSURE: 126 MMHG | DIASTOLIC BLOOD PRESSURE: 80 MMHG | BODY MASS INDEX: 33.15 KG/M2 | TEMPERATURE: 97.9 F | HEIGHT: 65 IN | OXYGEN SATURATION: 97 % | HEART RATE: 84 BPM | WEIGHT: 199 LBS

## 2024-01-24 PROCEDURE — 99024 POSTOP FOLLOW-UP VISIT: CPT

## 2024-01-30 ENCOUNTER — RX RENEWAL (OUTPATIENT)
Age: 64
End: 2024-01-30

## 2024-01-30 RX ORDER — 70%ISOPROPYL ALCOHOL 0.7 ML/ML
70 SWAB TOPICAL
Qty: 1 | Refills: 2 | Status: ACTIVE | COMMUNITY
Start: 2024-01-19 | End: 1900-01-01

## 2024-02-08 NOTE — ASSESSMENT
[FreeTextEntry1] : IMPRESSION:\par 1. Left facial pain.\par \par \par PLAN:\par MRI brain w/wo - navigation and fiesta protocols.\par f/u after imaging No

## 2024-02-09 ENCOUNTER — APPOINTMENT (OUTPATIENT)
Dept: RHEUMATOLOGY | Facility: HOSPITAL | Age: 64
End: 2024-02-09
Payer: MEDICAID

## 2024-02-09 ENCOUNTER — OUTPATIENT (OUTPATIENT)
Dept: OUTPATIENT SERVICES | Facility: HOSPITAL | Age: 64
LOS: 1 days | End: 2024-02-09
Payer: MEDICAID

## 2024-02-09 VITALS
HEIGHT: 65 IN | SYSTOLIC BLOOD PRESSURE: 120 MMHG | WEIGHT: 197 LBS | TEMPERATURE: 98 F | HEART RATE: 81 BPM | BODY MASS INDEX: 32.82 KG/M2 | RESPIRATION RATE: 14 BRPM | DIASTOLIC BLOOD PRESSURE: 72 MMHG

## 2024-02-09 DIAGNOSIS — Z98.89 OTHER SPECIFIED POSTPROCEDURAL STATES: Chronic | ICD-10-CM

## 2024-02-09 DIAGNOSIS — Z90.49 ACQUIRED ABSENCE OF OTHER SPECIFIED PARTS OF DIGESTIVE TRACT: Chronic | ICD-10-CM

## 2024-02-09 DIAGNOSIS — Z90.710 ACQUIRED ABSENCE OF BOTH CERVIX AND UTERUS: Chronic | ICD-10-CM

## 2024-02-09 DIAGNOSIS — M25.50 PAIN IN UNSPECIFIED JOINT: ICD-10-CM

## 2024-02-09 DIAGNOSIS — Z98.890 OTHER SPECIFIED POSTPROCEDURAL STATES: Chronic | ICD-10-CM

## 2024-02-09 DIAGNOSIS — G56.03 CARPAL TUNNEL SYNDROM,BILATERAL UPPER LIMBS: ICD-10-CM

## 2024-02-09 DIAGNOSIS — M06.9 RHEUMATOID ARTHRITIS, UNSPECIFIED: ICD-10-CM

## 2024-02-09 LAB — 25(OH)D3 SERPL-MCNC: 28.6 NG/ML

## 2024-02-09 PROCEDURE — G0463: CPT

## 2024-02-09 PROCEDURE — 99215 OFFICE O/P EST HI 40 MIN: CPT | Mod: GC

## 2024-02-09 RX ORDER — LIDOCAINE 3.5 G/1
3.5 PATCH TOPICAL
Qty: 30 | Refills: 0 | Status: ACTIVE | COMMUNITY
Start: 2024-02-09 | End: 1900-01-01

## 2024-02-10 PROBLEM — G56.03 BILATERAL CARPAL TUNNEL SYNDROME: Status: ACTIVE | Noted: 2021-07-16

## 2024-02-10 PROBLEM — M25.50 JOINT PAIN: Status: ACTIVE | Noted: 2020-11-13

## 2024-02-10 NOTE — PHYSICAL EXAM
[General Appearance - Alert] : alert [General Appearance - In No Acute Distress] : in no acute distress [Sclera] : the sclera and conjunctiva were normal [Outer Ear] : the ears and nose were normal in appearance [Neck Appearance] : the appearance of the neck was normal [Respiration, Rhythm And Depth] : normal respiratory rhythm and effort [Exaggerated Use Of Accessory Muscles For Inspiration] : no accessory muscle use [Auscultation Breath Sounds / Voice Sounds] : lungs were clear to auscultation bilaterally [Heart Rate And Rhythm] : heart rate was normal and rhythm regular [Heart Sounds] : normal S1 and S2 [Edema] : there was no peripheral edema [Bowel Sounds] : normal bowel sounds [Abdomen Soft] : soft [Abdomen Tenderness] : non-tender [Cervical Lymph Nodes Enlarged Posterior Bilaterally] : posterior cervical [Cervical Lymph Nodes Enlarged Anterior Bilaterally] : anterior cervical [Supraclavicular Lymph Nodes Enlarged Bilaterally] : supraclavicular [Abnormal Walk] : normal gait [Musculoskeletal - Swelling] : no joint swelling seen [Motor Tone] : muscle strength and tone were normal [No Focal Deficits] : no focal deficits [] : no rash [Impaired Insight] : insight and judgment were intact [FreeTextEntry1] : +ve CMC tenderness +ve Grind test, L empty cane +ve, Neer test +ve, and Healy +ve unremarkable for internal rotation

## 2024-02-10 NOTE — ASSESSMENT
[FreeTextEntry1] : 64 yo F PMH DM2, HTN, Asthma, CAD, diverticulitis, Gout, OA, Fibromyalgia, trigeminal neuralgia, cervical radiculopathy 2/2 large C5/C6 protrusion (MRI 5/2022) c/b b/l carpal tunnel, chronic R shoulder rotator cuff tear, supraspinatus tear presenting for follow up.  #Left shoulder tenderness s/p large hernia repair  patient with limited abduction > 180 degree due to tenderness but unremarkable internal and external rotation.  Empty cane test, Neer and Hwakins test are positive  Likely supraspinatus tendinitis and lesser head of biceps but cannot r/o tear   #Gout 2016 Has Gout (never had arthrocentesis though) Initial manifestation 1st MTPs Last flare up: 2020 Risk factors: DM2, obesity Last uric acid ~5 3/2023 Currently on ULT with 200 mg Allopurinol.  #OA B/l knee L>R Previous hx of patellar aliment surgery b/l, and L meniscal tear repair 2017 1/25/22-XR of the knees showed b/l tibiofemoral OA change presence of peripheral joint osteophyte L>R. Medial Left knee narrowing +ve Grind's test b/l CMC joint  #Left shoulder supraspinatus tear  MRI of the left shoulder showed full tickness tear of the supraspinatus tendon Moderate tendinosis of the infraspinatus tendon with shallow. Partial thickness tearing of the middle fibers of the distal subscapular with superimposed tendinosis s/p left shoulder steroid injection per pain medicine On PT Has been following with orthopedic  #Cervical spine 11/11/2022 MRI cervical spine mild retrolisthesis and broad-based posterior disc protrusion with bilateral neural foraminal stenosis and moderate central stenosis Epidural injection per pain medicine  #Fibromyalgia 2021 sleep study negative for MYLES Previous treatment gabapentin and Cymbalta ([id number]) stopped taking due to headache  #R Quintero's neuroma Noticed on exam +ve Milder's test  #De Quervin tenosynovitis of L thumb  #Elevate FRAX score with osteopenia  Dexa scan showed spine 2.5, femoral neck -1.1, total hip -0.3 osteopenia FRAX score 10 year probability of fracture 12, and hip fracture 0.9   Plan: -Recommend PT, OT and diclofenac, and lidocaine patch  -Recommend to keep doing exercise and weight loss -Patient plan for dental procedure (tooth extraction) next month. We will revisit the discussion to start bone resorption inhibitors next visit. Provide information about medication (Zoledronic acid)     RTC in 5-6 month DW Dr. Rogelio Parikh MD Rheumatology fellow.

## 2024-02-10 NOTE — REVIEW OF SYSTEMS
[Feeling Poorly] : feeling poorly [Arthralgias] : arthralgias [Joint Pain] : joint pain [Joint Swelling] : joint swelling [Limb Pain] : limb pain [As Noted in HPI] : as noted in HPI [Negative] : Heme/Lymph [Dry Eyes] : no dryness of the eyes [FreeTextEntry9] : bilateral CMC tenderness, L shoulder tenderness

## 2024-02-10 NOTE — HISTORY OF PRESENT ILLNESS
[___ Month(s) Ago] : [unfilled] month(s) ago [FreeTextEntry1] : Patient is doing well. She remains with pain due to fibromyalgia but started doing more physical activity.   ROS: Positive: L shoulder pain, left 1st CMC tenderness  Negative: fever, chills, chest pain, sob, abdominal pain, hematochezia, hematuria, rash, Raynaud [de-identified] : at today's visit  [Current Joint Pain] : no current joint pain [Erosions on Xrays] : no erosions on xrays [History of Tophi] : the patient has no history of tophi [FreeTextEntry7] : 2020 [FreeTextEntry3] : 2016 [FreeTextEntry4] : allopurinol, and colchicine before

## 2024-02-13 DIAGNOSIS — M54.12 RADICULOPATHY, CERVICAL REGION: ICD-10-CM

## 2024-02-13 DIAGNOSIS — G56.03 CARPAL TUNNEL SYNDROME, BILATERAL UPPER LIMBS: ICD-10-CM

## 2024-02-13 DIAGNOSIS — M25.50 PAIN IN UNSPECIFIED JOINT: ICD-10-CM

## 2024-03-26 ENCOUNTER — APPOINTMENT (OUTPATIENT)
Dept: NEUROLOGY | Facility: HOSPITAL | Age: 64
End: 2024-03-26

## 2024-03-26 ENCOUNTER — OUTPATIENT (OUTPATIENT)
Dept: OUTPATIENT SERVICES | Facility: HOSPITAL | Age: 64
LOS: 1 days | End: 2024-03-26
Payer: MEDICAID

## 2024-03-26 VITALS
WEIGHT: 196 LBS | HEART RATE: 67 BPM | SYSTOLIC BLOOD PRESSURE: 125 MMHG | HEIGHT: 65 IN | RESPIRATION RATE: 14 BRPM | DIASTOLIC BLOOD PRESSURE: 80 MMHG | TEMPERATURE: 98 F | OXYGEN SATURATION: 98 % | BODY MASS INDEX: 32.65 KG/M2

## 2024-03-26 DIAGNOSIS — Z90.49 ACQUIRED ABSENCE OF OTHER SPECIFIED PARTS OF DIGESTIVE TRACT: Chronic | ICD-10-CM

## 2024-03-26 DIAGNOSIS — Z98.89 OTHER SPECIFIED POSTPROCEDURAL STATES: Chronic | ICD-10-CM

## 2024-03-26 DIAGNOSIS — R51.9 HEADACHE, UNSPECIFIED: ICD-10-CM

## 2024-03-26 DIAGNOSIS — Z98.890 OTHER SPECIFIED POSTPROCEDURAL STATES: Chronic | ICD-10-CM

## 2024-03-26 DIAGNOSIS — Z90.710 ACQUIRED ABSENCE OF BOTH CERVIX AND UTERUS: Chronic | ICD-10-CM

## 2024-03-26 DIAGNOSIS — G50.0 TRIGEMINAL NEURALGIA: ICD-10-CM

## 2024-03-26 PROCEDURE — G0463: CPT

## 2024-03-26 RX ORDER — OXCARBAZEPINE 300 MG/1
300 TABLET, FILM COATED ORAL TWICE DAILY
Qty: 60 | Refills: 3 | Status: ACTIVE | COMMUNITY
Start: 2024-03-26 | End: 1900-01-01

## 2024-03-26 RX ORDER — BUDESONIDE AND FORMOTEROL FUMARATE DIHYDRATE 160; 4.5 UG/1; UG/1
160-4.5 AEROSOL RESPIRATORY (INHALATION)
Qty: 10 | Refills: 0 | Status: DISCONTINUED | COMMUNITY
Start: 2020-03-25 | End: 2024-03-26

## 2024-04-01 ENCOUNTER — APPOINTMENT (OUTPATIENT)
Dept: SURGERY | Facility: CLINIC | Age: 64
End: 2024-04-01
Payer: MEDICAID

## 2024-04-01 VITALS
SYSTOLIC BLOOD PRESSURE: 128 MMHG | HEIGHT: 65 IN | HEART RATE: 72 BPM | BODY MASS INDEX: 33.32 KG/M2 | DIASTOLIC BLOOD PRESSURE: 75 MMHG | WEIGHT: 200 LBS

## 2024-04-01 DIAGNOSIS — E04.9 NONTOXIC GOITER, UNSPECIFIED: ICD-10-CM

## 2024-04-01 DIAGNOSIS — M54.12 RADICULOPATHY, CERVICAL REGION: ICD-10-CM

## 2024-04-01 DIAGNOSIS — E04.1 NONTOXIC SINGLE THYROID NODULE: ICD-10-CM

## 2024-04-01 DIAGNOSIS — M54.10 RADICULOPATHY, SITE UNSPECIFIED: ICD-10-CM

## 2024-04-01 PROCEDURE — 99204 OFFICE O/P NEW MOD 45 MIN: CPT

## 2024-04-01 NOTE — PHYSICAL EXAM
[Midline] : located in midline position [Normal] : orientation to person, place, and time: normal [de-identified] : The neck appears flat.  There are no palpable masses appreciated.  A limited in-office ultrasound revealed a heterogeneous thyroid with bilateral subcentimeter thyroid cysts and a subtle subcentimeter heterogeneous/isoechoic nodule within the anterior lateral aspect of the mid left lobe. [de-identified] : Extremities: SWIFT x 4.   Skin: No obvious skin lesions.   Voice: clear

## 2024-04-01 NOTE — HISTORY OF PRESENT ILLNESS
[de-identified] : Mrs. Samaniego is a 63-year-old woman, with history significant for cervical disc disease, who presents for initial evaluation of thyroid nodules.  She states that she was diagnosed with thyroid nodules by her endocrinologist several years ago and has since had several ultrasounds.  She has not had a biopsy.  Her most recent neck ultrasound, performed 03/14/2024 ([facility]), reported a left mid 0.8 cm isoechoic solid thyroid nodule and bilateral subcentimeter thyroid cysts.  There was no lymphadenopathy appreciated.  When compared to her prior ultrasounds, her solid left thyroid nodule has remained relatively stable in size for over the past 3 years.  She states that one of her daughter's has congenital hypothyroidism and her other daughter has Hashimoto's and a goiter.  She denies known family history of thyroid cancer or personal history of recent voice changes.  She reports intermittent dysphagia which she describes as, "I have issues swallowing and need to drink water."  Finally, she reports chronic left posterior neck pain with LUE radiculopathy for which she has been seeing a neurologist.  She states that she attempted physical therapy without much improved.  She has not seen a spine surgeon.  Labs performed 02/03/2023 revealed TSH = 2.36.  Serum calcium = 9.8 (12/27/2023).

## 2024-04-01 NOTE — ASSESSMENT
[FreeTextEntry1] : Assessment:  63-year-old woman, with history significant for cervical disc disease, presents with bilateral subcentimeter thyroid nodules including a stable solid left thyroid nodule.  Plan: - The nature of thyroid nodules, as well as the indications for biopsy and thyroidectomy, were discussed with Mrs. Samaniego and I explained that a biopsy in general is recommended for hypoechoic solid thyroid nodules > 1 cm in diameter and isoechoic and complex thyroid nodules > 1.5 cm in diameter.  Based upon these guidelines, I explained that her reported thyroid nodules do not meet MARK criteria for a biopsy.  Considering that her solid left thyroid nodule has remained stable in size for over the past 3 years, I have recommended continued observation with a repeat neck ultrasound after 4 years (March, 2028). - I have recommended that she be evaluated by spine surgery for her symptomatic cervical disc disease. She was provided with the name and contact information for Dr. Cross. - The patient expressed understanding of the above agrees with this plan.  She will continue to follow-up with Dr. Peoples and was instructed to follow-up with me PRN or in 4 years.  Will arrange for her to have a follow-up ultrasound prior to her appointment such that we can review the results and further management at that time.

## 2024-04-02 DIAGNOSIS — G50.0 TRIGEMINAL NEURALGIA: ICD-10-CM

## 2024-04-02 RX ORDER — RIMEGEPANT SULFATE 75 MG/75MG
75 TABLET, ORALLY DISINTEGRATING ORAL
Qty: 9 | Refills: 11 | Status: ACTIVE | COMMUNITY
Start: 2022-11-01 | End: 1900-01-01

## 2024-04-02 NOTE — HISTORY OF PRESENT ILLNESS
[FreeTextEntry1] : 3/26/24 follow up Patient seen in clinic for follow up. Patient reports her headache is still present intermittently (occurring around 6-7 times per month). No changes in HA compared to prior. She currently is having a headache for the past 3 days intermittently. Nurtec helps but not completely resolving (takes the edge off). She only takes nurtec when headache is worst around 3 times a week. Also taking tripetal daily. Denied new neurological complaints.    8/8/23 Patient presents today for follow up for cervical radiculopathy, trigeminal neuralgia and headaches. She states that her nurtec has been effective and that her headaches are infrequent, maybe 1-2 a month, intensity 4/10 if they occur with some photo/phonophobia. She says that she is currently going through a sinus infection. Continues to endorse symptoms of trigeminal neuralgia with shooting pain from her left ear to the front of her face. However, she feels that they are managed well with the oxcarb. Recent blood work in June demonstrated normal electrolyte levels. The patient acknowledged her diagnosis of angina and fibromyalgia which is currently affecting her mobility but she does not believe that it is affecting her daily lifestyle. She did not want to take any medications for her fibromyalgia because she feels like she's taking too many medications. OK with continuing her nurtec and oxcarb.    2/7/23:  Patient presents today for follow up for cervical radiculopathy, and trigeminal neuralgia and headache. Pt states the nurtec has been helping her the most and that she recently saw her rheumatologist who also prescribed her nortriptaline and diclofec (both of which she has not taken yet due to scheduled cardiac cath procedure). Pt states her headaches (L sided originating in the neck radiating up, 7/10, persistent, with photophobia/phonophobia, neck stiffness) has been worse than last time she was here. Still endorsing weakness in the UE's (L>R), with numbness/tingling.    Interval Hx 11/1/22:  Patient presents today for follow up for cervical radiculopathy, and trigeminal neuralgia and headache.  Trigeminal neuralgia is mildly controlled on trileptal 300mg qday. 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 Still endorsing weakness in the UE's (L>R), but now numbness/tingling. Has appt w/ Dr. Nava this month. She is not currently taking any medications for her radicular pain. Taking Nurtec for her headaches, that are likely cervicogenic in etiology. Since last appt, patient saw interventional pain specialist (Dr. Camejo) in [place], [place] and received cervical epidural injection x1 in 9/2022, no relief. Also received L shoulder injection w/ minimal relief. Has another appt to see same pain specialist this month. Of note, started PT only 2 weeks ago. Also endorsing mild headaches for which Nurtec is controlling, needs refill.    Interval Hx 5/24/22:  Patient presents today for follow up for cervical radiculopathy, b/l carpal tunnel and trigeminal neuralgia.  Trigeminal neuralgia is moderately controlled on trileptal. Previous MRI brain showed compression of the L trigeminal nerve, for which Dr. Dunn offered to perform surgery but patient declined.  She also currently has a R rotator cuff tear and was referred for surgery, which patient also declined.  She is not currently taking any medications for her radicular pain. She was prescribed Naproxen but takes Tylenol PRN when her pain is really bad. Currently states today is a "bad day" and her pain is 8/10. Has not yet received PT as was waiting for MRI Cspine to be performed. MRI Cspine shows      60F R handed with a PMHx of R rotator cuff tear, DM, HTN, Asthma, CAD s/p stents, diverticulitis, Gout, OA, Fibromyalgia, trigeminal neuralgia, tremors presents to establish care. Pt saw seeing neurologist Dr. Leroy Mota but had insurance issues.    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 Progressive, worsening b/l hand weakness since 2018, which initially brought her to see a neurologist. She has reported C5-7 nerve impingements. Last MRI C spine done in 2020. She also reports R index finger numbness, no paresthesias.    Pt diagnosed with L sided trigeminal neuralgia in 2019, MRI done. She is on Trileptal lowest dose twice a day, no significant difference because she has only had mild symptoms but has slightly decreased frequency of headaches, occasional ear and jaw pain and tearing. She saw neurosurgery Dr. Hammond for trigeminal neuralgia, who offered surgery but pt refused.    Pt reports b/l hand tremors since 2019, intermittent but alternating hands, when eating and sewing, not related to her trigeminal neuralgia pain, lasting seconds at a time. No worsening with anxiety, no alcohol intake, no family hx of tremors.    Pt reports diffuse joint pains due to her fibromyalgia, was on duloxetine 60mg daily but discontinued due to nausea. Now just taking tylenol PRN for it. She has not had physical therapy or occupational therapy recently.    EMG 8/2021 with Dr. Leroy Mota  1. b/l C4-5 and C5-6 radiculopathy  2. b/l median motor neuropathy  3. b/l ulnar motor neuropathy  4. b/l median sensory neuropathy

## 2024-04-02 NOTE — ASSESSMENT
[FreeTextEntry1] : Patient presents today for follow up for cervical radiculopathy, and trigeminal neuralgia and headache. Currentlyon  nurtec and trileptal with poorly controlled headaches. No new neurological complaints.   Impression: HAs likely of cervicogenic origin vs trigeminal neuralgia.   Plan: C/w Nurtec   Increase Trileptal from 150 mg bid to 300mg bid RTC 6 months   Case d/w Attending

## 2024-04-02 NOTE — HISTORY OF PRESENT ILLNESS
[FreeTextEntry1] : 3/26/24 follow up Patient seen in clinic for follow up. Patient reports her headache is still present intermittently (occurring around 6-7 times per month). No changes in HA compared to prior. She currently is having a headache for the past 3 days intermittently. Nurtec helps but not completely resolving (takes the edge off). She only takes nurtec when headache is worst around 3 times a week. Also taking tripetal daily. Denied new neurological complaints.    8/8/23 Patient presents today for follow up for cervical radiculopathy, trigeminal neuralgia and headaches. She states that her nurtec has been effective and that her headaches are infrequent, maybe 1-2 a month, intensity 4/10 if they occur with some photo/phonophobia. She says that she is currently going through a sinus infection. Continues to endorse symptoms of trigeminal neuralgia with shooting pain from her left ear to the front of her face. However, she feels that they are managed well with the oxcarb. Recent blood work in June demonstrated normal electrolyte levels. The patient acknowledged her diagnosis of angina and fibromyalgia which is currently affecting her mobility but she does not believe that it is affecting her daily lifestyle. She did not want to take any medications for her fibromyalgia because she feels like she's taking too many medications. OK with continuing her nurtec and oxcarb.    2/7/23:  Patient presents today for follow up for cervical radiculopathy, and trigeminal neuralgia and headache. Pt states the nurtec has been helping her the most and that she recently saw her rheumatologist who also prescribed her nortriptaline and diclofec (both of which she has not taken yet due to scheduled cardiac cath procedure). Pt states her headaches (L sided originating in the neck radiating up, 7/10, persistent, with photophobia/phonophobia, neck stiffness) has been worse than last time she was here. Still endorsing weakness in the UE's (L>R), with numbness/tingling.    Interval Hx 11/1/22:  Patient presents today for follow up for cervical radiculopathy, and trigeminal neuralgia and headache.  Trigeminal neuralgia is mildly controlled on trileptal 300mg qday. 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 Still endorsing weakness in the UE's (L>R), but now numbness/tingling. Has appt w/ Dr. Nava this month. She is not currently taking any medications for her radicular pain. Taking Nurtec for her headaches, that are likely cervicogenic in etiology. Since last appt, patient saw interventional pain specialist (Dr. Camejo) in [place], [place] and received cervical epidural injection x1 in 9/2022, no relief. Also received L shoulder injection w/ minimal relief. Has another appt to see same pain specialist this month. Of note, started PT only 2 weeks ago. Also endorsing mild headaches for which Nurtec is controlling, needs refill.    Interval Hx 5/24/22:  Patient presents today for follow up for cervical radiculopathy, b/l carpal tunnel and trigeminal neuralgia.  Trigeminal neuralgia is moderately controlled on trileptal. Previous MRI brain showed compression of the L trigeminal nerve, for which Dr. Dunn offered to perform surgery but patient declined.  She also currently has a R rotator cuff tear and was referred for surgery, which patient also declined.  She is not currently taking any medications for her radicular pain. She was prescribed Naproxen but takes Tylenol PRN when her pain is really bad. Currently states today is a "bad day" and her pain is 8/10. Has not yet received PT as was waiting for MRI Cspine to be performed. MRI Cspine shows      60F R handed with a PMHx of R rotator cuff tear, DM, HTN, Asthma, CAD s/p stents, diverticulitis, Gout, OA, Fibromyalgia, trigeminal neuralgia, tremors presents to establish care. Pt saw seeing neurologist Dr. Leroy Mota but had insurance issues.    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 Progressive, worsening b/l hand weakness since 2018, which initially brought her to see a neurologist. She has reported C5-7 nerve impingements. Last MRI C spine done in 2020. She also reports R index finger numbness, no paresthesias.    Pt diagnosed with L sided trigeminal neuralgia in 2019, MRI done. She is on Trileptal lowest dose twice a day, no significant difference because she has only had mild symptoms but has slightly decreased frequency of headaches, occasional ear and jaw pain and tearing. She saw neurosurgery Dr. Hammond for trigeminal neuralgia, who offered surgery but pt refused.    Pt reports b/l hand tremors since 2019, intermittent but alternating hands, when eating and sewing, not related to her trigeminal neuralgia pain, lasting seconds at a time. No worsening with anxiety, no alcohol intake, no family hx of tremors.    Pt reports diffuse joint pains due to her fibromyalgia, was on duloxetine 60mg daily but discontinued due to nausea. Now just taking tylenol PRN for it. She has not had physical therapy or occupational therapy recently.    EMG 8/2021 with Dr. Leroy Mota  1. b/l C4-5 and C5-6 radiculopathy  2. b/l median motor neuropathy  3. b/l ulnar motor neuropathy  4. b/l median sensory neuropathy

## 2024-04-15 LAB
25(OH)D3 SERPL-MCNC: 26.7 NG/ML
ALBUMIN SERPL ELPH-MCNC: 4.5 G/DL
ALP BLD-CCNC: 123 U/L
ALT SERPL-CCNC: 12 U/L
ANION GAP SERPL CALC-SCNC: 11 MMOL/L
AST SERPL-CCNC: 12 U/L
BILIRUB SERPL-MCNC: 0.3 MG/DL
BUN SERPL-MCNC: 15 MG/DL
CALCIUM SERPL-MCNC: 9.7 MG/DL
CHLORIDE SERPL-SCNC: 103 MMOL/L
CHOLEST SERPL-MCNC: 263 MG/DL
CO2 SERPL-SCNC: 27 MMOL/L
CREAT SERPL-MCNC: 0.63 MG/DL
EGFR: 100 ML/MIN/1.73M2
ESTIMATED AVERAGE GLUCOSE: 171 MG/DL
GLUCOSE SERPL-MCNC: 123 MG/DL
HBA1C MFR BLD HPLC: 7.6 %
HDLC SERPL-MCNC: 60 MG/DL
LDLC SERPL CALC-MCNC: 168 MG/DL
NONHDLC SERPL-MCNC: 203 MG/DL
POTASSIUM SERPL-SCNC: 4.3 MMOL/L
PROT SERPL-MCNC: 7.1 G/DL
SODIUM SERPL-SCNC: 141 MMOL/L
T4 FREE SERPL-MCNC: 1 NG/DL
TRIGL SERPL-MCNC: 191 MG/DL
TSH SERPL-ACNC: 3.54 UIU/ML

## 2024-04-16 ENCOUNTER — APPOINTMENT (OUTPATIENT)
Dept: GASTROENTEROLOGY | Facility: HOSPITAL | Age: 64
End: 2024-04-16
Payer: MEDICAID

## 2024-04-16 ENCOUNTER — OUTPATIENT (OUTPATIENT)
Dept: OUTPATIENT SERVICES | Facility: HOSPITAL | Age: 64
LOS: 1 days | End: 2024-04-16
Payer: MEDICAID

## 2024-04-16 VITALS
WEIGHT: 200 LBS | TEMPERATURE: 98 F | SYSTOLIC BLOOD PRESSURE: 114 MMHG | RESPIRATION RATE: 14 BRPM | HEART RATE: 80 BPM | DIASTOLIC BLOOD PRESSURE: 76 MMHG | BODY MASS INDEX: 33.32 KG/M2 | OXYGEN SATURATION: 98 % | HEIGHT: 65 IN

## 2024-04-16 DIAGNOSIS — Z90.49 ACQUIRED ABSENCE OF OTHER SPECIFIED PARTS OF DIGESTIVE TRACT: Chronic | ICD-10-CM

## 2024-04-16 DIAGNOSIS — R14.0 ABDOMINAL DISTENSION (GASEOUS): ICD-10-CM

## 2024-04-16 DIAGNOSIS — R74.8 ABNORMAL LEVELS OF OTHER SERUM ENZYMES: ICD-10-CM

## 2024-04-16 DIAGNOSIS — K57.30 DIVERTICULOSIS OF LARGE INTESTINE W/OUT PERFORATION OR ABSCESS W/OUT BLEEDING: ICD-10-CM

## 2024-04-16 DIAGNOSIS — Z98.89 OTHER SPECIFIED POSTPROCEDURAL STATES: Chronic | ICD-10-CM

## 2024-04-16 DIAGNOSIS — Z98.890 OTHER SPECIFIED POSTPROCEDURAL STATES: Chronic | ICD-10-CM

## 2024-04-16 DIAGNOSIS — E66.9 OBESITY, UNSPECIFIED: ICD-10-CM

## 2024-04-16 DIAGNOSIS — R10.9 UNSPECIFIED ABDOMINAL PAIN: ICD-10-CM

## 2024-04-16 DIAGNOSIS — Z90.710 ACQUIRED ABSENCE OF BOTH CERVIX AND UTERUS: Chronic | ICD-10-CM

## 2024-04-16 DIAGNOSIS — R74.01 ELEVATION OF LEVELS OF LIVER TRANSAMINASE LEVELS: ICD-10-CM

## 2024-04-16 LAB
ALBUMIN SERPL ELPH-MCNC: 4.8 G/DL — SIGNIFICANT CHANGE UP (ref 3.3–5)
ALP SERPL-CCNC: 126 U/L — HIGH (ref 40–120)
ALT FLD-CCNC: 16 U/L — SIGNIFICANT CHANGE UP (ref 10–45)
ANION GAP SERPL CALC-SCNC: 12 MMOL/L — SIGNIFICANT CHANGE UP (ref 5–17)
AST SERPL-CCNC: 17 U/L — SIGNIFICANT CHANGE UP (ref 10–40)
BILIRUB SERPL-MCNC: 0.3 MG/DL — SIGNIFICANT CHANGE UP (ref 0.2–1.2)
BUN SERPL-MCNC: 14 MG/DL — SIGNIFICANT CHANGE UP (ref 7–23)
CALCIUM SERPL-MCNC: 10.5 MG/DL — SIGNIFICANT CHANGE UP (ref 8.4–10.5)
CHLORIDE SERPL-SCNC: 102 MMOL/L — SIGNIFICANT CHANGE UP (ref 96–108)
CO2 SERPL-SCNC: 25 MMOL/L — SIGNIFICANT CHANGE UP (ref 22–31)
CREAT SERPL-MCNC: 0.65 MG/DL — SIGNIFICANT CHANGE UP (ref 0.5–1.3)
EGFR: 99 ML/MIN/1.73M2 — SIGNIFICANT CHANGE UP
GLUCOSE SERPL-MCNC: 112 MG/DL — HIGH (ref 70–99)
POTASSIUM SERPL-MCNC: 4.1 MMOL/L — SIGNIFICANT CHANGE UP (ref 3.5–5.3)
POTASSIUM SERPL-SCNC: 4.1 MMOL/L — SIGNIFICANT CHANGE UP (ref 3.5–5.3)
PROT SERPL-MCNC: 7.4 G/DL — SIGNIFICANT CHANGE UP (ref 6–8.3)
SODIUM SERPL-SCNC: 139 MMOL/L — SIGNIFICANT CHANGE UP (ref 135–145)

## 2024-04-16 PROCEDURE — G0463: CPT

## 2024-04-16 PROCEDURE — 36415 COLL VENOUS BLD VENIPUNCTURE: CPT

## 2024-04-16 PROCEDURE — 86381 MITOCHONDRIAL ANTIBODY EACH: CPT

## 2024-04-16 PROCEDURE — 80053 COMPREHEN METABOLIC PANEL: CPT

## 2024-04-16 PROCEDURE — 99214 OFFICE O/P EST MOD 30 MIN: CPT | Mod: GC

## 2024-04-16 RX ORDER — POLYETHYLENE GLYCOL 3350 17 G/17G
17 POWDER, FOR SOLUTION ORAL DAILY
Qty: 17 | Refills: 0 | Status: ACTIVE | COMMUNITY
Start: 2024-04-16 | End: 1900-01-01

## 2024-04-16 NOTE — PHYSICAL EXAM
[Alert] : alert [Healthy Appearing] : healthy appearing [Sclera] : the sclera and conjunctiva were normal [Hearing Threshold Finger Rub Not Latimer] : hearing was normal [No Respiratory Distress] : no respiratory distress [No Acc Muscle Use] : no accessory muscle use [Heart Rate And Rhythm] : heart rate was normal and rhythm regular [Normal S1, S2] : normal S1 and S2 [Abdomen Tenderness] : non-tender [No Masses] : no abdominal mass palpated [Oriented To Time, Place, And Person] : oriented to person, place, and time

## 2024-04-16 NOTE — END OF VISIT
[] : Fellow [FreeTextEntry3] : As modified and discussed with patient MD ANKIT Aranda [facility] Associate Professor of Medicine [facility]

## 2024-04-16 NOTE — ASSESSMENT
[FreeTextEntry1] : Impression:  63yrs old F PMH of HTN. HLD (on Rapatha), CAD s/p 2 LAD stents (02/2019), DM type 2 (well managed as per pt, endo Dr Mendosa, last A1C unknown; DM complicated by peripheral neuropathy), diverticulitis s/p Hartmans and reversal in 2013 at [facility], MYLES not on CPAP, Asthma (controlled, as per pt), Cdiff (>10 episodes), fibromyalgia and now s/p umbilical hernia repair presenting for follow up.  #Abdominal bloating likely functional dyspepsia -  improved with omeprazole 40 mg daily.  - underwent EGD in 4/2022, normal path.  #Isolated elevated alkaline phosphatase - GGT and isoenzymes were normal. Last ALP level 123 in 2023. Will repeat ALP levels and AMA.   #Colon cancer screening underwent colonoscopy in 4/2022 which showed diverticulosis, healthy colo-colonic anastomosis, and internal hemorrhoids. - For diverticulosis the patient is instructed in a high fiber diet and increased fluid intake to reduce constipation. Encouraged to consume popcorn and to disregard any warnings about seeds or nuts which are unfounded -Patient is reluctant to receive COVID vaccinations and semaglutide therapy Recommendations: - continue with PO PPI daily. - Follow up with repeat ALP and AMA levels.  - Patient will need repeat colonoscopy in 2032.  - Follow up in clinic as needed.   Discussed the case with Dr. Lewis.

## 2024-04-16 NOTE — HISTORY OF PRESENT ILLNESS
[FreeTextEntry1] : 63yrs old F PMH of HTN. HLD (on Rapatha), CAD s/p 2 LAD stents (02/2019), DM type 2 (well managed as per pt, endo Dr Mendosa, last A1C unknown; DM complicated by peripheral neuropathy), diverticulitis s/p Hartmans and reversal in 2013 at [facility], MYLES not on CPAP, Asthma (controlled, as per pt), Cdiff (>10 episodes), fibromyalgia and now s/p umbilical hernia repair presenting for follow up.  #Constipation: Reported she has bowel movements daily with no straining, no bloody stools. Feels well overall, she takes Senna daily and Miralax as needed, Has tried Metamucil in the past with no improvement in symptoms.   # Belching with abdominal bloating: Symptoms have resolved with omeprazole 40mg daily. Denied nausea, vomiting, and has been tolerating PO diet.   EGD (4/2022): gastritis (H. pylori negative) + esophageal biopsies wnl Colonoscopy (4/2022): diverticulosis, healthy colo-colonic anastomosis, internal hemorrhoids

## 2024-04-16 NOTE — REVIEW OF SYSTEMS
[Fever] : no fever [Chills] : no chills [Recent Weight Loss (___ Lbs)] : no recent weight loss [Chest Pain] : no chest pain [Palpitations] : no palpitations [Shortness Of Breath] : no shortness of breath [Abdominal Pain] : no abdominal pain [Vomiting] : no vomiting [Constipation] : no constipation [Diarrhea] : no diarrhea [Heartburn] : no heartburn [Melena (black stool)] : no melena [Bleeding] : no bleeding [Bloating (gassiness)] : no bloating [Skin Lesions] : no skin lesions

## 2024-04-17 LAB — MITOCHONDRIA AB SER-ACNC: SIGNIFICANT CHANGE UP

## 2024-04-18 DIAGNOSIS — R14.0 ABDOMINAL DISTENSION (GASEOUS): ICD-10-CM

## 2024-04-18 DIAGNOSIS — E66.9 OBESITY, UNSPECIFIED: ICD-10-CM

## 2024-04-18 DIAGNOSIS — K57.30 DIVERTICULOSIS OF LARGE INTESTINE WITHOUT PERFORATION OR ABSCESS WITHOUT BLEEDING: ICD-10-CM

## 2024-04-18 DIAGNOSIS — R74.8 ABNORMAL LEVELS OF OTHER SERUM ENZYMES: ICD-10-CM

## 2024-04-22 ENCOUNTER — APPOINTMENT (OUTPATIENT)
Dept: ENDOCRINOLOGY | Facility: CLINIC | Age: 64
End: 2024-04-22
Payer: MEDICAID

## 2024-04-22 VITALS
SYSTOLIC BLOOD PRESSURE: 120 MMHG | OXYGEN SATURATION: 95 % | WEIGHT: 200 LBS | HEART RATE: 80 BPM | BODY MASS INDEX: 33.32 KG/M2 | HEIGHT: 65 IN | DIASTOLIC BLOOD PRESSURE: 58 MMHG

## 2024-04-22 DIAGNOSIS — M85.89 OTHER SPECIFIED DISORDERS OF BONE DENSITY AND STRUCTURE, MULTIPLE SITES: ICD-10-CM

## 2024-04-22 DIAGNOSIS — E55.9 VITAMIN D DEFICIENCY, UNSPECIFIED: ICD-10-CM

## 2024-04-22 DIAGNOSIS — D35.00 BENIGN NEOPLASM OF UNSPECIFIED ADRENAL GLAND: ICD-10-CM

## 2024-04-22 DIAGNOSIS — E11.9 TYPE 2 DIABETES MELLITUS W/OUT COMPLICATIONS: ICD-10-CM

## 2024-04-22 DIAGNOSIS — E04.2 NONTOXIC MULTINODULAR GOITER: ICD-10-CM

## 2024-04-22 DIAGNOSIS — E78.00 PURE HYPERCHOLESTEROLEMIA, UNSPECIFIED: ICD-10-CM

## 2024-04-22 PROCEDURE — 95251 CONT GLUC MNTR ANALYSIS I&R: CPT

## 2024-04-22 PROCEDURE — 99214 OFFICE O/P EST MOD 30 MIN: CPT | Mod: 25

## 2024-04-22 RX ORDER — INSULIN GLARGINE 100 [IU]/ML
100 INJECTION, SOLUTION SUBCUTANEOUS AT BEDTIME
Qty: 3 | Refills: 4 | Status: ACTIVE | COMMUNITY
Start: 1900-01-01 | End: 1900-01-01

## 2024-04-22 RX ORDER — FLASH GLUCOSE SENSOR
KIT MISCELLANEOUS
Qty: 6 | Refills: 1 | Status: ACTIVE | COMMUNITY
Start: 2022-08-10 | End: 1900-01-01

## 2024-04-22 RX ORDER — INSULIN LISPRO 100 U/ML
100 INJECTION, SOLUTION SUBCUTANEOUS
Qty: 3 | Refills: 3 | Status: ACTIVE | COMMUNITY
Start: 2022-02-08 | End: 1900-01-01

## 2024-04-22 NOTE — HISTORY OF PRESENT ILLNESS
[Continuous Glucose Monitoring] : Continuous Glucose Monitoring: Yes [Mega] : Mega [FreeTextEntry1] : This is a 62 yo female who presents for diabetes follow up  Patient had hernia repair on 10/24/23 at [facility] and had recovered well. At last endocrine visit in Oct 2023 (telehealth), she as taking much lower dose of insulin post op but today notes she is back to regular insulin regimen - Basaglar 35U qhs and continue admelog 8-10U TID ac.  She is off Jardiance  now.  She is taking Repatha and Vascepa, last LDL is 70. Not on statins due to myalgias. Saw ophthalmology in Jan 2024, no diabetic retinopathy, no cataracts.   Regarding thyroid nodules, has not done sono since Sept 2022- h/o b/l subcentimeter thyroid nodules [Finger Stick] : Finger Stick: No [Hypoglycemia] : Patient is not hypoglycemic. [FreeTextEntry2] : 97 [FreeTextEntry3] : 3+0 [FreeTextEntry4] : 0+0 [de-identified] : 6.6 [FreeTextEntry5] : 138 [FreeTextEntry6] : 15.0

## 2024-04-22 NOTE — ASSESSMENT
[Diabetes Foot Care] : diabetes foot care [Long Term Vascular Complications] : long term vascular complications of diabetes [Carbohydrate Consistent Diet] : carbohydrate consistent diet [Importance of Diet and Exercise] : importance of diet and exercise to improve glycemic control, achieve weight loss and improve cardiovascular health [Hypoglycemia Management] : hypoglycemia management [Self Monitoring of Blood Glucose] : self monitoring of blood glucose [Retinopathy Screening] : Patient was referred to ophthalmology for retinopathy screening [Diabetic Medications] : Risks and benefits of diabetic medications were discussed [FreeTextEntry1] : Type 2 DM Noted HgbA1c from March 2024 is 7.6%, uptrending since  last visit,  goal <7% Reviewed Mega 2 sensor tracing, noted TIR 97%,34% high and 0% lows. Noted minor occasional spikes during fasting however otherwise in range and will not change insulin regimen at this time. Encouraged to continue with diet and exercise, as tolerated Continue Basaglar 40U qhs Continue admelog 14 U TID ac Reviewed labs from 3/2024 with patient today, noted elevated LDL is 168mg/dl, not on statin due to myalgias. Resume on  Repatha and Vascepa for HLD, [facility] Noted egfr 100, stable kidney functio. Saw ophthalmologist last week, no diabetic retinopathy Given script for labs prior to next visit   Osteopenia Patient following with rheum is on vitamin D supplementation for vitamin D deficiency, noted recent vitamin D level low at 26, discussed to increase otc supplementation, was on ergocalciferol Reviewed DXA scan from Jan 2024 noted osteopenia of femoral neck, FRAX score is low, no therapy indicated for now. She notes rheumatology is thinking about Prolia, unclear  Thyroid nodules h/o bilateral subcentimeter thyroid nodules as per recent sonogram in March 2024, nodule appear stable and do not meet criteria for FNA at this time Clinically euthyroid, not on thyroid medication  Adrenal nodule  INcidental finding as per CT scan (Ct scan done to r/o PE) and found to have adrenal adenoma, will obtain report from [facility] DIscussed most adrenal adenomas are benign, however will do hormonal hypersecretion workup Dexamethasone tablets given in office today and explained how to do overnight dexamethasone suppression testing with patient today  Answered all questions today; patient verbalized understanding of the above RTO in 3 months.

## 2024-05-07 ENCOUNTER — NON-APPOINTMENT (OUTPATIENT)
Age: 64
End: 2024-05-07

## 2024-05-07 LAB
ALDOSTERONE SERUM: 7.5 NG/DL
CORTIS SERPL-MCNC: 0.8 UG/DL

## 2024-05-10 LAB
DEXAMETHASONE LEVEL: 286 NG/DL
RENIN ACTIVITY, PLASMA: 14.74 NG/ML/HR

## 2024-05-14 LAB
METANEPHRINE, PL: <25 PG/ML
NORMETANEPHRINE, PL: 62.7 PG/ML

## 2024-05-23 ENCOUNTER — NON-APPOINTMENT (OUTPATIENT)
Age: 64
End: 2024-05-23

## 2024-05-24 ENCOUNTER — NON-APPOINTMENT (OUTPATIENT)
Age: 64
End: 2024-05-24

## 2024-06-14 ENCOUNTER — RESULT REVIEW (OUTPATIENT)
Age: 64
End: 2024-06-14

## 2024-06-14 ENCOUNTER — OUTPATIENT (OUTPATIENT)
Dept: OUTPATIENT SERVICES | Facility: HOSPITAL | Age: 64
LOS: 1 days | End: 2024-06-14
Payer: MEDICAID

## 2024-06-14 ENCOUNTER — APPOINTMENT (OUTPATIENT)
Dept: CV DIAGNOSTICS | Facility: HOSPITAL | Age: 64
End: 2024-06-14

## 2024-06-14 DIAGNOSIS — Z90.710 ACQUIRED ABSENCE OF BOTH CERVIX AND UTERUS: Chronic | ICD-10-CM

## 2024-06-14 DIAGNOSIS — Z98.89 OTHER SPECIFIED POSTPROCEDURAL STATES: Chronic | ICD-10-CM

## 2024-06-14 DIAGNOSIS — R06.02 SHORTNESS OF BREATH: ICD-10-CM

## 2024-06-14 DIAGNOSIS — Z90.49 ACQUIRED ABSENCE OF OTHER SPECIFIED PARTS OF DIGESTIVE TRACT: Chronic | ICD-10-CM

## 2024-06-14 DIAGNOSIS — Z98.890 OTHER SPECIFIED POSTPROCEDURAL STATES: Chronic | ICD-10-CM

## 2024-06-14 PROCEDURE — 78452 HT MUSCLE IMAGE SPECT MULT: CPT | Mod: MC

## 2024-06-14 PROCEDURE — 93017 CV STRESS TEST TRACING ONLY: CPT

## 2024-06-14 PROCEDURE — 93016 CV STRESS TEST SUPVJ ONLY: CPT | Mod: MC

## 2024-06-14 PROCEDURE — A9500: CPT

## 2024-06-14 PROCEDURE — 78452 HT MUSCLE IMAGE SPECT MULT: CPT | Mod: 26,MC

## 2024-06-14 PROCEDURE — 93018 CV STRESS TEST I&R ONLY: CPT | Mod: MC

## 2024-07-19 ENCOUNTER — RX RENEWAL (OUTPATIENT)
Age: 64
End: 2024-07-19

## 2024-07-19 ENCOUNTER — OUTPATIENT (OUTPATIENT)
Dept: OUTPATIENT SERVICES | Facility: HOSPITAL | Age: 64
LOS: 1 days | End: 2024-07-19
Payer: MEDICAID

## 2024-07-19 ENCOUNTER — APPOINTMENT (OUTPATIENT)
Dept: RHEUMATOLOGY | Facility: HOSPITAL | Age: 64
End: 2024-07-19

## 2024-07-19 VITALS
RESPIRATION RATE: 14 BRPM | WEIGHT: 200 LBS | HEIGHT: 65 IN | HEART RATE: 75 BPM | TEMPERATURE: 98 F | DIASTOLIC BLOOD PRESSURE: 71 MMHG | BODY MASS INDEX: 33.32 KG/M2 | OXYGEN SATURATION: 98 % | SYSTOLIC BLOOD PRESSURE: 130 MMHG

## 2024-07-19 DIAGNOSIS — Z98.89 OTHER SPECIFIED POSTPROCEDURAL STATES: Chronic | ICD-10-CM

## 2024-07-19 DIAGNOSIS — Z90.710 ACQUIRED ABSENCE OF BOTH CERVIX AND UTERUS: Chronic | ICD-10-CM

## 2024-07-19 DIAGNOSIS — M06.9 RHEUMATOID ARTHRITIS, UNSPECIFIED: ICD-10-CM

## 2024-07-19 DIAGNOSIS — Z98.890 OTHER SPECIFIED POSTPROCEDURAL STATES: Chronic | ICD-10-CM

## 2024-07-19 DIAGNOSIS — M79.7 FIBROMYALGIA: ICD-10-CM

## 2024-07-19 DIAGNOSIS — M17.9 OSTEOARTHRITIS OF KNEE, UNSPECIFIED: ICD-10-CM

## 2024-07-19 DIAGNOSIS — M10.9 GOUT, UNSPECIFIED: ICD-10-CM

## 2024-07-19 DIAGNOSIS — M54.12 RADICULOPATHY, CERVICAL REGION: ICD-10-CM

## 2024-07-19 DIAGNOSIS — Z90.49 ACQUIRED ABSENCE OF OTHER SPECIFIED PARTS OF DIGESTIVE TRACT: Chronic | ICD-10-CM

## 2024-07-19 LAB — URATE SERPL-MCNC: 5.8 MG/DL — SIGNIFICANT CHANGE UP (ref 2.5–7)

## 2024-07-19 PROCEDURE — 84550 ASSAY OF BLOOD/URIC ACID: CPT

## 2024-07-19 PROCEDURE — 99214 OFFICE O/P EST MOD 30 MIN: CPT | Mod: GC

## 2024-07-19 PROCEDURE — G0463: CPT

## 2024-07-19 NOTE — REASON FOR VISIT
Alert and oriented x3. VSS. Diminished lungs sounds. Denies SOB. On 4L of O2  with SATs of 91%. Frequent non productive cough. Left sided weakness. Active bowel sounds x4q. Cardona intact draining allan urine. Turn and reposition q2 hours. Dysphagia diet. Denies pain.     [Follow-Up: _____] : a [unfilled] follow-up visit

## 2024-07-25 ENCOUNTER — APPOINTMENT (OUTPATIENT)
Dept: MRI IMAGING | Facility: IMAGING CENTER | Age: 64
End: 2024-07-25

## 2024-07-26 NOTE — REVIEW OF SYSTEMS
[Fever] : no fever [Feeling Tired] : feeling tired [Eye Pain] : no eye pain [Red Eyes] : eyes not red [Nasal Discharge] : no nasal discharge [Sore Throat] : no sore throat [Chest Pain] : no chest pain [Palpitations] : no palpitations [Shortness Of Breath] : no shortness of breath [Cough] : no cough [Abdominal Pain] : no abdominal pain [Vomiting] : no vomiting [Dysuria] : no dysuria [Arthralgias] : arthralgias [Joint Pain] : joint pain [Dizziness] : no dizziness [Fainting] : no fainting

## 2024-07-29 DIAGNOSIS — M17.9 OSTEOARTHRITIS OF KNEE, UNSPECIFIED: ICD-10-CM

## 2024-07-29 DIAGNOSIS — M79.7 FIBROMYALGIA: ICD-10-CM

## 2024-07-29 DIAGNOSIS — M54.12 RADICULOPATHY, CERVICAL REGION: ICD-10-CM

## 2024-07-29 DIAGNOSIS — M10.9 GOUT, UNSPECIFIED: ICD-10-CM

## 2024-07-30 ENCOUNTER — OUTPATIENT (OUTPATIENT)
Dept: OUTPATIENT SERVICES | Facility: HOSPITAL | Age: 64
LOS: 1 days | End: 2024-07-30
Payer: MEDICAID

## 2024-07-30 ENCOUNTER — APPOINTMENT (OUTPATIENT)
Dept: NEUROLOGY | Facility: HOSPITAL | Age: 64
End: 2024-07-30

## 2024-07-30 VITALS
HEART RATE: 77 BPM | SYSTOLIC BLOOD PRESSURE: 126 MMHG | RESPIRATION RATE: 15 BRPM | OXYGEN SATURATION: 97 % | DIASTOLIC BLOOD PRESSURE: 76 MMHG | TEMPERATURE: 97.6 F

## 2024-07-30 DIAGNOSIS — Z90.49 ACQUIRED ABSENCE OF OTHER SPECIFIED PARTS OF DIGESTIVE TRACT: Chronic | ICD-10-CM

## 2024-07-30 DIAGNOSIS — Z90.710 ACQUIRED ABSENCE OF BOTH CERVIX AND UTERUS: Chronic | ICD-10-CM

## 2024-07-30 DIAGNOSIS — M54.12 RADICULOPATHY, CERVICAL REGION: ICD-10-CM

## 2024-07-30 DIAGNOSIS — M54.16 RADICULOPATHY, LUMBAR REGION: ICD-10-CM

## 2024-07-30 DIAGNOSIS — R51.9 HEADACHE, UNSPECIFIED: ICD-10-CM

## 2024-07-30 DIAGNOSIS — Z98.89 OTHER SPECIFIED POSTPROCEDURAL STATES: Chronic | ICD-10-CM

## 2024-07-30 PROCEDURE — G0463: CPT

## 2024-07-30 NOTE — HISTORY OF PRESENT ILLNESS
[FreeTextEntry1] : HPI  07/30/2024 Follow up: Patient presents today for: -back of the head pain that runs down her neck into her L arm like an electric current. Pain starts above L ear and travels down the lateral posterior aspect of her L neck and down the back of her L arm. She is not able to lay on her R side dt the pain. onset june 2024, worsening. Patient denies any trauma at that time. Her only eliciting event she states is fibromyalgia. -low back pain that starts on the R low back and runs down her R leg. Her back pain starts in the R low back, travels across her upper R buttock and then to the anterior R leg then stops at the knee. The pain is described as a current like sensation. onset june 2024, worsening.  Her only eliciting event she states is fibromyalgia.  The patient reports chronic impingement issues in her cervical spine and lower back. She reports that her R arm will always be bad but the L arm is worse. She continues to swim but has to do treading motions instead of full arm strokes. She reports that her rheumatologist has recommended cervical spine imaging.  SHe also reports a long history of instability when walking that waxes and wanes. Like she is off balance. Patient will sometimes use a cane.   3/26/24 follow up Patient seen in clinic for follow up. Patient reports her headache is still present intermittently (occurring around 6-7 times per month). No changes in HA compared to prior. She currently is having a headache for the past 3 days intermittently. Nurtec helps but not completely resolving (takes the edge off). She only takes nurtec when headache is worst around 3 times a week. Also taking tripetal daily. Denied new neurological complaints.   8/8/23 Patient presents today for follow up for cervical radiculopathy, trigeminal neuralgia and headaches. She states that her nurtec has been effective and that her headaches are infrequent, maybe 1-2 a month, intensity 4/10 if they occur with some photo/phonophobia. She says that she is currently going through a sinus infection. Continues to endorse symptoms of trigeminal neuralgia with shooting pain from her left ear to the front of her face. However, she feels that they are managed well with the oxcarb. Recent blood work in June demonstrated normal electrolyte levels. The patient acknowledged her diagnosis of angina and fibromyalgia which is currently affecting her mobility but she does not believe that it is affecting her daily lifestyle. She did not want to take any medications for her fibromyalgia because she feels like she's taking too many medications. OK with continuing her nurtec and oxcarb.  2/7/23: Patient presents today for follow up for cervical radiculopathy, and trigeminal neuralgia and headache. Pt states the nurtec has been helping her the most and that she recently saw her rheumatologist who also prescribed her nortriptaline and diclofec (both of which she has not taken yet due to scheduled cardiac cath procedure). Pt states her headaches (L sided originating in the neck radiating up, 7/10, persistent, with photophobia/phonophobia, neck stiffness) has been worse than last time she was here. Still endorsing weakness in the UE's (L>R), with numbness/tingling.    Interval Hx 11/1/22: Patient presents today for follow up for cervical radiculopathy, and trigeminal neuralgia and headache. Trigeminal neuralgia is mildly controlled on trileptal 300mg qday. 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 Still endorsing weakness in the UE's (L>R), but now numbness/tingling. Has appt w/ Dr. Nava this month. She is not currently taking any medications for her radicular pain. Taking Nurtec for her headaches, that are likely cervicogenic in etiology. Since last appt, patient saw interventional pain specialist (Dr. Camejo) in [place], [place] and received cervical epidural injection x1 in 9/2022, no relief. Also received L shoulder injection w/ minimal relief. Has another appt to see same pain specialist this month. Of note, started PT only 2 weeks ago. Also endorsing mild headaches for which Nurtec is controlling, needs refill.    Interval Hx 5/24/22: Patient presents today for follow up for cervical radiculopathy, b/l carpal tunnel and trigeminal neuralgia. Trigeminal neuralgia is moderately controlled on trileptal. Previous MRI brain showed compression of the L trigeminal nerve, for which Dr. Dunn offered to perform surgery but patient declined. She also currently has a R rotator cuff tear and was referred for surgery, which patient also declined. She is not currently taking any medications for her radicular pain. She was prescribed Naproxen but takes Tylenol PRN when her pain is really bad. Currently states today is a "bad day" and her pain is 8/10. Has not yet received PT as was waiting for MRI Cspine to be performed. MRI Cspine shows      60F R handed with a PMHx of R rotator cuff tear, DM, HTN, Asthma, CAD s/p stents, diverticulitis, Gout, OA, Fibromyalgia, trigeminal neuralgia, tremors presents to establish care. Pt saw seeing neurologist Dr. Leroy Mota but had insurance issues.    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 Progressive, worsening b/l hand weakness since 2018, which initially brought her to see a neurologist. She has reported C5-7 nerve impingements. Last MRI C spine done in 2020. She also reports R index finger numbness, no paresthesias.    Pt diagnosed with L sided trigeminal neuralgia in 2019, MRI done. She is on Trileptal lowest dose twice a day, no significant difference because she has only had mild symptoms but has slightly decreased frequency of headaches, occasional ear and jaw pain and tearing. She saw neurosurgery Dr. Hammond for trigeminal neuralgia, who offered surgery but pt refused.    Pt reports b/l hand tremors since 2019, intermittent but alternating hands, when eating and sewing, not related to her trigeminal neuralgia pain, lasting seconds at a time. No worsening with anxiety, no alcohol intake, no family hx of tremors.    Pt reports diffuse joint pains due to her fibromyalgia, was on duloxetine 60mg daily but discontinued due to nausea. Now just taking tylenol PRN for it. She has not had physical therapy or occupational therapy recently.    EMG 8/2021 with Dr. Leroy Mota  1. b/l C4-5 and C5-6 radiculopathy  2. b/l median motor neuropathy  3. b/l ulnar motor neuropathy  4. b/l median sensory neuropathy   Review of Systems       Constitutional: no fever, no chills, not feeling poorly, not feeling tired, no recent weight gain and no recent weight loss.   Neurological: numbness, but no ataxia.   Eyes and ENT are otherwise negative.      ----------------------------------

## 2024-07-30 NOTE — HISTORY OF PRESENT ILLNESS
[FreeTextEntry1] : HPI  07/30/2024 Follow up: Patient presents today for: -back of the head pain that runs down her neck into her L arm like an electric current. Pain starts above L ear and travels down the lateral posterior aspect of her L neck and down the back of her L arm. She is not able to lay on her R side dt the pain. onset june 2024, worsening. Patient denies any trauma at that time. Her only eliciting event she states is fibromyalgia. -low back pain that starts on the R low back and runs down her R leg. Her back pain starts in the R low back, travels across her upper R buttock and then to the anterior R leg then stops at the knee. The pain is described as a current like sensation. onset june 2024, worsening.  Her only eliciting event she states is fibromyalgia.  The patient reports chronic impingement issues in her cervical spine and lower back. She reports that her R arm will always be bad but the L arm is worse. She continues to swim but has to do treading motions instead of full arm strokes. She reports that her rheumatologist has recommended cervical spine imaging.  SHe also reports a long history of instability when walking that waxes and wanes. Like she is off balance. Patient will sometimes use a cane.   3/26/24 follow up Patient seen in clinic for follow up. Patient reports her headache is still present intermittently (occurring around 6-7 times per month). No changes in HA compared to prior. She currently is having a headache for the past 3 days intermittently. Nurtec helps but not completely resolving (takes the edge off). She only takes nurtec when headache is worst around 3 times a week. Also taking tripetal daily. Denied new neurological complaints.   8/8/23 Patient presents today for follow up for cervical radiculopathy, trigeminal neuralgia and headaches. She states that her nurtec has been effective and that her headaches are infrequent, maybe 1-2 a month, intensity 4/10 if they occur with some photo/phonophobia. She says that she is currently going through a sinus infection. Continues to endorse symptoms of trigeminal neuralgia with shooting pain from her left ear to the front of her face. However, she feels that they are managed well with the oxcarb. Recent blood work in June demonstrated normal electrolyte levels. The patient acknowledged her diagnosis of angina and fibromyalgia which is currently affecting her mobility but she does not believe that it is affecting her daily lifestyle. She did not want to take any medications for her fibromyalgia because she feels like she's taking too many medications. OK with continuing her nurtec and oxcarb.  2/7/23: Patient presents today for follow up for cervical radiculopathy, and trigeminal neuralgia and headache. Pt states the nurtec has been helping her the most and that she recently saw her rheumatologist who also prescribed her nortriptaline and diclofec (both of which she has not taken yet due to scheduled cardiac cath procedure). Pt states her headaches (L sided originating in the neck radiating up, 7/10, persistent, with photophobia/phonophobia, neck stiffness) has been worse than last time she was here. Still endorsing weakness in the UE's (L>R), with numbness/tingling.    Interval Hx 11/1/22: Patient presents today for follow up for cervical radiculopathy, and trigeminal neuralgia and headache. Trigeminal neuralgia is mildly controlled on trileptal 300mg qday. 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 Still endorsing weakness in the UE's (L>R), but now numbness/tingling. Has appt w/ Dr. Nava this month. She is not currently taking any medications for her radicular pain. Taking Nurtec for her headaches, that are likely cervicogenic in etiology. Since last appt, patient saw interventional pain specialist (Dr. Camejo) in [place], [place] and received cervical epidural injection x1 in 9/2022, no relief. Also received L shoulder injection w/ minimal relief. Has another appt to see same pain specialist this month. Of note, started PT only 2 weeks ago. Also endorsing mild headaches for which Nurtec is controlling, needs refill.    Interval Hx 5/24/22: Patient presents today for follow up for cervical radiculopathy, b/l carpal tunnel and trigeminal neuralgia. Trigeminal neuralgia is moderately controlled on trileptal. Previous MRI brain showed compression of the L trigeminal nerve, for which Dr. Dunn offered to perform surgery but patient declined. She also currently has a R rotator cuff tear and was referred for surgery, which patient also declined. She is not currently taking any medications for her radicular pain. She was prescribed Naproxen but takes Tylenol PRN when her pain is really bad. Currently states today is a "bad day" and her pain is 8/10. Has not yet received PT as was waiting for MRI Cspine to be performed. MRI Cspine shows      60F R handed with a PMHx of R rotator cuff tear, DM, HTN, Asthma, CAD s/p stents, diverticulitis, Gout, OA, Fibromyalgia, trigeminal neuralgia, tremors presents to establish care. Pt saw seeing neurologist Dr. Leroy Mota but had insurance issues.    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 Progressive, worsening b/l hand weakness since 2018, which initially brought her to see a neurologist. She has reported C5-7 nerve impingements. Last MRI C spine done in 2020. She also reports R index finger numbness, no paresthesias.    Pt diagnosed with L sided trigeminal neuralgia in 2019, MRI done. She is on Trileptal lowest dose twice a day, no significant difference because she has only had mild symptoms but has slightly decreased frequency of headaches, occasional ear and jaw pain and tearing. She saw neurosurgery Dr. Hammond for trigeminal neuralgia, who offered surgery but pt refused.    Pt reports b/l hand tremors since 2019, intermittent but alternating hands, when eating and sewing, not related to her trigeminal neuralgia pain, lasting seconds at a time. No worsening with anxiety, no alcohol intake, no family hx of tremors.    Pt reports diffuse joint pains due to her fibromyalgia, was on duloxetine 60mg daily but discontinued due to nausea. Now just taking tylenol PRN for it. She has not had physical therapy or occupational therapy recently.    EMG 8/2021 with Dr. Leroy Mota  1. b/l C4-5 and C5-6 radiculopathy  2. b/l median motor neuropathy  3. b/l ulnar motor neuropathy  4. b/l median sensory neuropathy   Review of Systems       Constitutional: no fever, no chills, not feeling poorly, not feeling tired, no recent weight gain and no recent weight loss.   Neurological: numbness, but no ataxia.   Eyes and ENT are otherwise negative.      ----------------------------------

## 2024-07-30 NOTE — HISTORY OF PRESENT ILLNESS
[FreeTextEntry1] : HPI  07/30/2024 Follow up: Patient presents today for: -back of the head pain that runs down her neck into her L arm like an electric current. Pain starts above L ear and travels down the lateral posterior aspect of her L neck and down the back of her L arm. She is not able to lay on her R side dt the pain. onset june 2024, worsening. Patient denies any trauma at that time. Her only eliciting event she states is fibromyalgia. -low back pain that starts on the R low back and runs down her R leg. Her back pain starts in the R low back, travels across her upper R buttock and then to the anterior R leg then stops at the knee. The pain is described as a current like sensation. onset june 2024, worsening.  Her only eliciting event she states is fibromyalgia.  The patient reports chronic impingement issues in her cervical spine and lower back. She reports that her R arm will always be bad but the L arm is worse. She continues to swim but has to do treading motions instead of full arm strokes. She reports that her rheumatologist has recommended cervical spine imaging.  SHe also reports a long history of instability when walking that waxes and wanes. Like she is off balance. Patient will sometimes use a cane.   3/26/24 follow up Patient seen in clinic for follow up. Patient reports her headache is still present intermittently (occurring around 6-7 times per month). No changes in HA compared to prior. She currently is having a headache for the past 3 days intermittently. Nurtec helps but not completely resolving (takes the edge off). She only takes nurtec when headache is worst around 3 times a week. Also taking tripetal daily. Denied new neurological complaints.   8/8/23 Patient presents today for follow up for cervical radiculopathy, trigeminal neuralgia and headaches. She states that her nurtec has been effective and that her headaches are infrequent, maybe 1-2 a month, intensity 4/10 if they occur with some photo/phonophobia. She says that she is currently going through a sinus infection. Continues to endorse symptoms of trigeminal neuralgia with shooting pain from her left ear to the front of her face. However, she feels that they are managed well with the oxcarb. Recent blood work in June demonstrated normal electrolyte levels. The patient acknowledged her diagnosis of angina and fibromyalgia which is currently affecting her mobility but she does not believe that it is affecting her daily lifestyle. She did not want to take any medications for her fibromyalgia because she feels like she's taking too many medications. OK with continuing her nurtec and oxcarb.  2/7/23: Patient presents today for follow up for cervical radiculopathy, and trigeminal neuralgia and headache. Pt states the nurtec has been helping her the most and that she recently saw her rheumatologist who also prescribed her nortriptaline and diclofec (both of which she has not taken yet due to scheduled cardiac cath procedure). Pt states her headaches (L sided originating in the neck radiating up, 7/10, persistent, with photophobia/phonophobia, neck stiffness) has been worse than last time she was here. Still endorsing weakness in the UE's (L>R), with numbness/tingling.    Interval Hx 11/1/22: Patient presents today for follow up for cervical radiculopathy, and trigeminal neuralgia and headache. Trigeminal neuralgia is mildly controlled on trileptal 300mg qday. 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 Still endorsing weakness in the UE's (L>R), but now numbness/tingling. Has appt w/ Dr. Nava this month. She is not currently taking any medications for her radicular pain. Taking Nurtec for her headaches, that are likely cervicogenic in etiology. Since last appt, patient saw interventional pain specialist (Dr. Camejo) in [place], [place] and received cervical epidural injection x1 in 9/2022, no relief. Also received L shoulder injection w/ minimal relief. Has another appt to see same pain specialist this month. Of note, started PT only 2 weeks ago. Also endorsing mild headaches for which Nurtec is controlling, needs refill.    Interval Hx 5/24/22: Patient presents today for follow up for cervical radiculopathy, b/l carpal tunnel and trigeminal neuralgia. Trigeminal neuralgia is moderately controlled on trileptal. Previous MRI brain showed compression of the L trigeminal nerve, for which Dr. Dunn offered to perform surgery but patient declined. She also currently has a R rotator cuff tear and was referred for surgery, which patient also declined. She is not currently taking any medications for her radicular pain. She was prescribed Naproxen but takes Tylenol PRN when her pain is really bad. Currently states today is a "bad day" and her pain is 8/10. Has not yet received PT as was waiting for MRI Cspine to be performed. MRI Cspine shows      60F R handed with a PMHx of R rotator cuff tear, DM, HTN, Asthma, CAD s/p stents, diverticulitis, Gout, OA, Fibromyalgia, trigeminal neuralgia, tremors presents to establish care. Pt saw seeing neurologist Dr. Leroy Mota but had insurance issues.    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 Progressive, worsening b/l hand weakness since 2018, which initially brought her to see a neurologist. She has reported C5-7 nerve impingements. Last MRI C spine done in 2020. She also reports R index finger numbness, no paresthesias.    Pt diagnosed with L sided trigeminal neuralgia in 2019, MRI done. She is on Trileptal lowest dose twice a day, no significant difference because she has only had mild symptoms but has slightly decreased frequency of headaches, occasional ear and jaw pain and tearing. She saw neurosurgery Dr. Hammond for trigeminal neuralgia, who offered surgery but pt refused.    Pt reports b/l hand tremors since 2019, intermittent but alternating hands, when eating and sewing, not related to her trigeminal neuralgia pain, lasting seconds at a time. No worsening with anxiety, no alcohol intake, no family hx of tremors.    Pt reports diffuse joint pains due to her fibromyalgia, was on duloxetine 60mg daily but discontinued due to nausea. Now just taking tylenol PRN for it. She has not had physical therapy or occupational therapy recently.    EMG 8/2021 with Dr. Leroy Mota  1. b/l C4-5 and C5-6 radiculopathy  2. b/l median motor neuropathy  3. b/l ulnar motor neuropathy  4. b/l median sensory neuropathy   Review of Systems       Constitutional: no fever, no chills, not feeling poorly, not feeling tired, no recent weight gain and no recent weight loss.   Neurological: numbness, but no ataxia.   Eyes and ENT are otherwise negative.      ----------------------------------

## 2024-07-30 NOTE — ASSESSMENT
[FreeTextEntry1] : Assessment Trigeminal neuralgia (350.1) (G50.0) cervicalgia w radiculopathy sciatica on R Patient presents today for follow up for cervical radiculopathy, lumbar radiculopathy in setting of known trigeminal neuralgia and headache. Patient seems more bothered by the appendicular complaints today. Currently on nurtec (rimegepant) and trileptal (oxcarbazepine) with poorly controlled headaches. No new neurological complaints.  Impression: HAs likely of cervicogenic origin vs trigeminal neuralgia. peripheral complaints likely related to compression pathology.

## 2024-07-30 NOTE — PLAN
[TextEntry] : Plan: C/w Nurtec C/w Trileptal 300mg bid cervical and lumbar MRI, last spinal imaging per patient two years ago ptot referral for disabling back and neck pain RTC 6 months  Case d/w Attending: Dr. Nissenbaum

## 2024-07-30 NOTE — PHYSICAL EXAM
[General Appearance - Alert] : alert [General Appearance - In No Acute Distress] : in no acute distress [Sclera] : the sclera and conjunctiva were normal [Extraocular Movements] : extraocular movements were intact [Outer Ear] : the ears and nose were normal in appearance [Oropharynx] : the oropharynx was normal [Neck Appearance] : the appearance of the neck was normal [] : the neck was supple [Respiration, Rhythm And Depth] : normal respiratory rhythm and effort [Auscultation Breath Sounds / Voice Sounds] : lungs were clear to auscultation bilaterally [Heart Rate And Rhythm] : heart rate was normal and rhythm regular [Heart Sounds] : normal S1 and S2 [Abdomen Soft] : soft [Abdomen Tenderness] : non-tender [FreeTextEntry1] : Physical Exam     Constitutional: alert and in no acute distress. Psychiatric: oriented to person, place, and time, insight and judgment were intact, the affect was normal, the mood was normal and recent memory was not impaired. Neurologic: Orientation: oriented to person, oriented to place and oriented to time. Language: fluency intact. Cranial Nerves: visual acuity intact bilaterally, visual fields full to confrontation, pupils equal round and reactive to light, extraocular motion intact, facial numbness on L side (chronic), face symmetrical, hearing was intact bilaterally, head turning and shoulder shrug symmetric and there was no tongue deviation with protrusion. Motor: muscle tone was normal in all four extremities. Motor Strength: the patient is right hand dominant.  Right upper extremity: shoulder abduction 5/5, arm flexion 5/5, arm extension 5/5, , the hand  was normal, fingers abduction 5/5 right. Left upper extremity shoulder abduction 4/5, shoulder adduction 4+/5 arm flexion 5/5, arm extension 5/5, wrist flexion 5/5, wrist extension 5/5, the hand  was normal, fingers abduction 5/5 left. Right lower extremity strength: hip flexion 4/5, hip extension 5/5, knee flexion 4+/5, knee extension 4+/5, ankle dorsiflexion 5/5, ankle plantar flexion 5/5. Left lower extremity strength: hip flexion 4+/5, hip extension 5/5, knee flexion 4+/5, knee extension 4/5, ankle dorsiflexion 5/5, ankle plantar flexion 5/5. Sensory exam:       increased sensation to light touch in entire LUE compared to RUE, increased sensation to light touch in L2-L5 on R compared to L, light touch in distal L5 and S1 (toes) symmetric, light touch symmetric in dorsal C8-L2 (back)      sensation to cold touch decreased in proximal C5 and C6 dermatomes in LUE compared to RUE Coordination:. normal gait. Finger to nose dysmetria was not present. Deep tendon reflexes: Biceps right 1+. Biceps left 1+. Triceps right 2+. Triceps left 2+. Brachioradialis right 2+. Brachioradialis left 2+. Patella right 1+. Patella left 1+. Ankle jerk right 1+. Ankle jerk left 1+. Bilateral toes down.  ------------------------------

## 2024-07-30 NOTE — HISTORY OF PRESENT ILLNESS
[FreeTextEntry1] : HPI  07/30/2024 Follow up: Patient presents today for: -back of the head pain that runs down her neck into her L arm like an electric current. Pain starts above L ear and travels down the lateral posterior aspect of her L neck and down the back of her L arm. She is not able to lay on her R side dt the pain. onset june 2024, worsening. Patient denies any trauma at that time. Her only eliciting event she states is fibromyalgia. -low back pain that starts on the R low back and runs down her R leg. Her back pain starts in the R low back, travels across her upper R buttock and then to the anterior R leg then stops at the knee. The pain is described as a current like sensation. onset june 2024, worsening.  Her only eliciting event she states is fibromyalgia.  The patient reports chronic impingement issues in her cervical spine and lower back. She reports that her R arm will always be bad but the L arm is worse. She continues to swim but has to do treading motions instead of full arm strokes. She reports that her rheumatologist has recommended cervical spine imaging.  SHe also reports a long history of instability when walking that waxes and wanes. Like she is off balance. Patient will sometimes use a cane.   3/26/24 follow up Patient seen in clinic for follow up. Patient reports her headache is still present intermittently (occurring around 6-7 times per month). No changes in HA compared to prior. She currently is having a headache for the past 3 days intermittently. Nurtec helps but not completely resolving (takes the edge off). She only takes nurtec when headache is worst around 3 times a week. Also taking tripetal daily. Denied new neurological complaints.   8/8/23 Patient presents today for follow up for cervical radiculopathy, trigeminal neuralgia and headaches. She states that her nurtec has been effective and that her headaches are infrequent, maybe 1-2 a month, intensity 4/10 if they occur with some photo/phonophobia. She says that she is currently going through a sinus infection. Continues to endorse symptoms of trigeminal neuralgia with shooting pain from her left ear to the front of her face. However, she feels that they are managed well with the oxcarb. Recent blood work in June demonstrated normal electrolyte levels. The patient acknowledged her diagnosis of angina and fibromyalgia which is currently affecting her mobility but she does not believe that it is affecting her daily lifestyle. She did not want to take any medications for her fibromyalgia because she feels like she's taking too many medications. OK with continuing her nurtec and oxcarb.  2/7/23: Patient presents today for follow up for cervical radiculopathy, and trigeminal neuralgia and headache. Pt states the nurtec has been helping her the most and that she recently saw her rheumatologist who also prescribed her nortriptaline and diclofec (both of which she has not taken yet due to scheduled cardiac cath procedure). Pt states her headaches (L sided originating in the neck radiating up, 7/10, persistent, with photophobia/phonophobia, neck stiffness) has been worse than last time she was here. Still endorsing weakness in the UE's (L>R), with numbness/tingling.    Interval Hx 11/1/22: Patient presents today for follow up for cervical radiculopathy, and trigeminal neuralgia and headache. Trigeminal neuralgia is mildly controlled on trileptal 300mg qday. 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 Still endorsing weakness in the UE's (L>R), but now numbness/tingling. Has appt w/ Dr. Nava this month. She is not currently taking any medications for her radicular pain. Taking Nurtec for her headaches, that are likely cervicogenic in etiology. Since last appt, patient saw interventional pain specialist (Dr. Camejo) in [place], [place] and received cervical epidural injection x1 in 9/2022, no relief. Also received L shoulder injection w/ minimal relief. Has another appt to see same pain specialist this month. Of note, started PT only 2 weeks ago. Also endorsing mild headaches for which Nurtec is controlling, needs refill.    Interval Hx 5/24/22: Patient presents today for follow up for cervical radiculopathy, b/l carpal tunnel and trigeminal neuralgia. Trigeminal neuralgia is moderately controlled on trileptal. Previous MRI brain showed compression of the L trigeminal nerve, for which Dr. Dunn offered to perform surgery but patient declined. She also currently has a R rotator cuff tear and was referred for surgery, which patient also declined. She is not currently taking any medications for her radicular pain. She was prescribed Naproxen but takes Tylenol PRN when her pain is really bad. Currently states today is a "bad day" and her pain is 8/10. Has not yet received PT as was waiting for MRI Cspine to be performed. MRI Cspine shows      60F R handed with a PMHx of R rotator cuff tear, DM, HTN, Asthma, CAD s/p stents, diverticulitis, Gout, OA, Fibromyalgia, trigeminal neuralgia, tremors presents to establish care. Pt saw seeing neurologist Dr. Leroy Mota but had insurance issues.    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 Progressive, worsening b/l hand weakness since 2018, which initially brought her to see a neurologist. She has reported C5-7 nerve impingements. Last MRI C spine done in 2020. She also reports R index finger numbness, no paresthesias.    Pt diagnosed with L sided trigeminal neuralgia in 2019, MRI done. She is on Trileptal lowest dose twice a day, no significant difference because she has only had mild symptoms but has slightly decreased frequency of headaches, occasional ear and jaw pain and tearing. She saw neurosurgery Dr. Hammond for trigeminal neuralgia, who offered surgery but pt refused.    Pt reports b/l hand tremors since 2019, intermittent but alternating hands, when eating and sewing, not related to her trigeminal neuralgia pain, lasting seconds at a time. No worsening with anxiety, no alcohol intake, no family hx of tremors.    Pt reports diffuse joint pains due to her fibromyalgia, was on duloxetine 60mg daily but discontinued due to nausea. Now just taking tylenol PRN for it. She has not had physical therapy or occupational therapy recently.    EMG 8/2021 with Dr. Leroy Mota  1. b/l C4-5 and C5-6 radiculopathy  2. b/l median motor neuropathy  3. b/l ulnar motor neuropathy  4. b/l median sensory neuropathy   Review of Systems       Constitutional: no fever, no chills, not feeling poorly, not feeling tired, no recent weight gain and no recent weight loss.   Neurological: numbness, but no ataxia.   Eyes and ENT are otherwise negative.      ----------------------------------

## 2024-08-05 ENCOUNTER — APPOINTMENT (OUTPATIENT)
Dept: ENDOCRINOLOGY | Facility: CLINIC | Age: 64
End: 2024-08-05

## 2024-08-05 PROCEDURE — 95251 CONT GLUC MNTR ANALYSIS I&R: CPT

## 2024-08-05 PROCEDURE — 99214 OFFICE O/P EST MOD 30 MIN: CPT | Mod: 25

## 2024-08-05 NOTE — ASSESSMENT
[Diabetes Foot Care] : diabetes foot care [Long Term Vascular Complications] : long term vascular complications of diabetes [Carbohydrate Consistent Diet] : carbohydrate consistent diet [Importance of Diet and Exercise] : importance of diet and exercise to improve glycemic control, achieve weight loss and improve cardiovascular health [Hypoglycemia Management] : hypoglycemia management [Self Monitoring of Blood Glucose] : self monitoring of blood glucose [Retinopathy Screening] : Patient was referred to ophthalmology for retinopathy screening [Diabetic Medications] : Risks and benefits of diabetic medications were discussed [FreeTextEntry1] : Type 2 DM Noted HgbA1c from March 2024 is 7.5%, stable,  goal <7% Reviewed Mega 2 sensor tracing, noted TIR 95%,5% high and 0% lows. Noted minor occasional spikes during fasting however otherwise in range and will not change insulin regimen at this time. Encouraged to continue with diet and exercise, as tolerated Increase Basaglar 45U qhs Increase admelog 18 U TID ac Reviewed labs from 7/2024 with patient today, noted LDL is at goal 90/dl, not on statin due to myalgias. Resume on  Repatha and Vascepa for hyperlipidemia, will need to f/u cardiology for elevated triglyceride 280mg/dl Noted egfr 100, stable kidney function. Saw ophthalmologist in April 2024, no diabetic retinopathy Given script for labs prior to next visit   Osteopenia Patient following with rheum is on vitamin D supplementation for vitamin D deficiency, noted recent vitamin D level low at 26, discussed to increase otc supplementation, was on ergocalciferol Reviewed DXA scan from Jan 2024 noted osteopenia of femoral neck, FRAX score is low, no therapy indicated for now. She notes rheumatology is thinking about Prolia, however she declined due to concern of side effects  Thyroid nodules h/o bilateral subcentimeter thyroid nodules as per recent sonogram in March 2024, nodule appear stable and do not meet criteria for FNA at this time Clinically euthyroid, not on thyroid medication  Adrenal nodule  Incidental finding as per CT scan (Ct scan done to r/o PE) and found to have adrenal adenoma, will obtain report from Joseph Discussed most adrenal adenomas are benign, however will do hormonal hypersecretion workup  overnight dexamethasone suppression testing noted appropriately suppressed cortisol 0.8 from May 2024  Answered all questions today; patient verbalized understanding of the above RTO in 3 months.

## 2024-08-05 NOTE — HISTORY OF PRESENT ILLNESS
[Continuous Glucose Monitoring] : Continuous Glucose Monitoring: Yes [Mega] : Mega [FreeTextEntry1] : This is a 64 yo female who presents for diabetes follow up  Patient had hernia repair on 10/24/23 at [facility] and had recovered well. At last endocrine visit in Oct 2023 (telehealth), she as taking much lower dose of insulin post op but today notes she is back to regular insulin regimen - Basaglar 35U qhs and continue admelog 8-10U TID ac.  She is off Jardiance now.  She is taking Repatha and Vascepa, last LDL is 70. Not on statins due to myalgias. Saw ophthalmology in Jan 2024, no diabetic retinopathy, no cataracts.   Regarding thyroid nodules, has not done sono since Sept 2022- h/o b/l subcentimeter thyroid nodules [Finger Stick] : Finger Stick: No [Hypoglycemia] : Patient is not hypoglycemic. [FreeTextEntry2] : 95 [FreeTextEntry3] : 5+0 [FreeTextEntry4] : 0+0 [de-identified] : 6.8 [FreeTextEntry5] : 144 [FreeTextEntry6] : 14.3

## 2024-08-13 ENCOUNTER — APPOINTMENT (OUTPATIENT)
Dept: GASTROENTEROLOGY | Facility: HOSPITAL | Age: 64
End: 2024-08-13
Payer: MEDICAID

## 2024-08-13 VITALS
RESPIRATION RATE: 19 BRPM | HEIGHT: 65 IN | TEMPERATURE: 97.6 F | BODY MASS INDEX: 33.32 KG/M2 | DIASTOLIC BLOOD PRESSURE: 78 MMHG | OXYGEN SATURATION: 96 % | SYSTOLIC BLOOD PRESSURE: 127 MMHG | HEART RATE: 86 BPM | WEIGHT: 200 LBS

## 2024-08-13 DIAGNOSIS — R14.0 ABDOMINAL DISTENSION (GASEOUS): ICD-10-CM

## 2024-08-13 DIAGNOSIS — K59.00 CONSTIPATION, UNSPECIFIED: ICD-10-CM

## 2024-08-13 PROCEDURE — 99213 OFFICE O/P EST LOW 20 MIN: CPT | Mod: GC

## 2024-08-13 NOTE — HISTORY OF PRESENT ILLNESS
[FreeTextEntry1] : 63F with hx of CAD s/p 2 LAD stents (02/2019), DM type 2  diverticulitis s/p Otoole and reversal in 2013 at [facility], MYLES not on CPAP, Asthma (controlled, as per pt), Cdiff, fibromyalgia, umbilical hernia repair presenting for follow up.  Pt reports episode of rectal pain about 1 months ago; she reports feeling an external hemorrhoid at the time. She took prep H with resolution of pain in about 1 week; no bleeding; no fevers/chills. Of note, pt started taking magnesium citrate about 1 month ago with improvement in her constipation; previously BM QOD with straining and [place] scale 2; now with BM QD without straining and [place] scale 4.

## 2024-08-13 NOTE — ASSESSMENT
[FreeTextEntry1] : 63F with hx of CAD s/p 2 LAD stents (02/2019), DM type 2  diverticulitis s/p Otoole and reversal in 2013 at [facility], MYLES not on CPAP, Asthma (controlled, as per pt), Cdiff, fibromyalgia, umbilical hernia repair presenting for follow up.  1. Abdominal bloating, suspect related to combination of constipation and diet. Now resolved with dietary changes (avoiding milk products) and improvement in constipation. Continue magnesium citrate as below.  2. Constipation, resolved with magnesium citrate. Reports previously with previously BM QOD with straining and [place] scale 2; now with BM QD without straining and [place] scale 4.  3. CRC screening; colonoscopy 2022 with diverticulosis and healthy mucosa at anastamosis; recommended repeat in 2032.  4. ALP elevation, 126 (4.2024). GGT negative; also underwent liver workup including negative AMA, SMA, DEMETRICE, A1AT, ceruloplasmin.  Plan - continue mag citrate - continue dietary modifications - follow up with PCP re: ALP elevation - repeat colonoscopy 2032  RTC in 3 months. Discussed with Dr. ELE Grayson GI/Hep Fellow, PGY5

## 2024-08-13 NOTE — REVIEW OF SYSTEMS
[Fever] : no fever [Chills] : no chills [Heart Rate Is Slow] : the heart rate was not slow [Abdominal Pain] : no abdominal pain [Vomiting] : no vomiting [Constipation] : constipation [Diarrhea] : no diarrhea [Heartburn] : no heartburn [Melena (black stool)] : no melena [Bloating (gassiness)] : no bloating

## 2024-08-13 NOTE — PHYSICAL EXAM
[Alert] : alert [Hearing Threshold Finger Rub Not [place]] : hearing was normal [No Respiratory Distress] : no respiratory distress [Heart Rate And Rhythm] : heart rate was normal and rhythm regular [Abdomen Tenderness] : non-tender [No Masses] : no abdominal mass palpated [Abdomen Soft] : soft [Oriented To Time, Place, And Person] : oriented to person, place, and time

## 2024-08-18 ENCOUNTER — INPATIENT (INPATIENT)
Facility: HOSPITAL | Age: 64
LOS: 2 days | Discharge: HOME | DRG: 551 | End: 2024-08-21
Attending: INTERNAL MEDICINE | Admitting: INTERNAL MEDICINE
Payer: MEDICAID

## 2024-08-18 VITALS
RESPIRATION RATE: 22 BRPM | WEIGHT: 199.96 LBS | HEIGHT: 65 IN | HEART RATE: 74 BPM | OXYGEN SATURATION: 97 % | SYSTOLIC BLOOD PRESSURE: 123 MMHG | TEMPERATURE: 98 F | DIASTOLIC BLOOD PRESSURE: 84 MMHG

## 2024-08-18 DIAGNOSIS — Z98.89 OTHER SPECIFIED POSTPROCEDURAL STATES: Chronic | ICD-10-CM

## 2024-08-18 DIAGNOSIS — Z98.890 OTHER SPECIFIED POSTPROCEDURAL STATES: Chronic | ICD-10-CM

## 2024-08-18 DIAGNOSIS — Z90.710 ACQUIRED ABSENCE OF BOTH CERVIX AND UTERUS: Chronic | ICD-10-CM

## 2024-08-18 DIAGNOSIS — R42 DIZZINESS AND GIDDINESS: ICD-10-CM

## 2024-08-18 DIAGNOSIS — Z90.49 ACQUIRED ABSENCE OF OTHER SPECIFIED PARTS OF DIGESTIVE TRACT: Chronic | ICD-10-CM

## 2024-08-18 LAB
ADD ON TEST-SPECIMEN IN LAB: SIGNIFICANT CHANGE UP
ALBUMIN SERPL ELPH-MCNC: 4 G/DL — SIGNIFICANT CHANGE UP (ref 3.3–5)
ALP SERPL-CCNC: 109 U/L — SIGNIFICANT CHANGE UP (ref 40–120)
ALT FLD-CCNC: 15 U/L — SIGNIFICANT CHANGE UP (ref 10–45)
ANION GAP SERPL CALC-SCNC: 15 MMOL/L — SIGNIFICANT CHANGE UP (ref 5–17)
APTT BLD: SIGNIFICANT CHANGE UP SEC (ref 24.5–35.6)
AST SERPL-CCNC: 14 U/L — SIGNIFICANT CHANGE UP (ref 10–40)
BASOPHILS # BLD AUTO: 0.09 K/UL — SIGNIFICANT CHANGE UP (ref 0–0.2)
BASOPHILS NFR BLD AUTO: 0.8 % — SIGNIFICANT CHANGE UP (ref 0–2)
BILIRUB SERPL-MCNC: 0.3 MG/DL — SIGNIFICANT CHANGE UP (ref 0.2–1.2)
BUN SERPL-MCNC: 18 MG/DL — SIGNIFICANT CHANGE UP (ref 7–23)
CALCIUM SERPL-MCNC: 9.4 MG/DL — SIGNIFICANT CHANGE UP (ref 8.4–10.5)
CHLORIDE SERPL-SCNC: 104 MMOL/L — SIGNIFICANT CHANGE UP (ref 96–108)
CHOLEST SERPL-MCNC: 152 MG/DL — SIGNIFICANT CHANGE UP
CO2 SERPL-SCNC: 20 MMOL/L — LOW (ref 22–31)
CREAT SERPL-MCNC: 0.55 MG/DL — SIGNIFICANT CHANGE UP (ref 0.5–1.3)
EGFR: 103 ML/MIN/1.73M2 — SIGNIFICANT CHANGE UP
EOSINOPHIL # BLD AUTO: 0.12 K/UL — SIGNIFICANT CHANGE UP (ref 0–0.5)
EOSINOPHIL NFR BLD AUTO: 1 % — SIGNIFICANT CHANGE UP (ref 0–6)
FLUAV AG NPH QL: SIGNIFICANT CHANGE UP
FLUBV AG NPH QL: SIGNIFICANT CHANGE UP
GAS PNL BLDV: SIGNIFICANT CHANGE UP
GLUCOSE BLDC GLUCOMTR-MCNC: 125 MG/DL — HIGH (ref 70–99)
GLUCOSE SERPL-MCNC: 208 MG/DL — HIGH (ref 70–99)
HCT VFR BLD CALC: 39.1 % — SIGNIFICANT CHANGE UP (ref 34.5–45)
HDLC SERPL-MCNC: 54 MG/DL — SIGNIFICANT CHANGE UP
HGB BLD-MCNC: 12 G/DL — SIGNIFICANT CHANGE UP (ref 11.5–15.5)
IMM GRANULOCYTES NFR BLD AUTO: 0.9 % — SIGNIFICANT CHANGE UP (ref 0–0.9)
INR BLD: 0.98 RATIO — SIGNIFICANT CHANGE UP (ref 0.85–1.18)
LIPID PNL WITH DIRECT LDL SERPL: 77 MG/DL — SIGNIFICANT CHANGE UP
LYMPHOCYTES # BLD AUTO: 1.61 K/UL — SIGNIFICANT CHANGE UP (ref 1–3.3)
LYMPHOCYTES # BLD AUTO: 13.8 % — SIGNIFICANT CHANGE UP (ref 13–44)
MCHC RBC-ENTMCNC: 26.3 PG — LOW (ref 27–34)
MCHC RBC-ENTMCNC: 30.7 GM/DL — LOW (ref 32–36)
MCV RBC AUTO: 85.6 FL — SIGNIFICANT CHANGE UP (ref 80–100)
MONOCYTES # BLD AUTO: 0.79 K/UL — SIGNIFICANT CHANGE UP (ref 0–0.9)
MONOCYTES NFR BLD AUTO: 6.8 % — SIGNIFICANT CHANGE UP (ref 2–14)
NEUTROPHILS # BLD AUTO: 8.94 K/UL — HIGH (ref 1.8–7.4)
NEUTROPHILS NFR BLD AUTO: 76.7 % — SIGNIFICANT CHANGE UP (ref 43–77)
NON HDL CHOLESTEROL: 99 MG/DL — SIGNIFICANT CHANGE UP
NRBC # BLD: 0 /100 WBCS — SIGNIFICANT CHANGE UP (ref 0–0)
PLATELET # BLD AUTO: 235 K/UL — SIGNIFICANT CHANGE UP (ref 150–400)
POTASSIUM SERPL-MCNC: 3.9 MMOL/L — SIGNIFICANT CHANGE UP (ref 3.5–5.3)
POTASSIUM SERPL-SCNC: 3.9 MMOL/L — SIGNIFICANT CHANGE UP (ref 3.5–5.3)
PROT SERPL-MCNC: 6.7 G/DL — SIGNIFICANT CHANGE UP (ref 6–8.3)
PROTHROM AB SERPL-ACNC: 10.3 SEC — SIGNIFICANT CHANGE UP (ref 9.5–13)
RBC # BLD: 4.57 M/UL — SIGNIFICANT CHANGE UP (ref 3.8–5.2)
RBC # FLD: 14.5 % — SIGNIFICANT CHANGE UP (ref 10.3–14.5)
RSV RNA NPH QL NAA+NON-PROBE: SIGNIFICANT CHANGE UP
SARS-COV-2 RNA SPEC QL NAA+PROBE: SIGNIFICANT CHANGE UP
SODIUM SERPL-SCNC: 139 MMOL/L — SIGNIFICANT CHANGE UP (ref 135–145)
TRIGL SERPL-MCNC: 123 MG/DL — SIGNIFICANT CHANGE UP
TROPONIN T, HIGH SENSITIVITY RESULT: <6 NG/L — SIGNIFICANT CHANGE UP (ref 0–51)
WBC # BLD: 11.65 K/UL — HIGH (ref 3.8–10.5)
WBC # FLD AUTO: 11.65 K/UL — HIGH (ref 3.8–10.5)

## 2024-08-18 PROCEDURE — 70498 CT ANGIOGRAPHY NECK: CPT | Mod: 26,MC

## 2024-08-18 PROCEDURE — 70450 CT HEAD/BRAIN W/O DYE: CPT | Mod: 26,59,MC

## 2024-08-18 PROCEDURE — 70551 MRI BRAIN STEM W/O DYE: CPT | Mod: 26

## 2024-08-18 PROCEDURE — 99223 1ST HOSP IP/OBS HIGH 75: CPT | Mod: GC

## 2024-08-18 PROCEDURE — 0042T: CPT | Mod: MC

## 2024-08-18 PROCEDURE — 70496 CT ANGIOGRAPHY HEAD: CPT | Mod: 26,MC

## 2024-08-18 PROCEDURE — 72141 MRI NECK SPINE W/O DYE: CPT | Mod: 26

## 2024-08-18 PROCEDURE — 99291 CRITICAL CARE FIRST HOUR: CPT

## 2024-08-18 RX ORDER — ALLOPURINOL 300 MG
100 TABLET ORAL DAILY
Refills: 0 | Status: DISCONTINUED | OUTPATIENT
Start: 2024-08-18 | End: 2024-08-21

## 2024-08-18 RX ORDER — DEXTROSE 15 G/33 G
12.5 GEL IN PACKET (GRAM) ORAL ONCE
Refills: 0 | Status: DISCONTINUED | OUTPATIENT
Start: 2024-08-18 | End: 2024-08-21

## 2024-08-18 RX ORDER — ACETAMINOPHEN 325 MG/1
650 TABLET ORAL EVERY 6 HOURS
Refills: 0 | Status: DISCONTINUED | OUTPATIENT
Start: 2024-08-18 | End: 2024-08-21

## 2024-08-18 RX ORDER — ASPIRIN 81 MG
1 TABLET, DELAYED RELEASE (ENTERIC COATED) ORAL
Refills: 0 | DISCHARGE

## 2024-08-18 RX ORDER — SODIUM CHLORIDE 9 MG/ML
1000 INJECTION INTRAMUSCULAR; INTRAVENOUS; SUBCUTANEOUS ONCE
Refills: 0 | Status: COMPLETED | OUTPATIENT
Start: 2024-08-18 | End: 2024-08-18

## 2024-08-18 RX ORDER — GLUCAGON INJECTION, SOLUTION 1 MG/.2ML
1 INJECTION, SOLUTION SUBCUTANEOUS ONCE
Refills: 0 | Status: DISCONTINUED | OUTPATIENT
Start: 2024-08-18 | End: 2024-08-21

## 2024-08-18 RX ORDER — BUDESONIDE AND FORMOTEROL FUMARATE 80; 4.5 UG/1; UG/1
2 AEROSOL, METERED RESPIRATORY (INHALATION)
Refills: 0 | Status: DISCONTINUED | OUTPATIENT
Start: 2024-08-18 | End: 2024-08-21

## 2024-08-18 RX ORDER — INSULIN GLARGINE 100 [IU]/ML
10 INJECTION, SOLUTION SUBCUTANEOUS AT BEDTIME
Refills: 0 | Status: DISCONTINUED | OUTPATIENT
Start: 2024-08-18 | End: 2024-08-21

## 2024-08-18 RX ORDER — METOPROLOL TARTRATE 100 MG/1
50 TABLET ORAL DAILY
Refills: 0 | Status: DISCONTINUED | OUTPATIENT
Start: 2024-08-18 | End: 2024-08-21

## 2024-08-18 RX ORDER — PANTOPRAZOLE SODIUM 40 MG
40 TABLET, DELAYED RELEASE (ENTERIC COATED) ORAL
Refills: 0 | Status: DISCONTINUED | OUTPATIENT
Start: 2024-08-18 | End: 2024-08-21

## 2024-08-18 RX ORDER — INSULIN GLARGINE 100 [IU]/ML
35 INJECTION, SOLUTION SUBCUTANEOUS
Refills: 0 | DISCHARGE

## 2024-08-18 RX ORDER — DEXTROSE 15 G/33 G
25 GEL IN PACKET (GRAM) ORAL ONCE
Refills: 0 | Status: DISCONTINUED | OUTPATIENT
Start: 2024-08-18 | End: 2024-08-21

## 2024-08-18 RX ORDER — VALSARTAN 40 MG/1
20 TABLET ORAL DAILY
Refills: 0 | Status: DISCONTINUED | OUTPATIENT
Start: 2024-08-18 | End: 2024-08-21

## 2024-08-18 RX ORDER — DEXTROSE 15 G/33 G
15 GEL IN PACKET (GRAM) ORAL ONCE
Refills: 0 | Status: DISCONTINUED | OUTPATIENT
Start: 2024-08-18 | End: 2024-08-21

## 2024-08-18 RX ORDER — ASPIRIN 81 MG
81 TABLET, DELAYED RELEASE (ENTERIC COATED) ORAL
Refills: 0 | Status: DISCONTINUED | OUTPATIENT
Start: 2024-08-18 | End: 2024-08-21

## 2024-08-18 RX ORDER — METOCLOPRAMIDE HCL 5 MG
10 TABLET ORAL ONCE
Refills: 0 | Status: COMPLETED | OUTPATIENT
Start: 2024-08-18 | End: 2024-08-18

## 2024-08-18 RX ORDER — ONDANSETRON 2 MG/ML
4 INJECTION, SOLUTION INTRAMUSCULAR; INTRAVENOUS EVERY 6 HOURS
Refills: 0 | Status: DISCONTINUED | OUTPATIENT
Start: 2024-08-18 | End: 2024-08-21

## 2024-08-18 RX ORDER — MECLIZINE HYDROCHLORIDE 25 MG/1
25 TABLET ORAL ONCE
Refills: 0 | Status: COMPLETED | OUTPATIENT
Start: 2024-08-18 | End: 2024-08-18

## 2024-08-18 RX ORDER — EZETIMIBE 10 MG/1
1 TABLET ORAL
Refills: 0 | DISCHARGE

## 2024-08-18 RX ORDER — AZELASTINE HYDROCHLORIDE 0.5 MG/ML
1 SOLUTION/ DROPS INTRAOCULAR
Refills: 0 | DISCHARGE

## 2024-08-18 RX ORDER — MECLIZINE HYDROCHLORIDE 25 MG/1
12.5 TABLET ORAL THREE TIMES A DAY
Refills: 0 | Status: DISCONTINUED | OUTPATIENT
Start: 2024-08-18 | End: 2024-08-21

## 2024-08-18 RX ORDER — RIMEGEPANT SULFATE 75 MG/75MG
1 TABLET, ORALLY DISINTEGRATING ORAL
Refills: 0 | DISCHARGE

## 2024-08-18 RX ORDER — EVOLOCUMAB 140 MG/ML
1 INJECTION, SOLUTION SUBCUTANEOUS
Refills: 0 | DISCHARGE

## 2024-08-18 RX ORDER — FOLIC ACID/MULTIVIT,IRON,MINER 0.4MG-18MG
1 TABLET,CHEWABLE ORAL
Refills: 0 | DISCHARGE

## 2024-08-18 RX ADMIN — MECLIZINE HYDROCHLORIDE 12.5 MILLIGRAM(S): 25 TABLET ORAL at 20:01

## 2024-08-18 RX ADMIN — Medication 10 MILLIGRAM(S): at 16:49

## 2024-08-18 RX ADMIN — VALSARTAN 20 MILLIGRAM(S): 40 TABLET ORAL at 21:18

## 2024-08-18 RX ADMIN — SODIUM CHLORIDE 1000 MILLILITER(S): 9 INJECTION INTRAMUSCULAR; INTRAVENOUS; SUBCUTANEOUS at 11:31

## 2024-08-18 RX ADMIN — INSULIN GLARGINE 10 UNIT(S): 100 INJECTION, SOLUTION SUBCUTANEOUS at 23:06

## 2024-08-18 RX ADMIN — Medication 100 MILLIGRAM(S): at 21:18

## 2024-08-18 RX ADMIN — MECLIZINE HYDROCHLORIDE 25 MILLIGRAM(S): 25 TABLET ORAL at 12:27

## 2024-08-18 RX ADMIN — ONDANSETRON 4 MILLIGRAM(S): 2 INJECTION, SOLUTION INTRAMUSCULAR; INTRAVENOUS at 20:01

## 2024-08-18 NOTE — ED ADULT NURSE NOTE - OBJECTIVE STATEMENT
Patient withl history of DM, HTN, HLD, CAD s/p stents on ASA and Plavix presents emergency department for 1 day of generalized and nausea.  Code stroke activated upon further evaluation and physical exam, brought directly to CT.  Pt is alert and orientedX4. Pt was seen getting out of ambulance stretcher to the stretcher upon arrival to the ER. Pt is in no distress. Breathing easy and non labored. Pt's family member at bedside.

## 2024-08-18 NOTE — ED PROVIDER NOTE - PHYSICAL EXAMINATION
Constitutional: VS reviewed. Alert and orientedx3, well appearing, no apparent distress  HEENT: Atraumatic, EOMI, PERRL, no nystagmus  CV: RRR  Lungs: Clear and equal bilaterally, no wheezes, rales or crackles  Abdomen: Soft, nondistended, nontender  MSK: No deformities  Skin: Warm and dry. As visualized no rashes, lesions, bruising or erythema  Neuro: Strength 5/5 in all extremities. Sensation diminished over left facial, UE and LE. + left pronator drift. No facial droop. Unsteady gait. + slurring only when repeating back phrases.   Lymph: No pitting edema in extremities.

## 2024-08-18 NOTE — ED ADULT NURSE REASSESSMENT NOTE - NSFALLHARMRISKINTERV_ED_ALL_ED
Assistance OOB with selected safe patient handling equipment if applicable/Assistance with ambulation/Communicate risk of Fall with Harm to all staff, patient, and family/Encourage patient to sit up slowly, dangle for a short time, stand at bedside before walking/Monitor gait and stability/Orthostatic vital signs/Provide patient with walking aids/Provide visual cue: red socks, yellow wristband, yellow gown, etc/Reinforce activity limits and safety measures with patient and family/Bed in lowest position, wheels locked, appropriate side rails in place/Call bell, personal items and telephone in reach/Instruct patient to call for assistance before getting out of bed/chair/stretcher/Non-slip footwear applied when patient is off stretcher/[place] to call system/Physically safe environment - no spills, clutter or unnecessary equipment/Purposeful Proactive Rounding/Room/bathroom lighting operational, light cord in reach

## 2024-08-18 NOTE — ED PROVIDER NOTE - OTHER FINDINGS
No ECG recorded at 10:07 AM independently interpreted by me , Dr Cortez St,  at 10:30 AM shows normal sinus rhythm borderline left axis deviation normal intervals poor R wave progression T wave inversions lead III lead aVF.  Impression: No acute diagnostic ischemic ST-T changes

## 2024-08-18 NOTE — ED ADULT NURSE REASSESSMENT NOTE - NS ED NURSE REASSESS COMMENT FT1
@1800 Pt received from SHORTY Balderrama. Pt oriented to CDU & plan of care was discussed. Pt A&O x 4. Pt in CDU for MRI, neuro q 4. Pt denies any headache, weakness, numbness, visual or speech disturbances, pain, SOB, dizziness or palpitations. Pt given MRI checklist, will fax to MRI. On neuro exam, A&O x 4, PERRLA, EOMI, strength equal, sensation intact, clear speech. Pt on cardiac monitor in NSR, HR in 80's. V/S stable, pt afebrile,  IV in place, patent and free of signs of infiltration. Pt resting in bed. Safety & comfort measures maintained. Call bell in reach. Will continue to monitor.    @1810 Pt has unsteady gait. Pt ambulated with walker and 1 assist. pt feels very dizziness. KEYA Ziegler aware.     @1816 Pt admitted. @1800 Pt received from SHORTY Balderrama. Pt oriented to CDU & plan of care was discussed. Pt A&O x 4. Pt in CDU for MRI, neuro q 4. Pt denies any headache, weakness, numbness, visual or speech disturbances, pain, SOB, dizziness or palpitations. Pt given MRI checklist, will fax to MRI. On neuro exam, A&O x 4, PERRLA, EOMI, strength equal, sensation intact, clear speech. Pt on cardiac monitor in NSR, HR in 80's. V/S stable, pt afebrile,  IV in place, patent and free of signs of infiltration. Pt resting in bed. Safety & comfort measures maintained. Call bell in reach. Will continue to monitor.    @1810 Pt has unsteady gait. Pt ambulated with walker and 1 assist. Pt feels dizziness and nausea. at this time. KEYA Ziegler aware.     @1816 Pt admitted.

## 2024-08-18 NOTE — CONSULT NOTE ADULT - ATTENDING COMMENTS
Ms. Samaniego is a 63 year old right handed woman with PMHx of vascular risk factors and other medical problems who presents to [facility] ED for dizziness. During my evaluation, patient confirmed that her dizziness is mildy better after medication. Please note, she mentioned to the resident regarding left side weakness. During my evaluation, patient reported only dizziness.     I interviewed the patient, discussed the case with the Resident and agree with the findings and plan as documented in the Resident's note except below.    A/P:                                                      Ms. Samaniego is a 63 year old right handed woman with PMHx of vascular risk factors and other medical problems  who presents to [facility] ED for dizziness. Etiology of dizziness is most likely due to the peripheral etiology. Clinically less suspicious for vascular etiology.  Clinically her symptoms are mildly better.  Meclizine 25 mg TID  Normal saline for hydration.   Vestibular rehab if symptoms remain persist.  MRI brain to rule out treatable etiologies.   Please check the orthostatic BP if not performed.   Safety and fall precaution were discussed.  Continue medical management, neuro- check.  GI and DVT prophylaxis.  PT and OT  I discussed the diagnosis, and treatment plan with the patient.  All questions and concerns were addressed. The patient demonstrated good understanding of the treatment plan.  My cumulative time taking care of this patient is 80 minutes  If you have any further questions, please do not hesitate to contact our team.  Thank you for allowing us to participate in this patient care.

## 2024-08-18 NOTE — H&P ADULT - HISTORY OF PRESENT ILLNESS
62 y/o female with past medical history of DM, HTN, HLD, CAD s/p stents on ASA and Plavix presents emergency department for 1 day of generalized weakness and nausea.  Code stroke activated upon further evaluation and physical exam, brought directly to CT.  Patient states last known normal was 3 PM on 8/17.  Patient states around 6 PM yesterday she started feeling "off" and endorses some left-sided neck tingling.  Patient felt generally weak yesterday.  Patient woke up this morning with dizziness, room spinning sensation worse with head movements.  Patient endorses difficulty walking due to feeling unbalanced, needed assistance ambulating.  Patient also endorsed nausea with episode of emesis.  EMS gave 8 mg IV Zofran en route.  Patient denies fevers, chills, headache, vision changes, neck pain, chest pain, trouble breathing, abdominal pain, diarrhea/constipation, dysuria, hematuria, weakness in arms or legs.

## 2024-08-18 NOTE — ED PROVIDER NOTE - ATTENDING CONTRIBUTION TO CARE
Attending MD St:  I have seen and examined this patient and fully participated in the care of this patient as the teaching attending. I personally made/approved the management plan and take responsibility for the patient management.      63-year-old woman with a history of CAD on Plavix, hypertension hyperlipidemia diabetes presenting for evaluation of feeling "off" symptoms started around 3 PM yesterday.  Patient woke up this morning feeling overall worse but now is having dizziness described as a spinning sensation as well as nausea.  Denies any vision loss.  Denies any speech changes no difficulty swallowing no slurred speech.  No head or neck pain.  Is having some tingling to the left face.    Patient's vital signs are nonactionable, she is sitting in the stretcher appears somewhat uncomfortable but nontoxic.  Awake and alert oriented x 3.  Rapid stroke assessment reveals left upper extremity drift, left lower extremity drift, diminished sensation to light touch throughout left arm left face and left leg, mild aphasia noted no dysarthria, no obvious nystagmus noted.  After pronator drift appreciated, code stroke was activated and patient was ushered to CT scan with stroke neurology resident.    Patient presenting for evaluation of dizziness nausea vomiting and feeling "off" per report last known normal was 3 PM yesterday.  Examination notable for left upper extremity left lower extremity drift, concern for possible CVA in this patient.  Code stroke activated, will follow-up on CT head CT angiogram head and neck and CT perfusion, will follow-up on stroke neurology recommendations.  Last known normal is 3 PM yesterday so patient would not be candidate for thrombolysis but may be potential candidate for endovascular intervention if LVO is identified      *The above represents an initial assessment/impression. Please refer to progress notes for potential changes in patient clinical course*

## 2024-08-18 NOTE — H&P ADULT - ASSESSMENT
64 yo woman with h/o T2DM, HTN, HLD, CAD w/ stents (aspirin q48 and plavix qD) who presented to the ED for dizziness and L sided weakness. LKN 1500 2024. Patient said that she recalled around 1800 that she developed dizziness, chest pain, and L sided arm weakness leading up to the neck. She went to bed and realized that the symptoms persisted when she woke up today. Dizziness were not position dependent and were not triggered by sudden movement. Decided to come to the ED.   CODE STROKE called 0948. NIHSS 6. pre-MRS 0. Patient initially demonstrated L sided weakness upon NIHSS testing but upon manual motor tested demonstrated significant give way weakness on the L.   CT head negative for acute hemorrhage, infarct nor mass effect. CT angio with no LVO. Not candidate for tenecteplase because outside window. Not candidate for thrombectomy because no LVO.   seen by stroke team and her cardiologist Dr. Saldana.     A/P    R/O CVA vs Cervical Spondylosis    Room spinning dizziness, dysarthria, fluctuating word finding difficulties and L sided hypesthetic ataxic hemiparesis, would normally localize to right cerebral dysfunction i/s/o ischemic stroke of  mechanism. However, given flucuations of symptoms i/s/o of multiple chronic complaints, considering also metabolic encephalopathy with functional overlay   GIven patient's history of arm and leg weakness According to outpatient notes can also consider cervical spondylosis as etiology. However, would not explain fluctuating fluency and word finding difficulties     - c/w home plavix daily for secondary stroke prevention    - continue Aspirin 81mg daily q48 as per patient home med   - Symptomatic management for nausea, prn anti emetics    -f/u HgbA1C, fasting lipid panel, CBC, CMP, coag panel, troponin  - MRI brain w/o con AND  MR c- spine w/wo contrast   - TTE w/ bubble study  - keep on telemetry to check for arrhythmia, EKG, EPS to discuss loop recorder  - Tight glucose control (long-term goal HgbA1c < 6%)  - Stroke education and counseling  - Neuro-checks and VS q4h  - Permissive HTN up to 220/120 for 24-48h from symptom onset  - Dysphagia screen.  - aspiration and  fall precautions  - STAT CT head non-contrast for change in neuro exam.   - PT/ OT / DVT ppx w sc lovenox

## 2024-08-18 NOTE — ED PROVIDER NOTE - PROGRESS NOTE DETAILS
Martha Mayen PGY3: Code stroke initially called due to persistent dizziness, sensory changes on the left side of body and pronator drift.  Upon evaluation by neuro team, patient now also with left lower extremity pronator drift and worsening slurred speech and repeating back phrases.  Patient brought directly to CT.  Received call from attending radiologist that did not show any acute infarct or LVO.  Will continue to workup for possible metabolic causes.  Pending final neuro recs. Martha Mayen PGY3: Patient reassessed and resting comfortably.  Patient endorses improvement in her symptoms.  Patient able to ambulate without assistance.  Patient is endorsing some room spinning sensation.  Patient had 1 episode of emesis after sitting back in bed.  Will give meclizine at this time.  Discussed with CDU PA for observation for MRIs as recommended by neuro.  CDU accepted patient. Martha Mayen PGY3: Received call from CDU PA, patient now unable to walk due to worsening dizziness.  Spoke with patient's cardiologist /Geni Rasheed who recommended patient be admitted to Dr. Alegria. Spoke with Dr. Alegria who recommends admission to Dr. Montgomery and she stares she will reach out to him. Pt was transferred to CDU w/o any issues. While walking to the bathroom with walker she became very dizzy. Was unable to walk back without walker and 1 person assist. No falls. States dizziness had improved slightly priro to arrival to unit but now worse again. Plan to admit pt for further management. ED team notified. Slava Mahoney PA-C While walking to the bathroom with walker she became very dizzy. Was unable to walk back without walker and 1 person assist. No falls. States dizziness had improved slightly priro to arrival to unit but now worse again. Plan to admit pt for further management. ED team notified. Slava Mahoney PA-C Pt was transferred to CDU w/o any issues. Pending cntd w/u per neuro recs. Slava Mahoney PA-C

## 2024-08-18 NOTE — ED ADULT NURSE REASSESSMENT NOTE - NS ED NURSE REASSESS COMMENT FT1
Report taken from SHORTY Santiago. Pt received lying in bed A&O x 4. Breathing even and unlabored. Gross motor and neuro intact. States dizziness and nausea have improved. VSS. Call bell within reach. Safety and comfort provided. Awaiting CDU assessment and dispo.

## 2024-08-18 NOTE — H&P ADULT - NSHPPHYSICALEXAM_GEN_ALL_CORE
T(C): 36.8 (08-18-24 @ 17:45), Max: 36.8 (08-18-24 @ 17:45)  HR: 88 (08-18-24 @ 17:45) (74 - 92)  BP: 119/55 (08-18-24 @ 17:45) (111/66 - 129/65)  RR: 18 (08-18-24 @ 17:45) (17 - 22)  SpO2: 98% (08-18-24 @ 17:45) (95% - 98%)    PHYSICAL EXAM:  GENERAL: NAD, well-developed  HEAD:  Atraumatic, Normocephalic  EYES: EOMI, PERRLA, conjunctiva and sclera clear  NECK: Supple, No JVD  CHEST/LUNG: Clear to auscultation bilaterally; No wheeze  HEART: Regular rate and rhythm; No murmurs, rubs, or gallops  ABDOMEN: Soft, Nontender, Nondistended; Bowel sounds present  EXTREMITIES:  2+ Peripheral Pulses, No clubbing, cyanosis, or edema  PSYCH: AAOx3  NEUROLOGY: Left sided weakness  SKIN: No rashes or lesions

## 2024-08-18 NOTE — ED PROVIDER NOTE - OBJECTIVE STATEMENT
62 y/o female with past medical history of DM, HTN, HLD, CAD s/p stents on ASA and Plavix presents emergency department for 1 day of generalized and nausea.  Code stroke activated upon further evaluation and physical exam, brought directly to CT.  Patient states last known normal was 3 PM yesterday.  Patient states around 6 PM yesterday she started feeling "off" and endorse some left-sided neck tingling.  Patient felt generally weak yesterday.  Patient woke up this morning with dizziness, room spinning sensation worse with head movements.  Patient endorses difficulty walking due to feeling unbalanced, needed assistance ambulating.  Patient also endorsed nausea with episode of emesis.  EMS gave 8 mg IV Zofran en route.  Patient denies fevers, chills, headache, vision changes, neck pain, chest pain, trouble breathing, abdominal pain, diarrhea/constipation, dysuria, hematuria, weakness in arms or legs.

## 2024-08-18 NOTE — ED PROVIDER NOTE - CLINICAL SUMMARY MEDICAL DECISION MAKING FREE TEXT BOX
62 y/o female with past medical history of DM, HTN, HLD, CAD s/p stents on ASA and Plavix presents emergency department for 1 day of generalized and nausea. Code stroke activated on initial evaluation due to sensory deficit and persistent dizziness since this morning.  Last known normal 3 PM yesterday.  Patient brought directly to CT.  Differential diagnoses include but not limited to CVA, TIA, vertigo, metabolic encephalopathy, infection.  Plan for labs, CTs, UA/UC, viral swab.  Will get neuro recommendations.  Dispo likely admission for further workup of possible CVA.

## 2024-08-18 NOTE — CONSULT NOTE ADULT - SUBJECTIVE AND OBJECTIVE BOX
**STROKE CODE CONSULT NOTE**    Last known well time/Time of onset of symptoms: 1500 08/17/2024    HPI: 63 R-H F with h/o T2DM, HTN, HLD, CAD w/ stents (aspirin q48 and plavix qD) who presented to the ED for dizziness and L sided weakness. LKN 1500 8/17/2024. Patient said that she recalled around 1800 that she developed dizziness, chest pain, and L sided arm weakness leading up to the neck. She went to bed and realized that the symptoms persisted when she woke up today. Decided to come to the ED. CODE STROKE called [id number]    PAST MEDICAL & SURGICAL HISTORY:  Diabetes mellitus type 2 in obese  Hg A1c 7 ( 1/2022 ) in HIE      Hypertension      Hyperlipidemia      Peripheral neuropathy      Obstructive sleep apnea of adult  resolved , retested 5/2021 reports in [employer]      Diverticular disease  h/o partial colectomy 2013      Coronary artery disease  2 stents in 2019, occasional chest discomfort, using nitroglycerin PRN - chronic, last cardiac  cath 2/2022- patent stents      Asthma  controlled, last inhaler use 6-7 month ago , denies hospitalizations, ER visits      Fibromyalgia      Stented coronary artery  two PTCA in LAD 2/2019      History of gout      History of Clostridium difficile colitis  2019      Vitamin B12 deficiency      PVC (premature ventricular contraction)  on Metoprolol      Obese  BMI 36      S/P colon resection  surgery March 2013 with temporary colostomy til 9/13      S/P cholecystectomy  1993      H/O knee surgery  bilateral patella realignment age 18, later left knee meniscus age 34      S/P bilateral breast reduction  age 22      History of surgery on arm  left humerus age 54 , tumor i- enchondroma- hardware in place      History of hysterectomy  age 40      History of colonoscopy  and endo 2014          FAMILY HISTORY:  Family history of heart disease (Sibling)    Family history of hypertension    Family history of diabetes mellitus type II (Sibling)        SOCIAL HISTORY:  Smoking Cessation: Discussed    ROS:  Constitutional:  fatigue  Eyes: No eye pain, visual disturbances  ENMT: vertigo  Respiratory: No cough,   Cardiovascular: +chest pain  Gastrointestinal: No abdominal pain  Neurological: As per HPI    MEDICATIONS  (STANDING):    MEDICATIONS  (PRN):      Allergies    codeine (Diarrhea; Nausea; Vomiting)  morphine (Diarrhea; Nausea; Vomiting)  Keflex (Hives)  adhesives (Other)  Cipro (Anaphylaxis; Rash)    Intolerances    Lipitor (Unknown)  verapamil (Unknown)  Cardizem (Unknown)      Vital Signs Last 24 Hrs  T(C): 36.6 (18 Aug 2024 09:28), Max: 36.6 (18 Aug 2024 09:28)  T(F): 97.9 (18 Aug 2024 09:28), Max: 97.9 (18 Aug 2024 09:28)  HR: 74 (18 Aug 2024 09:28) (74 - 74)  BP: 123/84 (18 Aug 2024 09:28) (123/84 - 123/84)  BP(mean): --  RR: 22 (18 Aug 2024 09:28) (22 - 22)  SpO2: 97% (18 Aug 2024 09:28) (97% - 97%)    Parameters below as of 18 Aug 2024 09:28  Patient On (Oxygen Delivery Method): room air        PHYSICAL EXAM:  Constitutional: WDWN; NAD  Cardiovascular: RRR, no appreciable murmurs; no carotid bruits  Neurologic:  Mental status: Awake, alert and oriented x3.  Recent and remote memory intact.  Naming, repetition and comprehension intact.  Attention/concentration intact.  No dysarthria, no aphasia.  Fund of knowledge appropriate.    Cranial nerves: Fundoscopic exam demonstrated no abnormalities, pupils equally round and reactive to light, visual fields full, no nystagmus, extraocular muscles intact, V1 through V3 intact bilaterally and symmetric, face symmetric, hearing intact to finger rub, palate elevation symmetric, tongue was midline, sternocleidomastoid/shoulder shrug strength bilaterally 5/5.    Motor:  Normal bulk and tone, strength 5/5 in bilateral upper and lower extremities.   strength 5/5.  Rapid alternating movements intact and symmetric.   Sensation: Intact to light touch, proprioception, and pinprick.  No neglect.   Coordination: No dysmetria on finger-to-nose and heel-to-shin.  No clumsiness.  Reflexes: 2+ in upper and lower extremities, downgoing toes bilaterally  Gait: Wide based, able to independently lift LLE if distrated     NIHSS: 6    Fingerstick Blood Glucose: CAPILLARY BLOOD GLUCOSE  190 (18 Aug 2024 10:02)      POCT Blood Glucose.: 190 mg/dL (18 Aug 2024 09:51)       LABS:                    RADIOLOGY & ADDITIONAL STUDIES:    IV-tenecteplase(Y/N):    N                               Bolus time:  Reason IV-tenecteplase not given: Outside window    **STROKE CODE CONSULT NOTE**    Last known well time/Time of onset of symptoms: 1500 08/17/2024    HPI: 63 R-H F with h/o T2DM, HTN, HLD, CAD w/ stents (aspirin q48 and plavix qD) who presented to the ED for dizziness and L sided weakness. LKN 1500 8/17/2024. Patient said that she recalled around 1800 that she developed dizziness, chest pain, and L sided arm weakness leading up to the neck. She went to bed and realized that the symptoms persisted when she woke up today. Dizziness were not position dependent and were not triggered by sudden movement. Decided to come to the ED. CODE STROKE called [id number]. NIHSS 6. pre-MRS 0. Patient initially demonstrated L sided weakness upon NIHSS testing but upon manual motor tested demonstrated significant give way weakness on the L. CT head negative for acute hemorrhage, infarct nor mass effect. CT angio with no LVO. Not candidate for tenecteplase because outside window. Not candidate for thrombectomy because no LVO.     PAST MEDICAL & SURGICAL HISTORY:  Diabetes mellitus type 2 in obese  Hg A1c 7 ( 1/2022 ) in HIE      Hypertension      Hyperlipidemia      Peripheral neuropathy      Obstructive sleep apnea of adult  resolved , retested 5/2021 reports in [employer]      Diverticular disease  h/o partial colectomy 2013      Coronary artery disease  2 stents in 2019, occasional chest discomfort, using nitroglycerin PRN - chronic, last cardiac  cath 2/2022- patent stents      Asthma  controlled, last inhaler use 6-7 month ago , denies hospitalizations, ER visits      Fibromyalgia      Stented coronary artery  two PTCA in LAD 2/2019      History of gout      History of Clostridium difficile colitis  2019      Vitamin B12 deficiency      PVC (premature ventricular contraction)  on Metoprolol      Obese  BMI 36      S/P colon resection  surgery March 2013 with temporary colostomy til 9/13      S/P cholecystectomy  1993      H/O knee surgery  bilateral patella realignment age 18, later left knee meniscus age 34      S/P bilateral breast reduction  age 22      History of surgery on arm  left humerus age 54 , tumor i- enchondroma- hardware in place      History of hysterectomy  age 40      History of colonoscopy  and endo 2014          FAMILY HISTORY:  Family history of heart disease (Sibling)    Family history of hypertension    Family history of diabetes mellitus type II (Sibling)        SOCIAL HISTORY:  Smoking Cessation: Discussed    ROS:  Constitutional:  fatigue  Eyes: No eye pain, visual disturbances  ENMT: vertigo  Respiratory: No cough,   Cardiovascular: +chest pain  Gastrointestinal: No abdominal pain  Neurological: As per HPI    MEDICATIONS  (STANDING):    MEDICATIONS  (PRN):      Allergies    codeine (Diarrhea; Nausea; Vomiting)  morphine (Diarrhea; Nausea; Vomiting)  Keflex (Hives)  adhesives (Other)  Cipro (Anaphylaxis; Rash)    Intolerances    Lipitor (Unknown)  verapamil (Unknown)  Cardizem (Unknown)      Vital Signs Last 24 Hrs  T(C): 36.6 (18 Aug 2024 09:28), Max: 36.6 (18 Aug 2024 09:28)  T(F): 97.9 (18 Aug 2024 09:28), Max: 97.9 (18 Aug 2024 09:28)  HR: 74 (18 Aug 2024 09:28) (74 - 74)  BP: 123/84 (18 Aug 2024 09:28) (123/84 - 123/84)  BP(mean): --  RR: 22 (18 Aug 2024 09:28) (22 - 22)  SpO2: 97% (18 Aug 2024 09:28) (97% - 97%)    Parameters below as of 18 Aug 2024 09:28  Patient On (Oxygen Delivery Method): room air        PHYSICAL EXAM:  Physical Examination:   General - NAD, pleasant, cooperative   Cardiovascular - Peripheral pulses palpable, no edema  Neurologic Exam:  Mental status - Awake, Alert, Oriented to person, place, and time. Speech fluent, repetition and naming intact. Follows commands. Fund of knowledge intact    Cranial nerves:  CN II: Visual fields are full to confrontation. Pupils are 4 mm and briskly reactive to light.  CN III, IV, VI: EOMI, no nystagmus, no ptosis  CN V: Facial sensation is intact to pinprick in all 3 divisions bilaterally.  CN VII: Face is symmetric with normal eye closure and smile.  CN VII: Hearing is normal to rubbing fingers  CN IX, X: Palate elevates symmetrically. Phonation is normal.  CN XI: Head turning and shoulder shrug are intact  CN XII: Tongue is midline with normal movements and no atrophy.    Motor - Normal bulk and tone throughout. No pronator drift of out-stretched arms.  Strength testing                                            Deltoid(C5)  Biceps(C6)    Triceps(C7)     Wrist Extension    Wrist Flexion (C8)          R                                           5                 5                        5                     5                              5                                      5                           L (affected by give way)             5                 5                        5                     5                              5                                      5                                                                   Hip Flexion(L2/3)    Hip Extension (L4/5)   Knee Flexion (L4/5/S1)    Knee Extension (L3/4)   Dorsiflexion (L4/5)   Plantar Flexion (S1)  R                                             5                                    5                                     5                                                     5                                              5                          5  L  (affected by give way             5                                     5                                     5                                                     5                                              5                          5    Sensation - Decreased sensation to light touch and temperature on LUE and LLE       DTR's -             Biceps      Triceps     Brachioradialis      Patellar    Ankle    Toes/plantar response  R             2+             1+                  2+                       2+            2+                 Down  L              2+             1+                 2+                        2+           2+                 Down    Coordination - FNF on R with no deficits. FNF on L would constantly miss finger but patient would have her eyes closed when they were to occur    Gait and station - Wide based but did not seem antalgic. able to raise both feet equally     No nystagmus   Negative test of skew   Negative head impulse     NIHSS: 6    Fingerstick Blood Glucose: CAPILLARY BLOOD GLUCOSE  190 (18 Aug 2024 10:02)      POCT Blood Glucose.: 190 mg/dL (18 Aug 2024 09:51)       LABS:                    RADIOLOGY & ADDITIONAL STUDIES:        IV-tenecteplase(Y/N):    N                               Bolus time:  Reason IV-tenecteplase not given: Outside window    **STROKE CODE CONSULT NOTE**    Last known well time/Time of onset of symptoms: 1500 08/17/2024    HPI: 63 R-H F with h/o T2DM, HTN, HLD, CAD w/ stents (aspirin q48 and plavix qD) who presented to the ED for dizziness and L sided weakness. LKN 1500 8/17/2024. Patient said that she recalled around 1800 that she developed dizziness, chest pain, and L sided arm weakness leading up to the neck. She went to bed and realized that the symptoms persisted when she woke up today. Dizziness were not position dependent and were not triggered by sudden movement. Decided to come to the ED. CODE STROKE called [id number]. NIHSS 6. pre-MRS 0. Patient initially demonstrated L sided weakness upon NIHSS testing but upon manual motor tested demonstrated significant give way weakness on the L. CT head negative for acute hemorrhage, infarct nor mass effect. CT angio with no LVO. Not candidate for tenecteplase because outside window. Not candidate for thrombectomy because no LVO.     Of note patient was [facility] for headache and L sided arm sensory changes. Last seen on 7/30/24. According to notes "...back of the head pain that runs down her neck into her L arm like an electric current. Pain starts above L ear and travels down the lateral posterior aspect of her L neck and down the back of her L arm. She is not able to lay on her R side dt the pain. onset june 2024, worsening. Patient denies any trauma at that time. Her only eliciting event she states is fibromyalgia. [also] low back pain that starts on the R low back and runs down her R leg. Her back pain starts in the R low back, travels across her upper R buttock and then to the anterior R leg then stops at the knee. The pain is described as a current like sensation. onset june 2024, worsening. Her only eliciting event she states is fibromyalgia." Consideration for cervical radiculopathy and lumbar radiculopathy in setting of known trigeminal neuralgia and headache. Was recommended for MR C spine prior which was negative as of 2022   "C5-6 mild retrolisthesis and broad-based posterior disc protrusion with moderate bilateral neural foraminal stenosis and moderate central stenosis. C4-5 small central disc protrusion with mild central canal stenosis. Milder findings elsewhere in the cervical spine as above." Recommended for MR L spine but no records of it being done, likely pending financial authorization.           PAST MEDICAL & SURGICAL HISTORY:  Diabetes mellitus type 2 in obese  Hg A1c 7 ( 1/2022 ) in HIE      Hypertension      Hyperlipidemia      Peripheral neuropathy      Obstructive sleep apnea of adult  resolved , retested 5/2021 reports in [employer]      Diverticular disease  h/o partial colectomy 2013      Coronary artery disease  2 stents in 2019, occasional chest discomfort, using nitroglycerin PRN - chronic, last cardiac  cath 2/2022- patent stents      Asthma  controlled, last inhaler use 6-7 month ago , denies hospitalizations, ER visits      Fibromyalgia      Stented coronary artery  two PTCA in LAD 2/2019      History of gout      History of Clostridium difficile colitis  2019      Vitamin B12 deficiency      PVC (premature ventricular contraction)  on Metoprolol      Obese  BMI 36      S/P colon resection  surgery March 2013 with temporary colostomy til 9/13      S/P cholecystectomy  1993      H/O knee surgery  bilateral patella realignment age 18, later left knee meniscus age 34      S/P bilateral breast reduction  age 22      History of surgery on arm  left humerus age 54 , tumor i- enchondroma- hardware in place      History of hysterectomy  age 40      History of colonoscopy  and endo 2014          FAMILY HISTORY:  Family history of heart disease (Sibling)    Family history of hypertension    Family history of diabetes mellitus type II (Sibling)        SOCIAL HISTORY:  Smoking Cessation: Discussed    ROS:  Constitutional:  fatigue  Eyes: No eye pain, visual disturbances  ENMT: vertigo  Respiratory: No cough,   Cardiovascular: +chest pain  Gastrointestinal: No abdominal pain  Neurological: As per HPI    MEDICATIONS  (STANDING):    MEDICATIONS  (PRN):      Allergies    codeine (Diarrhea; Nausea; Vomiting)  morphine (Diarrhea; Nausea; Vomiting)  Keflex (Hives)  adhesives (Other)  Cipro (Anaphylaxis; Rash)    Intolerances    Lipitor (Unknown)  verapamil (Unknown)  Cardizem (Unknown)      Vital Signs Last 24 Hrs  T(C): 36.6 (18 Aug 2024 09:28), Max: 36.6 (18 Aug 2024 09:28)  T(F): 97.9 (18 Aug 2024 09:28), Max: 97.9 (18 Aug 2024 09:28)  HR: 74 (18 Aug 2024 09:28) (74 - 74)  BP: 123/84 (18 Aug 2024 09:28) (123/84 - 123/84)  BP(mean): --  RR: 22 (18 Aug 2024 09:28) (22 - 22)  SpO2: 97% (18 Aug 2024 09:28) (97% - 97%)    Parameters below as of 18 Aug 2024 09:28  Patient On (Oxygen Delivery Method): room air        PHYSICAL EXAM:  Physical Examination:   General - NAD, pleasant, cooperative   Cardiovascular - Peripheral pulses palpable, no edema  Neurologic Exam:  Mental status - Awake, Alert, Oriented to person, place, and time. Speech fluent, repetition and naming intact. Follows commands. Fund of knowledge intact    Cranial nerves:  CN II: Visual fields are full to confrontation. Pupils are 4 mm and briskly reactive to light.  CN III, IV, VI: EOMI, no nystagmus, no ptosis  CN V: Facial sensation is intact to pinprick in all 3 divisions bilaterally.  CN VII: Face is symmetric with normal eye closure and smile.  CN VII: Hearing is normal to rubbing fingers  CN IX, X: Palate elevates symmetrically. Phonation is normal.  CN XI: Head turning and shoulder shrug are intact  CN XII: Tongue is midline with normal movements and no atrophy.    Motor - Normal bulk and tone throughout. No pronator drift of out-stretched arms.  Strength testing                                            Deltoid(C5)  Biceps(C6)    Triceps(C7)     Wrist Extension    Wrist Flexion (C8)          R                                           5                 5                        5                     5                              5                                      5                           L (affected by give way)             5                 5                        5                     5                              5                                      5                                                                   Hip Flexion(L2/3)    Hip Extension (L4/5)   Knee Flexion (L4/5/S1)    Knee Extension (L3/4)   Dorsiflexion (L4/5)   Plantar Flexion (S1)  R                                             5                                    5                                     5                                                     5                                              5                          5  L  (affected by give way             5                                     5                                     5                                                     5                                              5                          5    Sensation - Decreased sensation to light touch and temperature on LUE and LLE       DTR's -             Biceps      Triceps     Brachioradialis      Patellar    Ankle    Toes/plantar response  R             2+             1+                  2+                       2+            2+                 Down  L              2+             1+                 2+                        2+           2+                 Down    Coordination - FNF on R with no deficits. FNF on L would constantly miss finger but patient would have her eyes closed when they were to occur    Gait and station - Wide based but did not seem antalgic. able to raise both feet equally     No nystagmus   Negative test of skew   Negative head impulse     NIHSS: 6    Fingerstick Blood Glucose: CAPILLARY BLOOD GLUCOSE  190 (18 Aug 2024 10:02)      POCT Blood Glucose.: 190 mg/dL (18 Aug 2024 09:51)       LABS:                    RADIOLOGY & ADDITIONAL STUDIES:        IV-tenecteplase(Y/N):    N                               Bolus time:  Reason IV-tenecteplase not given: Outside window    **STROKE CODE CONSULT NOTE**    Last known well time/Time of onset of symptoms: 1500 08/17/2024    HPI: 63 R-H F with h/o T2DM, HTN, HLD, CAD w/ stents (aspirin q48 and plavix qD) who presented to the ED for dizziness and L sided weakness. LKN 1500 8/17/2024. Patient said that she recalled around 1800 that she developed dizziness, chest pain, and L sided arm weakness leading up to the neck. She went to bed and realized that the symptoms persisted when she woke up today. Dizziness were not position dependent and were not triggered by sudden movement. Decided to come to the ED. CODE STROKE called 0948. NIHSS 6. pre-MRS 0. Patient initially demonstrated L sided weakness upon NIHSS testing but upon manual motor tested demonstrated significant give way weakness on the L. CT head negative for acute hemorrhage, infarct nor mass effect. CT angio with no LVO. Not candidate for tenecteplase because outside window. Not candidate for thrombectomy because no LVO.     Of note patient was last seen at [facility] for headache and L sided arm sensory changes. Last seen on 7/30/24. According to notes "...back of the head pain that runs down her neck into her L arm like an electric current. Pain starts above L ear and travels down the lateral posterior aspect of her L neck and down the back of her L arm. She is not able to lay on her R side dt the pain. onset june 2024, worsening. Patient denies any trauma at that time. Her only eliciting event she states is fibromyalgia. [also] low back pain that starts on the R low back and runs down her R leg. Her back pain starts in the R low back, travels across her upper R buttock and then to the anterior R leg then stops at the knee. The pain is described as a current like sensation. onset june 2024, worsening. Her only eliciting event she states is fibromyalgia." Consideration for cervical radiculopathy and lumbar radiculopathy in setting of known trigeminal neuralgia and headache. Was recommended for MR C spine prior which was negative as of 2022   "C5-6 mild retrolisthesis and broad-based posterior disc protrusion with moderate bilateral neural foraminal stenosis and moderate central stenosis. C4-5 small central disc protrusion with mild central canal stenosis. Milder findings elsewhere in the cervical spine as above." Recommended for MR L spine but no records of it being done, likely pending financial authorization.           PAST MEDICAL & SURGICAL HISTORY:  Diabetes mellitus type 2 in obese  Hg A1c 7 ( 1/2022 ) in HIE      Hypertension      Hyperlipidemia      Peripheral neuropathy      Obstructive sleep apnea of adult  resolved , retested 5/2021 reports in [employer]      Diverticular disease  h/o partial colectomy 2013      Coronary artery disease  2 stents in 2019, occasional chest discomfort, using nitroglycerin PRN - chronic, last cardiac  cath 2/2022- patent stents      Asthma  controlled, last inhaler use 6-7 month ago , denies hospitalizations, ER visits      Fibromyalgia      Stented coronary artery  two PTCA in LAD 2/2019      History of gout      History of Clostridium difficile colitis  2019      Vitamin B12 deficiency      PVC (premature ventricular contraction)  on Metoprolol      Obese  BMI 36      S/P colon resection  surgery March 2013 with temporary colostomy til 9/13      S/P cholecystectomy  1993      H/O knee surgery  bilateral patella realignment age 18, later left knee meniscus age 34      S/P bilateral breast reduction  age 22      History of surgery on arm  left humerus age 54 , tumor i- enchondroma- hardware in place      History of hysterectomy  age 40      History of colonoscopy  and endo 2014          FAMILY HISTORY:  Family history of heart disease (Sibling)    Family history of hypertension    Family history of diabetes mellitus type II (Sibling)        SOCIAL HISTORY:  Smoking Cessation: Discussed    ROS:  Constitutional:  fatigue  Eyes: No eye pain, visual disturbances  ENMT: vertigo  Respiratory: No cough,   Cardiovascular: +chest pain  Gastrointestinal: No abdominal pain  Neurological: As per HPI    MEDICATIONS  (STANDING):    MEDICATIONS  (PRN):      Allergies    codeine (Diarrhea; Nausea; Vomiting)  morphine (Diarrhea; Nausea; Vomiting)  Keflex (Hives)  adhesives (Other)  Cipro (Anaphylaxis; Rash)    Intolerances    Lipitor (Unknown)  verapamil (Unknown)  Cardizem (Unknown)      Vital Signs Last 24 Hrs  T(C): 36.6 (18 Aug 2024 09:28), Max: 36.6 (18 Aug 2024 09:28)  T(F): 97.9 (18 Aug 2024 09:28), Max: 97.9 (18 Aug 2024 09:28)  HR: 74 (18 Aug 2024 09:28) (74 - 74)  BP: 123/84 (18 Aug 2024 09:28) (123/84 - 123/84)  BP(mean): --  RR: 22 (18 Aug 2024 09:28) (22 - 22)  SpO2: 97% (18 Aug 2024 09:28) (97% - 97%)    Parameters below as of 18 Aug 2024 09:28  Patient On (Oxygen Delivery Method): room air        PHYSICAL EXAM:  Physical Examination:   General - NAD, pleasant, cooperative   Cardiovascular - Peripheral pulses palpable, no edema  Neurologic Exam:  Mental status - Awake, Alert, Oriented to person, place, and time. Speech fluent, repetition and naming intact. Follows commands. Fund of knowledge intact    Cranial nerves:  CN II: Visual fields are full to confrontation. Pupils are 4 mm and briskly reactive to light.  CN III, IV, VI: EOMI, no nystagmus, no ptosis  CN V: Facial sensation is intact to pinprick in all 3 divisions bilaterally.  CN VII: Face is symmetric with normal eye closure and smile.  CN VII: Hearing is normal to rubbing fingers  CN IX, X: Palate elevates symmetrically. Phonation is normal.  CN XI: Head turning and shoulder shrug are intact  CN XII: Tongue is midline with normal movements and no atrophy.    Motor - Normal bulk and tone throughout. No pronator drift of out-stretched arms.  Strength testing                                            Deltoid(C5)  Biceps(C6)    Triceps(C7)     Wrist Extension    Wrist Flexion (C8)          R                                           5                 5                        5                     5                              5                                      5                           L (affected by give way)             5                 5                        5                     5                              5                                      5                                                                   Hip Flexion(L2/3)    Hip Extension (L4/5)   Knee Flexion (L4/5/S1)    Knee Extension (L3/4)   Dorsiflexion (L4/5)   Plantar Flexion (S1)  R                                             5                                    5                                     5                                                     5                                              5                          5  L  (affected by give way             5                                     5                                     5                                                     5                                              5                          5    Sensation - Decreased sensation to light touch and temperature on LUE and LLE       DTR's -             Biceps      Triceps     Brachioradialis      Patellar    Ankle    Toes/plantar response  R             2+             1+                  2+                       2+            2+                 Down  L              2+             1+                 2+                        2+           2+                 Down    Coordination - FNF on R with no deficits. FNF on L would constantly miss finger but patient would have her eyes closed when they were to occur    Gait and station - Wide based but did not seem antalgic. able to raise both feet equally     No nystagmus   Negative test of skew   Negative head impulse     NIHSS: 6    Fingerstick Blood Glucose: CAPILLARY BLOOD GLUCOSE  190 (18 Aug 2024 10:02)      POCT Blood Glucose.: 190 mg/dL (18 Aug 2024 09:51)       LABS:                    RADIOLOGY & ADDITIONAL STUDIES:  CT HEAD:  No CT evidence for acute demarcated infarction. No intracranial hematoma   or mass effect. If symptom worsens or persists MRI of the brain can be   obtained.    CT PERFUSION:  No core infarction or penumbra.    CTA HEAD:  No high-grade stenosis, large vessel occlusion, aneurysm or AVM.    CTA NECK:  No high-grade stenosis or occlusion in extracranial carotid and vertebral   arteries.      IV-tenecteplase(Y/N):    N                               Bolus time:  Reason IV-tenecteplase not given: Outside window    **STROKE CODE CONSULT NOTE**    Last known well time/Time of onset of symptoms: 1500 08/17/2024    HPI: 63 R-H F with h/o T2DM, HTN, HLD, CAD w/ stents (aspirin q48 and plavix qD) who presented to the ED for dizziness and L sided weakness. LKN 1500 8/17/2024. Patient said that she recalled around 1800 that she developed dizziness, chest pain, and L sided arm weakness leading up to the neck. She went to bed and realized that the symptoms persisted when she woke up today. Dizziness were not position dependent and were not triggered by sudden movement. Decided to come to the ED. CODE STROKE called 0948. NIHSS 6. pre-MRS 0. Patient initially demonstrated L sided weakness upon NIHSS testing but upon manual motor tested demonstrated significant give way weakness on the L. CT head negative for acute hemorrhage, infarct nor mass effect. CT angio with no LVO. Not candidate for tenecteplase because outside window. Not candidate for thrombectomy because no LVO.     Of note patient was last seen [facility] for headache and L sided arm sensory changes. Last seen on 7/30/24. According to notes "...back of the head pain that runs down her neck into her L arm like an electric current. Pain starts above L ear and travels down the lateral posterior aspect of her L neck and down the back of her L arm. She is not able to lay on her R side dt the pain. onset june 2024, worsening. Patient denies any trauma at that time. Her only eliciting event she states is fibromyalgia. [also] low back pain that starts on the R low back and runs down her R leg. Her back pain starts in the R low back, travels across her upper R buttock and then to the anterior R leg then stops at the knee. The pain is described as a current like sensation. onset june 2024, worsening. Her only eliciting event she states is fibromyalgia." Consideration for cervical radiculopathy and lumbar radiculopathy in setting of known trigeminal neuralgia and headache. Was recommended for MR C spine prior which was negative as of 2022   "C5-6 mild retrolisthesis and broad-based posterior disc protrusion with moderate bilateral neural foraminal stenosis and moderate central stenosis. C4-5 small central disc protrusion with mild central canal stenosis. Milder findings elsewhere in the cervical spine as above." Recommended for MR L spine but no records of it being done, likely pending financial authorization.           PAST MEDICAL & SURGICAL HISTORY:  Diabetes mellitus type 2 in obese  Hg A1c 7 ( 1/2022 ) in [facility]IE      Hypertension      Hyperlipidemia      Peripheral neuropathy      Obstructive sleep apnea of adult  resolved , retested 5/2021 reports in [employer]      Diverticular disease  h/o partial colectomy 2013      Coronary artery disease  2 stents in 2019, occasional chest discomfort, using nitroglycerin PRN - chronic, last cardiac  cath 2/2022- patent stents      Asthma  controlled, last inhaler use 6-7 month ago , denies hospitalizations, ER visits      Fibromyalgia      Stented coronary artery  two PTCA in LAD 2/2019      History of gout      History of Clostridium difficile colitis  2019      Vitamin B12 deficiency      PVC (premature ventricular contraction)  on Metoprolol      Obese  BMI 36      S/P colon resection  surgery March 2013 with temporary colostomy til 9/13      S/P cholecystectomy  1993      H/O knee surgery  bilateral patella realignment age 18, later left knee meniscus age 34      S/P bilateral breast reduction  age 22      History of surgery on arm  left humerus age 54 , tumor i- enchondroma- hardware in place      History of hysterectomy  age 40      History of colonoscopy  and endo 2014          FAMILY HISTORY:  Family history of heart disease (Sibling)    Family history of hypertension    Family history of diabetes mellitus type II (Sibling)        SOCIAL HISTORY:  Smoking Cessation: Discussed    ROS:  Constitutional:  fatigue  Eyes: No eye pain, visual disturbances  ENMT: vertigo  Respiratory: No cough,   Cardiovascular: +chest pain  Gastrointestinal: No abdominal pain  Neurological: As per HPI    MEDICATIONS  (STANDING):    MEDICATIONS  (PRN):      Allergies    codeine (Diarrhea; Nausea; Vomiting)  morphine (Diarrhea; Nausea; Vomiting)  Keflex (Hives)  adhesives (Other)  Cipro (Anaphylaxis; Rash)    Intolerances    Lipitor (Unknown)  verapamil (Unknown)  Cardizem (Unknown)      Vital Signs Last 24 Hrs  T(C): 36.6 (18 Aug 2024 09:28), Max: 36.6 (18 Aug 2024 09:28)  T(F): 97.9 (18 Aug 2024 09:28), Max: 97.9 (18 Aug 2024 09:28)  HR: 74 (18 Aug 2024 09:28) (74 - 74)  BP: 123/84 (18 Aug 2024 09:28) (123/84 - 123/84)  BP(mean): --  RR: 22 (18 Aug 2024 09:28) (22 - 22)  SpO2: 97% (18 Aug 2024 09:28) (97% - 97%)    Parameters below as of 18 Aug 2024 09:28  Patient On (Oxygen Delivery Method): room air        PHYSICAL EXAM:  Physical Examination:   General - NAD, pleasant, cooperative   Cardiovascular - Peripheral pulses palpable, no edema  Neurologic Exam:  Mental status - Awake, Alert, Oriented to person, place, and time. Speech fluent, repetition and naming intact. Follows commands. Fund of knowledge intact    Cranial nerves:  CN II: Visual fields are full to confrontation. Pupils are 4 mm and briskly reactive to light.  CN III, IV, VI: EOMI, no nystagmus, no ptosis  CN V: Facial sensation is intact to pinprick in all 3 divisions bilaterally.  CN VII: Face is symmetric with normal eye closure and smile.  CN VII: Hearing is normal to rubbing fingers  CN IX, X: Palate elevates symmetrically. Phonation is normal.  CN XI: Head turning and shoulder shrug are intact  CN XII: Tongue is midline with normal movements and no atrophy.    Motor - Normal bulk and tone throughout. No pronator drift of out-stretched arms.  Strength testing                                            Deltoid(C5)  Biceps(C6)    Triceps(C7)     Wrist Extension    Wrist Flexion (C8)          R                                           5                 5                        4+                     5                              5                                      5                           L (affected by give way)             4-                4-                        4+                     4+                             4+                                      4-                                                                   Hip Flexion(L2/3)    Hip Extension (L4/5)   Knee Flexion (L4/5/S1)    Knee Extension (L3/4)   Dorsiflexion (L4/5)   Plantar Flexion (S1)  R                                             5                                    5                                     5                                                     5                                              5                          5  L  (affected by give way             4-                                     4-                                     4-                                                     4-                                              5                          5    Sensation - Decreased sensation to light touch and temperature on LUE and LLE       DTR's -             Biceps      Triceps     Brachioradialis      Patellar    Ankle    Toes/plantar response  R             2+             1+                  2+                       2+            2+                 Down  L              2+             1+                 2+                        2+           2+                 Down    Coordination - FNF on R with no deficits. FNF on L would constantly miss finger but patient would have her eyes closed when they were to occur    Gait and station - Wide based but did not seem antalgic. able to raise both feet equally     No nystagmus   Negative test of skew   Negative head impulse     NIHSS: 6    Fingerstick Blood Glucose: CAPILLARY BLOOD GLUCOSE  190 (18 Aug 2024 10:02)      POCT Blood Glucose.: 190 mg/dL (18 Aug 2024 09:51)       LABS:                    RADIOLOGY & ADDITIONAL STUDIES:  CT HEAD:  No CT evidence for acute demarcated infarction. No intracranial hematoma   or mass effect. If symptom worsens or persists MRI of the brain can be   obtained.    CT PERFUSION:  No core infarction or penumbra.    CTA HEAD:  No high-grade stenosis, large vessel occlusion, aneurysm or AVM.    CTA NECK:  No high-grade stenosis or occlusion in extracranial carotid and vertebral   arteries.      IV-tenecteplase(Y/N):    N                               Bolus time:  Reason IV-tenecteplase not given: Outside window

## 2024-08-18 NOTE — CONSULT NOTE ADULT - ASSESSMENT
ASSESSMENT       IMPRESSION       RECOMMENDATION     LKW:  NIHSS:    Baseline MRS:  Not a tenecteplase candidate due to   Not a thrombectomy candidate due to      CT head:   CTA/P:    Impression:      Mechanism: Cardioembolic, Large artery atherosclerosis (>50%), Small vessel disease, Embolic stroke of undetermined source, Stroke of other determined etiology    Recommendations:  [] c/w home plavix daily for secondary stroke prevention    [] OK to continue Aspirin 81mg daily q48 as per patient home med   [] HgbA1C, fasting lipid panel, CBC, CMP, coag panel, troponin  [] MRI brain w/o con if not contraindicated  [] TTE w/ bubble study  [] telemetry to check for arrhythmia, EKG, will discuss loop recorder    - Tight glucose control (long-term goal HgbA1c < 6%)  - Stroke education and counseling  - Neuro-checks and VS q4h  - Permissive HTN up to 220/120 for 24-48h from symptom onset  - Dysphagia screen. If fails, speech/swallow eval. If passes can start on pureed diet   - aspiration, fall precautions  - STAT CT head non-contrast for change in neuro exam.   - PT/ OT / DVT ppx per primary team     Discussed with stroke fellow Amado He      and under supervision of attending  Richard LIbman     regarding decision against candidacy for tenecteplase/ thrombectomy. Will be formally staffed on morning rounds with attending. Recommendations will be complete once signed by attending.   ASSESSMENT    63 R-H F with h/o T2DM, HTN, HLD, CAD w/ stents (aspirin q48 and plavix qD) who presented to the ED for dizziness and L sided weakness. LKN 1500 2024. Patient said that she recalled around 1800 that she developed dizziness, chest pain, and L sided arm weakness leading up to the neck. She went to bed and realized that the symptoms persisted when she woke up today. Dizziness were not position dependent and were not triggered by sudden movement. Decided to come to the ED. CODE STROKE called 0948. NIHSS 6. pre-MRS 0. Patient initially demonstrated L sided weakness upon NIHSS testing but upon manual motor tested demonstrated significant give way weakness on the L. CT head negative for acute hemorrhage, infarct nor mass effect. CT angio with no LVO. Not candidate for tenecteplase because outside window. Not candidate for thrombectomy because no LVO.     IMPRESSION   Room spinning dizziness and L sided hypesthetic ataxic hemiparesis, would normally localize to right cerebral dysfunction i/s/o ischemic stroke of  mechanism. However, given flucuations of symptoms i/s/o of multiple chronic complaints, considering also metabolic encephalopathy with functional overlay     RECOMMENDATION   [] c/w home plavix daily for secondary stroke prevention    [] OK to continue Aspirin 81mg daily q48 as per patient home med   [] Symptomatic management for nausea, can try reglan   [] f/u HgbA1C, fasting lipid panel, CBC, CMP, coag panel, troponin  [] Lipid lowering therapy titration based on LDL < 70, Patient seems to be intolerant of statin, therefore would look for other agents if needed   [] MRI brain w/o con if not contraindicated  [] TTE w/ bubble study  [] telemetry to check for arrhythmia, EKG, will discuss loop recorder    - Tight glucose control (long-term goal HgbA1c < 6%)  - Stroke education and counseling  - Neuro-checks and VS q4h  - Permissive HTN up to 220/120 for 24-48h from symptom onset  - Dysphagia screen. If fails, speech/swallow eval. If passes can start on pureed diet   - aspiration, fall precautions  - STAT CT head non-contrast for change in neuro exam.   - PT/ OT / DVT ppx per primary team     Discussed with stroke fellow Amado He      and under supervision of attending  Richard LIbman     regarding decision against candidacy for tenecteplase/ thrombectomy. Will be formally staffed on morning rounds with attending. Recommendations will be complete once signed by attending.   ASSESSMENT    63 R-H F with h/o T2DM, HTN, HLD, CAD w/ stents (aspirin q48 and plavix qD) who presented to the ED for dizziness and L sided weakness. LKN 1500 2024. Patient said that she recalled around 1800 that she developed dizziness, chest pain, and L sided arm weakness leading up to the neck. She went to bed and realized that the symptoms persisted when she woke up today. Dizziness were not position dependent and were not triggered by sudden movement. Decided to come to the ED. CODE STROKE called 0948. NIHSS 6. pre-MRS 0. Patient initially demonstrated L sided weakness upon NIHSS testing but upon manual motor tested demonstrated significant give way weakness on the L. CT head negative for acute hemorrhage, infarct nor mass effect. CT angio with no LVO. Not candidate for tenecteplase because outside window. Not candidate for thrombectomy because no LVO.     IMPRESSION   Room spinning dizziness, dysarthria, fluctuating word finding difficulties and L sided hypesthetic ataxic hemiparesis, would normally localize to right cerebral dysfunction i/s/o ischemic stroke of  mechanism. However, given flucuations of symptoms i/s/o of multiple chronic complaints, considering also metabolic encephalopathy with functional overlay     RECOMMENDATION   [] c/w home plavix daily for secondary stroke prevention    [] OK to continue Aspirin 81mg daily q48 as per patient home med   [] Symptomatic management for nausea, can try reglan   [] f/u HgbA1C, fasting lipid panel, CBC, CMP, coag panel, troponin  [] Lipid lowering therapy titration based on LDL < 70, Patient seems to be intolerant of statin, therefore would look for other agents if needed   [] MRI brain w/o con if not contraindicated  [] TTE w/ bubble study  [] telemetry to check for arrhythmia, EKG, will discuss loop recorder    - Tight glucose control (long-term goal HgbA1c < 6%)  - Stroke education and counseling  - Neuro-checks and VS q4h  - Permissive HTN up to 220/120 for 24-48h from symptom onset  - Dysphagia screen. If fails, speech/swallow eval. If passes can start on pureed diet   - aspiration, fall precautions  - STAT CT head non-contrast for change in neuro exam.   - PT/ OT / DVT ppx per primary team     Discussed with stroke fellow Amado He      and under supervision of attending  Richard LIbman     regarding decision against candidacy for tenecteplase/ thrombectomy. Will be formally staffed on morning rounds with attending. Recommendations will be complete once signed by attending.   ASSESSMENT    63 R-H F with h/o T2DM, HTN, HLD, CAD w/ stents (aspirin q48 and plavix qD) who presented to the ED for dizziness and L sided weakness. LKN 1500 2024. Patient said that she recalled around 1800 that she developed dizziness, chest pain, and L sided arm weakness leading up to the neck. She went to bed and realized that the symptoms persisted when she woke up today. Dizziness were not position dependent and were not triggered by sudden movement. Decided to come to the ED. CODE STROKE called 0948. NIHSS 6. pre-MRS 0. Patient initially demonstrated L sided weakness upon NIHSS testing but upon manual motor tested demonstrated significant give way weakness on the L. CT head negative for acute hemorrhage, infarct nor mass effect. CT angio with no LVO. Not candidate for tenecteplase because outside window. Not candidate for thrombectomy because no LVO.     IMPRESSION   Room spinning dizziness, dysarthria, fluctuating word finding difficulties and L sided hypesthetic ataxic hemiparesis, would normally localize to right cerebral dysfunction i/s/o ischemic stroke of  mechanism. However, given flucuations of symptoms i/s/o of multiple chronic complaints, considering also metabolic encephalopathy with functional overlay     RECOMMENDATION   [] c/w home plavix daily for secondary stroke prevention    [] OK to continue Aspirin 81mg daily q48 as per patient home med   [] Symptomatic management for nausea, can try reglan   [] f/u HgbA1C, fasting lipid panel, CBC, CMP, coag panel, troponin  [] Lipid lowering therapy titration based on LDL < 70, Patient seems to be intolerant of statin, therefore would look for other agents if needed   [] MRI brain w/o con if not contraindicated  [] TTE w/ bubble study  [] telemetry to check for arrhythmia, EKG, will discuss loop recorder  [] upon discharge should follow up with neurology resident clinic on  at [address] ([phone])    - Tight glucose control (long-term goal HgbA1c < 6%)  - Stroke education and counseling  - Neuro-checks and VS q4h  - Permissive HTN up to 220/120 for 24-48h from symptom onset  - Dysphagia screen. If fails, speech/swallow eval. If passes can start on pureed diet   - aspiration, fall precautions  - STAT CT head non-contrast for change in neuro exam.   - PT/ OT / DVT ppx per primary team     Discussed with stroke fellow Amado He      and under supervision of attending  Richard LIbman     regarding decision against candidacy for tenecteplase/ thrombectomy. Will be formally staffed on morning rounds with attending. Recommendations will be complete once signed by attending.   ASSESSMENT    63 R-H F with h/o T2DM, HTN, HLD, CAD w/ stents (aspirin q48 and plavix qD) who presented to the ED for dizziness and L sided weakness. LKN 1500 2024. Patient said that she recalled around 1800 that she developed dizziness, chest pain, and L sided arm weakness leading up to the neck. She went to bed and realized that the symptoms persisted when she woke up today. Dizziness were not position dependent and were not triggered by sudden movement. Decided to come to the ED. CODE STROKE called 0948. NIHSS 6. pre-MRS 0. Patient initially demonstrated L sided weakness upon NIHSS testing but upon manual motor tested demonstrated significant give way weakness on the L. CT head negative for acute hemorrhage, infarct nor mass effect. CT angio with no LVO. Not candidate for tenecteplase because outside window. Not candidate for thrombectomy because no LVO.     IMPRESSION   Room spinning dizziness, dysarthria, fluctuating word finding difficulties and L sided hypesthetic ataxic hemiparesis, would normally localize to right cerebral dysfunction i/s/o ischemic stroke of  mechanism. However, given flucuations of symptoms i/s/o of multiple chronic complaints, considering also metabolic encephalopathy with functional overlay   GIven patient's history of arm and leg weakness According to outpatient notes) can also consider cervical spondylosis as etiology. However, would not explain fluctuating fluency and word finding difficulties     RECOMMENDATION   [] c/w home plavix daily for secondary stroke prevention    [] OK to continue Aspirin 81mg daily q48 as per patient home med   [] Symptomatic management for nausea, can try reglan   [] f/u HgbA1C, fasting lipid panel, CBC, CMP, coag panel, troponin  [] Lipid lowering therapy titration based on LDL < 70, Patient seems to be intolerant of statin, therefore would look for other agents if needed   [] MRI brain w/o con and MR cspine w/wo contrast if not contraindicated  [] TTE w/ bubble study  [] telemetry to check for arrhythmia, EKG, will discuss loop recorder  [] upon discharge should follow up with neurology resident clinic on  at [address] [place] [address] ([phone])    - Tight glucose control (long-term goal HgbA1c < 6%)  - Stroke education and counseling  - Neuro-checks and VS q4h  - Permissive HTN up to 220/120 for 24-48h from symptom onset  - Dysphagia screen. If fails, speech/swallow eval. If passes can start on pureed diet   - aspiration, fall precautions  - STAT CT head non-contrast for change in neuro exam.   - PT/ OT / DVT ppx per primary team     Discussed with stroke fellow Amado He      and under supervision of attending  Richard LIbman     regarding decision against candidacy for tenecteplase/ thrombectomy. Will be formally staffed on morning rounds with attending. Recommendations will be complete once signed by attending.

## 2024-08-18 NOTE — ED ADULT NURSE REASSESSMENT NOTE - NS ED NURSE REASSESS COMMENT FT1
Pt received from SHORTY Govea at 1900. Pt oriented to CDU and plan of care was discussed. Pt is admitted to Medicine for dizziness, waiting for bed assignment. Pt c/o dizziness and nausea, medicated as per order, see eMAR. Neuro check performed, pt still experiencing decreased sensation to R side of face, LUIS ROSAS, see ED Stroke Flowsheet. A&Ox4, obeys commands, ambulatory via walker, independent with support. Denies HA, blurry vision. Respirations spontaneous and unlabored. Denies SOB, dyspnea, cough, CP, palpitations. On CM, NSR. IV site patent, no signs of infiltration noted. Denies N/V/D/C. Pt afebrile, denies chills. Pt resting in bed. Safety & comfort measures maintained. Call bell within reach.

## 2024-08-18 NOTE — ED PROVIDER NOTE - NS ED MD DISPO OBSERVATION SRV
CDU
Surendra ALCALA: Pt signed out to me.  He reports feeling better with the lidoderm patch.  Labs, urine, renal us, and CTA all negative.  No PE.  Small left pleural effusion and atelectasis.  Pt is stable for discharge home and outpatient PMD follow-up.

## 2024-08-19 ENCOUNTER — RX RENEWAL (OUTPATIENT)
Age: 64
End: 2024-08-19

## 2024-08-19 ENCOUNTER — RESULT REVIEW (OUTPATIENT)
Age: 64
End: 2024-08-19

## 2024-08-19 LAB
A1C WITH ESTIMATED AVERAGE GLUCOSE RESULT: 7.6 % — HIGH (ref 4–5.6)
A1C WITH ESTIMATED AVERAGE GLUCOSE RESULT: 7.6 % — HIGH (ref 4–5.6)
APPEARANCE UR: CLEAR — SIGNIFICANT CHANGE UP
BACTERIA # UR AUTO: NEGATIVE /HPF — SIGNIFICANT CHANGE UP
BILIRUB UR-MCNC: NEGATIVE — SIGNIFICANT CHANGE UP
CAST: 1 /LPF — SIGNIFICANT CHANGE UP (ref 0–4)
CHOLEST SERPL-MCNC: 158 MG/DL — SIGNIFICANT CHANGE UP
COLOR SPEC: YELLOW — SIGNIFICANT CHANGE UP
DIFF PNL FLD: NEGATIVE — SIGNIFICANT CHANGE UP
ESTIMATED AVERAGE GLUCOSE: 171 MG/DL — HIGH (ref 68–114)
ESTIMATED AVERAGE GLUCOSE: 171 MG/DL — HIGH (ref 68–114)
GLUCOSE BLDC GLUCOMTR-MCNC: 125 MG/DL — HIGH (ref 70–99)
GLUCOSE BLDC GLUCOMTR-MCNC: 143 MG/DL — HIGH (ref 70–99)
GLUCOSE BLDC GLUCOMTR-MCNC: 155 MG/DL — HIGH (ref 70–99)
GLUCOSE BLDC GLUCOMTR-MCNC: 157 MG/DL — HIGH (ref 70–99)
GLUCOSE BLDC GLUCOMTR-MCNC: 159 MG/DL — HIGH (ref 70–99)
GLUCOSE UR QL: NEGATIVE MG/DL — SIGNIFICANT CHANGE UP
HDLC SERPL-MCNC: 57 MG/DL — SIGNIFICANT CHANGE UP
KETONES UR-MCNC: NEGATIVE MG/DL — SIGNIFICANT CHANGE UP
LEUKOCYTE ESTERASE UR-ACNC: ABNORMAL
LIPID PNL WITH DIRECT LDL SERPL: 76 MG/DL — SIGNIFICANT CHANGE UP
NITRITE UR-MCNC: NEGATIVE — SIGNIFICANT CHANGE UP
NON HDL CHOLESTEROL: 101 MG/DL — SIGNIFICANT CHANGE UP
PH UR: 6 — SIGNIFICANT CHANGE UP (ref 5–8)
PROT UR-MCNC: NEGATIVE MG/DL — SIGNIFICANT CHANGE UP
RBC CASTS # UR COMP ASSIST: 0 /HPF — SIGNIFICANT CHANGE UP (ref 0–4)
SP GR SPEC: 1.02 — SIGNIFICANT CHANGE UP (ref 1–1.03)
SQUAMOUS # UR AUTO: 5 /HPF — SIGNIFICANT CHANGE UP (ref 0–5)
T3 SERPL-MCNC: 104 NG/DL — SIGNIFICANT CHANGE UP (ref 80–200)
T4 AB SER-ACNC: 8.1 UG/DL — SIGNIFICANT CHANGE UP (ref 4.6–12)
TRIGL SERPL-MCNC: 144 MG/DL — SIGNIFICANT CHANGE UP
TSH SERPL-MCNC: 2.02 UIU/ML — SIGNIFICANT CHANGE UP (ref 0.27–4.2)
UROBILINOGEN FLD QL: 0.2 MG/DL — SIGNIFICANT CHANGE UP (ref 0.2–1)
WBC UR QL: 5 /HPF — SIGNIFICANT CHANGE UP (ref 0–5)

## 2024-08-19 PROCEDURE — 93306 TTE W/DOPPLER COMPLETE: CPT | Mod: 26

## 2024-08-19 RX ADMIN — METOPROLOL TARTRATE 50 MILLIGRAM(S): 100 TABLET ORAL at 06:26

## 2024-08-19 RX ADMIN — INSULIN GLARGINE 10 UNIT(S): 100 INJECTION, SOLUTION SUBCUTANEOUS at 22:50

## 2024-08-19 RX ADMIN — MECLIZINE HYDROCHLORIDE 12.5 MILLIGRAM(S): 25 TABLET ORAL at 12:20

## 2024-08-19 RX ADMIN — Medication 75 MILLIGRAM(S): at 12:21

## 2024-08-19 RX ADMIN — Medication 1: at 12:13

## 2024-08-19 RX ADMIN — Medication 100 MILLIGRAM(S): at 12:20

## 2024-08-19 RX ADMIN — VALSARTAN 20 MILLIGRAM(S): 40 TABLET ORAL at 20:11

## 2024-08-19 RX ADMIN — Medication 81 MILLIGRAM(S): at 23:27

## 2024-08-19 RX ADMIN — MECLIZINE HYDROCHLORIDE 12.5 MILLIGRAM(S): 25 TABLET ORAL at 04:10

## 2024-08-19 RX ADMIN — MECLIZINE HYDROCHLORIDE 12.5 MILLIGRAM(S): 25 TABLET ORAL at 20:10

## 2024-08-19 NOTE — CONSULT NOTE ADULT - ASSESSMENT
63 yr female with h/o HTN DM HLD CAD post stent, PVC , with acute dizziness, suggestive of vertigo, L>R sensation loss ? related to chronic C spine compression, not felt to need intervention    advise    1. continue current cardiac meds  2. if neuro feels patient had stroke will place loop  3. seems improved with meclizine, would continue

## 2024-08-19 NOTE — CONSULT NOTE ADULT - SUBJECTIVE AND OBJECTIVE BOX
CHIEF COMPLAINT: dizziness      PAST MEDICAL & SURGICAL HISTORY:  Diabetes mellitus type 2 in obese  Hg A1c 7 ( 2022 ) in HIE      Hypertension      Hyperlipidemia      Peripheral neuropathy      Obstructive sleep apnea of adult  resolved , retested 2021 reports in [employer]      Diverticular disease  h/o partial colectomy       Coronary artery disease  2 stents in 2019, occasional chest discomfort, using nitroglycerin PRN - chronic, last cardiac  cath 2022- patent stents      Asthma  controlled, last inhaler use 6-7 month ago , denies hospitalizations, ER visits      Fibromyalgia      Stented coronary artery  two PTCA in LAD 2019      History of gout      History of Clostridium difficile colitis        Vitamin B12 deficiency      PVC (premature ventricular contraction)  on Metoprolol      Obese  BMI 36      S/P colon resection  surgery 2013 with temporary colostomy til       S/P cholecystectomy        H/O knee surgery  bilateral patella realignment age 18, later left knee meniscus age 34      S/P bilateral breast reduction  age 22      History of surgery on arm  left humerus age 54 , tumor i- enchondroma- hardware in place      History of hysterectomy  age 40      History of colonoscopy  and endo           HPI: 63 yr female with DM HTN HLD CAD post stent PVC admit with dizzines room spiinning and chronic L>R sensation loss, now worse    MRI negative for stroek, but cervical spine mild compression not felt to need intervention, no chest pain no dyspnea    ECHO Normal LV fx , mild LV chamber enlargement                PREVIOUS DIAGNOSTIC TESTING:      ECHO  FINDINGS:    STRESS  FINDINGS:    CATHETERIZATION  FINDINGS:    ELECTROPHYSIOLOGY STUDY  FINDINGS:    CAROTID ULTRASOUND:  FINDINGS    VENOUS DUPLEX SCAN:  FINDINGS:    CHEST CT PULMONARY ANGIO with IV Contrast:  FINDINGS:  MEDICATIONS  (STANDING):  allopurinol 100 milliGRAM(s) Oral daily  aspirin enteric coated 81 milliGRAM(s) Oral <User Schedule>  budesonide 160 MICROgram(s)/formoterol 4.5 MICROgram(s) Inhaler 2 Puff(s) Inhalation two times a day  clopidogrel Tablet 75 milliGRAM(s) Oral daily  dextrose 5%. 1000 milliLiter(s) (50 mL/Hr) IV Continuous <Continuous>  dextrose 5%. 1000 milliLiter(s) (100 mL/Hr) IV Continuous <Continuous>  dextrose 50% Injectable 25 Gram(s) IV Push once  dextrose 50% Injectable 12.5 Gram(s) IV Push once  dextrose 50% Injectable 25 Gram(s) IV Push once  glucagon  Injectable 1 milliGRAM(s) IntraMuscular once  insulin glargine Injectable (LANTUS) 10 Unit(s) SubCutaneous at bedtime  insulin lispro (ADMELOG) corrective regimen sliding scale   SubCutaneous three times a day before meals  meclizine 12.5 milliGRAM(s) Oral three times a day  metoprolol succinate ER 50 milliGRAM(s) Oral daily  pantoprazole    Tablet 40 milliGRAM(s) Oral before breakfast  valsartan 20 milliGRAM(s) Oral daily    MEDICATIONS  (PRN):  acetaminophen     Tablet .. 650 milliGRAM(s) Oral every 6 hours PRN Temp greater or equal to 38C (100.4F), Mild Pain (1 - 3)  albuterol    90 MICROgram(s) HFA Inhaler 2 Puff(s) Inhalation every 6 hours PRN for shortness of breath and/or wheezing  dextrose Oral Gel 15 Gram(s) Oral once PRN Blood Glucose LESS THAN 70 milliGRAM(s)/deciliter  ondansetron Injectable 4 milliGRAM(s) IV Push every 6 hours PRN Nausea and/or Vomiting      FAMILY HISTORY:  Family history of heart disease (Sibling)    Family history of hypertension    Family history of diabetes mellitus type II (Sibling)        SOCIAL HISTORY:    CIGARETTES:    ALCOHOL:    REVIEW OF SYSTEMS:    CONSTITUTIONAL: No fever, weight loss, chills, shakes, or fatigue  EYES: No eye pain, visual disturbances, or discharge  ENMT:  No difficulty hearing, tinnitus, vertigo; No sinus or throat pain  NECK: No pain or stiffness  BREASTS: No pain, masses, or nipple discharge  RESPIRATORY: No cough, wheezing, hemoptysis, or shortness of breath  CARDIOVASCULAR: No chest pain, dyspnea, palpitations, dizziness, syncope, paroxysmal nocturnal dyspnea, orthopnea, or arm or leg swelling  GASTROINTESTINAL: No abdominal  or epigastric pain, nausea, vomiting, hematemesis, diarrhea, constipation, melena or bright red blood.  GENITOURINARY: No dysuria, nocturia, hematuria, or urinary incontinence  NEUROLOGICAL: No headaches, memory loss, slurred speech, limb weakness, loss of strength, +  numbness + dizziness  SKIN: No itching, burning, rashes, or lesions   LYMPH NODES: No enlarged glands  ENDOCRINE: No heat or cold intolerance, or hair loss  MUSCULOSKELETAL: No joint pain or swelling, muscle, back, or extremity pain  PSYCHIATRIC: No depression, anxiety, or difficulty sleeping  HEME/LYMPH: No easy bruising or bleeding gums  ALLERY AND IMMUNOLOGIC: No hives or rash.      Vital Signs Last 24 Hrs  T(C): 37.1 (19 Aug 2024 20:12), Max: 37.1 (19 Aug 2024 20:12)  T(F): 98.8 (19 Aug 2024 20:12), Max: 98.8 (19 Aug 2024 20:12)  HR: 81 (19 Aug 2024 20:12) (78 - 84)  BP: 118/75 (19 Aug 2024 20:12) (111/63 - 135/69)  BP(mean): 90 (19 Aug 2024 20:12) (79 - 90)  RR: 17 (19 Aug 2024 20:12) (17 - 18)  SpO2: 98% (19 Aug 2024 20:12) (96% - 98%)    Parameters below as of 19 Aug 2024 20:12  Patient On (Oxygen Delivery Method): room air      Daily     Daily         PHYSICAL EXAM:    GENERAL: In no apparent distress, well nourished, and hydrated.  HEAD:  Atraumatic, Normocephalic  EYES: EOMI, PERRLA, conjunctiva and sclera clear  ENMT: No tonsillar erythema, exudates, or enlargement; Moist mucous membranes, Good dentition, No lesions  NECK: Supple and normal thyroid.  No JVD or carotid bruit.  Carotid pulse is 2+ bilaterally.  HEART: Regular rate and rhythm; No murmurs, rubs, or gallops.  PULMONARY: Clear to auscultation and perfusion.  No rales, wheezing, or rhonchi bilaterally.  ABDOMEN: Soft, Nontender, Nondistended; Bowel sounds present  EXTREMITIES:  2+ Peripheral Pulses, No clubbing, cyanosis, or edema  LYMPH: No lymphadenopathy noted  NEUROLOGICAL: states sensation left is less than right          INTERPRETATION OF TELEMETRY:    ECG:    I&O's Detail      LABS:                        12.0   11.65 )-----------( 235      ( 18 Aug 2024 10:29 )             39.1     08-18    139  |  104  |  18  ----------------------------<  208<H>  3.9   |  20<L>  |  0.55    Ca    9.4      18 Aug 2024 10:29    TPro  6.7  /  Alb  4.0  /  TBili  0.3  /  DBili  x   /  AST  14  /  ALT  15  /  AlkPhos  109  08-18        PT/INR - ( 18 Aug 2024 10:29 )   PT: See Note;   INR: See Note ratio         PTT - ( 18 Aug 2024 10:29 )  PTT:See Note sec  Urinalysis Basic - ( 19 Aug 2024 07:25 )    Color: Yellow / Appearance: Clear / S.018 / pH: x  Gluc: x / Ketone: Negative mg/dL  / Bili: Negative / Urobili: 0.2 mg/dL   Blood: x / Protein: Negative mg/dL / Nitrite: Negative   Leuk Esterase: Trace / RBC: 0 /HPF / WBC 5 /HPF   Sq Epi: x / Non Sq Epi: 5 /HPF / Bacteria: Negative /HPF      BNP  I&O's Detail    Daily     Daily     RADIOLOGY & ADDITIONAL STUDIES:
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
## 2024-08-19 NOTE — CONSULT NOTE ADULT - ASSESSMENT
63 yr female with h/o DM HTN HLD CAD post stent, PVC with dizziness which is acute spinning sensation, improved wiht meclizine suggestive more of vertigo but with spinal stenosis noted, sensation loss appearing mor chronic than acute. NO evidence of cardiac arrhtyhmia at this point    advise    1 . continue current cardiac medication  2. if neuro feels that patient may have had a stroke , will consider for implantable loop

## 2024-08-19 NOTE — CHART NOTE - NSCHARTNOTEFT_GEN_A_CORE
S: TE and patient stable    O:  Patient imaging reviewed by neurology service. Degenerative changes and foraminal stenosis appreciated in cervical and thoracic spine. MRI findings specifically:  MRI BRAIN: No evidence of acute intracranial pathology.      MRI CERVICAL SPINE:  Mild multilevel degenerative changes as discussed above.  Specifically:  C2-C3: No significant disc herniation, spinal canal stenosis or neural   foraminal narrowing.  C3-C4: Small central disc protrusion.  No significant spinal canal   stenosis or neural foraminal narrowing.  C4-C5: Less than 2 mm anterolisthesis.  Small disc osteophyte complex,   asymmetric to the left.  Minimal left-sided spinal canal stenosis.    Left-sided uncovertebral hypertrophy causes mild left neural foraminal   narrowing.  C5-C6: Less than 2 mm retrolisthesis.  Disc osteophyte complex causes   mild spinal canal stenosis and may slightly contact the ventral cord.     Uncovertebral/facet hypertrophy causes moderate to severe left and mild   to moderate right neural foraminal narrowing.  C6-C7: No significant disc herniation, spinal canal stenosis or neural   foraminal narrowing.  C7-T1:  No significant disc herniation, spinal canal stenosis or neural   foraminal narrowing.  Upper thoracic spine: Small disc herniations at T1-T2, T2-T3 and T3-T4   seen on sagittal imaging with mild spinal canal stenosis greatest at   T3-T4.  No cord contact or cord signal changes.      AP:  1) PAtient mental status change likely 2/2 metabolic encephalopathy as MRI (-) for acute ischemic pathology  2) L arm weakness likely 2/2 compressive and degenerative pathology w spinal and foraminal stenosis given MRI findings    Recommend  [] recommend evaluation by spine surgery  [] follow up [facility] Neurology resident clinic  [] ok to continue home medications S: TE and patient stable    O:  Patient imaging reviewed by neurology service. Degenerative changes and foraminal stenosis appreciated in cervical and thoracic spine. MRI findings specifically:  MRI BRAIN: No evidence of acute intracranial pathology.      MRI CERVICAL SPINE:  Mild multilevel degenerative changes as discussed above.  Specifically:  C2-C3: No significant disc herniation, spinal canal stenosis or neural   foraminal narrowing.  C3-C4: Small central disc protrusion.  No significant spinal canal   stenosis or neural foraminal narrowing.  C4-C5: Less than 2 mm anterolisthesis.  Small disc osteophyte complex,   asymmetric to the left.  Minimal left-sided spinal canal stenosis.    Left-sided uncovertebral hypertrophy causes mild left neural foraminal   narrowing.  C5-C6: Less than 2 mm retrolisthesis.  Disc osteophyte complex causes   mild spinal canal stenosis and may slightly contact the ventral cord.     Uncovertebral/facet hypertrophy causes moderate to severe left and mild   to moderate right neural foraminal narrowing.  C6-C7: No significant disc herniation, spinal canal stenosis or neural   foraminal narrowing.  C7-T1:  No significant disc herniation, spinal canal stenosis or neural   foraminal narrowing.  Upper thoracic spine: Small disc herniations at T1-T2, T2-T3 and T3-T4   seen on sagittal imaging with mild spinal canal stenosis greatest at   T3-T4.  No cord contact or cord signal changes.      AP:  1) PAtient mental status change likely 2/2 metabolic encephalopathy as MRI (-) for acute ischemic pathology  2) L arm weakness likely 2/2 compressive and degenerative pathology w spinal and foraminal stenosis given MRI findings    Recommend  [] recommend evaluation by spine surgery  [] follow up [facility] Neurology resident clinic ([phone])  [] ok to continue home medications  [] will sign off, please call [phone]) with additional questions or concerns

## 2024-08-19 NOTE — CONSULT NOTE ADULT - ASSESSMENT
63F on ASA and plavix for CAD s/p 2 cardiac stents in 2019, DM2, HTN, HLD adm med s/p episode of chest pain/dizziness/WFD, L sided arm weakness yday. Pt now at baseline. Code stroke called w/ no acute findings. MR brain wnl. MR C spine obtained as part of w/u w/ multilevel degen changes; C5-C6 mild stenosis w/ no cord signal change; mod foraminal stenosis at b/l C5-6 and L C4-5. Exam: AOx3, SWIFT 5/5 except for L triceps 4/5 (reports baseline for months). Fine motor movements intact. Neg Lincoln’s. L hemibody decreased sensation (reports baseline for years).   - no acute neurosurgical intervention  - cardiac w/u per cards/primary   - neurology following   - f/u w/ Dr. Mohan in 1-2 weeks from d/c

## 2024-08-19 NOTE — PROGRESS NOTE ADULT - ASSESSMENT
62 yo woman with h/o T2DM, HTN, HLD, CAD w/ stents (aspirin q48 and plavix qD) who presented to the ED for dizziness and L sided weakness. LKN 1500 2024. Patient said that she recalled around 1800 that she developed dizziness, chest pain, and L sided arm weakness leading up to the neck. She went to bed and realized that the symptoms persisted when she woke up today. Dizziness were not position dependent and were not triggered by sudden movement. Decided to come to the ED.   CODE STROKE called [id number]. NIHSS 6. pre-MRS 0. Patient initially demonstrated L sided weakness upon NIHSS testing but upon manual motor tested demonstrated significant give way weakness on the L.   CT head negative for acute hemorrhage, infarct nor mass effect. CT angio with no LVO. Not candidate for tenecteplase because outside window. Not candidate for thrombectomy because no LVO.   seen by stroke team and her cardiologist Dr. Saldana.     A/P    R/O CVA vs Cervical Spondylosis    Room spinning dizziness, dysarthria, fluctuating word finding difficulties and L sided hypesthetic ataxic hemiparesis, would normally localize to right cerebral dysfunction i/s/o ischemic stroke of  mechanism. However, given flucuations of symptoms i/s/o of multiple chronic complaints, considering also metabolic encephalopathy with functional overlay   GIven patient's history of arm and leg weakness According to outpatient notes can also consider cervical spondylosis as etiology. However, would not explain fluctuating fluency and word finding difficulties     - c/w home plavix daily for secondary stroke prevention    - continue Aspirin 81mg daily q48 as per patient home med   - Symptomatic management for nausea, prn anti emetics    -f/u HgbA1C, fasting lipid panel, CBC, CMP, coag panel, troponin  - MRI brain w/o con AND  MR c- spine w/wo contrast noted  - Spine surgery evaluation   - TTE w/ bubble study  - keep on telemetry to check for arrhythmia, EKG, EPS to discuss loop recorder  - Tight glucose control (long-term goal HgbA1c < 6%)  - Stroke education and counseling  - Neuro-checks and VS q4h  - Permissive HTN up to 220/120 for 24-48h from symptom onset  - Dysphagia screen.   - aspiration and  fall precautions   - PT/ OT / DVT ppx w sc lovenox    CAD  - stable  - Cardiology follow    Diabetes   - ADA diet  - BS control    Josef Montgomery MD phone [phone]

## 2024-08-19 NOTE — ED ADULT NURSE REASSESSMENT NOTE - NS ED NURSE REASSESS COMMENT FT1
Pt received from SHORTY Lockett at 1900. Pt oriented to CDU and plan of care was discussed. Pt is admitted to Medicine for dizziness, waiting for bed assignment. Pt states dizziness has improved since earlier today. Neuro check performed, pt still experiencing decreased sensation to R side of face, RUE, RLE, see ED Stroke Flowsheet. A&Ox4, obeys commands, ambulatory via walker, independent with support. Denies HA, blurry vision. Respirations spontaneous and unlabored. Denies SOB, dyspnea, cough, CP, palpitations. On CM, NSR. IV site patent, no signs of infiltration noted. Denies N/V/D/C. Pt afebrile, denies chills. Pt resting in bed. Safety & comfort measures maintained. Call bell within reach.

## 2024-08-19 NOTE — PHYSICAL THERAPY INITIAL EVALUATION ADULT - PERTINENT HX OF CURRENT PROBLEM, REHAB EVAL
64 y/o female with past medical history of DM, HTN, HLD, CAD s/p stents on ASA and Plavix presents emergency department for 1 day of generalized weakness and nausea.  Code stroke activated upon further evaluation and physical exam, brought directly to CT.  Patient states last known normal was 3 PM on 8/17.  Patient states around 6 PM yesterday she started feeling "off" and endorses some left-sided neck tingling.  Patient felt generally weak yesterday.  Patient woke up this morning with dizziness, room spinning sensation worse with head movements.  Patient endorses difficulty walking due to feeling unbalanced, needed assistance ambulating.  Patient also endorsed nausea with episode of emesis.  EMS gave 8 mg IV Zofran en route.  Patient denies fevers, chills, headache, vision changes, neck pain, chest pain, trouble breathing, abdominal pain, diarrhea/constipation, dysuria, hematuria, weakness in arms or legs.  -CODE STROKE called 0948. NIHSS 6. pre-MRS 0. Patient initially demonstrated L sided weakness upon NIHSS testing but upon manual motor tested demonstrated significant give way weakness on the L.   -CT head negative for acute hemorrhage, infarct nor mass effect. CT angio with no LVO. Not candidate for tenecteplase because outside window. Not candidate for thrombectomy because no LVO.   seen by stroke team and her cardiologist Dr. Saldana.

## 2024-08-19 NOTE — PHYSICAL THERAPY INITIAL EVALUATION ADULT - NSPTDMEREC_GEN_A_CORE
Patient will require a rolling walker at home due to decreased balance, strength and endurance to help complete MRADL's (Mobility related aides for daily living). Pt owns cane

## 2024-08-19 NOTE — CONSULT NOTE ADULT - SUBJECTIVE AND OBJECTIVE BOX
p ([id number])     63F on ASA and plavix for CAD s/p 2 cardiac stents in 2019, DM2, HTN, HLD adm med s/p episode of chest pain/dizziness/WFD, L sided arm weakness yday. Pt now at baseline. Code stroke called w/ no acute findings. MR brain wnl. MR C spine obtained as part of w/u w/ multilevel degen changes; C5-C6 mild stenosis w/ no cord signal change; mod foraminal stenosis at b/l C5-6 and L C4-5. Exam: AOx3, SWIFT 5/5 except for L triceps 4/5 (reports baseline for months). Fine motor movements intact. Neg Lincoln’s. L hemibody decreased sensation (reports baseline for years).     --Anticoagulation:  aspirin enteric coated 81 milliGRAM(s) Oral <User Schedule>  clopidogrel Tablet 75 milliGRAM(s) Oral daily    =====================  PAST MEDICAL HISTORY   Diabetes mellitus type 2 in obese    Hypertension    Hyperlipidemia    Peripheral neuropathy    Obstructive sleep apnea of adult    Diverticular disease    Coronary artery disease    Asthma    Fibromyalgia    Stented coronary artery    History of gout    History of Clostridium difficile colitis    Vitamin B12 deficiency    PVC (premature ventricular contraction)    Obese      PAST SURGICAL HISTORY   S/P colon resection    S/P cholecystectomy    H/O knee surgery    S/P bilateral breast reduction    History of surgery on arm    History of hysterectomy    History of colonoscopy      codeine (Diarrhea; Nausea; Vomiting)  morphine (Diarrhea; Nausea; Vomiting)  Lipitor (Unknown)  Keflex (Hives)  adhesives (Other)  verapamil (Unknown)  Cipro (Anaphylaxis; Rash)  Cardizem (Unknown)      MEDICATIONS:  Antibiotics:    Neuro:  acetaminophen     Tablet .. 650 milliGRAM(s) Oral every 6 hours PRN  meclizine 12.5 milliGRAM(s) Oral three times a day  ondansetron Injectable 4 milliGRAM(s) IV Push every 6 hours PRN    Other:  albuterol    90 MICROgram(s) HFA Inhaler 2 Puff(s) Inhalation every 6 hours PRN  allopurinol 100 milliGRAM(s) Oral daily  budesonide 160 MICROgram(s)/formoterol 4.5 MICROgram(s) Inhaler 2 Puff(s) Inhalation two times a day  dextrose 5%. 1000 milliLiter(s) IV Continuous <Continuous>  dextrose 5%. 1000 milliLiter(s) IV Continuous <Continuous>  dextrose 50% Injectable 25 Gram(s) IV Push once  dextrose 50% Injectable 25 Gram(s) IV Push once  dextrose 50% Injectable 12.5 Gram(s) IV Push once  dextrose Oral Gel 15 Gram(s) Oral once PRN  glucagon  Injectable 1 milliGRAM(s) IntraMuscular once  insulin glargine Injectable (LANTUS) 10 Unit(s) SubCutaneous at bedtime  insulin lispro (ADMELOG) corrective regimen sliding scale   SubCutaneous three times a day before meals  metoprolol succinate ER 50 milliGRAM(s) Oral daily  pantoprazole    Tablet 40 milliGRAM(s) Oral before breakfast  valsartan 20 milliGRAM(s) Oral daily      SOCIAL HISTORY:   Occupation:   Marital Status:     FAMILY HISTORY:  Family history of heart disease (Sibling)    Family history of hypertension    Family history of diabetes mellitus type II (Sibling)        ROS: Negative except per HPI    LABS:  PT/INR - ( 18 Aug 2024 10:29 )   PT: See Note;   INR: See Note ratio         PTT - ( 18 Aug 2024 10:29 )  PTT:See Note sec                        12.0   11.65 )-----------( 235      ( 18 Aug 2024 10:29 )             39.1     08-18    139  |  104  |  18  ----------------------------<  208<H>  3.9   |  20<L>  |  0.55    Ca    9.4      18 Aug 2024 10:29    TPro  6.7  /  Alb  4.0  /  TBili  0.3  /  DBili  x   /  AST  14  /  ALT  15  /  AlkPhos  109  08-18

## 2024-08-19 NOTE — CONSULT NOTE ADULT - SUBJECTIVE AND OBJECTIVE BOX
CHIEF COMPLAINT: dizziness, worsening left sided sensory loss      PAST MEDICAL & SURGICAL HISTORY:  Diabetes mellitus type 2 in obese  Hg A1c 7 ( 2022 ) in HIE      Hypertension      Hyperlipidemia      Peripheral neuropathy      Obstructive sleep apnea of adult  resolved , retested 2021 reports in [employer]      Diverticular disease  h/o partial colectomy       Coronary artery disease  2 stents in 2019, occasional chest discomfort, using nitroglycerin PRN - chronic, last cardiac  cath 2022- patent stents      Asthma  controlled, last inhaler use 6-7 month ago , denies hospitalizations, ER visits      Fibromyalgia      Stented coronary artery  two PTCA in LAD 2019      History of gout      History of Clostridium difficile colitis        Vitamin B12 deficiency      PVC (premature ventricular contraction)  on Metoprolol      Obese  BMI 36      S/P colon resection  surgery 2013 with temporary colostomy til       S/P cholecystectomy        H/O knee surgery  bilateral patella realignment age 18, later left knee meniscus age 34      S/P bilateral breast reduction  age 22      History of surgery on arm  left humerus age 54 , tumor i- enchondroma- hardware in place      History of hysterectomy  age 40      History of colonoscopy  and endo           HPI:64 y/o female with past medical history of DM, HTN, HLD, CAD s/p  KARLEY stents on ASA and Plavix <PVCs  presents emergency department for 1 day of generalized weakness and nausea.  Code stroke activated upon further evaluation and physical exam, brought directly to CT.  Patient states last known normal was 3 PM on .  Awoke  with sensation of spinning , weakness, felt off, MR brain wnl. MR C spine obtained as part of w/u w/ multilevel degen changes; C5-C6 mild stenosis w/ no cord signal change; mod foraminal stenosis at b/l C5-6 and L C4-5. Exam: AOx3, SWIFT 5/5 except for L triceps 4/5 (reports baseline for months). Fine motor movements intact. Neg Lincoln’s. L hemibody decreased sensation (reports baseline for years).      Given meclizine which she states has helped, but feels tired with this      She remains in NSR, rare PVC                         PREVIOUS DIAGNOSTIC TESTING:      ECHO  FINDINGS:    STRESS  FINDINGS:    CATHETERIZATION  FINDINGS:    ELECTROPHYSIOLOGY STUDY  FINDINGS:    CAROTID ULTRASOUND:  FINDINGS    VENOUS DUPLEX SCAN:  FINDINGS:    CHEST CT PULMONARY ANGIO with IV Contrast:  FINDINGS:  MEDICATIONS  (STANDING):  allopurinol 100 milliGRAM(s) Oral daily  aspirin enteric coated 81 milliGRAM(s) Oral <User Schedule>  budesonide 160 MICROgram(s)/formoterol 4.5 MICROgram(s) Inhaler 2 Puff(s) Inhalation two times a day  clopidogrel Tablet 75 milliGRAM(s) Oral daily  dextrose 5%. 1000 milliLiter(s) (50 mL/Hr) IV Continuous <Continuous>  dextrose 5%. 1000 milliLiter(s) (100 mL/Hr) IV Continuous <Continuous>  dextrose 50% Injectable 25 Gram(s) IV Push once  dextrose 50% Injectable 25 Gram(s) IV Push once  dextrose 50% Injectable 12.5 Gram(s) IV Push once  glucagon  Injectable 1 milliGRAM(s) IntraMuscular once  insulin glargine Injectable (LANTUS) 10 Unit(s) SubCutaneous at bedtime  insulin lispro (ADMELOG) corrective regimen sliding scale   SubCutaneous three times a day before meals  meclizine 12.5 milliGRAM(s) Oral three times a day  metoprolol succinate ER 50 milliGRAM(s) Oral daily  pantoprazole    Tablet 40 milliGRAM(s) Oral before breakfast  valsartan 20 milliGRAM(s) Oral daily    MEDICATIONS  (PRN):  acetaminophen     Tablet .. 650 milliGRAM(s) Oral every 6 hours PRN Temp greater or equal to 38C (100.4F), Mild Pain (1 - 3)  albuterol    90 MICROgram(s) HFA Inhaler 2 Puff(s) Inhalation every 6 hours PRN for shortness of breath and/or wheezing  dextrose Oral Gel 15 Gram(s) Oral once PRN Blood Glucose LESS THAN 70 milliGRAM(s)/deciliter  ondansetron Injectable 4 milliGRAM(s) IV Push every 6 hours PRN Nausea and/or Vomiting      FAMILY HISTORY:  Family history of heart disease (Sibling)    Family history of hypertension    Family history of diabetes mellitus type II (Sibling)        SOCIAL HISTORY:    CIGARETTES:    ALCOHOL:    REVIEW OF SYSTEMS:    CONSTITUTIONAL: No fever, weight loss, chills, shakes, or fatigue  EYES: No eye pain, visual disturbances, or discharge  ENMT:  No difficulty hearing, tinnitus, vertigo; No sinus or throat pain  NECK: No pain or stiffness  BREASTS: No pain, masses, or nipple discharge  RESPIRATORY: No cough, wheezing, hemoptysis, or shortness of breath  CARDIOVASCULAR: No chest pain, dyspnea, palpitations, dizziness, syncope, paroxysmal nocturnal dyspnea, orthopnea, or arm or leg swelling  GASTROINTESTINAL: No abdominal  or epigastric pain, nausea, vomiting, hematemesis, diarrhea, constipation, melena or bright red blood.  GENITOURINARY: No dysuria, nocturia, hematuria, or urinary incontinence  NEUROLOGICAL:  + dizziness  No , memory loss, slurred speech, limb weakness, loss of strength,  + LLAt numbness, No  tremors  SKIN: No itching, burning, rashes, or lesions   LYMPH NODES: No enlarged glands  ENDOCRINE: No heat or cold intolerance, or hair loss  MUSCULOSKELETAL: No joint pain or swelling, muscle, back, or extremity pain  PSYCHIATRIC: No depression, anxiety, or difficulty sleeping  HEME/LYMPH: No easy bruising or bleeding gums  ALLERY AND IMMUNOLOGIC: No hives or rash.      Vital Signs Last 24 Hrs  T(C): 37.1 (19 Aug 2024 20:12), Max: 37.1 (19 Aug 2024 20:12)  T(F): 98.8 (19 Aug 2024 20:12), Max: 98.8 (19 Aug 2024 20:12)  HR: 81 (19 Aug 2024 20:12) (78 - 84)  BP: 118/75 (19 Aug 2024 20:12) (111/63 - 135/69)  BP(mean): 90 (19 Aug 2024 20:12) (79 - 90)  RR: 17 (19 Aug 2024 20:12) (17 - 18)  SpO2: 98% (19 Aug 2024 20:12) (96% - 98%)    Parameters below as of 19 Aug 2024 20:12  Patient On (Oxygen Delivery Method): room air      Daily     Daily         PHYSICAL EXAM:    GENERAL: In no apparent distress, well nourished, and hydrated.  HEAD:  Atraumatic, Normocephalic  EYES: EOMI, PERRLA, conjunctiva and sclera clear  ENMT: No tonsillar erythema, exudates, or enlargement; Moist mucous membranes, Good dentition, No lesions  NECK: Supple and normal thyroid.  No JVD or carotid bruit.  Carotid pulse is 2+ bilaterally.  HEART: Regular rate and rhythm; No murmurs, rubs, or gallops.  PULMONARY: Clear to auscultation and perfusion.  No rales, wheezing, or rhonchi bilaterally.  ABDOMEN: Soft, Nontender, Nondistended; Bowel sounds present  EXTREMITIES:  2+ Peripheral Pulses, No clubbing, cyanosis, or edema  LYMPH: No lymphadenopathy noted  NEUROLOGICAL: Grossly nonfocal          INTERPRETATION OF TELEMETRY:    ECG:    I&O's Detail      LABS:                        12.0   11.65 )-----------( 235      ( 18 Aug 2024 10:29 )             39.1     08-18    139  |  104  |  18  ----------------------------<  208<H>  3.9   |  20<L>  |  0.55    Ca    9.4      18 Aug 2024 10:29    TPro  6.7  /  Alb  4.0  /  TBili  0.3  /  DBili  x   /  AST  14  /  ALT  15  /  AlkPhos  109  08-18        PT/INR - ( 18 Aug 2024 10:29 )   PT: See Note;   INR: See Note ratio         PTT - ( 18 Aug 2024 10:29 )  PTT:See Note sec  Urinalysis Basic - ( 19 Aug 2024 07:25 )    Color: Yellow / Appearance: Clear / S.018 / pH: x  Gluc: x / Ketone: Negative mg/dL  / Bili: Negative / Urobili: 0.2 mg/dL   Blood: x / Protein: Negative mg/dL / Nitrite: Negative   Leuk Esterase: Trace / RBC: 0 /HPF / WBC 5 /HPF   Sq Epi: x / Non Sq Epi: 5 /HPF / Bacteria: Negative /HPF      BNP  I&O's Detail    Daily     Daily     RADIOLOGY & ADDITIONAL STUDIES:

## 2024-08-19 NOTE — PHYSICAL THERAPY INITIAL EVALUATION ADULT - GAIT DISTANCE, PT EVAL
pt amb an additional 100ft x 2 using RW independently. Pt would benefit using RW at this time to decrease risk of falls./100 feet

## 2024-08-19 NOTE — ED ADULT NURSE REASSESSMENT NOTE - NSFALLRISKFACTORS_ED_ALL_ED
Coagulation: Bleeding disorder either through use of anticoagulants or underlying clinical condition(s)
Coagulation: Bleeding disorder either through use of anticoagulants or underlying clinical condition(s)

## 2024-08-19 NOTE — ED ADULT NURSE REASSESSMENT NOTE - NSFALLHARMRISKINTERV_ED_ALL_ED
Assistance OOB with selected safe patient handling equipment if applicable/Assistance with ambulation/Communicate risk of Fall with Harm to all staff, patient, and family/Encourage patient to sit up slowly, dangle for a short time, stand at bedside before walking/Monitor gait and stability/Orthostatic vital signs/Provide patient with walking aids/Provide visual cue: red socks, yellow wristband, yellow gown, etc/Reinforce activity limits and safety measures with patient and family/Bed in lowest position, wheels locked, appropriate side rails in place/Call bell, personal items and telephone in reach/Instruct patient to call for assistance before getting out of bed/chair/stretcher/Non-slip footwear applied when patient is off stretcher/[place] to call system/Physically safe environment - no spills, clutter or unnecessary equipment/Purposeful Proactive Rounding/Room/bathroom lighting operational, light cord in reach

## 2024-08-20 LAB
CULTURE RESULTS: SIGNIFICANT CHANGE UP
GLUCOSE BLDC GLUCOMTR-MCNC: 111 MG/DL — HIGH (ref 70–99)
GLUCOSE BLDC GLUCOMTR-MCNC: 131 MG/DL — HIGH (ref 70–99)
GLUCOSE BLDC GLUCOMTR-MCNC: 153 MG/DL — HIGH (ref 70–99)
GLUCOSE BLDC GLUCOMTR-MCNC: 170 MG/DL — HIGH (ref 70–99)
SPECIMEN SOURCE: SIGNIFICANT CHANGE UP

## 2024-08-20 RX ORDER — VALSARTAN 40 MG/1
0.5 TABLET ORAL
Refills: 0 | DISCHARGE

## 2024-08-20 RX ORDER — CYANOCOBALAMIN (VITAMIN B-12) 500MCG/0.1
0 GEL (ML) NASAL
Refills: 0 | DISCHARGE

## 2024-08-20 RX ORDER — LIFITEGRAST 50 MG/ML
1 SOLUTION/ DROPS OPHTHALMIC
Refills: 0 | DISCHARGE

## 2024-08-20 RX ORDER — BUDESONIDE AND FORMOTEROL FUMARATE 80; 4.5 UG/1; UG/1
2 AEROSOL, METERED RESPIRATORY (INHALATION)
Refills: 0 | DISCHARGE

## 2024-08-20 RX ORDER — OMEPRAZOLE 40 MG/1
1 CAPSULE, DELAYED RELEASE ORAL
Refills: 0 | DISCHARGE

## 2024-08-20 RX ADMIN — METOPROLOL TARTRATE 50 MILLIGRAM(S): 100 TABLET ORAL at 05:50

## 2024-08-20 RX ADMIN — MECLIZINE HYDROCHLORIDE 12.5 MILLIGRAM(S): 25 TABLET ORAL at 05:51

## 2024-08-20 RX ADMIN — INSULIN GLARGINE 10 UNIT(S): 100 INJECTION, SOLUTION SUBCUTANEOUS at 22:14

## 2024-08-20 RX ADMIN — Medication 75 MILLIGRAM(S): at 10:59

## 2024-08-20 RX ADMIN — MECLIZINE HYDROCHLORIDE 12.5 MILLIGRAM(S): 25 TABLET ORAL at 10:59

## 2024-08-20 RX ADMIN — Medication 1: at 12:55

## 2024-08-20 RX ADMIN — MECLIZINE HYDROCHLORIDE 12.5 MILLIGRAM(S): 25 TABLET ORAL at 22:15

## 2024-08-20 RX ADMIN — VALSARTAN 20 MILLIGRAM(S): 40 TABLET ORAL at 22:15

## 2024-08-20 RX ADMIN — Medication 100 MILLIGRAM(S): at 10:59

## 2024-08-20 NOTE — PATIENT PROFILE ADULT - FALL HARM RISK - RISK INTERVENTIONS
Assistance OOB with selected safe patient handling equipment/Assistance with ambulation/Communicate Fall Risk and Risk Factors to all staff, patient, and family/Discuss with provider need for PT consult/Monitor gait and stability/Provide patient with walking aids - walker, cane, crutches/Reinforce activity limits and safety measures with patient and family/Sit up slowly, dangle for a short time, stand at bedside before walking/Visual Cue: Yellow wristband/Bed in lowest position, wheels locked, appropriate side rails in place/Call bell, personal items and telephone in reach/Instruct patient to call for assistance before getting out of bed or chair/Non-slip footwear when patient is out of bed/[place] to call system/Physically safe environment - no spills, clutter or unnecessary equipment/Purposeful Proactive Rounding/Room/bathroom lighting operational, light cord in reach

## 2024-08-20 NOTE — CHART NOTE - NSCHARTNOTEFT_GEN_A_CORE
Patient will require a rolling walker at home due to their diagnosis of Vertigo to help complete MRADLs

## 2024-08-20 NOTE — PATIENT PROFILE ADULT - LIVING ENVIRONMENT
What Type Of Note Output Would You Prefer (Optional)?: Standard Output How Severe Are Your Spot(S)?: mild Have Your Spot(S) Been Treated In The Past?: has not been treated Hpi Title: Evaluation of Skin Lesions no Location: Back Year Removed: [address]

## 2024-08-20 NOTE — PROGRESS NOTE ADULT - ASSESSMENT
62 yo woman with h/o T2DM, HTN, HLD, CAD w/ stents (aspirin q48 and plavix qD) who presented to the ED for dizziness and L sided weakness. LKN 1500 2024. Patient said that she recalled around 1800 that she developed dizziness, chest pain, and L sided arm weakness leading up to the neck. She went to bed and realized that the symptoms persisted when she woke up today. Dizziness were not position dependent and were not triggered by sudden movement. Decided to come to the ED.   CODE STROKE called 0948. NIHSS 6. pre-MRS 0. Patient initially demonstrated L sided weakness upon NIHSS testing but upon manual motor tested demonstrated significant give way weakness on the L.   CT head negative for acute hemorrhage, infarct nor mass effect. CT angio with no LVO. Not candidate for tenecteplase because outside window. Not candidate for thrombectomy because no LVO.   seen by stroke team and her cardiologist Dr. Saldana.     A/P    R/O CVA vs Cervical Spondylosis    Room spinning dizziness, dysarthria, fluctuating word finding difficulties and L sided hypesthetic ataxic hemiparesis, would normally localize to right cerebral dysfunction i/s/o ischemic stroke of  mechanism. However, given flucuations of symptoms i/s/o of multiple chronic complaints, considering also metabolic encephalopathy with functional overlay   GIven patient's history of arm and leg weakness According to outpatient notes can also consider cervical spondylosis as etiology. However, would not explain fluctuating fluency and word finding difficulties     - c/w home plavix daily for secondary stroke prevention    - continue Aspirin 81mg daily q48 as per patient home med   - Symptomatic management for nausea, prn anti emetics    -f/u HgbA1C, fasting lipid panel, CBC, CMP, coag panel, troponin  - MRI brain w/o con AND  MR c- spine w/wo contrast noted  - Spine surgery evaluation   - TTE noted   - keep on telemetry to check for arrhythmia, EKG, EPS to discuss loop recorder  - Tight glucose control (long-term goal HgbA1c < 6%)  - Stroke education and counseling  - Neuro-checks and VS q4h  - Permissive HTN up to 220/120 for 24-48h from symptom onset  - Dysphagia screen.   - may need ILR  - aspiration and  fall precautions   - PT/ OT / DVT ppx w sc lovenox    CAD  - stable  - Cardiology follow    Diabetes   - ADA diet  - BS control    Labs in am    Josef Montgomery MD phone [phone]        62 yo woman with h/o T2DM, HTN, HLD, CAD w/ stents (aspirin q48 and plavix qD) who presented to the ED for dizziness and L sided weakness. LKN 1500 2024. Patient said that she recalled around 1800 that she developed dizziness, chest pain, and L sided arm weakness leading up to the neck. She went to bed and realized that the symptoms persisted when she woke up today. Dizziness were not position dependent and were not triggered by sudden movement. Decided to come to the ED.   CODE STROKE called 0948. NIHSS 6. pre-MRS 0. Patient initially demonstrated L sided weakness upon NIHSS testing but upon manual motor tested demonstrated significant give way weakness on the L.   CT head negative for acute hemorrhage, infarct nor mass effect. CT angio with no LVO. Not candidate for tenecteplase because outside window. Not candidate for thrombectomy because no LVO.   seen by stroke team and her cardiologist Dr. Saldana.     A/P    R/O CVA vs Cervical Spondylosis    Room spinning dizziness, dysarthria, fluctuating word finding difficulties and L sided hypesthetic ataxic hemiparesis, would normally localize to right cerebral dysfunction i/s/o ischemic stroke of  mechanism. However, given flucuations of symptoms i/s/o of multiple chronic complaints, considering also metabolic encephalopathy with functional overlay   GIven patient's history of arm and leg weakness According to outpatient notes can also consider cervical spondylosis as etiology. However, would not explain fluctuating fluency and word finding difficulties     - c/w home plavix daily for secondary stroke prevention    - continue Aspirin 81mg daily q48 as per patient home med   - Symptomatic management for nausea, prn anti emetics    -f/u HgbA1C, fasting lipid panel, CBC, CMP, coag panel, troponin  - MRI brain w/o con AND  MR c- spine w/wo contrast noted  - Spine surgery evaluation noted. No intervention.   - TTE noted   - keep on telemetry to check for arrhythmia, EKG, EPS to discuss loop recorder  - Tight glucose control (long-term goal HgbA1c < 6%)  - Stroke education and counseling  - Neuro-checks and VS q4h  - Permissive HTN up to 220/120 for 24-48h from symptom onset  - Dysphagia screen.   - may need ILR  - aspiration and  fall precautions   - PT/ OT / DVT ppx w sc lovenox    CAD  - stable  - Cardiology follow    Diabetes   - ADA diet  - BS control    Labs in am    Josef Montgomery MD phone [phone]

## 2024-08-20 NOTE — PHARMACOTHERAPY INTERVENTION NOTE - COMMENTS
Performed medication reconciliation and home medication list updated in prescription writer/ outpatient medication review. Medications verified with patient and pharmacy.     Home medications:  acetaminophen 500 mg oral tablet: 1 tab(s) orally prn  Admelog 100 units/mL injectable solution: 14 unit(s) injectable 3 times a day (before meals)  Albuterol (Eqv-ProAir HFA) 90 mcg/inh inhalation aerosol: 2 puff(s) inhaled 4 times a day as needed for  shortness of breath and/or wheezing  allopurinol 100 mg oral tablet: 1 tab(s) orally once a day  aspirin 81 mg oral delayed release tablet: 1 tab(s) orally every other day  azelastine 0.05% ophthalmic solution: 1 drop(s) in each affected eye 2 times a day as needed  Basaglar KwikPen 100 units/mL subcutaneous solution: 35 unit(s) subcutaneous once a day (at bedtime)  cetirizine 10 mg oral tablet: 1 tab(s) orally prn  cholecalciferol 1250 mcg (50,000 intl units) oral capsule: 1 cap(s) orally once a week  clopidogrel 75 mg oral tablet: 1 tab(s) orally once a day  cyanocobalamin 100 mcg/mL injectable solution: injectable once a month  docusate: 1 tab(s) orally prn  fluconazole 150 mg oral tablet: 1 tab(s) orally prn  magnesium oxide: 1 tab(s) orally once a day 400 mg tab  metoprolol succinate 25 mg oral tablet, extended release: 1 tab(s) orally 2 times a day  nitroglycerin 0.4 mg sublingual tablet: 1 tab(s) sublingually prn  Nurtec ODT 75 mg oral tablet, disintegratin tab(s) orally once a day as needed for  headache  omeprazole 20 mg oral delayed release capsule: 1 cap(s) orally prn  Repatha 140 mg/mL subcutaneous solution: 1 milliliter(s) subcutaneously every 2 weeks  senna: 1 tab(s) orally prn  valsartan 40 mg oral tablet: 0.5 tab(s) orally once a day  Voltaren 1% topical gel: Apply topically to affected area prn  Xiidra 5% ophthalmic solution: 1 drop(s) in each eye once a day  Zetia 10 mg oral tablet: 1 tab(s) orally once a day    [facility]   PharmD Candidate Class of      Performed medication reconciliation and home medication list updated in prescription writer/ outpatient medication review. Medications verified with patient and pharmacy.     Home medications:  acetaminophen 500 mg oral tablet: 1 tab(s) orally prn  Admelog 100 units/mL injectable solution: 14 unit(s) injectable 3 times a day (before meals)  Albuterol (Eqv-ProAir HFA) 90 mcg/inh inhalation aerosol: 2 puff(s) inhaled 4 times a day as needed for  shortness of breath and/or wheezing  allopurinol 100 mg oral tablet: 1 tab(s) orally once a day  aspirin 81 mg oral delayed release tablet: 1 tab(s) orally every other day  azelastine 0.05% ophthalmic solution: 1 drop(s) in each affected eye 2 times a day as needed  Basaglar KwikPen 100 units/mL subcutaneous solution: 35 unit(s) subcutaneous once a day (at bedtime)  cetirizine 10 mg oral tablet: 1 tab(s) orally prn  cholecalciferol 1250 mcg (50,000 intl units) oral capsule: 1 cap(s) orally once a week  clopidogrel 75 mg oral tablet: 1 tab(s) orally once a day  cyanocobalamin 100 mcg/mL injectable solution: injectable once a month  docusate: 1 tab(s) orally prn  fluconazole 150 mg oral tablet: 1 tab(s) orally prn  magnesium oxide: 1 tab(s) orally once a day 400 mg tab  metoprolol succinate 25 mg oral tablet, extended release: 1 tab(s) orally 2 times a day  nitroglycerin 0.4 mg sublingual tablet: 1 tab(s) sublingually prn  Nurtec ODT 75 mg oral tablet, disintegratin tab(s) orally once a day as needed for  headache  omeprazole 20 mg oral delayed release capsule: 1 cap(s) orally prn  Repatha 140 mg/mL subcutaneous solution: 1 milliliter(s) subcutaneously every 2 weeks  senna: 1 tab(s) orally prn  valsartan 40 mg oral tablet: 0.5 tab(s) orally once a day  Voltaren 1% topical gel: Apply topically to affected area prn  Xiidra 5% ophthalmic solution: 1 drop(s) in each eye once a day  Zetia 10 mg oral tablet: 1 tab(s) orally once a day    Mela Daidone, [facility]   PharmD Candidate Class of     Caryl Thomas PharmD, BCPS  Clinical Pharmacy Specialist  Teams (preferred) or [phone]

## 2024-08-21 ENCOUNTER — TRANSCRIPTION ENCOUNTER (OUTPATIENT)
Age: 64
End: 2024-08-21

## 2024-08-21 VITALS
RESPIRATION RATE: 18 BRPM | DIASTOLIC BLOOD PRESSURE: 64 MMHG | OXYGEN SATURATION: 96 % | HEART RATE: 74 BPM | SYSTOLIC BLOOD PRESSURE: 111 MMHG | TEMPERATURE: 98 F

## 2024-08-21 LAB
ANION GAP SERPL CALC-SCNC: 17 MMOL/L — SIGNIFICANT CHANGE UP (ref 5–17)
BUN SERPL-MCNC: 15 MG/DL — SIGNIFICANT CHANGE UP (ref 7–23)
CALCIUM SERPL-MCNC: 10 MG/DL — SIGNIFICANT CHANGE UP (ref 8.4–10.5)
CHLORIDE SERPL-SCNC: 102 MMOL/L — SIGNIFICANT CHANGE UP (ref 96–108)
CO2 SERPL-SCNC: 21 MMOL/L — LOW (ref 22–31)
CREAT SERPL-MCNC: 0.62 MG/DL — SIGNIFICANT CHANGE UP (ref 0.5–1.3)
EGFR: 100 ML/MIN/1.73M2 — SIGNIFICANT CHANGE UP
GLUCOSE BLDC GLUCOMTR-MCNC: 159 MG/DL — HIGH (ref 70–99)
GLUCOSE BLDC GLUCOMTR-MCNC: 173 MG/DL — HIGH (ref 70–99)
GLUCOSE SERPL-MCNC: 162 MG/DL — HIGH (ref 70–99)
HCT VFR BLD CALC: 42.6 % — SIGNIFICANT CHANGE UP (ref 34.5–45)
HGB BLD-MCNC: 13.3 G/DL — SIGNIFICANT CHANGE UP (ref 11.5–15.5)
MCHC RBC-ENTMCNC: 25.1 PG — LOW (ref 27–34)
MCHC RBC-ENTMCNC: 31.2 GM/DL — LOW (ref 32–36)
MCV RBC AUTO: 80.5 FL — SIGNIFICANT CHANGE UP (ref 80–100)
NRBC # BLD: 0 /100 WBCS — SIGNIFICANT CHANGE UP (ref 0–0)
PLATELET # BLD AUTO: 292 K/UL — SIGNIFICANT CHANGE UP (ref 150–400)
POTASSIUM SERPL-MCNC: 3.9 MMOL/L — SIGNIFICANT CHANGE UP (ref 3.5–5.3)
POTASSIUM SERPL-SCNC: 3.9 MMOL/L — SIGNIFICANT CHANGE UP (ref 3.5–5.3)
RBC # BLD: 5.29 M/UL — HIGH (ref 3.8–5.2)
RBC # FLD: 14.4 % — SIGNIFICANT CHANGE UP (ref 10.3–14.5)
SODIUM SERPL-SCNC: 140 MMOL/L — SIGNIFICANT CHANGE UP (ref 135–145)
WBC # BLD: 7.94 K/UL — SIGNIFICANT CHANGE UP (ref 3.8–10.5)
WBC # FLD AUTO: 7.94 K/UL — SIGNIFICANT CHANGE UP (ref 3.8–10.5)

## 2024-08-21 PROCEDURE — 72141 MRI NECK SPINE W/O DYE: CPT | Mod: MC

## 2024-08-21 PROCEDURE — 84132 ASSAY OF SERUM POTASSIUM: CPT

## 2024-08-21 PROCEDURE — 96374 THER/PROPH/DIAG INJ IV PUSH: CPT

## 2024-08-21 PROCEDURE — 93306 TTE W/DOPPLER COMPLETE: CPT

## 2024-08-21 PROCEDURE — 85025 COMPLETE CBC W/AUTO DIFF WBC: CPT

## 2024-08-21 PROCEDURE — 84436 ASSAY OF TOTAL THYROXINE: CPT

## 2024-08-21 PROCEDURE — 87637 SARSCOV2&INF A&B&RSV AMP PRB: CPT

## 2024-08-21 PROCEDURE — 85018 HEMOGLOBIN: CPT

## 2024-08-21 PROCEDURE — 82330 ASSAY OF CALCIUM: CPT

## 2024-08-21 PROCEDURE — 82803 BLOOD GASES ANY COMBINATION: CPT

## 2024-08-21 PROCEDURE — 87086 URINE CULTURE/COLONY COUNT: CPT

## 2024-08-21 PROCEDURE — 84295 ASSAY OF SERUM SODIUM: CPT

## 2024-08-21 PROCEDURE — 99291 CRITICAL CARE FIRST HOUR: CPT | Mod: 25

## 2024-08-21 PROCEDURE — 85610 PROTHROMBIN TIME: CPT

## 2024-08-21 PROCEDURE — 83036 HEMOGLOBIN GLYCOSYLATED A1C: CPT

## 2024-08-21 PROCEDURE — 70551 MRI BRAIN STEM W/O DYE: CPT | Mod: MC

## 2024-08-21 PROCEDURE — 80053 COMPREHEN METABOLIC PANEL: CPT

## 2024-08-21 PROCEDURE — 85014 HEMATOCRIT: CPT

## 2024-08-21 PROCEDURE — 82435 ASSAY OF BLOOD CHLORIDE: CPT

## 2024-08-21 PROCEDURE — 97161 PT EVAL LOW COMPLEX 20 MIN: CPT

## 2024-08-21 PROCEDURE — 70498 CT ANGIOGRAPHY NECK: CPT | Mod: MC

## 2024-08-21 PROCEDURE — 84480 ASSAY TRIIODOTHYRONINE (T3): CPT

## 2024-08-21 PROCEDURE — 80048 BASIC METABOLIC PNL TOTAL CA: CPT

## 2024-08-21 PROCEDURE — 36415 COLL VENOUS BLD VENIPUNCTURE: CPT

## 2024-08-21 PROCEDURE — 0042T: CPT | Mod: MC

## 2024-08-21 PROCEDURE — 82962 GLUCOSE BLOOD TEST: CPT

## 2024-08-21 PROCEDURE — 82947 ASSAY GLUCOSE BLOOD QUANT: CPT

## 2024-08-21 PROCEDURE — 70450 CT HEAD/BRAIN W/O DYE: CPT | Mod: MC

## 2024-08-21 PROCEDURE — 85730 THROMBOPLASTIN TIME PARTIAL: CPT

## 2024-08-21 PROCEDURE — 83605 ASSAY OF LACTIC ACID: CPT

## 2024-08-21 PROCEDURE — 70496 CT ANGIOGRAPHY HEAD: CPT | Mod: MC

## 2024-08-21 PROCEDURE — 80061 LIPID PANEL: CPT

## 2024-08-21 PROCEDURE — 85027 COMPLETE CBC AUTOMATED: CPT

## 2024-08-21 PROCEDURE — 81001 URINALYSIS AUTO W/SCOPE: CPT

## 2024-08-21 PROCEDURE — 84484 ASSAY OF TROPONIN QUANT: CPT

## 2024-08-21 PROCEDURE — 84443 ASSAY THYROID STIM HORMONE: CPT

## 2024-08-21 RX ORDER — DICLOFENAC SODIUM 20 MG/G
2 SOLUTION TOPICAL
Refills: 0 | DISCHARGE

## 2024-08-21 RX ORDER — METOPROLOL TARTRATE 100 MG/1
1 TABLET ORAL
Qty: 30 | Refills: 0
Start: 2024-08-21 | End: 2024-09-19

## 2024-08-21 RX ORDER — DOCUSATE CALCIUM 240 MG/1
1 CAPSULE ORAL
Refills: 0 | DISCHARGE

## 2024-08-21 RX ORDER — MAGNESIUM OXIDE TAB 400 MG (240 MG ELEMENTAL MG) 400 (240 MG) MG
1 TAB ORAL
Refills: 0 | DISCHARGE

## 2024-08-21 RX ORDER — METOPROLOL TARTRATE 100 MG/1
1 TABLET ORAL
Refills: 0 | DISCHARGE

## 2024-08-21 RX ORDER — ACETAMINOPHEN 325 MG/1
2 TABLET ORAL
Qty: 0 | Refills: 0 | DISCHARGE
Start: 2024-08-21

## 2024-08-21 RX ORDER — FLUCONAZOLE 150 MG/1
1 TABLET ORAL
Refills: 0 | DISCHARGE

## 2024-08-21 RX ORDER — MECLIZINE HYDROCHLORIDE 25 MG/1
1 TABLET ORAL
Qty: 30 | Refills: 0
Start: 2024-08-21 | End: 2024-08-30

## 2024-08-21 RX ORDER — DOCUSATE CALCIUM 240 MG/1
1 CAPSULE ORAL
Qty: 0 | Refills: 0 | DISCHARGE

## 2024-08-21 RX ORDER — BUDESONIDE AND FORMOTEROL FUMARATE 80; 4.5 UG/1; UG/1
2 AEROSOL, METERED RESPIRATORY (INHALATION)
Qty: 0 | Refills: 0 | DISCHARGE

## 2024-08-21 RX ORDER — SENNA 187 MG
2 TABLET ORAL
Qty: 0 | Refills: 0 | DISCHARGE

## 2024-08-21 RX ORDER — NITROGLYCERIN 0.2MG/HR
1 PATCH, TRANSDERMAL 24 HOURS TRANSDERMAL
Refills: 0 | DISCHARGE

## 2024-08-21 RX ORDER — ACETAMINOPHEN 325 MG/1
1 TABLET ORAL
Refills: 0 | DISCHARGE

## 2024-08-21 RX ADMIN — Medication 1: at 12:10

## 2024-08-21 RX ADMIN — Medication 81 MILLIGRAM(S): at 05:37

## 2024-08-21 RX ADMIN — MECLIZINE HYDROCHLORIDE 12.5 MILLIGRAM(S): 25 TABLET ORAL at 05:37

## 2024-08-21 RX ADMIN — Medication 1: at 08:18

## 2024-08-21 RX ADMIN — Medication 75 MILLIGRAM(S): at 12:10

## 2024-08-21 RX ADMIN — MECLIZINE HYDROCHLORIDE 12.5 MILLIGRAM(S): 25 TABLET ORAL at 12:10

## 2024-08-21 RX ADMIN — METOPROLOL TARTRATE 50 MILLIGRAM(S): 100 TABLET ORAL at 05:36

## 2024-08-21 RX ADMIN — Medication 100 MILLIGRAM(S): at 12:10

## 2024-08-21 NOTE — DISCHARGE NOTE PROVIDER - CARE PROVIDERS DIRECT ADDRESSES
,DirectAddress_Unknown,[email or web address] ,[email or web address],[email or web address]

## 2024-08-21 NOTE — DISCHARGE NOTE PROVIDER - HOSPITAL COURSE
HPI:  64 y/o female with past medical history of DM, HTN, HLD, CAD s/p stents on ASA and Plavix presents emergency department for 1 day of generalized weakness and nausea.  Code stroke activated upon further evaluation and physical exam, brought directly to CT.  Patient states last known normal was 3 PM on 8/17.  Patient states around 6 PM yesterday she started feeling "off" and endorses some left-sided neck tingling.  Patient felt generally weak yesterday.  Patient woke up this morning with dizziness, room spinning sensation worse with head movements.  Patient endorses difficulty walking due to feeling unbalanced, needed assistance ambulating.  Patient also endorsed nausea with episode of emesis.  EMS gave 8 mg IV Zofran en route.  Patient denies fevers, chills, headache, vision changes, neck pain, chest pain, trouble breathing, abdominal pain, diarrhea/constipation, dysuria, hematuria, weakness in arms or legs.   (18 Aug 2024 18:17)    Hospital Course:  Presented as stroke code for Weakness/dizziness with left sided tingling. Room spinning dizziness, dysarthria, fluctuating word finding difficulties and L sided hypesthetic ataxic hemiparesis. NIHSS 6. pre-MRS 0.    CTH neg, MRI H neg, MRI C spine w/ C5-6 mild spinal canal stenosis and severe neural foraminal narrowing, c/w meclizine. MRI BRAIN: No evidence of acute intracranial pathology. MR C spine obtained as part of w/u w/ multilevel degen changes; C5-C6 mild stenosis w/ no cord signal change; mod foraminal stenosis at b/l C5-6 and L C4-5. AOx3, SWIFT 5/5 except for L triceps 4/5 (reports baseline for months). Fine motor movements intact. Neg Lincoln’s. L hemibody decreased sensation (reports baseline for years). No acute neurosurgical intervention  Follow up with Neurosurgery - Dr. Mohan in 1-2 weeks from d/c  Neurology consulted - L arm weakness likely 2/2 compressive and degenerative pathology w spinal and foraminal stenosis given MRI findings. L sided arm weakness resolved.  Patient's acute dizziness, suggestive of vertigo, L>R sensation loss ? related to chronic C spine compression, not felt to need intervention  No events on telemetry.  Cardiology consulted - Continue current cardiac meds. Symptoms improved with meclizine.  Medically cleared by neurology, Neurosurgery, Cardiology  and Dr. Montgomery to discharge patient home today    Important Medication Changes and Reason:    Active or Pending Issues Requiring Follow-up:  Follow up with cardiologist - Dr. Saldana in 1 week  Follow up with Neurosurgery - Dr. Mohan in 1-2 weeks from d/c    Advanced Directives:   [x] Full code  [ ] DNR  [ ] Hospice    Discharge Diagnoses:   # Weakness/dizziness - Vertigo  # CAD - stable  # Diabetes - ADA diet

## 2024-08-21 NOTE — DISCHARGE NOTE PROVIDER - CARE PROVIDER_API CALL
Renetta Saldana  Cardiac Electrophysiology  [address], [address]  [place] [address]  Phone: [phone]  Fax: [phone]  Follow Up Time: 1 week    Giancarlo Mohan  Neurosurgery  [address] [place] [address], [address]  [place] [address]  Phone: [phone]  Fax: [phone]  Follow Up Time: 2 weeks   Renetta Saldana  Cardiac Electrophysiology  [address] [facility], [address]  [place] [address]  Phone: [phone]  Fax: [phone]  Follow Up Time: 1 week    Giancarlo Mohan  Neurosurgery  [address]  [place] [address]  Phone: [phone]  Fax: [phone]  Follow Up Time: 2 weeks    Sheila Tirado  Internal Medicine  [address] [place] [address]  Phone: ([phone]  Fax: ()-  Follow Up Time: 1 week

## 2024-08-21 NOTE — DISCHARGE NOTE PROVIDER - PROVIDER TOKENS
PROVIDER:[TOKEN:[[id number]],FOLLOWUP:[1 week]],PROVIDER:[TOKEN:[[id number]],FOLLOWUP:[2 weeks]] PROVIDER:[TOKEN:[[id number]],FOLLOWUP:[1 week]],PROVIDER:[TOKEN:[[id number]],FOLLOWUP:[2 weeks]],PROVIDER:[TOKEN:[[id number]],FOLLOWUP:[1 week]]

## 2024-08-21 NOTE — PROGRESS NOTE ADULT - ASSESSMENT
64 yo woman with h/o T2DM, HTN, HLD, CAD w/ stents (aspirin q48 and plavix qD) who presented to the ED for dizziness and L sided weakness. LKN 1500 2024. Patient said that she recalled around 1800 that she developed dizziness, chest pain, and L sided arm weakness leading up to the neck. She went to bed and realized that the symptoms persisted when she woke up today. Dizziness were not position dependent and were not triggered by sudden movement. Decided to come to the ED.   CODE STROKE called 0948. NIHSS 6. pre-MRS 0. Patient initially demonstrated L sided weakness upon NIHSS testing but upon manual motor tested demonstrated significant give way weakness on the L.   CT head negative for acute hemorrhage, infarct nor mass effect. CT angio with no LVO. Not candidate for tenecteplase because outside window. Not candidate for thrombectomy because no LVO.   seen by stroke team and her cardiologist Dr. Saldana.     A/P    R/O CVA vs Cervical Spondylosis    Room spinning dizziness, dysarthria, fluctuating word finding difficulties and L sided hypesthetic ataxic hemiparesis, would normally localize to right cerebral dysfunction i/s/o ischemic stroke of  mechanism. However, given flucuations of symptoms i/s/o of multiple chronic complaints, considering also metabolic encephalopathy with functional overlay   GIven patient's history of arm and leg weakness According to outpatient notes can also consider cervical spondylosis as etiology. However, would not explain fluctuating fluency and word finding difficulties     - c/w home plavix daily for secondary stroke prevention    - continue Aspirin 81mg daily q48 as per patient home med   - Symptomatic management for nausea, prn anti emetics    -f/u HgbA1C, fasting lipid panel, CBC, CMP, coag panel, troponin  - MRI brain w/o con AND  MR c- spine w/wo contrast noted  - Spine surgery evaluation noted. No intervention.   - TTE noted   - keep on telemetry to check for arrhythmia, EKG, EPS to discuss loop recorder  - Tight glucose control (long-term goal HgbA1c < 6%)  - Stroke education and counseling  - Neuro-checks and VS q4h  - Permissive HTN up to 220/120 for 24-48h from symptom onset  - Dysphagia screen.   - may need ILR/ as OTP Cleared by EP  - aspiration and  fall precautions   - PT/ OT / DVT ppx w sc lovenox    CAD  - stable  - Cardiology follow    Diabetes   - ADA diet  - BS control    DC gome . Follow with PMD/ Cardiology/ Endocrine in 3-4 days    d/w patient and ACP QA    Josef Montgomery MD phone [phone]

## 2024-08-21 NOTE — DISCHARGE NOTE PROVIDER - NSDCFUSCHEDAPPT_GEN_ALL_CORE_FT
[facility]  MRI  [facility]  Scheduled Appointment: 08/27/2024    [facility]  MRI  [facility]  Scheduled Appointment: 08/27/2024    [facility]  GASTRO [address]  Scheduled Appointment: 11/05/2024     [facility]  MRI  [facility]  Scheduled Appointment: 09/03/2024    [facility]  NEUROLOGY  Comm D  Scheduled Appointment: 09/03/2024    Giancarlo Mohan  [facility]  NEUROSURG [address] [facility]  Scheduled Appointment: 09/05/2024    [facility]  GASTRO [address]  Scheduled Appointment: 11/05/2024

## 2024-08-21 NOTE — PROGRESS NOTE ADULT - SUBJECTIVE AND OBJECTIVE BOX
Patient is a 63y old  Female who presents with a chief complaint of functional paraplegia, CVA (18 Aug 2024 18:17)      SUBJECTIVE / OVERNIGHT EVENTS: Comfortable without new complaints.   Review of Systems  chest pain no  palpitations no  sob no  nausea no  headache no    MEDICATIONS  (STANDING):  allopurinol 100 milliGRAM(s) Oral daily  aspirin enteric coated 81 milliGRAM(s) Oral <User Schedule>  budesonide 160 MICROgram(s)/formoterol 4.5 MICROgram(s) Inhaler 2 Puff(s) Inhalation two times a day  clopidogrel Tablet 75 milliGRAM(s) Oral daily  dextrose 5%. 1000 milliLiter(s) (50 mL/Hr) IV Continuous <Continuous>  dextrose 5%. 1000 milliLiter(s) (100 mL/Hr) IV Continuous <Continuous>  dextrose 50% Injectable 25 Gram(s) IV Push once  dextrose 50% Injectable 25 Gram(s) IV Push once  dextrose 50% Injectable 12.5 Gram(s) IV Push once  glucagon  Injectable 1 milliGRAM(s) IntraMuscular once  insulin glargine Injectable (LANTUS) 10 Unit(s) SubCutaneous at bedtime  insulin lispro (ADMELOG) corrective regimen sliding scale   SubCutaneous three times a day before meals  meclizine 12.5 milliGRAM(s) Oral three times a day  metoprolol succinate ER 50 milliGRAM(s) Oral daily  pantoprazole    Tablet 40 milliGRAM(s) Oral before breakfast  valsartan 20 milliGRAM(s) Oral daily    MEDICATIONS  (PRN):  acetaminophen     Tablet .. 650 milliGRAM(s) Oral every 6 hours PRN Temp greater or equal to 38C (100.4F), Mild Pain (1 - 3)  albuterol    90 MICROgram(s) HFA Inhaler 2 Puff(s) Inhalation every 6 hours PRN for shortness of breath and/or wheezing  dextrose Oral Gel 15 Gram(s) Oral once PRN Blood Glucose LESS THAN 70 milliGRAM(s)/deciliter  ondansetron Injectable 4 milliGRAM(s) IV Push every 6 hours PRN Nausea and/or Vomiting      Vital Signs Last 24 Hrs  T(C): 36.7 (19 Aug 2024 16:32), Max: 36.9 (19 Aug 2024 04:18)  T(F): 98 (19 Aug 2024 16:32), Max: 98.4 (19 Aug 2024 04:18)  HR: 78 (19 Aug 2024 16:32) (78 - 86)  BP: 115/67 (19 Aug 2024 16:32) (111/63 - 135/69)  BP(mean): 89 (19 Aug 2024 06:25) (79 - 89)  RR: 18 (19 Aug 2024 16:32) (16 - 18)  SpO2: 97% (19 Aug 2024 16:32) (96% - 98%)    Parameters below as of 19 Aug 2024 16:32  Patient On (Oxygen Delivery Method): room air        PHYSICAL EXAM:  GENERAL: NAD, well-developed  HEAD:  Atraumatic, Normocephalic  EYES: EOMI, PERRLA, conjunctiva and sclera clear  NECK: Supple, No JVD  CHEST/LUNG: Clear to auscultation bilaterally; No wheeze  HEART: Regular rate and rhythm; No murmurs, rubs, or gallops  ABDOMEN: Soft, Nontender, Nondistended; Bowel sounds present  EXTREMITIES:  2+ Peripheral Pulses, No clubbing, cyanosis, or edema  PSYCH: AAOx3  NEUROLOGY: non-focal  SKIN: No rashes or lesions    LABS:                        12.0   11.65 )-----------( 235      ( 18 Aug 2024 10:29 )             39.1     08-18    139  |  104  |  18  ----------------------------<  208<H>  3.9   |  20<L>  |  0.55    Ca    9.4      18 Aug 2024 10:29    TPro  6.7  /  Alb  4.0  /  TBili  0.3  /  DBili  x   /  AST  14  /  ALT  15  /  AlkPhos  109  08-18    PT/INR - ( 18 Aug 2024 10:29 )   PT: See Note;   INR: See Note ratio         PTT - ( 18 Aug 2024 10:29 )  PTT:See Note sec      Urinalysis Basic - ( 19 Aug 2024 07:25 )    Color: Yellow / Appearance: Clear / S.018 / pH: x  Gluc: x / Ketone: Negative mg/dL  / Bili: Negative / Urobili: 0.2 mg/dL   Blood: x / Protein: Negative mg/dL / Nitrite: Negative   Leuk Esterase: Trace / RBC: 0 /HPF / WBC 5 /HPF   Sq Epi: x / Non Sq Epi: 5 /HPF / Bacteria: Negative /HPF          RADIOLOGY & ADDITIONAL TESTS:    Imaging Personally Reviewed:  < from: MR Cervical Spine No Cont (24 @ 22:51) >  IMPRESSION:    MRI BRAIN: No evidence of acute intracranial pathology.      MRI CERVICAL SPINE:  Mild multilevel degenerative changes as discussed above.    At C5-C6, a disc osteophyte complexes causes mild spinal canal stenosis   and may slightly contact the ventral cord.  No cord compression or cord   signal changes.    Uncovertebral/facet hypertrophy causes moderate to severe neural   foraminal narrowing at C5-C6 and the left; causes mild to moderate neural   foraminal narrowing at C5-C6 on the right; and causes mild neural   foraminal narrowing at C4-C5 on the left.    < end of copied text >    Consultant(s) Notes Reviewed:      Care Discussed with Consultants/Other Providers:  
Patient is a 63y old  Female who presents with a chief complaint of functional paraplegia, CVA (21 Aug 2024 10:40)      SUBJECTIVE / OVERNIGHT EVENTS: feels better. Wants to go home.   Review of Systems  chest pain no  palpitations no  sob no  nausea no  headache no    MEDICATIONS  (STANDING):  allopurinol 100 milliGRAM(s) Oral daily  aspirin enteric coated 81 milliGRAM(s) Oral <User Schedule>  budesonide 160 MICROgram(s)/formoterol 4.5 MICROgram(s) Inhaler 2 Puff(s) Inhalation two times a day  clopidogrel Tablet 75 milliGRAM(s) Oral daily  dextrose 5%. 1000 milliLiter(s) (100 mL/Hr) IV Continuous <Continuous>  dextrose 5%. 1000 milliLiter(s) (50 mL/Hr) IV Continuous <Continuous>  dextrose 50% Injectable 25 Gram(s) IV Push once  dextrose 50% Injectable 25 Gram(s) IV Push once  dextrose 50% Injectable 12.5 Gram(s) IV Push once  glucagon  Injectable 1 milliGRAM(s) IntraMuscular once  insulin glargine Injectable (LANTUS) 10 Unit(s) SubCutaneous at bedtime  insulin lispro (ADMELOG) corrective regimen sliding scale   SubCutaneous three times a day before meals  meclizine 12.5 milliGRAM(s) Oral three times a day  metoprolol succinate ER 50 milliGRAM(s) Oral daily  pantoprazole    Tablet 40 milliGRAM(s) Oral before breakfast  valsartan 20 milliGRAM(s) Oral daily    MEDICATIONS  (PRN):  acetaminophen     Tablet .. 650 milliGRAM(s) Oral every 6 hours PRN Temp greater or equal to 38C (100.4F), Mild Pain (1 - 3)  albuterol    90 MICROgram(s) HFA Inhaler 2 Puff(s) Inhalation every 6 hours PRN for shortness of breath and/or wheezing  dextrose Oral Gel 15 Gram(s) Oral once PRN Blood Glucose LESS THAN 70 milliGRAM(s)/deciliter  ondansetron Injectable 4 milliGRAM(s) IV Push every 6 hours PRN Nausea and/or Vomiting      Vital Signs Last 24 Hrs  T(C): 36.8 (21 Aug 2024 13:40), Max: 37.1 (20 Aug 2024 21:06)  T(F): 98.3 (21 Aug 2024 13:40), Max: 98.7 (20 Aug 2024 21:06)  HR: 74 (21 Aug 2024 13:40) (68 - 77)  BP: 111/64 (21 Aug 2024 13:40) (111/64 - 117/71)  BP(mean): --  RR: 18 (21 Aug 2024 13:40) (18 - 18)  SpO2: 96% (21 Aug 2024 13:40) (94% - 96%)    Parameters below as of 21 Aug 2024 13:40  Patient On (Oxygen Delivery Method): room air        PHYSICAL EXAM:  GENERAL: NAD   HEAD:  Atraumatic, Normocephalic  EYES: EOMI, PERRLA, conjunctiva and sclera clear  NECK: Supple, No JVD  CHEST/LUNG: Clear to auscultation bilaterally; No wheeze  HEART: Regular rate and rhythm; No murmurs, rubs, or gallops  ABDOMEN: Soft, Nontender, Nondistended; Bowel sounds present  EXTREMITIES:  2+ Peripheral Pulses, No clubbing, cyanosis, or edema  PSYCH: AAOx3  NEUROLOGY: non-focal  SKIN: No rashes or lesions    LABS:                        13.3   7.94  )-----------( 292      ( 21 Aug 2024 07:12 )             42.6     08-21    140  |  102  |  15  ----------------------------<  162<H>  3.9   |  21<L>  |  0.62    Ca    10.0      21 Aug 2024 07:13            Urinalysis Basic - ( 21 Aug 2024 07:13 )    Color: x / Appearance: x / SG: x / pH: x  Gluc: 162 mg/dL / Ketone: x  / Bili: x / Urobili: x   Blood: x / Protein: x / Nitrite: x   Leuk Esterase: x / RBC: x / WBC x   Sq Epi: x / Non Sq Epi: x / Bacteria: x        Culture - Urine (collected 19 Aug 2024 07:25)  Source: Clean Catch Clean Catch (Midstream)  Final Report (20 Aug 2024 16:14):    <10,000 CFU/mL Normal Urogenital Flores        RADIOLOGY & ADDITIONAL TESTS:    Imaging Personally Reviewed:    Consultant(s) Notes Reviewed:      Care Discussed with Consultants/Other Providers:  
Patient is a 63y old  Female who presents with a chief complaint of functional paraplegia, CVA (19 Aug 2024 20:47)      SUBJECTIVE / OVERNIGHT EVENTS: No new complaints.   Review of Systems  chest pain no  palpitations no  sob no  nausea no  headache no    MEDICATIONS  (STANDING):  allopurinol 100 milliGRAM(s) Oral daily  aspirin enteric coated 81 milliGRAM(s) Oral <User Schedule>  budesonide 160 MICROgram(s)/formoterol 4.5 MICROgram(s) Inhaler 2 Puff(s) Inhalation two times a day  clopidogrel Tablet 75 milliGRAM(s) Oral daily  dextrose 5%. 1000 milliLiter(s) (50 mL/Hr) IV Continuous <Continuous>  dextrose 5%. 1000 milliLiter(s) (100 mL/Hr) IV Continuous <Continuous>  dextrose 50% Injectable 25 Gram(s) IV Push once  dextrose 50% Injectable 25 Gram(s) IV Push once  dextrose 50% Injectable 12.5 Gram(s) IV Push once  glucagon  Injectable 1 milliGRAM(s) IntraMuscular once  insulin glargine Injectable (LANTUS) 10 Unit(s) SubCutaneous at bedtime  insulin lispro (ADMELOG) corrective regimen sliding scale   SubCutaneous three times a day before meals  meclizine 12.5 milliGRAM(s) Oral three times a day  metoprolol succinate ER 50 milliGRAM(s) Oral daily  pantoprazole    Tablet 40 milliGRAM(s) Oral before breakfast  valsartan 20 milliGRAM(s) Oral daily    MEDICATIONS  (PRN):  acetaminophen     Tablet .. 650 milliGRAM(s) Oral every 6 hours PRN Temp greater or equal to 38C (100.4F), Mild Pain (1 - 3)  albuterol    90 MICROgram(s) HFA Inhaler 2 Puff(s) Inhalation every 6 hours PRN for shortness of breath and/or wheezing  dextrose Oral Gel 15 Gram(s) Oral once PRN Blood Glucose LESS THAN 70 milliGRAM(s)/deciliter  ondansetron Injectable 4 milliGRAM(s) IV Push every 6 hours PRN Nausea and/or Vomiting      Vital Signs Last 24 Hrs  T(C): 36.7 (20 Aug 2024 11:52), Max: 37.1 (19 Aug 2024 20:12)  T(F): 98 (20 Aug 2024 11:52), Max: 98.8 (19 Aug 2024 20:12)  HR: 73 (20 Aug 2024 11:52) (66 - 81)  BP: 121/80 (20 Aug 2024 11:52) (115/66 - 121/80)  BP(mean): 90 (19 Aug 2024 20:12) (90 - 90)  RR: 18 (20 Aug 2024 11:52) (17 - 18)  SpO2: 95% (20 Aug 2024 11:52) (93% - 98%)    Parameters below as of 20 Aug 2024 11:52  Patient On (Oxygen Delivery Method): room air        PHYSICAL EXAM:  GENERAL: NAD, well-developed  HEAD:  Atraumatic, Normocephalic  EYES: EOMI, PERRLA, conjunctiva and sclera clear  NECK: Supple, No JVD  CHEST/LUNG: Clear to auscultation bilaterally; No wheeze  HEART: Regular rate and rhythm; No murmurs, rubs, or gallops  ABDOMEN: Soft, Nontender, Nondistended; Bowel sounds present  EXTREMITIES:  2+ Peripheral Pulses, No clubbing, cyanosis, or edema  PSYCH: AAOx3  NEUROLOGY: non-focal  SKIN: No rashes or lesions    LABS:                Urinalysis Basic - ( 19 Aug 2024 07:25 )    Color: Yellow / Appearance: Clear / S.018 / pH: x  Gluc: x / Ketone: Negative mg/dL  / Bili: Negative / Urobili: 0.2 mg/dL   Blood: x / Protein: Negative mg/dL / Nitrite: Negative   Leuk Esterase: Trace / RBC: 0 /HPF / WBC 5 /HPF   Sq Epi: x / Non Sq Epi: 5 /HPF / Bacteria: Negative /HPF        Culture - Urine (collected 19 Aug 2024 07:25)  Source: Clean Catch Clean Catch (Midstream)  Final Report (20 Aug 2024 16:14):    <10,000 CFU/mL Normal Urogenital Flores        RADIOLOGY & ADDITIONAL TESTS:    Imaging Personally Reviewed:    Consultant(s) Notes Reviewed:      Care Discussed with Consultants/Other Providers:

## 2024-08-21 NOTE — DISCHARGE NOTE NURSING/CASE MANAGEMENT/SOCIAL WORK - PATIENT PORTAL LINK FT
You can access the FollowMyHealth Patient Portal offered by [facility] by registering at the following website: [email or web address]. By joining [email or web address]’s FollowMyHealth portal, you will also be able to view your health information using other applications (apps) compatible with our system.

## 2024-08-21 NOTE — DISCHARGE NOTE NURSING/CASE MANAGEMENT/SOCIAL WORK - NSDCPEFALRISK_GEN_ALL_CORE
For information on Fall & Injury Prevention, visit: [email or web address] OR  [email or web address] OR  https://www.cdc.gov/steadi/patient.html

## 2024-08-21 NOTE — DISCHARGE NOTE PROVIDER - NSDCMRMEDTOKEN_GEN_ALL_CORE_FT
acetaminophen 325 mg oral tablet: 2 tab(s) orally every 6 hours as needed for  moderate pain  Admelog 100 units/mL injectable solution: 14 unit(s) injectable 3 times a day (before meals)  Albuterol (Eqv-ProAir HFA) 90 mcg/inh inhalation aerosol: 2 puff(s) inhaled 4 times a day as needed for  shortness of breath and/or wheezing  allopurinol 100 mg oral tablet: 1 tab(s) orally once a day  aspirin 81 mg oral delayed release tablet: 1 tab(s) orally every other day  azelastine 0.05% ophthalmic solution: 1 drop(s) in each affected eye 2 times a day as needed  Basaglar KwikPen 100 units/mL subcutaneous solution: 35 unit(s) subcutaneous once a day (at bedtime)  budesonide-formoterol 160 mcg-4.5 mcg/inh inhalation aerosol: 2 puff(s) inhaled 2 times a day  cetirizine 10 mg oral tablet: 1 tab(s) orally prn  cholecalciferol 1250 mcg (50,000 intl units) oral capsule: 1 cap(s) orally once a week  clopidogrel 75 mg oral tablet: 1 tab(s) orally once a day  Colace 100 mg oral capsule: 1 cap(s) orally once a day as needed for  constipation  cyanocobalamin 100 mcg/mL injectable solution: injectable once a month  meclizine 12.5 mg oral tablet: 1 tab(s) orally 3 times a day as needed for  dizziness  metoprolol succinate 50 mg oral tablet, extended release: 1 tab(s) orally once a day  Nurtec ODT 75 mg oral tablet, disintegratin tab(s) orally once a day as needed for  headache  omeprazole 20 mg oral delayed release capsule: 1 cap(s) orally prn  Repatha 140 mg/mL subcutaneous solution: 1 milliliter(s) subcutaneously every 2 weeks  Rolling walker: for strengthening  Ht: 5.5 Ft  Wt: 90kg  ICD 10 - H81.10  senna: 2 tab(s) orally once a day (at bedtime) as needed for  constipation  valsartan 40 mg oral tablet: 0.5 tab(s) orally once a day  Xiidra 5% ophthalmic solution: 1 drop(s) in each eye once a day  Zetia 10 mg oral tablet: 1 tab(s) orally once a day   acetaminophen 325 mg oral tablet: 2 tab(s) orally every 6 hours as needed for  moderate pain  Admelog 100 units/mL injectable solution: 14 unit(s) injectable 3 times a day (before meals)  Albuterol (Eqv-ProAir HFA) 90 mcg/inh inhalation aerosol: 2 puff(s) inhaled 4 times a day as needed for  shortness of breath and/or wheezing  allopurinol 100 mg oral tablet: 1 tab(s) orally once a day  aspirin 81 mg oral delayed release tablet: 1 tab(s) orally every other day  azelastine 0.05% ophthalmic solution: 1 drop(s) in each affected eye 2 times a day as needed  Basaglar KwikPen 100 units/mL subcutaneous solution: 35 unit(s) subcutaneous once a day (at bedtime)  budesonide-formoterol 160 mcg-4.5 mcg/inh inhalation aerosol: 2 puff(s) inhaled 2 times a day  cetirizine 10 mg oral tablet: 1 tab(s) orally prn  cholecalciferol 1250 mcg (50,000 intl units) oral capsule: 1 cap(s) orally once a week  clopidogrel 75 mg oral tablet: 1 tab(s) orally once a day  Colace 100 mg oral capsule: 1 cap(s) orally once a day as needed for  constipation  cyanocobalamin 100 mcg/mL injectable solution: injectable once a month  meclizine 12.5 mg oral tablet: 1 tab(s) orally 3 times a day as needed for  dizziness  metoprolol succinate 50 mg oral tablet, extended release: 1 tab(s) orally once a day  Nurtec ODT 75 mg oral tablet, disintegratin tab(s) orally once a day as needed for  headache  omeprazole 20 mg oral delayed release capsule: 1 cap(s) orally prn  Outpatient Physical Therapy: Strengthening  Repatha 140 mg/mL subcutaneous solution: 1 milliliter(s) subcutaneously every 2 weeks  Rolling walker: for strengthening  Ht: 5.5 Ft  Wt: 90kg  ICD 10 - H81.10  senna: 2 tab(s) orally once a day (at bedtime) as needed for  constipation  valsartan 40 mg oral tablet: 0.5 tab(s) orally once a day  Xiidra 5% ophthalmic solution: 1 drop(s) in each eye once a day  Zetia 10 mg oral tablet: 1 tab(s) orally once a day

## 2024-08-21 NOTE — DISCHARGE NOTE PROVIDER - NSDCCPTREATMENT_GEN_ALL_CORE_FT
PRINCIPAL PROCEDURE  Procedure: MR cervical spine  Findings and Treatment: C2-C3: No significant disc herniation, spinal canal stenosis or neural   foraminal narrowing.  C3-C4: Small central disc protrusion.  No significant spinal canal   stenosis or neural foraminal narrowing.  C4-C5: Less than 2 mm anterolisthesis.  Small disc osteophyte complex,   asymmetric to the left.  Minimal left-sided spinal canal stenosis.    Left-sided uncovertebral hypertrophy causes mild left neural foraminal   narrowing.  C5-C6: Less than 2 mm retrolisthesis.  Disc osteophyte complex causes   mild spinal canal stenosis and may slightly contact the ventral cord.     Uncovertebral/facet hypertrophy causes moderate to severe left and mild   to moderate right neural foraminal narrowing.  C6-C7: No significant disc herniation, spinal canal stenosis or neural   foraminal narrowing.  C7-T1:  No significant disc herniation, spinal canal stenosis or neural   foraminal narrowing.  Upper thoracic spine: Small disc herniations at T1-T2, T2-T3 and T3-T4   seen on sagittal imaging with mild spinal canal stenosis greatest at   T3-T4.  No cord contact or cord signal changes.

## 2024-08-21 NOTE — DISCHARGE NOTE PROVIDER - NSDCCPCAREPLAN_GEN_ALL_CORE_FT
PRINCIPAL DISCHARGE DIAGNOSIS  Diagnosis: Dizziness  Assessment and Plan of Treatment: Presented as stroke code for Weakness/dizziness with left sided tingling. Room spinning dizziness, dysarthria, fluctuating word finding difficulties and L sided hypesthetic ataxic hemiparesis. NIHSS 6. pre-MRS 0.    CTH neg, MRI H neg, MRI C spine w/ C5-6 mild spinal canal stenosis and severe neural foraminal narrowing,   Symptoms improved with Meclizine  Continue Meclizine.   Acute dizziness, suggestive of vertigo,   No events on on cardiac monitoring.  Cardiology consulted - Continue current cardiac medications.  Follow upwith Primary physician - Dr. Tirado, cardiologist Dr. Saldana on 1 week      SECONDARY DISCHARGE DIAGNOSES  Diagnosis: Spinal stenosis, multilevel  Assessment and Plan of Treatment: Neurology consulted for left arm weakness.  Left more than Right sensation loss likely related to chronic C spine compression  MR C spine showed multilevel degen changes; C5-C6 mild stenosis w/ no cord signal change; mod foraminal stenosis at b/l C5-6 and L C4-5. No acute neurosurgical intervention  Follow up with Neurosurgery - Dr. Mohan in 1-2 weeks from d/c    Diagnosis: CAD (coronary artery disease)  Assessment and Plan of Treatment: Continur Plavix adn Aspirin    Diagnosis: DM (diabetes mellitus)  Assessment and Plan of Treatment: HgA1C this admission - 7.6  Take insulin as recommended  Make sure you get your HgA1c checked every three months  Check your blood glucose before meals and at bedtime.  It's important not to skip any meals.  Keep a log of your blood glucose results and always take it with you to your doctor appointments.  Keep a list of your current medications including injectables and over the counter medications and bring this medication list with you to all your doctor appointments.  If you have not seen your ophthalmologist this year call for appointment.  Check your feet daily for redness, sores, or openings. Do not self treat. If no improvement in two days call your primary care physician for an appointment.  Low blood sugar (hypoglycemia) is a blood sugar below 70mg/dl. Check your blood sugar if you feel signs/symptoms of hypoglycemia. If your blood sugar is below 70 take 15 grams of carbohydrates (ex 4 oz of apple juice, 3-4 glucose tablets, or 4-6 oz of regular soda) wait 15 minutes and repeat blood sugar to make sure it comes up above 70.  If your blood sugar is above 70 and you are due for a meal, have a meal.  If you are not due for a meal have a snack.  This snack helps keeps your blood sugar at a safe range.

## 2024-08-27 ENCOUNTER — APPOINTMENT (OUTPATIENT)
Dept: MRI IMAGING | Facility: CLINIC | Age: 64
End: 2024-08-27

## 2024-09-03 ENCOUNTER — OUTPATIENT (OUTPATIENT)
Dept: OUTPATIENT SERVICES | Facility: HOSPITAL | Age: 64
LOS: 1 days | End: 2024-09-03
Payer: MEDICAID

## 2024-09-03 ENCOUNTER — APPOINTMENT (OUTPATIENT)
Dept: NEUROLOGY | Facility: HOSPITAL | Age: 64
End: 2024-09-03

## 2024-09-03 VITALS
HEART RATE: 74 BPM | OXYGEN SATURATION: 97 % | RESPIRATION RATE: 18 BRPM | SYSTOLIC BLOOD PRESSURE: 110 MMHG | DIASTOLIC BLOOD PRESSURE: 70 MMHG | TEMPERATURE: 96.8 F

## 2024-09-03 DIAGNOSIS — Z98.89 OTHER SPECIFIED POSTPROCEDURAL STATES: Chronic | ICD-10-CM

## 2024-09-03 DIAGNOSIS — Z98.890 OTHER SPECIFIED POSTPROCEDURAL STATES: Chronic | ICD-10-CM

## 2024-09-03 DIAGNOSIS — M54.12 RADICULOPATHY, CERVICAL REGION: ICD-10-CM

## 2024-09-03 DIAGNOSIS — R51.9 HEADACHE, UNSPECIFIED: ICD-10-CM

## 2024-09-03 DIAGNOSIS — Z90.710 ACQUIRED ABSENCE OF BOTH CERVIX AND UTERUS: Chronic | ICD-10-CM

## 2024-09-03 DIAGNOSIS — Z90.49 ACQUIRED ABSENCE OF OTHER SPECIFIED PARTS OF DIGESTIVE TRACT: Chronic | ICD-10-CM

## 2024-09-03 PROCEDURE — G0463: CPT

## 2024-09-03 RX ORDER — NORTRIPTYLINE HYDROCHLORIDE 10 MG/1
10 CAPSULE ORAL AT BEDTIME
Qty: 7 | Refills: 0 | Status: ACTIVE | COMMUNITY
Start: 2024-09-03 | End: 1900-01-01

## 2024-09-03 RX ORDER — MECLIZINE HYDROCHLORIDE 25 MG/1
25 TABLET ORAL 3 TIMES DAILY
Refills: 0 | Status: ACTIVE | COMMUNITY
Start: 2024-09-03

## 2024-09-03 RX ORDER — INSULIN GLARGINE 100 [IU]/ML
100 INJECTION, SOLUTION SUBCUTANEOUS AT BEDTIME
Refills: 0 | Status: ACTIVE | COMMUNITY
Start: 2024-09-03

## 2024-09-03 RX ORDER — NORTRIPTYLINE HYDROCHLORIDE 25 MG/1
25 CAPSULE ORAL
Qty: 60 | Refills: 1 | Status: ACTIVE | COMMUNITY
Start: 2024-09-03 | End: 1900-01-01

## 2024-09-04 NOTE — DISCUSSION/SUMMARY
[FreeTextEntry1] : Assessment Cervicalgia w radiculopathy Sciatica on R Peripheral vertigo  Patient presents today for follow up after hospitalization for left side weakness and numbness. MRI cervical spine noted for stenosis of C5/C6. Today complains of pain in left occiput radiating down the neck. Exam noted for chronic L>R weakness and decreased sensation on left side.   Impression: Radiating cervical pain with imaging finding of C5-C6 stenosis, likely cervical radiculopathy.   Plan: [] Start nortriptyline 10 mg nightly for 1 week, then increase to 25 mg nightly [] C/w Nurtec 75 mg as needed for migraines [] Discontinue Trileptal as symptoms more consistent with cervical radiculopathy [] PT/OT  [] FU neurosurgery clinic for C5/C6 stenosis [] Pending MRI lumbar spine  RTC 3 months  Case d/w Attending: Dr. Nissenbaum

## 2024-09-04 NOTE — HISTORY OF PRESENT ILLNESS
[FreeTextEntry1] : HPI  9/3/2024: Patient presents for follow up after recent hospitalization. She went to [facility] ED on 8/18/24 due to sudden onset dizziness, dysarthria, word-finding and L sided weakness and code stroke was called. CT head and CTA were unremarkable. MRI brain was negative for acute stroke. Symptoms were thought to be due to metabolic encephalopathy (patient reports that she had 2 days of vomiting prior to symptom onset). MR cervical spine was also done showing moderate-severe foraminal narrowing at C5-C6. Seen by neurosurgery with no acute intervention, plan for outpatient follow up. Today, patient complains of pain in left occiput with radiation down the neck. She states that her symptoms are worse now than prior headaches. She also reports hyperesthesia of left cheek, which she states is different than her prior trigeminal neuralgia symptoms. She reports intermittent vertigo, has been taking meclizine as needed. Was ambulating with a walker due to dizziness and feeling like she was falling to her left. Now she is somewhat improved and able to ambulate with a cane. Continues to have migraine-like headaches as well with photophobia and phonophobia. She has not been taking her Trileptal or nurtec due to concern for drug interaction. Patient reports her symptoms have prevented her from being able to swim or go to the beach this summer.  07/30/2024 Follow up: Patient presents today for: -back of the head pain that runs down her neck into her L arm like an electric current. Pain starts above L ear and travels down the lateral posterior aspect of her L neck and down the back of her L arm. She is not able to lay on her R side dt the pain. onset june 2024, worsening. Patient denies any trauma at that time. Her only eliciting event she states is fibromyalgia. -low back pain that starts on the R low back and runs down her R leg. Her back pain starts in the R low back, travels across her upper R buttock and then to the anterior R leg then stops at the knee. The pain is described as a current like sensation. onset june 2024, worsening.  Her only eliciting event she states is fibromyalgia.  The patient reports chronic impingement issues in her cervical spine and lower back. She reports that her R arm will always be bad but the L arm is worse. She continues to swim but has to do treading motions instead of full arm strokes. She reports that her rheumatologist has recommended cervical spine imaging.  SHe also reports a long history of instability when walking that waxes and wanes. Like she is off balance. Patient will sometimes use a cane.   3/26/24 follow up Patient seen in clinic for follow up. Patient reports her headache is still present intermittently (occurring around 6-7 times per month). No changes in HA compared to prior. She currently is having a headache for the past 3 days intermittently. Nurtec helps but not completely resolving (takes the edge off). She only takes nurtec when headache is worst around 3 times a week. Also taking tripetal daily. Denied new neurological complaints.   8/8/23 Patient presents today for follow up for cervical radiculopathy, trigeminal neuralgia and headaches. She states that her nurtec has been effective and that her headaches are infrequent, maybe 1-2 a month, intensity 4/10 if they occur with some photo/phonophobia. She says that she is currently going through a sinus infection. Continues to endorse symptoms of trigeminal neuralgia with shooting pain from her left ear to the front of her face. However, she feels that they are managed well with the oxcarb. Recent blood work in June demonstrated normal electrolyte levels. The patient acknowledged her diagnosis of angina and fibromyalgia which is currently affecting her mobility but she does not believe that it is affecting her daily lifestyle. She did not want to take any medications for her fibromyalgia because she feels like she's taking too many medications. OK with continuing her nurtec and oxcarb.  2/7/23: Patient presents today for follow up for cervical radiculopathy, and trigeminal neuralgia and headache. Pt states the nurtec has been helping her the most and that she recently saw her rheumatologist who also prescribed her nortriptaline and diclofec (both of which she has not taken yet due to scheduled cardiac cath procedure). Pt states her headaches (L sided originating in the neck radiating up, 7/10, persistent, with photophobia/phonophobia, neck stiffness) has been worse than last time she was here. Still endorsing weakness in the UE's (L>R), with numbness/tingling.    Interval Hx 11/1/22: Patient presents today for follow up for cervical radiculopathy, and trigeminal neuralgia and headache. Trigeminal neuralgia is mildly controlled on trileptal 300mg qday. 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 Still endorsing weakness in the UE's (L>R), but now numbness/tingling. Has appt w/ Dr. Nava this month. She is not currently taking any medications for her radicular pain. Taking Nurtec for her headaches, that are likely cervicogenic in etiology. Since last appt, patient saw interventional pain specialist (Dr. Camejo) in [place], [place] and received cervical epidural injection x1 in 9/2022, no relief. Also received L shoulder injection w/ minimal relief. Has another appt to see same pain specialist this month. Of note, started PT only 2 weeks ago. Also endorsing mild headaches for which Nurtec is controlling, needs refill.    Interval Hx 5/24/22: Patient presents today for follow up for cervical radiculopathy, b/l carpal tunnel and trigeminal neuralgia. Trigeminal neuralgia is moderately controlled on trileptal. Previous MRI brain showed compression of the L trigeminal nerve, for which Dr. Dunn offered to perform surgery but patient declined. She also currently has a R rotator cuff tear and was referred for surgery, which patient also declined. She is not currently taking any medications for her radicular pain. She was prescribed Naproxen but takes Tylenol PRN when her pain is really bad. Currently states today is a "bad day" and her pain is 8/10. Has not yet received PT as was waiting for MRI Cspine to be performed. MRI Cspine shows      60F R handed with a PMHx of R rotator cuff tear, DM, HTN, Asthma, CAD s/p stents, diverticulitis, Gout, OA, Fibromyalgia, trigeminal neuralgia, tremors presents to establish care. Pt saw seeing neurologist Dr. Leroy Mota but had insurance issues.    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 Progressive, worsening b/l hand weakness since 2018, which initially brought her to see a neurologist. She has reported C5-7 nerve impingements. Last MRI C spine done in 2020. She also reports R index finger numbness, no paresthesias.    Pt diagnosed with L sided trigeminal neuralgia in 2019, MRI done. She is on Trileptal lowest dose twice a day, no significant difference because she has only had mild symptoms but has slightly decreased frequency of headaches, occasional ear and jaw pain and tearing. She saw neurosurgery Dr. Hammond for trigeminal neuralgia, who offered surgery but pt refused.    Pt reports b/l hand tremors since 2019, intermittent but alternating hands, when eating and sewing, not related to her trigeminal neuralgia pain, lasting seconds at a time. No worsening with anxiety, no alcohol intake, no family hx of tremors.    Pt reports diffuse joint pains due to her fibromyalgia, was on duloxetine 60mg daily but discontinued due to nausea. Now just taking tylenol PRN for it. She has not had physical therapy or occupational therapy recently.    EMG 8/2021 with Dr. Leroy Mota  1. b/l C4-5 and C5-6 radiculopathy  2. b/l median motor neuropathy  3. b/l ulnar motor neuropathy  4. b/l median sensory neuropathy   Review of Systems       Constitutional: no fever, no chills, not feeling poorly, not feeling tired, no recent weight gain and no recent weight loss.   Neurological: numbness, but no ataxia.   Eyes and ENT are otherwise negative.      ----------------------------------

## 2024-09-04 NOTE — HISTORY OF PRESENT ILLNESS
[FreeTextEntry1] : HPI  9/3/2024: Patient presents for follow up after recent hospitalization. She went to [facility] ED on 8/18/24 due to sudden onset dizziness, dysarthria, word-finding and L sided weakness and code stroke was called. CT head and CTA were unremarkable. MRI brain was negative for acute stroke. Symptoms were thought to be due to metabolic encephalopathy (patient reports that she had 2 days of vomiting prior to symptom onset). MR cervical spine was also done showing moderate-severe foraminal narrowing at C5-C6. Seen by neurosurgery with no acute intervention, plan for outpatient follow up. Today, patient complains of pain in left occiput with radiation down the neck. She states that her symptoms are worse now than prior headaches. She also reports hyperesthesia of left cheek, which she states is different than her prior trigeminal neuralgia symptoms. She reports intermittent vertigo, has been taking meclizine as needed. Was ambulating with a walker due to dizziness and feeling like she was falling to her left. Now she is somewhat improved and able to ambulate with a cane. Continues to have migraine-like headaches as well with photophobia and phonophobia. She has not been taking her Trileptal or nurtec due to concern for drug interaction. Patient reports her symptoms have prevented her from being able to swim or go to the beach this summer.  07/30/2024 Follow up: Patient presents today for: -back of the head pain that runs down her neck into her L arm like an electric current. Pain starts above L ear and travels down the lateral posterior aspect of her L neck and down the back of her L arm. She is not able to lay on her R side dt the pain. onset june 2024, worsening. Patient denies any trauma at that time. Her only eliciting event she states is fibromyalgia. -low back pain that starts on the R low back and runs down her R leg. Her back pain starts in the R low back, travels across her upper R buttock and then to the anterior R leg then stops at the knee. The pain is described as a current like sensation. onset june 2024, worsening.  Her only eliciting event she states is fibromyalgia.  The patient reports chronic impingement issues in her cervical spine and lower back. She reports that her R arm will always be bad but the L arm is worse. She continues to swim but has to do treading motions instead of full arm strokes. She reports that her rheumatologist has recommended cervical spine imaging.  SHe also reports a long history of instability when walking that waxes and wanes. Like she is off balance. Patient will sometimes use a cane.   3/26/24 follow up Patient seen in clinic for follow up. Patient reports her headache is still present intermittently (occurring around 6-7 times per month). No changes in HA compared to prior. She currently is having a headache for the past 3 days intermittently. Nurtec helps but not completely resolving (takes the edge off). She only takes nurtec when headache is worst around 3 times a week. Also taking tripetal daily. Denied new neurological complaints.   8/8/23 Patient presents today for follow up for cervical radiculopathy, trigeminal neuralgia and headaches. She states that her nurtec has been effective and that her headaches are infrequent, maybe 1-2 a month, intensity 4/10 if they occur with some photo/phonophobia. She says that she is currently going through a sinus infection. Continues to endorse symptoms of trigeminal neuralgia with shooting pain from her left ear to the front of her face. However, she feels that they are managed well with the oxcarb. Recent blood work in June demonstrated normal electrolyte levels. The patient acknowledged her diagnosis of angina and fibromyalgia which is currently affecting her mobility but she does not believe that it is affecting her daily lifestyle. She did not want to take any medications for her fibromyalgia because she feels like she's taking too many medications. OK with continuing her nurtec and oxcarb.  2/7/23: Patient presents today for follow up for cervical radiculopathy, and trigeminal neuralgia and headache. Pt states the nurtec has been helping her the most and that she recently saw her rheumatologist who also prescribed her nortriptaline and diclofec (both of which she has not taken yet due to scheduled cardiac cath procedure). Pt states her headaches (L sided originating in the neck radiating up, 7/10, persistent, with photophobia/phonophobia, neck stiffness) has been worse than last time she was here. Still endorsing weakness in the UE's (L>R), with numbness/tingling.    Interval Hx 11/1/22: Patient presents today for follow up for cervical radiculopathy, and trigeminal neuralgia and headache. Trigeminal neuralgia is mildly controlled on trileptal 300mg qday. 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 Still endorsing weakness in the UE's (L>R), but now numbness/tingling. Has appt w/ Dr. Nava this month. She is not currently taking any medications for her radicular pain. Taking Nurtec for her headaches, that are likely cervicogenic in etiology. Since last appt, patient saw interventional pain specialist (Dr. Camejo) in [place], [place] and received cervical epidural injection x1 in 9/2022, no relief. Also received L shoulder injection w/ minimal relief. Has another appt to see same pain specialist this month. Of note, started PT only 2 weeks ago. Also endorsing mild headaches for which Nurtec is controlling, needs refill.    Interval Hx 5/24/22: Patient presents today for follow up for cervical radiculopathy, b/l carpal tunnel and trigeminal neuralgia. Trigeminal neuralgia is moderately controlled on trileptal. Previous MRI brain showed compression of the L trigeminal nerve, for which Dr. Dunn offered to perform surgery but patient declined. She also currently has a R rotator cuff tear and was referred for surgery, which patient also declined. She is not currently taking any medications for her radicular pain. She was prescribed Naproxen but takes Tylenol PRN when her pain is really bad. Currently states today is a "bad day" and her pain is 8/10. Has not yet received PT as was waiting for MRI Cspine to be performed. MRI Cspine shows      60F R handed with a PMHx of R rotator cuff tear, DM, HTN, Asthma, CAD s/p stents, diverticulitis, Gout, OA, Fibromyalgia, trigeminal neuralgia, tremors presents to establish care. Pt saw seeing neurologist Dr. Leroy Mota but had insurance issues.    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 Progressive, worsening b/l hand weakness since 2018, which initially brought her to see a neurologist. She has reported C5-7 nerve impingements. Last MRI C spine done in 2020. She also reports R index finger numbness, no paresthesias.    Pt diagnosed with L sided trigeminal neuralgia in 2019, MRI done. She is on Trileptal lowest dose twice a day, no significant difference because she has only had mild symptoms but has slightly decreased frequency of headaches, occasional ear and jaw pain and tearing. She saw neurosurgery Dr. Hammond for trigeminal neuralgia, who offered surgery but pt refused.    Pt reports b/l hand tremors since 2019, intermittent but alternating hands, when eating and sewing, not related to her trigeminal neuralgia pain, lasting seconds at a time. No worsening with anxiety, no alcohol intake, no family hx of tremors.    Pt reports diffuse joint pains due to her fibromyalgia, was on duloxetine 60mg daily but discontinued due to nausea. Now just taking tylenol PRN for it. She has not had physical therapy or occupational therapy recently.    EMG 8/2021 with Dr. Leroy Mota  1. b/l C4-5 and C5-6 radiculopathy  2. b/l median motor neuropathy  3. b/l ulnar motor neuropathy  4. b/l median sensory neuropathy   Review of Systems       Constitutional: no fever, no chills, not feeling poorly, not feeling tired, no recent weight gain and no recent weight loss.   Neurological: numbness, but no ataxia.   Eyes and ENT are otherwise negative.      ----------------------------------

## 2024-09-04 NOTE — PHYSICAL EXAM
[FreeTextEntry1] : Physical Exam     Constitutional: alert and in no acute distress. Psychiatric: oriented to person, place, and time, insight and judgment were intact, the affect was normal, the mood was normal and recent memory was not impaired. Neurologic: Orientation: oriented to person, oriented to place and oriented to time. Language: fluency intact. Cranial Nerves: visual acuity intact bilaterally, visual fields full to confrontation, pupils equal round and reactive to light, extraocular motion intact, facial numbness on L side (chronic), no pain with tapping L cheek, face symmetrical, hearing was intact bilaterally, head turning and shoulder shrug symmetric and there was no tongue deviation with protrusion. Motor: muscle tone was normal in all four extremities. Motor Strength: the patient is right hand dominant.  Right upper extremity: shoulder abduction 5/5, arm flexion 5/5, arm extension 5/5, , the hand  was normal, fingers abduction 5/5 right. Left upper extremity shoulder abduction 4/5, shoulder adduction 4+/5 arm flexion 5/5, arm extension 5/5, wrist flexion 5/5, wrist extension 5/5, the hand  was normal, fingers abduction 5/5 left. Right lower extremity strength: hip flexion 4/5, hip extension 5/5, knee flexion 4+/5, knee extension 4+/5, ankle dorsiflexion 5/5, ankle plantar flexion 5/5. Left lower extremity strength: hip flexion 4+/5, hip extension 5/5, knee flexion 4+/5, knee extension 4/5, ankle dorsiflexion 5/5, ankle plantar flexion 5/5. Sensory exam:       Decreased sensation to light touch in entire LUE compared to RUE,  increased sensation to light touch in L2-L5 on R compared to L, light touch in distal L5 and S1 (toes) symmetric, light touch symmetric in dorsal C8-L2 (back)      sensation to cold touch decreased in proximal C5 and C6 dermatomes in LUE compared to RUE Coordination:. normal gait. Finger to nose dysmetria was not present. Deep tendon reflexes: Biceps right 1+. Biceps left 1+. Triceps right 2+. Triceps left 2+. Brachioradialis right 2+. Brachioradialis left 2+. Patella right 1+. Patella left 1+. Ankle jerk right 1+. Ankle jerk left 1+. Bilateral toes down.  ------------------------------

## 2024-09-05 ENCOUNTER — APPOINTMENT (OUTPATIENT)
Dept: NEUROSURGERY | Facility: CLINIC | Age: 64
End: 2024-09-05

## 2024-09-06 DIAGNOSIS — M54.12 RADICULOPATHY, CERVICAL REGION: ICD-10-CM

## 2024-09-10 ENCOUNTER — APPOINTMENT (OUTPATIENT)
Dept: MRI IMAGING | Facility: CLINIC | Age: 64
End: 2024-09-10

## 2024-11-04 ENCOUNTER — APPOINTMENT (OUTPATIENT)
Dept: MRI IMAGING | Facility: IMAGING CENTER | Age: 64
End: 2024-11-04

## 2024-11-05 ENCOUNTER — OUTPATIENT (OUTPATIENT)
Dept: OUTPATIENT SERVICES | Facility: HOSPITAL | Age: 64
LOS: 1 days | End: 2024-11-05
Payer: MEDICAID

## 2024-11-05 ENCOUNTER — APPOINTMENT (OUTPATIENT)
Dept: GASTROENTEROLOGY | Facility: HOSPITAL | Age: 64
End: 2024-11-05

## 2024-11-05 VITALS
RESPIRATION RATE: 18 BRPM | DIASTOLIC BLOOD PRESSURE: 75 MMHG | BODY MASS INDEX: 34.16 KG/M2 | OXYGEN SATURATION: 95 % | SYSTOLIC BLOOD PRESSURE: 114 MMHG | HEIGHT: 65 IN | HEART RATE: 69 BPM | WEIGHT: 205 LBS | TEMPERATURE: 98.1 F

## 2024-11-05 DIAGNOSIS — Z90.710 ACQUIRED ABSENCE OF BOTH CERVIX AND UTERUS: Chronic | ICD-10-CM

## 2024-11-05 DIAGNOSIS — K59.00 CONSTIPATION, UNSPECIFIED: ICD-10-CM

## 2024-11-05 DIAGNOSIS — Z90.49 ACQUIRED ABSENCE OF OTHER SPECIFIED PARTS OF DIGESTIVE TRACT: Chronic | ICD-10-CM

## 2024-11-05 DIAGNOSIS — Z98.890 OTHER SPECIFIED POSTPROCEDURAL STATES: Chronic | ICD-10-CM

## 2024-11-05 DIAGNOSIS — Z98.89 OTHER SPECIFIED POSTPROCEDURAL STATES: Chronic | ICD-10-CM

## 2024-11-05 DIAGNOSIS — R14.0 ABDOMINAL DISTENSION (GASEOUS): ICD-10-CM

## 2024-11-05 DIAGNOSIS — K21.9 GASTRO-ESOPHAGEAL REFLUX DISEASE WITHOUT ESOPHAGITIS: ICD-10-CM

## 2024-11-05 DIAGNOSIS — K21.9 GASTRO-ESOPHAGEAL REFLUX DISEASE W/OUT ESOPHAGITIS: ICD-10-CM

## 2024-11-05 PROCEDURE — 99213 OFFICE O/P EST LOW 20 MIN: CPT

## 2024-11-05 PROCEDURE — G0463: CPT

## 2024-11-21 ENCOUNTER — TRANSCRIPTION ENCOUNTER (OUTPATIENT)
Age: 64
End: 2024-11-21

## 2024-11-21 ENCOUNTER — OUTPATIENT (OUTPATIENT)
Dept: OUTPATIENT SERVICES | Facility: HOSPITAL | Age: 64
LOS: 1 days | End: 2024-11-21
Payer: MEDICAID

## 2024-11-21 VITALS
HEART RATE: 65 BPM | SYSTOLIC BLOOD PRESSURE: 144 MMHG | DIASTOLIC BLOOD PRESSURE: 81 MMHG | RESPIRATION RATE: 16 BRPM | OXYGEN SATURATION: 97 %

## 2024-11-21 VITALS
WEIGHT: 205.03 LBS | TEMPERATURE: 99 F | OXYGEN SATURATION: 97 % | RESPIRATION RATE: 18 BRPM | SYSTOLIC BLOOD PRESSURE: 120 MMHG | DIASTOLIC BLOOD PRESSURE: 59 MMHG | HEIGHT: 65 IN | HEART RATE: 80 BPM

## 2024-11-21 DIAGNOSIS — Z98.890 OTHER SPECIFIED POSTPROCEDURAL STATES: Chronic | ICD-10-CM

## 2024-11-21 DIAGNOSIS — Z98.89 OTHER SPECIFIED POSTPROCEDURAL STATES: Chronic | ICD-10-CM

## 2024-11-21 DIAGNOSIS — Z90.49 ACQUIRED ABSENCE OF OTHER SPECIFIED PARTS OF DIGESTIVE TRACT: Chronic | ICD-10-CM

## 2024-11-21 DIAGNOSIS — R93.1 ABNORMAL FINDINGS ON DIAGNOSTIC IMAGING OF HEART AND CORONARY CIRCULATION: ICD-10-CM

## 2024-11-21 DIAGNOSIS — Z90.710 ACQUIRED ABSENCE OF BOTH CERVIX AND UTERUS: Chronic | ICD-10-CM

## 2024-11-21 LAB
ANION GAP SERPL CALC-SCNC: 16 MMOL/L — SIGNIFICANT CHANGE UP (ref 5–17)
BUN SERPL-MCNC: 13 MG/DL — SIGNIFICANT CHANGE UP (ref 7–23)
CALCIUM SERPL-MCNC: 10.1 MG/DL — SIGNIFICANT CHANGE UP (ref 8.4–10.5)
CHLORIDE SERPL-SCNC: 101 MMOL/L — SIGNIFICANT CHANGE UP (ref 96–108)
CO2 SERPL-SCNC: 22 MMOL/L — SIGNIFICANT CHANGE UP (ref 22–31)
CREAT SERPL-MCNC: 0.61 MG/DL — SIGNIFICANT CHANGE UP (ref 0.5–1.3)
EGFR: 100 ML/MIN/1.73M2 — SIGNIFICANT CHANGE UP
GLUCOSE SERPL-MCNC: 120 MG/DL — HIGH (ref 70–99)
HCT VFR BLD CALC: 40.5 % — SIGNIFICANT CHANGE UP (ref 34.5–45)
HGB BLD-MCNC: 12.7 G/DL — SIGNIFICANT CHANGE UP (ref 11.5–15.5)
MCHC RBC-ENTMCNC: 25.5 PG — LOW (ref 27–34)
MCHC RBC-ENTMCNC: 31.4 G/DL — LOW (ref 32–36)
MCV RBC AUTO: 81.3 FL — SIGNIFICANT CHANGE UP (ref 80–100)
NRBC # BLD: 0 /100 WBCS — SIGNIFICANT CHANGE UP (ref 0–0)
PLATELET # BLD AUTO: 268 K/UL — SIGNIFICANT CHANGE UP (ref 150–400)
POTASSIUM SERPL-MCNC: 4.1 MMOL/L — SIGNIFICANT CHANGE UP (ref 3.5–5.3)
POTASSIUM SERPL-SCNC: 4.1 MMOL/L — SIGNIFICANT CHANGE UP (ref 3.5–5.3)
RBC # BLD: 4.98 M/UL — SIGNIFICANT CHANGE UP (ref 3.8–5.2)
RBC # FLD: 14.8 % — HIGH (ref 10.3–14.5)
SODIUM SERPL-SCNC: 139 MMOL/L — SIGNIFICANT CHANGE UP (ref 135–145)
WBC # BLD: 8.65 K/UL — SIGNIFICANT CHANGE UP (ref 3.8–10.5)
WBC # FLD AUTO: 8.65 K/UL — SIGNIFICANT CHANGE UP (ref 3.8–10.5)

## 2024-11-21 PROCEDURE — 93010 ELECTROCARDIOGRAM REPORT: CPT

## 2024-11-21 PROCEDURE — 93454 CORONARY ARTERY ANGIO S&I: CPT | Mod: 59

## 2024-11-21 PROCEDURE — 80048 BASIC METABOLIC PNL TOTAL CA: CPT

## 2024-11-21 PROCEDURE — C1887: CPT

## 2024-11-21 PROCEDURE — C9600: CPT | Mod: LD

## 2024-11-21 PROCEDURE — 93571 IV DOP VEL&/PRESS C FLO 1ST: CPT | Mod: 26,LD

## 2024-11-21 PROCEDURE — 93005 ELECTROCARDIOGRAM TRACING: CPT

## 2024-11-21 PROCEDURE — C1894: CPT

## 2024-11-21 PROCEDURE — 93454 CORONARY ARTERY ANGIO S&I: CPT | Mod: 26,59

## 2024-11-21 PROCEDURE — 99152 MOD SED SAME PHYS/QHP 5/>YRS: CPT

## 2024-11-21 PROCEDURE — 85027 COMPLETE CBC AUTOMATED: CPT

## 2024-11-21 PROCEDURE — C1874: CPT

## 2024-11-21 PROCEDURE — 93799 UNLISTED CV SVC/PROCEDURE: CPT

## 2024-11-21 PROCEDURE — 92928 PRQ TCAT PLMT NTRAC ST 1 LES: CPT | Mod: LD

## 2024-11-21 PROCEDURE — C1769: CPT

## 2024-11-21 RX ORDER — INSULIN GLARGINE 100 [IU]/ML
28 INJECTION, SOLUTION SUBCUTANEOUS AT BEDTIME
Refills: 0 | Status: ACTIVE | OUTPATIENT
Start: 2024-11-21 | End: 2025-10-20

## 2024-11-21 RX ORDER — MECLIZINE HCL 12.5 MG
1 TABLET ORAL
Refills: 0 | DISCHARGE

## 2024-11-21 RX ORDER — METOPROLOL TARTRATE 100 MG/1
100 TABLET, FILM COATED ORAL
Refills: 0 | Status: ACTIVE | OUTPATIENT
Start: 2024-11-21 | End: 2025-10-20

## 2024-11-21 RX ORDER — ONDANSETRON HYDROCHLORIDE 4 MG/1
4 TABLET, FILM COATED ORAL ONCE
Refills: 0 | Status: COMPLETED | OUTPATIENT
Start: 2024-11-21 | End: 2024-11-21

## 2024-11-21 RX ORDER — SODIUM CHLORIDE 9 MG/ML
1000 INJECTION, SOLUTION INTRAMUSCULAR; INTRAVENOUS; SUBCUTANEOUS
Refills: 0 | Status: ACTIVE | OUTPATIENT
Start: 2024-11-21 | End: 2024-11-21

## 2024-11-21 RX ORDER — CLOPIDOGREL 75 MG/1
1 TABLET, FILM COATED ORAL
Qty: 90 | Refills: 3
Start: 2024-11-21 | End: 2025-11-15

## 2024-11-21 RX ORDER — METOPROLOL TARTRATE 100 MG/1
1 TABLET, FILM COATED ORAL
Qty: 0 | Refills: 0 | DISCHARGE

## 2024-11-21 RX ORDER — 0.9 % SODIUM CHLORIDE 0.9 %
1000 INTRAVENOUS SOLUTION INTRAVENOUS
Refills: 0 | Status: ACTIVE | OUTPATIENT
Start: 2024-11-21 | End: 2025-10-20

## 2024-11-21 RX ORDER — CLOPIDOGREL 75 MG/1
75 TABLET, FILM COATED ORAL DAILY
Refills: 0 | Status: ACTIVE | OUTPATIENT
Start: 2024-11-22 | End: 2025-10-21

## 2024-11-21 RX ORDER — ALLOPURINOL 300 MG/1
100 TABLET ORAL DAILY
Refills: 0 | Status: ACTIVE | OUTPATIENT
Start: 2024-11-21 | End: 2025-10-20

## 2024-11-21 RX ORDER — METOPROLOL TARTRATE 100 MG/1
1 TABLET, FILM COATED ORAL
Refills: 0 | DISCHARGE

## 2024-11-21 RX ORDER — GLUCAGON INJECTION, SOLUTION 0.5 MG/.1ML
1 INJECTION, SOLUTION SUBCUTANEOUS ONCE
Refills: 0 | Status: ACTIVE | OUTPATIENT
Start: 2024-11-21 | End: 2025-10-20

## 2024-11-21 RX ORDER — VALSARTAN 320 MG/1
20 TABLET ORAL DAILY
Refills: 0 | Status: ACTIVE | OUTPATIENT
Start: 2024-11-21 | End: 2025-10-20

## 2024-11-21 RX ORDER — ALLOPURINOL 300 MG/1
1 TABLET ORAL
Refills: 0 | DISCHARGE

## 2024-11-21 RX ORDER — PANTOPRAZOLE SODIUM 40 MG/1
40 TABLET, DELAYED RELEASE ORAL
Refills: 0 | Status: ACTIVE | OUTPATIENT
Start: 2024-11-21 | End: 2025-10-20

## 2024-11-21 RX ORDER — METOPROLOL TARTRATE 100 MG/1
50 TABLET, FILM COATED ORAL
Refills: 0 | Status: ACTIVE | OUTPATIENT
Start: 2024-11-21 | End: 2025-10-20

## 2024-11-21 RX ORDER — METOPROLOL TARTRATE 100 MG/1
0 TABLET, FILM COATED ORAL
Refills: 0 | DISCHARGE

## 2024-11-21 RX ORDER — EZETIMIBE 10 MG
10 TABLET ORAL DAILY
Refills: 0 | Status: ACTIVE | OUTPATIENT
Start: 2024-11-21 | End: 2025-10-20

## 2024-11-21 RX ADMIN — ONDANSETRON HYDROCHLORIDE 4 MILLIGRAM(S): 4 TABLET, FILM COATED ORAL at 11:09

## 2024-11-21 RX ADMIN — METOPROLOL TARTRATE 50 MILLIGRAM(S): 100 TABLET, FILM COATED ORAL at 13:24

## 2024-11-21 RX ADMIN — SODIUM CHLORIDE 75 MILLILITER(S): 9 INJECTION, SOLUTION INTRAMUSCULAR; INTRAVENOUS; SUBCUTANEOUS at 13:12

## 2024-11-21 NOTE — ASU DISCHARGE PLAN (ADULT/PEDIATRIC) - CARE PROVIDER_API CALL
Renetta Saldana  Cardiac Electrophysiology  [address], [address]  [place] [address]  Phone: [phone]  Fax: [phone]  Established Patient  Follow Up Time: 2 weeks

## 2024-11-21 NOTE — H&P CARDIOLOGY - SURGICAL SITE INCISION
Called pharmacy gave verbal order Lantus inulin as ordered, 10 units per night  Ok'd per DR Renée Porter, given to kamille ESCOTO. Patient NABIL@ [phone] that it will be LANTUS (medication is clear). Asked Jimi to call office. no

## 2024-11-21 NOTE — H&P CARDIOLOGY - HISTORY OF PRESENT ILLNESS
62 year old  female with PMH of HTN, HLD ( on Rapatha),  CAD s/p 2 LAD stents ( 02/2019), DM type 2 ( well managed as per pt,),  DM complicated by peripheral neuropathy, diverticulitis s/p Hartmans and reversal in 2013 at [facility], MYLES not on CPAP,  Asthma (controlled, as per pt), Cdiff, hx pf acute abd on 11/2019, fibromyalgia  Seen and referred by her cardiologist Dr BOSTON Mccloud who referred her for a C with DR. Blair. In July 11 /2023 she was noted to have patent stents on LHC and then in August suffered a TIA. She continues to have recurrent chest pain and CTA coronaries demonstrated mild three vessel CAD . She still complains of chest pain on exertion, relieved by rest. She feels excessively fatigued .     A 21 day MCOT revealed 5 beat run of NSVT up to 164. She currently denies chest pain, dizziness, numbness.tingling.

## 2024-11-21 NOTE — ASU PREOP CHECKLIST - RESPIRATORY RATE (BREATHS/MIN)
The ABCs of the Annual Wellness Visit  Initial Medicare Wellness Visit    Subjective      James Gary Meese is a 70 y.o. male who presents for an Initial Medicare Wellness Visit.    The following portions of the patient's history were reviewed and   updated as appropriate: allergies, current medications, past family history, past medical history, past social history, past surgical history, and problem list.    Compared to one year ago, the patient feels his physical   health is better.    Compared to one year ago, the patient feels his mental   health is the same.    Recent Hospitalizations:  He was admitted within the past 365 days at [facility].       Current Medical Providers:  Patient Care Team:  Breana Harden MD as PCP - General  Breana Harden MD as PCP - Family Medicine  Mushtaq Rudolph MD as Consulting Physician (Urology)    Outpatient Medications Prior to Visit   Medication Sig Dispense Refill    aspirin 81 MG EC tablet Take 1 tablet by mouth Daily.      metoprolol succinate XL (TOPROL-XL) 50 MG 24 hr tablet Take 1 tablet by mouth Every Night. 30 tablet 5    Multiple Vitamins-Minerals (OCUVITE ADULT FORMULA PO) Take 1 capsule by mouth Daily.      atorvastatin (LIPITOR) 10 MG tablet Take 1 tablet by mouth Daily.      atorvastatin (LIPITOR) 40 MG tablet Take 1 tablet by mouth Every Night. 30 tablet 11     No facility-administered medications prior to visit.       No opioid medication identified on active medication list. I have reviewed chart for other potential  high risk medication/s and harmful drug interactions in the elderly.        Aspirin is on active medication list. Aspirin use is indicated based on review of current medical condition/s. Pros and cons of this therapy have been discussed today. Benefits of this medication outweigh potential harm.  Patient has been encouraged to continue taking this medication.  .      Patient Active Problem List   Diagnosis    Simple renal cyst     "Hypertension    Obstructive sleep apnea syndrome    Osteoarthritis    Basal cell carcinoma (BCC) of skin of nose    SCC (squamous cell carcinoma), face    Melanoma in situ of torso excluding breast    Prostate cancer    Elevated coronary artery calcium score     Advance Care Planning   Advance Care Planning     Advance Directive is on file.  ACP discussion was held with the patient during this visit. Patient has an advance directive in EMR which is still valid.      Objective    Vitals:    23 0759   BP: 146/82   BP Location: Right arm   Patient Position: Sitting   Cuff Size: Adult   Pulse: 68   Resp: 16   Temp: 97.8 °F (36.6 °C)   TempSrc: Infrared   Weight: 76.9 kg (169 lb 8 oz)   Height: 175.3 cm (69\")   PainSc: 0-No pain     Estimated body mass index is 25.03 kg/m² as calculated from the following:    Height as of this encounter: 175.3 cm (69\").    Weight as of this encounter: 76.9 kg (169 lb 8 oz).    BMI is >= 25 and <30. (Overweight) The following options were offered after discussion;: exercise counseling/recommendations and nutrition counseling/recommendations    Finger Rub Hearing{Test (right ear):passed  Finger Rub Hearing{Test (left ear):passed        Does the patient have evidence of cognitive impairment?   No            HEALTH RISK ASSESSMENT    Smoking Status:  Social History     Tobacco Use   Smoking Status Never   Smokeless Tobacco Never     Alcohol Consumption:  Social History     Substance and Sexual Activity   Alcohol Use Not Currently    Comment: Weekly     Fall Risk Screen:    STEADI Fall Risk Assessment was completed, and patient is at LOW risk for falls.Assessment completed on:2023    Depression Screenin/14/2023     7:57 AM   PHQ-2/PHQ-9 Depression Screening   Little Interest or Pleasure in Doing Things 0-->not at all   Feeling Down, Depressed or Hopeless 0-->not at all   Trouble Falling or Staying Asleep, or Sleeping Too Much 0-->not at all   Feeling Tired or Having Little " Energy 0-->not at all   Poor Appetite or Overeating 0-->not at all   Feeling Bad about Yourself - or that You are a Failure or Have Let Yourself or Your Family Down 0-->not at all   Trouble Concentrating on Things, Such as Reading the Newspaper or Watching Television 0-->not at all   Moving or Speaking So Slowly that Other People Could Have Noticed? Or the Opposite - Being So Fidgety 0-->not at all   Thoughts that You Would be Better Off Dead or of Hurting Yourself in Some Way 0-->not at all   PHQ-9: Brief Depression Severity Measure Score 0       Health Habits and Functional and Cognitive Screenin/14/2023     7:57 AM   Functional & Cognitive Status   Do you have difficulty preparing food and eating? No   Do you have difficulty bathing yourself, getting dressed or grooming yourself? No   Do you have difficulty using the toilet? No   Do you have difficulty moving around from place to place? No   Do you have trouble with steps or getting out of a bed or a chair? No   Current Diet Well Balanced Diet   Dental Exam Up to date   Eye Exam Up to date   Exercise (times per week) 7 times per week   Current Exercises Include Walking;Running/Jogging   Do you need help using the phone?  No   Are you deaf or do you have serious difficulty hearing?  No   Do you need help with transportation? No   Do you need help shopping? No   Do you need help preparing meals?  No   Do you need help with housework?  No   Do you need help with laundry? No   Do you need help taking your medications? No   Do you need help managing money? No   Do you ever drive or ride in a car without wearing a seat belt? No   Have you felt unusual stress, anger or loneliness in the last month? No   Who do you live with? Spouse   If you need help, do you have trouble finding someone available to you? No   Have you been bothered in the last four weeks by sexual problems? No   Do you have difficulty concentrating, remembering or making decisions? No        Age-appropriate Screening Schedule:  Refer to the list below for future screening recommendations based on patient's age, sex and/or medical conditions. Orders for these recommended tests are listed in the plan section. The patient has been provided with a written plan.    Health Maintenance   Topic Date Due    COVID-19 Vaccine (3 - Pfizer series) 06/16/2023 (Originally 6/15/2021)    ZOSTER VACCINE (1 of 2) 08/31/2023 (Originally 11/22/2002)    INFLUENZA VACCINE  10/01/2023    LIPID PANEL  12/07/2023    PROSTATE CANCER SCREENING  02/14/2024    ANNUAL WELLNESS VISIT  06/14/2024    COLORECTAL CANCER SCREENING  10/31/2025    TDAP/TD VACCINES (3 - Td or Tdap) 03/14/2027    HEPATITIS C SCREENING  Completed    Pneumococcal Vaccine 65+  Completed                  CMS Preventative Services Quick Reference  Risk Factors Identified During Encounter:    Immunizations Discussed/Encouraged: Hepatitis A Vaccine/Series, Shingrix, and COVID19  Dental Screening Recommended  Vision Screening Recommended    The above risks/problems have been discussed with the patient.  Pertinent information has been shared with the patient in the After Visit Summary.    Diagnoses and all orders for this visit:    1. Medicare annual wellness visit, initial (Primary)    2. Primary hypertension  -     Lipid Panel; Future  -     Comprehensive Metabolic Panel; Future  -     TSH; Future  -     CBC & Differential; Future  -     Lipid Panel  -     Comprehensive Metabolic Panel  -     TSH  -     CBC & Differential    3. Elevated coronary artery calcium score  -     Lipid Panel; Future  -     Comprehensive Metabolic Panel; Future  -     TSH; Future  -     CBC & Differential; Future  -     Lipid Panel  -     Comprehensive Metabolic Panel  -     TSH  -     CBC & Differential    4. Prostate cancer    5. Obstructive sleep apnea syndrome    6. Primary osteoarthritis, unspecified site    7. Melanoma in situ of torso excluding breast    8. Basal cell carcinoma  (BCC) of skin of nose    9. SCC (squamous cell carcinoma), face        Follow Up:   Next Medicare Wellness visit to be scheduled in 1 year.      An After Visit Summary and PPPS were made available to the patient.             18

## 2024-11-21 NOTE — ASU DISCHARGE PLAN (ADULT/PEDIATRIC) - FINANCIAL ASSISTANCE
[facility] provides services at a reduced cost to those who are determined to be eligible through [facility]’s financial assistance program. Information regarding [facility]’s financial assistance program can be found by going to [email or web address] or by calling [phone].

## 2024-11-26 ENCOUNTER — APPOINTMENT (OUTPATIENT)
Dept: ENDOCRINOLOGY | Facility: CLINIC | Age: 64
End: 2024-11-26

## 2024-11-26 DIAGNOSIS — E78.00 PURE HYPERCHOLESTEROLEMIA, UNSPECIFIED: ICD-10-CM

## 2024-11-26 DIAGNOSIS — E04.2 NONTOXIC MULTINODULAR GOITER: ICD-10-CM

## 2024-11-26 DIAGNOSIS — D35.00 BENIGN NEOPLASM OF UNSPECIFIED ADRENAL GLAND: ICD-10-CM

## 2024-11-26 DIAGNOSIS — E11.9 TYPE 2 DIABETES MELLITUS W/OUT COMPLICATIONS: ICD-10-CM

## 2024-11-26 LAB
ALBUMIN SERPL ELPH-MCNC: 4.3 G/DL
ALP BLD-CCNC: 129 U/L
ALT SERPL-CCNC: 15 U/L
ANION GAP SERPL CALC-SCNC: 14 MMOL/L
AST SERPL-CCNC: 11 U/L
BILIRUB SERPL-MCNC: 0.3 MG/DL
BUN SERPL-MCNC: 14 MG/DL
CALCIUM SERPL-MCNC: 9.9 MG/DL
CHLORIDE SERPL-SCNC: 105 MMOL/L
CHOLEST SERPL-MCNC: 157 MG/DL
CO2 SERPL-SCNC: 23 MMOL/L
CREAT SERPL-MCNC: 0.59 MG/DL
EGFR: 101 ML/MIN/1.73M2
GLUCOSE SERPL-MCNC: 143 MG/DL
HBA1C MFR BLD HPLC: 7.2
HDLC SERPL-MCNC: 61 MG/DL
LDLC SERPL CALC-MCNC: 73 MG/DL
NONHDLC SERPL-MCNC: 96 MG/DL
POTASSIUM SERPL-SCNC: 4.6 MMOL/L
PROT SERPL-MCNC: 6.9 G/DL
SODIUM SERPL-SCNC: 141 MMOL/L
T4 FREE SERPL-MCNC: 1.1 NG/DL
TRIGL SERPL-MCNC: 133 MG/DL
TSH SERPL-ACNC: 2.41 UIU/ML

## 2024-11-26 PROCEDURE — 99214 OFFICE O/P EST MOD 30 MIN: CPT | Mod: 25

## 2024-11-26 PROCEDURE — 95251 CONT GLUC MNTR ANALYSIS I&R: CPT

## 2024-11-26 PROCEDURE — 83036 HEMOGLOBIN GLYCOSYLATED A1C: CPT | Mod: QW

## 2024-12-03 ENCOUNTER — APPOINTMENT (OUTPATIENT)
Dept: NEUROLOGY | Facility: HOSPITAL | Age: 64
End: 2024-12-03
Payer: MEDICAID

## 2024-12-03 ENCOUNTER — OUTPATIENT (OUTPATIENT)
Dept: OUTPATIENT SERVICES | Facility: HOSPITAL | Age: 64
LOS: 1 days | End: 2024-12-03
Payer: MEDICAID

## 2024-12-03 VITALS
SYSTOLIC BLOOD PRESSURE: 130 MMHG | TEMPERATURE: 97.9 F | HEART RATE: 74 BPM | RESPIRATION RATE: 16 BRPM | WEIGHT: 205 LBS | BODY MASS INDEX: 34.16 KG/M2 | DIASTOLIC BLOOD PRESSURE: 80 MMHG | OXYGEN SATURATION: 100 % | HEIGHT: 65 IN

## 2024-12-03 DIAGNOSIS — M54.12 RADICULOPATHY, CERVICAL REGION: ICD-10-CM

## 2024-12-03 DIAGNOSIS — Z98.890 OTHER SPECIFIED POSTPROCEDURAL STATES: Chronic | ICD-10-CM

## 2024-12-03 DIAGNOSIS — Z98.89 OTHER SPECIFIED POSTPROCEDURAL STATES: Chronic | ICD-10-CM

## 2024-12-03 DIAGNOSIS — R51.9 HEADACHE, UNSPECIFIED: ICD-10-CM

## 2024-12-03 DIAGNOSIS — Z90.49 ACQUIRED ABSENCE OF OTHER SPECIFIED PARTS OF DIGESTIVE TRACT: Chronic | ICD-10-CM

## 2024-12-03 DIAGNOSIS — Z90.710 ACQUIRED ABSENCE OF BOTH CERVIX AND UTERUS: Chronic | ICD-10-CM

## 2024-12-03 PROCEDURE — G0463: CPT

## 2024-12-03 PROCEDURE — 99212 OFFICE O/P EST SF 10 MIN: CPT

## 2024-12-19 ENCOUNTER — NON-APPOINTMENT (OUTPATIENT)
Age: 64
End: 2024-12-19

## 2024-12-23 ENCOUNTER — NON-APPOINTMENT (OUTPATIENT)
Age: 64
End: 2024-12-23

## 2024-12-23 ENCOUNTER — APPOINTMENT (OUTPATIENT)
Dept: CARDIOLOGY | Facility: CLINIC | Age: 64
End: 2024-12-23

## 2025-01-14 ENCOUNTER — APPOINTMENT (OUTPATIENT)
Dept: NEUROSURGERY | Facility: CLINIC | Age: 65
End: 2025-01-14

## 2025-01-23 NOTE — ASU PATIENT PROFILE, ADULT - TEACHING/LEARNING LEARNING PREFERENCES
verbal instruction/written material Patient requests all Lab, Cardiology, and Radiology Results on their Discharge Instructions

## 2025-01-24 ENCOUNTER — OUTPATIENT (OUTPATIENT)
Dept: OUTPATIENT SERVICES | Facility: HOSPITAL | Age: 65
LOS: 1 days | End: 2025-01-24
Payer: MEDICAID

## 2025-01-24 ENCOUNTER — APPOINTMENT (OUTPATIENT)
Dept: RHEUMATOLOGY | Facility: HOSPITAL | Age: 65
End: 2025-01-24

## 2025-01-24 VITALS
SYSTOLIC BLOOD PRESSURE: 132 MMHG | BODY MASS INDEX: 34.16 KG/M2 | TEMPERATURE: 98 F | WEIGHT: 205 LBS | HEIGHT: 65 IN | HEART RATE: 73 BPM | DIASTOLIC BLOOD PRESSURE: 80 MMHG | RESPIRATION RATE: 14 BRPM | OXYGEN SATURATION: 95 %

## 2025-01-24 DIAGNOSIS — Z98.89 OTHER SPECIFIED POSTPROCEDURAL STATES: Chronic | ICD-10-CM

## 2025-01-24 DIAGNOSIS — Z98.890 OTHER SPECIFIED POSTPROCEDURAL STATES: Chronic | ICD-10-CM

## 2025-01-24 DIAGNOSIS — Z90.710 ACQUIRED ABSENCE OF BOTH CERVIX AND UTERUS: Chronic | ICD-10-CM

## 2025-01-24 DIAGNOSIS — M25.511 PAIN IN RIGHT SHOULDER: ICD-10-CM

## 2025-01-24 DIAGNOSIS — M79.7 FIBROMYALGIA: ICD-10-CM

## 2025-01-24 DIAGNOSIS — Z90.49 ACQUIRED ABSENCE OF OTHER SPECIFIED PARTS OF DIGESTIVE TRACT: Chronic | ICD-10-CM

## 2025-01-24 DIAGNOSIS — M06.9 RHEUMATOID ARTHRITIS, UNSPECIFIED: ICD-10-CM

## 2025-01-24 LAB
24R-OH-CALCIDIOL SERPL-MCNC: 28.1 NG/ML — LOW (ref 30–80)
ALBUMIN SERPL ELPH-MCNC: 4.5 G/DL — SIGNIFICANT CHANGE UP (ref 3.3–5)
ALP SERPL-CCNC: 127 U/L — HIGH (ref 40–120)
ALT FLD-CCNC: 14 U/L — SIGNIFICANT CHANGE UP (ref 10–45)
ANION GAP SERPL CALC-SCNC: 12 MMOL/L — SIGNIFICANT CHANGE UP (ref 5–17)
AST SERPL-CCNC: 13 U/L — SIGNIFICANT CHANGE UP (ref 10–40)
BASOPHILS # BLD AUTO: 0.09 K/UL — SIGNIFICANT CHANGE UP (ref 0–0.2)
BASOPHILS NFR BLD AUTO: 1.2 % — SIGNIFICANT CHANGE UP (ref 0–2)
BILIRUB SERPL-MCNC: 0.3 MG/DL — SIGNIFICANT CHANGE UP (ref 0.2–1.2)
BUN SERPL-MCNC: 15 MG/DL — SIGNIFICANT CHANGE UP (ref 7–23)
CALCIUM SERPL-MCNC: 10 MG/DL — SIGNIFICANT CHANGE UP (ref 8.4–10.5)
CHLORIDE SERPL-SCNC: 105 MMOL/L — SIGNIFICANT CHANGE UP (ref 96–108)
CO2 SERPL-SCNC: 26 MMOL/L — SIGNIFICANT CHANGE UP (ref 22–31)
CREAT SERPL-MCNC: 0.65 MG/DL — SIGNIFICANT CHANGE UP (ref 0.5–1.3)
EGFR: 98 ML/MIN/1.73M2 — SIGNIFICANT CHANGE UP
EOSINOPHIL # BLD AUTO: 0.29 K/UL — SIGNIFICANT CHANGE UP (ref 0–0.5)
EOSINOPHIL NFR BLD AUTO: 3.9 % — SIGNIFICANT CHANGE UP (ref 0–6)
GLUCOSE SERPL-MCNC: 95 MG/DL — SIGNIFICANT CHANGE UP (ref 70–99)
HCT VFR BLD CALC: 41.1 % — SIGNIFICANT CHANGE UP (ref 34.5–45)
HGB BLD-MCNC: 12.6 G/DL — SIGNIFICANT CHANGE UP (ref 11.5–15.5)
IMM GRANULOCYTES NFR BLD AUTO: 0.4 % — SIGNIFICANT CHANGE UP (ref 0–0.9)
LYMPHOCYTES # BLD AUTO: 2.57 K/UL — SIGNIFICANT CHANGE UP (ref 1–3.3)
LYMPHOCYTES # BLD AUTO: 34.6 % — SIGNIFICANT CHANGE UP (ref 13–44)
MCHC RBC-ENTMCNC: 25.6 PG — LOW (ref 27–34)
MCHC RBC-ENTMCNC: 30.7 G/DL — LOW (ref 32–36)
MCV RBC AUTO: 83.5 FL — SIGNIFICANT CHANGE UP (ref 80–100)
MONOCYTES # BLD AUTO: 0.58 K/UL — SIGNIFICANT CHANGE UP (ref 0–0.9)
MONOCYTES NFR BLD AUTO: 7.8 % — SIGNIFICANT CHANGE UP (ref 2–14)
NEUTROPHILS # BLD AUTO: 3.87 K/UL — SIGNIFICANT CHANGE UP (ref 1.8–7.4)
NEUTROPHILS NFR BLD AUTO: 52.1 % — SIGNIFICANT CHANGE UP (ref 43–77)
PLATELET # BLD AUTO: 276 K/UL — SIGNIFICANT CHANGE UP (ref 150–400)
POTASSIUM SERPL-MCNC: 4.5 MMOL/L — SIGNIFICANT CHANGE UP (ref 3.5–5.3)
POTASSIUM SERPL-SCNC: 4.5 MMOL/L — SIGNIFICANT CHANGE UP (ref 3.5–5.3)
PROT SERPL-MCNC: 7.1 G/DL — SIGNIFICANT CHANGE UP (ref 6–8.3)
RBC # BLD: 4.92 M/UL — SIGNIFICANT CHANGE UP (ref 3.8–5.2)
RBC # FLD: 14.8 % — HIGH (ref 10.3–14.5)
SODIUM SERPL-SCNC: 143 MMOL/L — SIGNIFICANT CHANGE UP (ref 135–145)
URATE SERPL-MCNC: 6.6 MG/DL — SIGNIFICANT CHANGE UP (ref 2.5–7)
WBC # BLD: 7.43 K/UL — SIGNIFICANT CHANGE UP (ref 3.8–10.5)
WBC # FLD AUTO: 7.43 K/UL — SIGNIFICANT CHANGE UP (ref 3.8–10.5)

## 2025-01-24 PROCEDURE — 84550 ASSAY OF BLOOD/URIC ACID: CPT

## 2025-01-24 PROCEDURE — G0463: CPT

## 2025-01-24 PROCEDURE — 99215 OFFICE O/P EST HI 40 MIN: CPT | Mod: GC

## 2025-01-24 PROCEDURE — 36415 COLL VENOUS BLD VENIPUNCTURE: CPT

## 2025-01-24 PROCEDURE — 82306 VITAMIN D 25 HYDROXY: CPT

## 2025-01-24 PROCEDURE — G2211 COMPLEX E/M VISIT ADD ON: CPT | Mod: NC,GC

## 2025-01-24 PROCEDURE — 80053 COMPREHEN METABOLIC PANEL: CPT

## 2025-01-24 PROCEDURE — 85025 COMPLETE CBC W/AUTO DIFF WBC: CPT

## 2025-01-31 ENCOUNTER — APPOINTMENT (OUTPATIENT)
Dept: ULTRASOUND IMAGING | Facility: CLINIC | Age: 65
End: 2025-01-31
Payer: MEDICAID

## 2025-01-31 ENCOUNTER — NON-APPOINTMENT (OUTPATIENT)
Age: 65
End: 2025-01-31

## 2025-01-31 ENCOUNTER — OUTPATIENT (OUTPATIENT)
Dept: OUTPATIENT SERVICES | Facility: HOSPITAL | Age: 65
LOS: 1 days | End: 2025-01-31
Payer: MEDICAID

## 2025-01-31 DIAGNOSIS — M25.50 PAIN IN UNSPECIFIED JOINT: ICD-10-CM

## 2025-01-31 DIAGNOSIS — Z90.49 ACQUIRED ABSENCE OF OTHER SPECIFIED PARTS OF DIGESTIVE TRACT: Chronic | ICD-10-CM

## 2025-01-31 DIAGNOSIS — Z98.890 OTHER SPECIFIED POSTPROCEDURAL STATES: Chronic | ICD-10-CM

## 2025-01-31 DIAGNOSIS — Z00.8 ENCOUNTER FOR OTHER GENERAL EXAMINATION: ICD-10-CM

## 2025-01-31 DIAGNOSIS — Z98.89 OTHER SPECIFIED POSTPROCEDURAL STATES: Chronic | ICD-10-CM

## 2025-01-31 DIAGNOSIS — Z90.710 ACQUIRED ABSENCE OF BOTH CERVIX AND UTERUS: Chronic | ICD-10-CM

## 2025-01-31 PROCEDURE — 76881 US COMPL JOINT R-T W/IMG: CPT

## 2025-01-31 PROCEDURE — 76881 US COMPL JOINT R-T W/IMG: CPT | Mod: 26,RT

## 2025-02-03 RX ORDER — LIDOCAINE 5% 700 MG/1
5 PATCH TOPICAL
Qty: 30 | Refills: 0 | Status: ACTIVE | COMMUNITY
Start: 2025-02-03 | End: 1900-01-01

## 2025-02-05 ENCOUNTER — RX RENEWAL (OUTPATIENT)
Age: 65
End: 2025-02-05

## 2025-02-05 RX ORDER — PEN NEEDLE, DIABETIC 32GX 5/32"
32G X 4 MM NEEDLE, DISPOSABLE MISCELLANEOUS
Qty: 100 | Refills: 35 | Status: ACTIVE | COMMUNITY
Start: 2025-02-05 | End: 1900-01-01

## 2025-02-11 ENCOUNTER — APPOINTMENT (OUTPATIENT)
Dept: GASTROENTEROLOGY | Facility: HOSPITAL | Age: 65
End: 2025-02-11
Payer: MEDICAID

## 2025-02-11 ENCOUNTER — OUTPATIENT (OUTPATIENT)
Dept: OUTPATIENT SERVICES | Facility: HOSPITAL | Age: 65
LOS: 1 days | End: 2025-02-11
Payer: MEDICAID

## 2025-02-11 VITALS
HEART RATE: 79 BPM | RESPIRATION RATE: 14 BRPM | TEMPERATURE: 97.8 F | SYSTOLIC BLOOD PRESSURE: 132 MMHG | WEIGHT: 205 LBS | HEIGHT: 65 IN | OXYGEN SATURATION: 96 % | DIASTOLIC BLOOD PRESSURE: 80 MMHG | BODY MASS INDEX: 34.16 KG/M2

## 2025-02-11 DIAGNOSIS — Z98.890 OTHER SPECIFIED POSTPROCEDURAL STATES: Chronic | ICD-10-CM

## 2025-02-11 DIAGNOSIS — Z98.89 OTHER SPECIFIED POSTPROCEDURAL STATES: Chronic | ICD-10-CM

## 2025-02-11 DIAGNOSIS — K21.9 GASTRO-ESOPHAGEAL REFLUX DISEASE W/OUT ESOPHAGITIS: ICD-10-CM

## 2025-02-11 DIAGNOSIS — Z90.710 ACQUIRED ABSENCE OF BOTH CERVIX AND UTERUS: Chronic | ICD-10-CM

## 2025-02-11 PROCEDURE — G0463: CPT

## 2025-02-11 PROCEDURE — 99213 OFFICE O/P EST LOW 20 MIN: CPT

## 2025-02-11 RX ORDER — FAMOTIDINE 40 MG/1
40 TABLET, FILM COATED ORAL
Qty: 30 | Refills: 6 | Status: ACTIVE | COMMUNITY
Start: 2025-02-11 | End: 1900-01-01

## 2025-02-12 DIAGNOSIS — K21.9 GASTRO-ESOPHAGEAL REFLUX DISEASE WITHOUT ESOPHAGITIS: ICD-10-CM

## 2025-02-20 NOTE — ASU DISCHARGE PLAN (ADULT/PEDIATRIC) - CALL YOUR DOCTOR IF YOU HAVE ANY OF THE FOLLOWING:
Subjective   Reason for Visit: Nan Crane is an 74 y.o. female here for a Medicare Wellness visit.     Past Medical, Surgical, and Family History reviewed and updated in chart.    Reviewed all medications by prescribing practitioner or clinical pharmacist (such as prescriptions, OTCs, herbal therapies and supplements) and documented in the medical record.    AWV   Never   a smoker   Follow up  DM,last HGBA1C was 6.3 on 10.16.2024, today   is 7.4   Checking  BG  at  home ,patient states  is been higher  since dr Coyne took her off Trulicity 10.2024 due to patient was  falling and at  that time  was one of the medication stopped, and she lowered the metformin to 500mg 1tb BID at   that time , and she added glipizide  BID , and to continue jardiance /per  patient statement .   Discussed regarding goals of bs before and after eating, will increase metformin to 750 mg bid,  Has a lot of right shoulder pain, to have surgery  soon  Colonoscopy- 12.13.2021-Q10y   Mammogram- 12.20.2023   DEXA- 2.22.2023        Patient Care Team:  Irvin Muñoz MD as PCP - General (Internal Medicine)  Irvin Muñoz MD as PCP - [facility] Medicare Advantage PCP  Mayra Vines MD as Consulting Physician (Hematology and Oncology)     Review of Systems   Constitutional:  Negative for chills, fatigue and unexpected weight change.        Comment   HENT:  Negative for congestion, ear pain and sore throat.    Respiratory:  Negative for cough, chest tightness, shortness of breath and wheezing.    Cardiovascular:  Negative for palpitations and leg swelling.   Gastrointestinal:  Negative for abdominal pain, constipation, diarrhea, nausea and vomiting.   Genitourinary:  Negative for dysuria and urgency.   Musculoskeletal:  Positive for arthralgias and neck pain. Negative for joint swelling.        R  shoulder - surgery will be on 4.17.2025    Skin:  Negative for rash.   Neurological:  Negative for dizziness and weakness.  "  Hematological:  Negative for adenopathy.   Psychiatric/Behavioral:  Positive for sleep disturbance. Negative for confusion.    All other systems reviewed and are negative.      Objective   Vitals:  /78 (BP Location: Left arm, Patient Position: Sitting)   Pulse 95   Temp 36.2 °C (97.2 °F)   Resp 16   Ht 1.575 m (5' 2\")   Wt 66.8 kg (147 lb 3.2 oz)   SpO2 96%   BMI 26.92 kg/m²       Physical Exam  Constitutional:       Appearance: Normal appearance.   HENT:      Head: Normocephalic and atraumatic.   Eyes:      Pupils: Pupils are equal, round, and reactive to light.   Cardiovascular:      Rate and Rhythm: Normal rate and regular rhythm.   Pulmonary:      Effort: Pulmonary effort is normal.      Breath sounds: Normal breath sounds.   Musculoskeletal:         General: Normal range of motion.      Cervical back: Normal range of motion and neck supple.   Skin:     General: Skin is warm.   Neurological:      General: No focal deficit present.      Mental Status: She is alert and oriented to person, place, and time.   Psychiatric:         Mood and Affect: Mood normal.         Behavior: Behavior normal.       Lab Results   Component Value Date    GLUCOSE 159 (H) 01/24/2025    CALCIUM 10.3 01/24/2025     01/24/2025    K 4.3 01/24/2025    CO2 26 01/24/2025     01/24/2025    BUN 33 (H) 01/24/2025    CREATININE 1.20 (H) 01/24/2025     Lab Results   Component Value Date    ALT 15 01/24/2025    AST 17 01/24/2025    ALKPHOS 62 01/24/2025    BILITOT 0.6 01/24/2025     Lab Results   Component Value Date    WBC 6.5 01/24/2025    HGB 12.6 01/24/2025    HCT 39.5 01/24/2025    MCV 98 01/24/2025     01/24/2025       Assessment & Plan  Diabetes mellitus due to underlying condition, controlled, with diabetic nephropathy, without long-term current use of insulin    Orders:    POCT glycosylated hemoglobin (Hb A1C) manually resulted    Encounter for screening mammogram for malignant neoplasm of " breast    Orders:    BI mammo bilateral screening tomosynthesis; Future    Screening for osteoporosis    Orders:    XR DEXA bone density; Future    Depression, major, single episode, moderate (Multi)  Condition stable, controlled with current medication, no medication side effects, continue same treatment,call if any  new symptoms develops. Follow up with specialty service as needed or advised.  Controlled on current medication         Type 2 diabetes mellitus with stage 3b chronic kidney disease, without long-term current use of insulin (Multi)  Hba1c 7.4 , increased from 6.3         Controlled type 2 diabetes mellitus without complication, without long-term current use of insulin (Multi)    Orders:    metFORMIN 750 mg tablet; Take 750 mg by mouth every 12 hours.    Primary hypertension    Orders:    Urinalysis with Reflex Microscopic; Future    Vitamin B12; Future    Other hyperlipidemia    Orders:    TSH with reflex to Free T4 if abnormal; Future    Lipid Panel; Future    Vitamin D deficiency    Orders:    Magnesium; Future    Vitamin D 25-Hydroxy,Total (for eval of Vitamin D levels); Future    Stage 3 chronic kidney disease, unspecified whether stage 3a or 3b CKD (Multi)    Orders:    CBC and Auto Differential; Future    Hepatitis C Antibody; Future    Comprehensive Metabolic Panel; Future    Albumin-Creatinine Ratio, Urine Random; Future    Albumin-Creatinine Ratio, Urine Random; Future    Medicare annual wellness visit, subsequent         Age-related osteoporosis without current pathological fracture    Orders:    XR DEXA bone density; Future               Bleeding that does not stop/Swelling that gets worse/Pain not relieved by Medications/Fever greater than (need to indicate Fahrenheit or Celsius)/Wound/Surgical Site with redness, or foul smelling discharge or pus

## 2025-02-25 ENCOUNTER — APPOINTMENT (OUTPATIENT)
Dept: NEUROLOGY | Facility: HOSPITAL | Age: 65
End: 2025-02-25

## 2025-02-25 ENCOUNTER — OUTPATIENT (OUTPATIENT)
Dept: OUTPATIENT SERVICES | Facility: HOSPITAL | Age: 65
LOS: 1 days | End: 2025-02-25
Payer: MEDICAID

## 2025-02-25 VITALS
HEART RATE: 68 BPM | SYSTOLIC BLOOD PRESSURE: 127 MMHG | BODY MASS INDEX: 34.16 KG/M2 | TEMPERATURE: 98 F | OXYGEN SATURATION: 96 % | WEIGHT: 205 LBS | HEIGHT: 65 IN | RESPIRATION RATE: 14 BRPM | DIASTOLIC BLOOD PRESSURE: 85 MMHG

## 2025-02-25 DIAGNOSIS — Z90.710 ACQUIRED ABSENCE OF BOTH CERVIX AND UTERUS: Chronic | ICD-10-CM

## 2025-02-25 DIAGNOSIS — Z90.49 ACQUIRED ABSENCE OF OTHER SPECIFIED PARTS OF DIGESTIVE TRACT: Chronic | ICD-10-CM

## 2025-02-25 DIAGNOSIS — R51.9 HEADACHE, UNSPECIFIED: ICD-10-CM

## 2025-02-25 DIAGNOSIS — Z98.890 OTHER SPECIFIED POSTPROCEDURAL STATES: Chronic | ICD-10-CM

## 2025-02-25 DIAGNOSIS — Z98.89 OTHER SPECIFIED POSTPROCEDURAL STATES: Chronic | ICD-10-CM

## 2025-02-25 DIAGNOSIS — R42 DIZZINESS AND GIDDINESS: ICD-10-CM

## 2025-02-25 DIAGNOSIS — G81.94 HEMIPLEGIA, UNSPECIFIED AFFECTING LEFT NONDOMINANT SIDE: ICD-10-CM

## 2025-02-25 DIAGNOSIS — G50.0 TRIGEMINAL NEURALGIA: ICD-10-CM

## 2025-02-25 PROCEDURE — G0463: CPT

## 2025-02-25 RX ORDER — MECLIZINE HYDROCHLORIDE 25 MG/1
25 TABLET ORAL
Qty: 30 | Refills: 2 | Status: ACTIVE | COMMUNITY
Start: 2025-02-25 | End: 1900-01-01

## 2025-02-25 RX ORDER — BACLOFEN 10 MG/1
10 TABLET ORAL TWICE DAILY
Qty: 60 | Refills: 4 | Status: ACTIVE | COMMUNITY
Start: 2025-02-25 | End: 1900-01-01

## 2025-02-26 ENCOUNTER — APPOINTMENT (OUTPATIENT)
Dept: ENDOCRINOLOGY | Facility: CLINIC | Age: 65
End: 2025-02-26
Payer: MEDICAID

## 2025-02-26 VITALS
BODY MASS INDEX: 34.82 KG/M2 | HEIGHT: 65 IN | HEART RATE: 79 BPM | DIASTOLIC BLOOD PRESSURE: 74 MMHG | OXYGEN SATURATION: 99 % | SYSTOLIC BLOOD PRESSURE: 113 MMHG | WEIGHT: 209 LBS

## 2025-02-26 DIAGNOSIS — E11.9 TYPE 2 DIABETES MELLITUS W/OUT COMPLICATIONS: ICD-10-CM

## 2025-02-26 LAB
GLUCOSE BLDC GLUCOMTR-MCNC: 132
HBA1C MFR BLD HPLC: 7.4

## 2025-02-26 PROCEDURE — 83036 HEMOGLOBIN GLYCOSYLATED A1C: CPT | Mod: QW

## 2025-02-26 PROCEDURE — 95251 CONT GLUC MNTR ANALYSIS I&R: CPT

## 2025-02-26 PROCEDURE — 82962 GLUCOSE BLOOD TEST: CPT

## 2025-02-26 PROCEDURE — G0108 DIAB MANAGE TRN  PER INDIV: CPT

## 2025-03-01 NOTE — ASU PATIENT PROFILE, ADULT - FALL HARM RISK - UNIVERSAL INTERVENTIONS
Bed: EDOU05  Expected date:   Expected time:   Means of arrival:   Comments:  15  
I-STAT Chem-8+ Results:   Value Reference Range   Sodium 134 136-145 mmol/L   Potassium  5.9 3.5-5.1 mmol/L   Chloride 101  mmol/L   Ionized Calcium 0.95 1.06-1.42 mmol/L   CO2 (measured) 25 23-29 mmol/L   Glucose 102  mg/dL   BUN 45 6-30 mg/dL   Creatinine 10.2 0.5-1.4 mg/dL   Hematocrit 52 36-54%         MD Lorna notified of istat results  
I-STAT Chem-8+ Results:   Value Reference Range   Sodium 135 136-145 mmol/L   Potassium  4.4 3.5-5.1 mmol/L   Chloride 108  mmol/L   Ionized Calcium 1.84 1.06-1.42 mmol/L   CO2 (measured) 21 23-29 mmol/L   Glucose 129  mg/dL   BUN 44 6-30 mg/dL   Creatinine 9.0 0.5-1.4 mg/dL   Hematocrit 43 36-54%      
Pt care assumed. Report received by RUSH Slater. Pt lying in stretcher in low and locked position and side rails raised x2. Call light, pt's belongings, and bedside table within pt's reach. Pt on continuous cardiac monitoring, pulse oximetry, and BP cycling every 30 minutes. Pt in NAD and verbalized no needs at this time.   
Bed in lowest position, wheels locked, appropriate side rails in place/Call bell, personal items and telephone in reach/Instruct patient to call for assistance before getting out of bed or chair/Non-slip footwear when patient is out of bed/[employer] to call system/Physically safe environment - no spills, clutter or unnecessary equipment/Purposeful Proactive Rounding/Room/bathroom lighting operational, light cord in reach

## 2025-03-04 RX ORDER — BLOOD-GLUCOSE SENSOR
EACH MISCELLANEOUS
Qty: 2 | Refills: 5 | Status: ACTIVE | COMMUNITY
Start: 2025-02-26

## 2025-03-04 RX ORDER — BLOOD-GLUCOSE,RECEIVER,CONT
EACH MISCELLANEOUS
Qty: 1 | Refills: 1 | Status: ACTIVE | COMMUNITY
Start: 2025-02-26 | End: 1900-01-01

## 2025-03-18 ENCOUNTER — NON-APPOINTMENT (OUTPATIENT)
Age: 65
End: 2025-03-18

## 2025-03-21 ENCOUNTER — NON-APPOINTMENT (OUTPATIENT)
Age: 65
End: 2025-03-21

## 2025-03-25 ENCOUNTER — APPOINTMENT (OUTPATIENT)
Dept: ENDOCRINOLOGY | Facility: CLINIC | Age: 65
End: 2025-03-25
Payer: MEDICAID

## 2025-03-25 VITALS
DIASTOLIC BLOOD PRESSURE: 72 MMHG | HEART RATE: 85 BPM | OXYGEN SATURATION: 99 % | HEIGHT: 65 IN | BODY MASS INDEX: 34.99 KG/M2 | SYSTOLIC BLOOD PRESSURE: 129 MMHG | WEIGHT: 210 LBS

## 2025-03-25 DIAGNOSIS — E11.9 TYPE 2 DIABETES MELLITUS W/OUT COMPLICATIONS: ICD-10-CM

## 2025-03-25 LAB — GLUCOSE BLDC GLUCOMTR-MCNC: 156

## 2025-03-25 PROCEDURE — 95251 CONT GLUC MNTR ANALYSIS I&R: CPT

## 2025-03-25 PROCEDURE — G0108 DIAB MANAGE TRN  PER INDIV: CPT

## 2025-03-25 PROCEDURE — 82962 GLUCOSE BLOOD TEST: CPT

## 2025-04-04 ENCOUNTER — OUTPATIENT (OUTPATIENT)
Dept: OUTPATIENT SERVICES | Facility: HOSPITAL | Age: 65
LOS: 1 days | End: 2025-04-04
Payer: MEDICAID

## 2025-04-04 ENCOUNTER — APPOINTMENT (OUTPATIENT)
Dept: RHEUMATOLOGY | Facility: HOSPITAL | Age: 65
End: 2025-04-04

## 2025-04-04 VITALS
TEMPERATURE: 98.2 F | WEIGHT: 205 LBS | HEIGHT: 65 IN | OXYGEN SATURATION: 99 % | RESPIRATION RATE: 14 BRPM | HEART RATE: 80 BPM | BODY MASS INDEX: 34.16 KG/M2 | SYSTOLIC BLOOD PRESSURE: 122 MMHG | DIASTOLIC BLOOD PRESSURE: 78 MMHG

## 2025-04-04 DIAGNOSIS — M79.7 FIBROMYALGIA: ICD-10-CM

## 2025-04-04 DIAGNOSIS — Z98.890 OTHER SPECIFIED POSTPROCEDURAL STATES: Chronic | ICD-10-CM

## 2025-04-04 DIAGNOSIS — Z98.89 OTHER SPECIFIED POSTPROCEDURAL STATES: Chronic | ICD-10-CM

## 2025-04-04 DIAGNOSIS — Z90.710 ACQUIRED ABSENCE OF BOTH CERVIX AND UTERUS: Chronic | ICD-10-CM

## 2025-04-04 DIAGNOSIS — M10.9 GOUT, UNSPECIFIED: ICD-10-CM

## 2025-04-04 DIAGNOSIS — Z90.49 ACQUIRED ABSENCE OF OTHER SPECIFIED PARTS OF DIGESTIVE TRACT: Chronic | ICD-10-CM

## 2025-04-04 LAB
24R-OH-CALCIDIOL SERPL-MCNC: 30.9 NG/ML — SIGNIFICANT CHANGE UP
ALBUMIN SERPL ELPH-MCNC: 4.5 G/DL — SIGNIFICANT CHANGE UP (ref 3.3–5)
ALP SERPL-CCNC: 130 U/L — HIGH (ref 40–120)
ALT FLD-CCNC: 19 U/L — SIGNIFICANT CHANGE UP (ref 10–45)
ANION GAP SERPL CALC-SCNC: 17 MMOL/L — SIGNIFICANT CHANGE UP (ref 5–17)
AST SERPL-CCNC: 15 U/L — SIGNIFICANT CHANGE UP (ref 10–40)
BASOPHILS # BLD AUTO: 0.11 K/UL — SIGNIFICANT CHANGE UP (ref 0–0.2)
BASOPHILS NFR BLD AUTO: 1.2 % — SIGNIFICANT CHANGE UP (ref 0–2)
BILIRUB SERPL-MCNC: 0.3 MG/DL — SIGNIFICANT CHANGE UP (ref 0.2–1.2)
BUN SERPL-MCNC: 13 MG/DL — SIGNIFICANT CHANGE UP (ref 7–23)
CALCIUM SERPL-MCNC: 10.2 MG/DL — SIGNIFICANT CHANGE UP (ref 8.4–10.5)
CHLORIDE SERPL-SCNC: 100 MMOL/L — SIGNIFICANT CHANGE UP (ref 96–108)
CO2 SERPL-SCNC: 20 MMOL/L — LOW (ref 22–31)
CREAT SERPL-MCNC: 0.53 MG/DL — SIGNIFICANT CHANGE UP (ref 0.5–1.3)
EGFR: 103 ML/MIN/1.73M2 — SIGNIFICANT CHANGE UP
EGFR: 103 ML/MIN/1.73M2 — SIGNIFICANT CHANGE UP
EOSINOPHIL # BLD AUTO: 0.32 K/UL — SIGNIFICANT CHANGE UP (ref 0–0.5)
EOSINOPHIL NFR BLD AUTO: 3.6 % — SIGNIFICANT CHANGE UP (ref 0–6)
GLUCOSE SERPL-MCNC: 125 MG/DL — HIGH (ref 70–99)
HCT VFR BLD CALC: 42.3 % — SIGNIFICANT CHANGE UP (ref 34.5–45)
HGB BLD-MCNC: 13.1 G/DL — SIGNIFICANT CHANGE UP (ref 11.5–15.5)
IMM GRANULOCYTES NFR BLD AUTO: 0.6 % — SIGNIFICANT CHANGE UP (ref 0–0.9)
LYMPHOCYTES # BLD AUTO: 2.99 K/UL — SIGNIFICANT CHANGE UP (ref 1–3.3)
LYMPHOCYTES # BLD AUTO: 33.7 % — SIGNIFICANT CHANGE UP (ref 13–44)
MCHC RBC-ENTMCNC: 25.6 PG — LOW (ref 27–34)
MCHC RBC-ENTMCNC: 31 G/DL — LOW (ref 32–36)
MCV RBC AUTO: 82.8 FL — SIGNIFICANT CHANGE UP (ref 80–100)
MONOCYTES # BLD AUTO: 0.81 K/UL — SIGNIFICANT CHANGE UP (ref 0–0.9)
MONOCYTES NFR BLD AUTO: 9.1 % — SIGNIFICANT CHANGE UP (ref 2–14)
NEUTROPHILS # BLD AUTO: 4.6 K/UL — SIGNIFICANT CHANGE UP (ref 1.8–7.4)
NEUTROPHILS NFR BLD AUTO: 51.8 % — SIGNIFICANT CHANGE UP (ref 43–77)
PLATELET # BLD AUTO: 290 K/UL — SIGNIFICANT CHANGE UP (ref 150–400)
POTASSIUM SERPL-MCNC: 4.5 MMOL/L — SIGNIFICANT CHANGE UP (ref 3.5–5.3)
POTASSIUM SERPL-SCNC: 4.5 MMOL/L — SIGNIFICANT CHANGE UP (ref 3.5–5.3)
PROT SERPL-MCNC: 7.2 G/DL — SIGNIFICANT CHANGE UP (ref 6–8.3)
RBC # BLD: 5.11 M/UL — SIGNIFICANT CHANGE UP (ref 3.8–5.2)
RBC # FLD: 15.5 % — HIGH (ref 10.3–14.5)
SODIUM SERPL-SCNC: 138 MMOL/L — SIGNIFICANT CHANGE UP (ref 135–145)
URATE SERPL-MCNC: 5.9 MG/DL — SIGNIFICANT CHANGE UP (ref 2.5–7)
WBC # BLD: 8.88 K/UL — SIGNIFICANT CHANGE UP (ref 3.8–10.5)
WBC # FLD AUTO: 8.88 K/UL — SIGNIFICANT CHANGE UP (ref 3.8–10.5)

## 2025-04-04 PROCEDURE — 82306 VITAMIN D 25 HYDROXY: CPT

## 2025-04-04 PROCEDURE — G0463: CPT

## 2025-04-04 PROCEDURE — 99214 OFFICE O/P EST MOD 30 MIN: CPT | Mod: GC

## 2025-04-04 PROCEDURE — 36415 COLL VENOUS BLD VENIPUNCTURE: CPT

## 2025-04-04 PROCEDURE — 85025 COMPLETE CBC W/AUTO DIFF WBC: CPT

## 2025-04-04 PROCEDURE — 80053 COMPREHEN METABOLIC PANEL: CPT

## 2025-04-04 PROCEDURE — 84550 ASSAY OF BLOOD/URIC ACID: CPT

## 2025-04-04 RX ORDER — ALLOPURINOL 200 MG/1
200 TABLET ORAL
Qty: 90 | Refills: 3 | Status: ACTIVE | COMMUNITY
Start: 2025-04-04 | End: 1900-01-01

## 2025-04-09 ENCOUNTER — OUTPATIENT (OUTPATIENT)
Dept: OUTPATIENT SERVICES | Facility: HOSPITAL | Age: 65
LOS: 1 days | End: 2025-04-09
Payer: MEDICAID

## 2025-04-09 ENCOUNTER — RESULT REVIEW (OUTPATIENT)
Age: 65
End: 2025-04-09

## 2025-04-09 ENCOUNTER — APPOINTMENT (OUTPATIENT)
Dept: ORTHOPEDIC SURGERY | Facility: HOSPITAL | Age: 65
End: 2025-04-09

## 2025-04-09 ENCOUNTER — APPOINTMENT (OUTPATIENT)
Dept: MRI IMAGING | Facility: IMAGING CENTER | Age: 65
End: 2025-04-09
Payer: MEDICAID

## 2025-04-09 VITALS
SYSTOLIC BLOOD PRESSURE: 127 MMHG | OXYGEN SATURATION: 98 % | RESPIRATION RATE: 16 BRPM | DIASTOLIC BLOOD PRESSURE: 78 MMHG | TEMPERATURE: 96.3 F | HEART RATE: 75 BPM

## 2025-04-09 DIAGNOSIS — Z98.89 OTHER SPECIFIED POSTPROCEDURAL STATES: Chronic | ICD-10-CM

## 2025-04-09 DIAGNOSIS — Z98.890 OTHER SPECIFIED POSTPROCEDURAL STATES: Chronic | ICD-10-CM

## 2025-04-09 DIAGNOSIS — Z90.710 ACQUIRED ABSENCE OF BOTH CERVIX AND UTERUS: Chronic | ICD-10-CM

## 2025-04-09 DIAGNOSIS — Z90.49 ACQUIRED ABSENCE OF OTHER SPECIFIED PARTS OF DIGESTIVE TRACT: Chronic | ICD-10-CM

## 2025-04-09 DIAGNOSIS — M54.16 RADICULOPATHY, LUMBAR REGION: ICD-10-CM

## 2025-04-09 DIAGNOSIS — M79.609 PAIN IN UNSPECIFIED LIMB: ICD-10-CM

## 2025-04-09 DIAGNOSIS — M25.511 PAIN IN RIGHT SHOULDER: ICD-10-CM

## 2025-04-09 PROCEDURE — 73030 X-RAY EXAM OF SHOULDER: CPT

## 2025-04-09 PROCEDURE — G0463: CPT

## 2025-04-09 PROCEDURE — 72148 MRI LUMBAR SPINE W/O DYE: CPT

## 2025-04-09 PROCEDURE — 73030 X-RAY EXAM OF SHOULDER: CPT | Mod: 26,RT

## 2025-04-09 PROCEDURE — 72148 MRI LUMBAR SPINE W/O DYE: CPT | Mod: 26

## 2025-04-09 PROCEDURE — 73562 X-RAY EXAM OF KNEE 3: CPT | Mod: 26,LT

## 2025-04-09 PROCEDURE — 73562 X-RAY EXAM OF KNEE 3: CPT

## 2025-04-11 DIAGNOSIS — M25.511 PAIN IN RIGHT SHOULDER: ICD-10-CM

## 2025-04-15 DIAGNOSIS — M10.9 GOUT, UNSPECIFIED: ICD-10-CM

## 2025-04-15 DIAGNOSIS — M79.7 FIBROMYALGIA: ICD-10-CM

## 2025-04-22 ENCOUNTER — APPOINTMENT (OUTPATIENT)
Dept: NEUROLOGY | Facility: HOSPITAL | Age: 65
End: 2025-04-22
Payer: MEDICAID

## 2025-04-22 ENCOUNTER — OUTPATIENT (OUTPATIENT)
Dept: OUTPATIENT SERVICES | Facility: HOSPITAL | Age: 65
LOS: 1 days | End: 2025-04-22
Payer: MEDICAID

## 2025-04-22 VITALS
DIASTOLIC BLOOD PRESSURE: 68 MMHG | OXYGEN SATURATION: 96 % | TEMPERATURE: 97.5 F | HEART RATE: 79 BPM | SYSTOLIC BLOOD PRESSURE: 109 MMHG | RESPIRATION RATE: 17 BRPM

## 2025-04-22 DIAGNOSIS — Z98.89 OTHER SPECIFIED POSTPROCEDURAL STATES: Chronic | ICD-10-CM

## 2025-04-22 DIAGNOSIS — Z90.710 ACQUIRED ABSENCE OF BOTH CERVIX AND UTERUS: Chronic | ICD-10-CM

## 2025-04-22 DIAGNOSIS — R51.9 HEADACHE, UNSPECIFIED: ICD-10-CM

## 2025-04-22 DIAGNOSIS — Z98.890 OTHER SPECIFIED POSTPROCEDURAL STATES: Chronic | ICD-10-CM

## 2025-04-22 DIAGNOSIS — M54.12 RADICULOPATHY, CERVICAL REGION: ICD-10-CM

## 2025-04-22 DIAGNOSIS — Z90.49 ACQUIRED ABSENCE OF OTHER SPECIFIED PARTS OF DIGESTIVE TRACT: Chronic | ICD-10-CM

## 2025-04-22 PROCEDURE — 99212 OFFICE O/P EST SF 10 MIN: CPT

## 2025-04-22 PROCEDURE — G0463: CPT

## 2025-05-08 ENCOUNTER — APPOINTMENT (OUTPATIENT)
Dept: NEUROSURGERY | Facility: CLINIC | Age: 65
End: 2025-05-08
Payer: MEDICAID

## 2025-05-08 VITALS
WEIGHT: 210 LBS | SYSTOLIC BLOOD PRESSURE: 127 MMHG | TEMPERATURE: 97.2 F | BODY MASS INDEX: 34.99 KG/M2 | OXYGEN SATURATION: 96 % | DIASTOLIC BLOOD PRESSURE: 70 MMHG | HEART RATE: 76 BPM | HEIGHT: 65 IN

## 2025-05-08 DIAGNOSIS — M54.9 DORSALGIA, UNSPECIFIED: ICD-10-CM

## 2025-05-08 PROCEDURE — 99213 OFFICE O/P EST LOW 20 MIN: CPT

## 2025-05-09 PROBLEM — M54.9 PAIN IN BACK: Status: ACTIVE | Noted: 2025-05-09

## 2025-05-13 ENCOUNTER — APPOINTMENT (OUTPATIENT)
Dept: GASTROENTEROLOGY | Facility: HOSPITAL | Age: 65
End: 2025-05-13
Payer: MEDICAID

## 2025-05-13 ENCOUNTER — OUTPATIENT (OUTPATIENT)
Dept: OUTPATIENT SERVICES | Facility: HOSPITAL | Age: 65
LOS: 1 days | End: 2025-05-13
Payer: MEDICAID

## 2025-05-13 VITALS
BODY MASS INDEX: 33.32 KG/M2 | SYSTOLIC BLOOD PRESSURE: 109 MMHG | OXYGEN SATURATION: 98 % | DIASTOLIC BLOOD PRESSURE: 69 MMHG | WEIGHT: 200 LBS | RESPIRATION RATE: 16 BRPM | HEART RATE: 91 BPM | TEMPERATURE: 98 F | HEIGHT: 65 IN

## 2025-05-13 DIAGNOSIS — K21.9 GASTRO-ESOPHAGEAL REFLUX DISEASE WITHOUT ESOPHAGITIS: ICD-10-CM

## 2025-05-13 DIAGNOSIS — K21.9 GASTRO-ESOPHAGEAL REFLUX DISEASE W/OUT ESOPHAGITIS: ICD-10-CM

## 2025-05-13 DIAGNOSIS — Z90.710 ACQUIRED ABSENCE OF BOTH CERVIX AND UTERUS: Chronic | ICD-10-CM

## 2025-05-13 DIAGNOSIS — Z98.89 OTHER SPECIFIED POSTPROCEDURAL STATES: Chronic | ICD-10-CM

## 2025-05-13 DIAGNOSIS — Z90.49 ACQUIRED ABSENCE OF OTHER SPECIFIED PARTS OF DIGESTIVE TRACT: Chronic | ICD-10-CM

## 2025-05-13 DIAGNOSIS — Z98.890 OTHER SPECIFIED POSTPROCEDURAL STATES: Chronic | ICD-10-CM

## 2025-05-13 DIAGNOSIS — K59.00 CONSTIPATION, UNSPECIFIED: ICD-10-CM

## 2025-05-13 DIAGNOSIS — M54.16 RADICULOPATHY, LUMBAR REGION: ICD-10-CM

## 2025-05-13 DIAGNOSIS — M54.12 RADICULOPATHY, CERVICAL REGION: ICD-10-CM

## 2025-05-13 PROCEDURE — G0463: CPT

## 2025-05-13 PROCEDURE — 72052 X-RAY EXAM NECK SPINE 6/>VWS: CPT

## 2025-05-13 PROCEDURE — 72110 X-RAY EXAM L-2 SPINE 4/>VWS: CPT

## 2025-05-13 PROCEDURE — 72110 X-RAY EXAM L-2 SPINE 4/>VWS: CPT | Mod: 26

## 2025-05-13 PROCEDURE — 72052 X-RAY EXAM NECK SPINE 6/>VWS: CPT | Mod: 26

## 2025-05-13 PROCEDURE — 99213 OFFICE O/P EST LOW 20 MIN: CPT

## 2025-05-21 ENCOUNTER — OUTPATIENT (OUTPATIENT)
Dept: OUTPATIENT SERVICES | Facility: HOSPITAL | Age: 65
LOS: 1 days | End: 2025-05-21
Payer: MEDICAID

## 2025-05-21 ENCOUNTER — APPOINTMENT (OUTPATIENT)
Dept: ORTHOPEDIC SURGERY | Facility: HOSPITAL | Age: 65
End: 2025-05-21

## 2025-05-21 VITALS
WEIGHT: 200 LBS | RESPIRATION RATE: 16 BRPM | HEIGHT: 65 IN | BODY MASS INDEX: 33.32 KG/M2 | TEMPERATURE: 97 F | DIASTOLIC BLOOD PRESSURE: 80 MMHG | HEART RATE: 78 BPM | SYSTOLIC BLOOD PRESSURE: 137 MMHG

## 2025-05-21 DIAGNOSIS — Z98.89 OTHER SPECIFIED POSTPROCEDURAL STATES: Chronic | ICD-10-CM

## 2025-05-21 DIAGNOSIS — M25.519 PAIN IN UNSPECIFIED SHOULDER: ICD-10-CM

## 2025-05-21 DIAGNOSIS — Z90.49 ACQUIRED ABSENCE OF OTHER SPECIFIED PARTS OF DIGESTIVE TRACT: Chronic | ICD-10-CM

## 2025-05-21 DIAGNOSIS — Z98.890 OTHER SPECIFIED POSTPROCEDURAL STATES: Chronic | ICD-10-CM

## 2025-05-21 DIAGNOSIS — M25.511 PAIN IN RIGHT SHOULDER: ICD-10-CM

## 2025-05-21 DIAGNOSIS — Z90.710 ACQUIRED ABSENCE OF BOTH CERVIX AND UTERUS: Chronic | ICD-10-CM

## 2025-05-21 DIAGNOSIS — M79.609 PAIN IN UNSPECIFIED LIMB: ICD-10-CM

## 2025-05-21 PROCEDURE — G0463: CPT

## 2025-05-27 ENCOUNTER — RX RENEWAL (OUTPATIENT)
Age: 65
End: 2025-05-27

## 2025-06-06 RX ORDER — INSULIN GLARGINE 100 [IU]/ML
100 INJECTION, SOLUTION SUBCUTANEOUS
Qty: 1 | Refills: 3 | Status: ACTIVE | COMMUNITY
Start: 2025-06-05

## 2025-06-24 ENCOUNTER — NON-APPOINTMENT (OUTPATIENT)
Age: 65
End: 2025-06-24

## 2025-06-24 ENCOUNTER — APPOINTMENT (OUTPATIENT)
Dept: ENDOCRINOLOGY | Facility: CLINIC | Age: 65
End: 2025-06-24
Payer: MEDICAID

## 2025-06-24 VITALS
HEIGHT: 65 IN | HEART RATE: 72 BPM | DIASTOLIC BLOOD PRESSURE: 76 MMHG | WEIGHT: 210 LBS | BODY MASS INDEX: 34.99 KG/M2 | SYSTOLIC BLOOD PRESSURE: 114 MMHG | OXYGEN SATURATION: 97 %

## 2025-06-24 LAB
GLUCOSE BLDC GLUCOMTR-MCNC: 166
HBA1C MFR BLD HPLC: 7.3

## 2025-06-24 PROCEDURE — G0108 DIAB MANAGE TRN  PER INDIV: CPT

## 2025-06-24 PROCEDURE — 83036 HEMOGLOBIN GLYCOSYLATED A1C: CPT | Mod: QW

## 2025-06-24 PROCEDURE — 82962 GLUCOSE BLOOD TEST: CPT

## 2025-06-24 PROCEDURE — 95251 CONT GLUC MNTR ANALYSIS I&R: CPT

## 2025-07-02 ENCOUNTER — RESULT REVIEW (OUTPATIENT)
Age: 65
End: 2025-07-02

## 2025-07-02 ENCOUNTER — APPOINTMENT (OUTPATIENT)
Dept: ORTHOPEDIC SURGERY | Facility: HOSPITAL | Age: 65
End: 2025-07-02

## 2025-07-02 ENCOUNTER — OUTPATIENT (OUTPATIENT)
Dept: OUTPATIENT SERVICES | Facility: HOSPITAL | Age: 65
LOS: 1 days | End: 2025-07-02
Payer: MEDICAID

## 2025-07-02 VITALS
OXYGEN SATURATION: 98 % | BODY MASS INDEX: 34.99 KG/M2 | HEART RATE: 65 BPM | RESPIRATION RATE: 16 BRPM | TEMPERATURE: 97.5 F | WEIGHT: 210 LBS | SYSTOLIC BLOOD PRESSURE: 125 MMHG | HEIGHT: 65 IN | DIASTOLIC BLOOD PRESSURE: 77 MMHG

## 2025-07-02 DIAGNOSIS — Z98.89 OTHER SPECIFIED POSTPROCEDURAL STATES: Chronic | ICD-10-CM

## 2025-07-02 DIAGNOSIS — M79.9 SOFT TISSUE DISORDER, UNSPECIFIED: ICD-10-CM

## 2025-07-02 DIAGNOSIS — Z98.890 OTHER SPECIFIED POSTPROCEDURAL STATES: Chronic | ICD-10-CM

## 2025-07-02 DIAGNOSIS — Z90.710 ACQUIRED ABSENCE OF BOTH CERVIX AND UTERUS: Chronic | ICD-10-CM

## 2025-07-02 PROBLEM — M25.569 KNEE PAIN: Status: ACTIVE | Noted: 2025-07-02

## 2025-07-02 PROCEDURE — 73564 X-RAY EXAM KNEE 4 OR MORE: CPT

## 2025-07-02 PROCEDURE — 73564 X-RAY EXAM KNEE 4 OR MORE: CPT | Mod: 26,LT

## 2025-07-02 PROCEDURE — G0463: CPT

## 2025-07-03 DIAGNOSIS — M25.569 PAIN IN UNSPECIFIED KNEE: ICD-10-CM

## 2025-07-07 ENCOUNTER — NON-APPOINTMENT (OUTPATIENT)
Age: 65
End: 2025-07-07

## 2025-07-25 ENCOUNTER — APPOINTMENT (OUTPATIENT)
Dept: RHEUMATOLOGY | Facility: HOSPITAL | Age: 65
End: 2025-07-25
Payer: MEDICAID

## 2025-07-25 ENCOUNTER — OUTPATIENT (OUTPATIENT)
Dept: OUTPATIENT SERVICES | Facility: HOSPITAL | Age: 65
LOS: 1 days | End: 2025-07-25
Payer: MEDICAID

## 2025-07-25 ENCOUNTER — LABORATORY RESULT (OUTPATIENT)
Age: 65
End: 2025-07-25

## 2025-07-25 VITALS
BODY MASS INDEX: 34.99 KG/M2 | HEART RATE: 69 BPM | RESPIRATION RATE: 14 BRPM | SYSTOLIC BLOOD PRESSURE: 118 MMHG | TEMPERATURE: 97.3 F | HEIGHT: 65 IN | OXYGEN SATURATION: 98 % | DIASTOLIC BLOOD PRESSURE: 80 MMHG | WEIGHT: 210 LBS

## 2025-07-25 DIAGNOSIS — M75.100 UNSPECIFIED ROTATOR CUFF TEAR OR RUPTURE OF UNSPECIFIED SHOULDER, NOT SPECIFIED AS TRAUMATIC: ICD-10-CM

## 2025-07-25 DIAGNOSIS — Z90.710 ACQUIRED ABSENCE OF BOTH CERVIX AND UTERUS: Chronic | ICD-10-CM

## 2025-07-25 DIAGNOSIS — Z98.89 OTHER SPECIFIED POSTPROCEDURAL STATES: Chronic | ICD-10-CM

## 2025-07-25 DIAGNOSIS — E55.9 VITAMIN D DEFICIENCY, UNSPECIFIED: ICD-10-CM

## 2025-07-25 DIAGNOSIS — Z98.890 OTHER SPECIFIED POSTPROCEDURAL STATES: Chronic | ICD-10-CM

## 2025-07-25 DIAGNOSIS — M17.9 OSTEOARTHRITIS OF KNEE, UNSPECIFIED: ICD-10-CM

## 2025-07-25 DIAGNOSIS — Z90.49 ACQUIRED ABSENCE OF OTHER SPECIFIED PARTS OF DIGESTIVE TRACT: Chronic | ICD-10-CM

## 2025-07-25 DIAGNOSIS — M19.90 UNSPECIFIED OSTEOARTHRITIS, UNSPECIFIED SITE: ICD-10-CM

## 2025-07-25 DIAGNOSIS — M06.9 RHEUMATOID ARTHRITIS, UNSPECIFIED: ICD-10-CM

## 2025-07-25 DIAGNOSIS — M79.7 FIBROMYALGIA: ICD-10-CM

## 2025-07-25 PROCEDURE — G0463: CPT

## 2025-07-25 PROCEDURE — 99214 OFFICE O/P EST MOD 30 MIN: CPT | Mod: GC

## 2025-07-27 LAB
BASOPHILS # BLD AUTO: 0.09 K/UL
BASOPHILS NFR BLD AUTO: 1.1 %
EOSINOPHIL # BLD AUTO: 0.35 K/UL
EOSINOPHIL NFR BLD AUTO: 4.2 %
HCT VFR BLD CALC: 39.8 %
HGB BLD-MCNC: 12.6 G/DL
IMM GRANULOCYTES NFR BLD AUTO: 0.6 %
LYMPHOCYTES # BLD AUTO: 2.43 K/UL
LYMPHOCYTES NFR BLD AUTO: 29.3 %
MAN DIFF?: NORMAL
MCHC RBC-ENTMCNC: 25.9 PG
MCHC RBC-ENTMCNC: 31.7 G/DL
MCV RBC AUTO: 81.9 FL
MONOCYTES # BLD AUTO: 0.77 K/UL
MONOCYTES NFR BLD AUTO: 9.3 %
NEUTROPHILS # BLD AUTO: 4.6 K/UL
NEUTROPHILS NFR BLD AUTO: 55.5 %
PLATELET # BLD AUTO: 257 K/UL
RBC # BLD: 4.86 M/UL
RBC # FLD: 15.2 %
WBC # FLD AUTO: 8.29 K/UL

## 2025-07-29 DIAGNOSIS — M19.90 UNSPECIFIED OSTEOARTHRITIS, UNSPECIFIED SITE: ICD-10-CM

## 2025-07-29 DIAGNOSIS — M75.100 UNSPECIFIED ROTATOR CUFF TEAR OR RUPTURE OF UNSPECIFIED SHOULDER, NOT SPECIFIED AS TRAUMATIC: ICD-10-CM

## 2025-07-29 DIAGNOSIS — E55.9 VITAMIN D DEFICIENCY, UNSPECIFIED: ICD-10-CM

## 2025-07-29 DIAGNOSIS — M79.7 FIBROMYALGIA: ICD-10-CM

## 2025-07-29 DIAGNOSIS — M17.9 OSTEOARTHRITIS OF KNEE, UNSPECIFIED: ICD-10-CM

## 2025-08-05 ENCOUNTER — APPOINTMENT (OUTPATIENT)
Dept: ENDOCRINOLOGY | Facility: CLINIC | Age: 65
End: 2025-08-05
Payer: MEDICAID

## 2025-08-05 VITALS
HEIGHT: 65 IN | DIASTOLIC BLOOD PRESSURE: 83 MMHG | OXYGEN SATURATION: 97 % | HEART RATE: 81 BPM | SYSTOLIC BLOOD PRESSURE: 134 MMHG

## 2025-08-05 DIAGNOSIS — E11.9 TYPE 2 DIABETES MELLITUS W/OUT COMPLICATIONS: ICD-10-CM

## 2025-08-05 DIAGNOSIS — M85.89 OTHER SPECIFIED DISORDERS OF BONE DENSITY AND STRUCTURE, MULTIPLE SITES: ICD-10-CM

## 2025-08-05 DIAGNOSIS — E78.00 PURE HYPERCHOLESTEROLEMIA, UNSPECIFIED: ICD-10-CM

## 2025-08-05 DIAGNOSIS — E04.2 NONTOXIC MULTINODULAR GOITER: ICD-10-CM

## 2025-08-05 PROCEDURE — 99214 OFFICE O/P EST MOD 30 MIN: CPT | Mod: 25

## 2025-08-05 PROCEDURE — 95251 CONT GLUC MNTR ANALYSIS I&R: CPT

## 2025-08-06 LAB
ALBUMIN SERPL ELPH-MCNC: 4.2 G/DL
ALBUMIN, RANDOM URINE: <1.2 MG/DL
ALP BLD-CCNC: 118 U/L
ALT SERPL-CCNC: 18 U/L
ANION GAP SERPL CALC-SCNC: 16 MMOL/L
AST SERPL-CCNC: 17 U/L
BILIRUB SERPL-MCNC: 0.4 MG/DL
BUN SERPL-MCNC: 15 MG/DL
CALCIUM SERPL-MCNC: 9.6 MG/DL
CHLORIDE SERPL-SCNC: 102 MMOL/L
CHOLEST SERPL-MCNC: 193 MG/DL
CO2 SERPL-SCNC: 19 MMOL/L
CREAT SERPL-MCNC: 0.53 MG/DL
CREAT SPEC-SCNC: 88 MG/DL
EGFRCR SERPLBLD CKD-EPI 2021: 103 ML/MIN/1.73M2
GLUCOSE SERPL-MCNC: 185 MG/DL
HDLC SERPL-MCNC: 56 MG/DL
LDLC SERPL-MCNC: 106 MG/DL
MICROALBUMIN/CREAT 24H UR-RTO: NORMAL MG/G
NONHDLC SERPL-MCNC: 137 MG/DL
POTASSIUM SERPL-SCNC: 4.3 MMOL/L
PROT SERPL-MCNC: 6.8 G/DL
SODIUM SERPL-SCNC: 137 MMOL/L
T4 FREE SERPL-MCNC: 1 NG/DL
TRIGL SERPL-MCNC: 180 MG/DL
TSH SERPL-ACNC: 2.34 UIU/ML

## 2025-08-12 ENCOUNTER — EMERGENCY (EMERGENCY)
Facility: HOSPITAL | Age: 65
LOS: 1 days | End: 2025-08-12
Attending: STUDENT IN AN ORGANIZED HEALTH CARE EDUCATION/TRAINING PROGRAM
Payer: MEDICARE

## 2025-08-12 VITALS
SYSTOLIC BLOOD PRESSURE: 155 MMHG | TEMPERATURE: 98 F | WEIGHT: 210.1 LBS | HEIGHT: 65 IN | HEART RATE: 74 BPM | DIASTOLIC BLOOD PRESSURE: 84 MMHG | RESPIRATION RATE: 16 BRPM | OXYGEN SATURATION: 96 %

## 2025-08-12 DIAGNOSIS — Z98.89 OTHER SPECIFIED POSTPROCEDURAL STATES: Chronic | ICD-10-CM

## 2025-08-12 DIAGNOSIS — Z90.710 ACQUIRED ABSENCE OF BOTH CERVIX AND UTERUS: Chronic | ICD-10-CM

## 2025-08-12 DIAGNOSIS — Z98.890 OTHER SPECIFIED POSTPROCEDURAL STATES: Chronic | ICD-10-CM

## 2025-08-12 DIAGNOSIS — Z90.49 ACQUIRED ABSENCE OF OTHER SPECIFIED PARTS OF DIGESTIVE TRACT: Chronic | ICD-10-CM

## 2025-08-12 PROCEDURE — 99283 EMERGENCY DEPT VISIT LOW MDM: CPT

## 2025-08-12 PROCEDURE — 99283 EMERGENCY DEPT VISIT LOW MDM: CPT | Mod: GC

## 2025-08-12 RX ORDER — AMOXICILLIN AND CLAVULANATE POTASSIUM 500; 125 MG/1; MG/1
1 TABLET, FILM COATED ORAL
Qty: 14 | Refills: 0
Start: 2025-08-12 | End: 2025-08-18

## 2025-08-13 ENCOUNTER — APPOINTMENT (OUTPATIENT)
Age: 65
End: 2025-08-13

## 2025-08-14 ENCOUNTER — APPOINTMENT (OUTPATIENT)
Age: 65
End: 2025-08-14
Payer: MEDICAID

## 2025-08-14 PROCEDURE — D0330 PANORAMIC RADIOGRAPHIC IMAGE: CPT

## 2025-08-14 PROCEDURE — D0140: CPT

## 2025-08-18 ENCOUNTER — NON-APPOINTMENT (OUTPATIENT)
Age: 65
End: 2025-08-18

## 2025-08-19 ENCOUNTER — OUTPATIENT (OUTPATIENT)
Dept: OUTPATIENT SERVICES | Facility: HOSPITAL | Age: 65
LOS: 1 days | End: 2025-08-19
Payer: MEDICAID

## 2025-08-19 ENCOUNTER — APPOINTMENT (OUTPATIENT)
Dept: NEUROLOGY | Facility: HOSPITAL | Age: 65
End: 2025-08-19
Payer: MEDICAID

## 2025-08-19 VITALS
OXYGEN SATURATION: 98 % | RESPIRATION RATE: 16 BRPM | HEART RATE: 79 BPM | SYSTOLIC BLOOD PRESSURE: 123 MMHG | DIASTOLIC BLOOD PRESSURE: 76 MMHG | TEMPERATURE: 98 F | HEIGHT: 65 IN | BODY MASS INDEX: 34.99 KG/M2 | WEIGHT: 210 LBS

## 2025-08-19 DIAGNOSIS — R51.9 HEADACHE, UNSPECIFIED: ICD-10-CM

## 2025-08-19 DIAGNOSIS — Z98.890 OTHER SPECIFIED POSTPROCEDURAL STATES: Chronic | ICD-10-CM

## 2025-08-19 DIAGNOSIS — Z98.89 OTHER SPECIFIED POSTPROCEDURAL STATES: Chronic | ICD-10-CM

## 2025-08-19 DIAGNOSIS — Z90.710 ACQUIRED ABSENCE OF BOTH CERVIX AND UTERUS: Chronic | ICD-10-CM

## 2025-08-19 DIAGNOSIS — M54.9 DORSALGIA, UNSPECIFIED: ICD-10-CM

## 2025-08-19 PROCEDURE — G0463: CPT

## 2025-08-19 PROCEDURE — 99212 OFFICE O/P EST SF 10 MIN: CPT

## 2025-08-20 ENCOUNTER — APPOINTMENT (OUTPATIENT)
Age: 65
End: 2025-08-20

## 2025-08-28 ENCOUNTER — APPOINTMENT (OUTPATIENT)
Age: 65
End: 2025-08-28
Payer: MEDICAID

## 2025-08-28 PROCEDURE — D9110: CPT

## 2025-09-02 ENCOUNTER — APPOINTMENT (OUTPATIENT)
Dept: GASTROENTEROLOGY | Facility: HOSPITAL | Age: 65
End: 2025-09-02

## 2025-09-03 ENCOUNTER — APPOINTMENT (OUTPATIENT)
Age: 65
End: 2025-09-03
Payer: MEDICAID

## 2025-09-03 PROCEDURE — D0210: CPT

## 2025-09-03 PROCEDURE — D0150: CPT

## (undated) DEVICE — ELCTR GROUNDING PAD ADULT COVIDIEN

## (undated) DEVICE — SOL INJ NS 0.9% 500ML 2 PORT

## (undated) DEVICE — PACK IV START WITH CHG

## (undated) DEVICE — CATH IV SAFE BC 20G X 1.16" (PINK)

## (undated) DEVICE — SUCTION YANKAUER NO CONTROL VENT

## (undated) DEVICE — XI DRAPE ARM

## (undated) DEVICE — GLV 7.5 PROTEXIS (WHITE)

## (undated) DEVICE — XI SEAL UNIV 5- 8 MM

## (undated) DEVICE — DRAPE SPLIT SHEET 77" X 120"

## (undated) DEVICE — SUT VLOC 180 2-0 6" GS-22 GREEN

## (undated) DEVICE — SENSOR O2 FINGER ADULT 24/CA

## (undated) DEVICE — TUBING SUCTION 20FT

## (undated) DEVICE — POLY TRAP ETRAP

## (undated) DEVICE — D HELP - CLEARVIEW CLEARIFY SYSTEM

## (undated) DEVICE — FORCEP RADIAL JAW 4 JUMBO 2.8MM 3.2MM 240CM ORANGE DISP

## (undated) DEVICE — TUBING IV SET GRAVITY 3Y 100" MACRO

## (undated) DEVICE — XI TIP COVER

## (undated) DEVICE — POSITIONER PURPLE ARM ONE STEP (LARGE)

## (undated) DEVICE — PACK GENERAL LAPAROSCOPY

## (undated) DEVICE — NDL HYPO SAFE 22G X 1.5" (BLACK)

## (undated) DEVICE — FORCEP RADIAL JAW 4 W NDL 2.4MM 2.8MM 240CM ORANGE DISP

## (undated) DEVICE — IRRIGATOR BIO SHIELD

## (undated) DEVICE — SYR LUER LOK 50CC

## (undated) DEVICE — FOLEY HOLDER STATLOCK 2 WAY ADULT

## (undated) DEVICE — Device

## (undated) DEVICE — XI DRAPE COLUMN

## (undated) DEVICE — DRAPE 3/4 SHEET 52X76"

## (undated) DEVICE — CLAMP BX HOT RAD JAW 3

## (undated) DEVICE — XI OBTURATOR OPTICAL BLADELESS 8MM

## (undated) DEVICE — POSITIONER PINK PAD PIGAZZI SYSTEM

## (undated) DEVICE — TUBING SUCTION CONN 6FT STERILE

## (undated) DEVICE — BIOPSY FORCEP RADIAL JAW 4 STANDARD WITH NEEDLE

## (undated) DEVICE — BRUSH COLONOSCOPY CYTOLOGY

## (undated) DEVICE — CATH IV SAFE BC 22G X 1" (BLUE)